# Patient Record
Sex: MALE | Race: WHITE | NOT HISPANIC OR LATINO | Employment: OTHER | URBAN - NONMETROPOLITAN AREA
[De-identification: names, ages, dates, MRNs, and addresses within clinical notes are randomized per-mention and may not be internally consistent; named-entity substitution may affect disease eponyms.]

---

## 2017-01-02 ENCOUNTER — TELEPHONE (OUTPATIENT)
Dept: URGENT CARE | Facility: CLINIC | Age: 52
End: 2017-01-02

## 2017-01-02 NOTE — TELEPHONE ENCOUNTER
Patient called stating he still isn't completely better.  Per Marya I advised the patient that he should be seen either here or by his PCP due to his heart history.  Marya stated the patient should still have abx left, the patient advised me that he had taken more than one tablet a day.    Gregoria Freeman RN 1/2/2017 2:02 PM

## 2017-01-09 RX ORDER — TRAZODONE HYDROCHLORIDE 100 MG/1
TABLET ORAL
Qty: 30 TABLET | Refills: 0 | Status: SHIPPED | OUTPATIENT
Start: 2017-01-09 | End: 2017-01-31 | Stop reason: SDUPTHER

## 2017-01-30 ENCOUNTER — TELEPHONE (OUTPATIENT)
Dept: INTERNAL MEDICINE | Facility: CLINIC | Age: 52
End: 2017-01-30

## 2017-01-30 DIAGNOSIS — E03.9 HYPOTHYROIDISM IN ADULT: Primary | ICD-10-CM

## 2017-01-30 DIAGNOSIS — K21.9 GASTROESOPHAGEAL REFLUX DISEASE WITHOUT ESOPHAGITIS: ICD-10-CM

## 2017-01-30 RX ORDER — LEVOTHYROXINE SODIUM 0.05 MG/1
50 TABLET ORAL DAILY
Qty: 30 TABLET | Refills: 3 | Status: CANCELLED | OUTPATIENT
Start: 2017-01-30

## 2017-01-30 RX ORDER — ESOMEPRAZOLE MAGNESIUM 40 MG/1
40 CAPSULE, DELAYED RELEASE ORAL
Qty: 30 CAPSULE | Refills: 5 | Status: SHIPPED | OUTPATIENT
Start: 2017-01-30 | End: 2017-08-08 | Stop reason: SDUPTHER

## 2017-01-30 RX ORDER — ESOMEPRAZOLE MAGNESIUM 40 MG/1
40 CAPSULE, DELAYED RELEASE ORAL
Qty: 30 CAPSULE | Refills: 3 | Status: CANCELLED | OUTPATIENT
Start: 2017-01-30

## 2017-01-30 RX ORDER — LEVOTHYROXINE SODIUM 0.05 MG/1
50 TABLET ORAL DAILY
Qty: 30 TABLET | Refills: 5 | Status: SHIPPED | OUTPATIENT
Start: 2017-01-30 | End: 2017-08-08 | Stop reason: SDUPTHER

## 2017-01-31 RX ORDER — TRAZODONE HYDROCHLORIDE 100 MG/1
TABLET ORAL
Qty: 30 TABLET | Refills: 0 | Status: SHIPPED | OUTPATIENT
Start: 2017-01-31 | End: 2017-02-27 | Stop reason: SDUPTHER

## 2017-01-31 RX ORDER — ESOMEPRAZOLE MAGNESIUM 40 MG/1
1 CAPSULE, DELAYED RELEASE ORAL DAILY
COMMUNITY
Start: 2016-01-13 | End: 2017-01-31 | Stop reason: SDUPTHER

## 2017-02-20 ENCOUNTER — OFFICE VISIT (OUTPATIENT)
Dept: INTERNAL MEDICINE | Facility: CLINIC | Age: 52
End: 2017-02-20

## 2017-02-20 ENCOUNTER — LAB (OUTPATIENT)
Dept: LAB | Facility: HOSPITAL | Age: 52
End: 2017-02-20

## 2017-02-20 VITALS
SYSTOLIC BLOOD PRESSURE: 150 MMHG | HEIGHT: 68 IN | WEIGHT: 247.5 LBS | OXYGEN SATURATION: 98 % | DIASTOLIC BLOOD PRESSURE: 108 MMHG | RESPIRATION RATE: 16 BRPM | HEART RATE: 102 BPM | BODY MASS INDEX: 37.51 KG/M2

## 2017-02-20 DIAGNOSIS — I10 ESSENTIAL HYPERTENSION: ICD-10-CM

## 2017-02-20 DIAGNOSIS — R51.9 CHRONIC NONINTRACTABLE HEADACHE, UNSPECIFIED HEADACHE TYPE: Primary | ICD-10-CM

## 2017-02-20 DIAGNOSIS — E03.9 ACQUIRED HYPOTHYROIDISM: ICD-10-CM

## 2017-02-20 DIAGNOSIS — M25.50 ARTHRALGIA, UNSPECIFIED JOINT: ICD-10-CM

## 2017-02-20 DIAGNOSIS — G89.29 CHRONIC NONINTRACTABLE HEADACHE, UNSPECIFIED HEADACHE TYPE: Primary | ICD-10-CM

## 2017-02-20 DIAGNOSIS — I50.22 CHRONIC SYSTOLIC HEART FAILURE (HCC): ICD-10-CM

## 2017-02-20 DIAGNOSIS — R41.3 MEMORY DEFICIT: ICD-10-CM

## 2017-02-20 DIAGNOSIS — R79.89 ELEVATED LFTS: ICD-10-CM

## 2017-02-20 LAB
ALBUMIN SERPL-MCNC: 4.1 G/DL (ref 3.5–5)
ALBUMIN/GLOB SERPL: 1.4 G/DL (ref 1.1–2.5)
ALP SERPL-CCNC: 92 U/L (ref 24–120)
ALT SERPL W P-5'-P-CCNC: 50 U/L (ref 0–54)
ANION GAP SERPL CALCULATED.3IONS-SCNC: 10 MMOL/L (ref 4–13)
APAP SERPL-MCNC: <10 MCG/ML (ref 10–30)
ARTICHOKE IGE QN: 60 MG/DL (ref 0–99)
AST SERPL-CCNC: 38 U/L (ref 7–45)
BILIRUB SERPL-MCNC: 0.4 MG/DL (ref 0.1–1)
BUN BLD-MCNC: 12 MG/DL (ref 5–21)
BUN/CREAT SERPL: 12.2 (ref 7–25)
CALCIUM SPEC-SCNC: 9.1 MG/DL (ref 8.4–10.4)
CHLORIDE SERPL-SCNC: 102 MMOL/L (ref 98–110)
CHOLEST SERPL-MCNC: 150 MG/DL (ref 130–200)
CO2 SERPL-SCNC: 29 MMOL/L (ref 24–31)
CREAT BLD-MCNC: 0.98 MG/DL (ref 0.5–1.4)
FERRITIN SERPL-MCNC: 81.7 NG/ML (ref 17.9–464)
GFR SERPL CREATININE-BSD FRML MDRD: 80 ML/MIN/1.73
GLOBULIN UR ELPH-MCNC: 2.9 GM/DL
GLUCOSE BLD-MCNC: 114 MG/DL (ref 70–100)
HDLC SERPL-MCNC: 32 MG/DL
LDLC/HDLC SERPL: ABNORMAL {RATIO}
POTASSIUM BLD-SCNC: 3.7 MMOL/L (ref 3.5–5.3)
PROT SERPL-MCNC: 7 G/DL (ref 6.3–8.7)
SODIUM BLD-SCNC: 141 MMOL/L (ref 135–145)
TRIGL SERPL-MCNC: 475 MG/DL (ref 0–149)
TSH SERPL DL<=0.05 MIU/L-ACNC: 3.48 MIU/ML (ref 0.47–4.68)

## 2017-02-20 PROCEDURE — 82728 ASSAY OF FERRITIN: CPT | Performed by: INTERNAL MEDICINE

## 2017-02-20 PROCEDURE — 99204 OFFICE O/P NEW MOD 45 MIN: CPT | Performed by: INTERNAL MEDICINE

## 2017-02-20 PROCEDURE — 86038 ANTINUCLEAR ANTIBODIES: CPT | Performed by: INTERNAL MEDICINE

## 2017-02-20 PROCEDURE — 83516 IMMUNOASSAY NONANTIBODY: CPT | Performed by: INTERNAL MEDICINE

## 2017-02-20 PROCEDURE — 36415 COLL VENOUS BLD VENIPUNCTURE: CPT

## 2017-02-20 PROCEDURE — 80061 LIPID PANEL: CPT | Performed by: INTERNAL MEDICINE

## 2017-02-20 PROCEDURE — 80053 COMPREHEN METABOLIC PANEL: CPT | Performed by: INTERNAL MEDICINE

## 2017-02-20 PROCEDURE — 80307 DRUG TEST PRSMV CHEM ANLYZR: CPT | Performed by: INTERNAL MEDICINE

## 2017-02-20 PROCEDURE — 84443 ASSAY THYROID STIM HORMONE: CPT | Performed by: INTERNAL MEDICINE

## 2017-02-20 RX ORDER — VALSARTAN 160 MG/1
160 TABLET ORAL DAILY
Qty: 30 TABLET | Refills: 5 | Status: SHIPPED | OUTPATIENT
Start: 2017-02-20 | End: 2017-08-03 | Stop reason: SDUPTHER

## 2017-02-20 RX ORDER — AMITRIPTYLINE HYDROCHLORIDE 100 MG/1
100 TABLET, FILM COATED ORAL 2 TIMES DAILY
COMMUNITY
End: 2019-02-19

## 2017-02-20 RX ORDER — AMITRIPTYLINE HYDROCHLORIDE 25 MG/1
50 TABLET, FILM COATED ORAL NIGHTLY
COMMUNITY
End: 2017-06-21

## 2017-02-20 NOTE — PROGRESS NOTES
"CC: Memory issues    History:  Ziyad Wilson is a 51 y.o. male who presents today for evaluation of the above problems.  He reports he has been doing reasonably well without any acute illness.  He recently tried to start a new job as a , but was unable to perform the necessary tasks.  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  He feels he had done very well during training and was confident starting this job, though he reports it as a \"complete disaster.\"  He has been performing his usual activities at home and has not noticed any memory issues there.  He states he is dyslexic and has always had trouble with learning, though he had been very confident prior to this job.  He denies any syncope, mental status change, or falls.  He has not noticed any weakness, vision changes or other focal symptoms.  He also has a history of chronic headaches, for which he has been receiving Fioricet from Dr. haq and also receiving injections a few times a year.  He reports the injections helped for about a month and then he requires medication again.  He has a history of MRI of the C-spine performed in 2014 at [facility], which was reviewed and showed only degenerative changes.  He has never had any imaging of his head to this point that is available to me.  He reports he takes Tylenol and other over-the-counter pain medications in addition to his Fioricet related to his headaches and other arthralgias.  He also wonders if his thyroid medication needs adjustment as he notes he has gained weight.  He reports this is ongoing worsening of his joint pains, especially in his knees.  However, he notes no other symptoms related to his thyroid that he cannot specify.    ROS:  Review of Systems   Constitutional: Negative for chills and fever.   HENT: Negative for congestion and sore throat.    Eyes: Negative for visual disturbance.   Respiratory: Negative for cough and shortness " of breath.    Cardiovascular: Negative for chest pain and palpitations.   Gastrointestinal: Negative for abdominal pain, constipation and nausea.   Endocrine: Negative for cold intolerance and heat intolerance.   Genitourinary: Negative for difficulty urinating and frequency.   Musculoskeletal: Positive for arthralgias, back pain and neck pain. Negative for gait problem.   Skin: Negative for rash.   Neurological: Positive for headaches. Negative for dizziness, syncope and weakness.   Psychiatric/Behavioral: Negative for dysphoric mood. The patient is not nervous/anxious.        No Known Allergies  Past Medical History   Diagnosis Date   • Asthma    • Cardiomyopathy      non-ischemic   • CHF (congestive heart failure)    • Coronary artery disease    • GERD (gastroesophageal reflux disease)    • Headache    • Hyperlipemia, mixed    • Hypertension      benign   • Hypothyroidism    • Sleep apnea    • SOB (shortness of breath)      Past Surgical History   Procedure Laterality Date   • Colostomy       with colostomy reversal   • Appendectomy     • Cardiac catheterization  09/2003   • Bladder repair       Family History   Problem Relation Age of Onset   • Hypertension Mother    • Diabetes Mother    • Diabetes Father    • Hypertension Father    • Hypertension Sister    • Hypertension Brother       reports that he has never smoked. He does not have any smokeless tobacco history on file. He reports that he drinks alcohol. He reports that he does not use illicit drugs.      Current Outpatient Prescriptions:   •  albuterol (PROAIR HFA) 108 (90 BASE) MCG/ACT inhaler, Inhale as needed., Disp: , Rfl:   •  albuterol (PROVENTIL HFA;VENTOLIN HFA) 108 (90 BASE) MCG/ACT inhaler, Inhale as needed., Disp: , Rfl:   •  amitriptyline (ELAVIL) 100 MG tablet, Take 100 mg by mouth 3 (Three) Times a Day., Disp: , Rfl:   •  amitriptyline (ELAVIL) 25 MG tablet, Take 25 mg by mouth Every Night., Disp: , Rfl:   •  aspirin 325 MG tablet, daily.,  "Disp: , Rfl:   •  carvedilol (COREG) 25 MG tablet, Take 2 tablets by mouth 2 (two) times a day., Disp: 120 tablet, Rfl: 11  •  esomeprazole (NEXIUM) 40 MG capsule, Take 1 capsule by mouth Every Morning Before Breakfast., Disp: 30 capsule, Rfl: 5  •  fluticasone (FLONASE) 50 MCG/ACT nasal spray, 2 sprays into each nostril Daily. Administer 2 sprays in each nostril for each dose., Disp: 1 each, Rfl: 0  •  fluticasone-salmeterol (ADVAIR) 100-50 MCG/DOSE DISKUS, Inhale 1 puff 2 (two) times a day., Disp: , Rfl:   •  furosemide (LASIX) 40 MG tablet, TAKE ONE TABLET BY MOUTH TWICE DAILY AS NEEDED, Disp: 60 tablet, Rfl: 11  •  guaiFENesin (MUCINEX) 600 MG 12 hr tablet, Take 1 tablet by mouth 2 (Two) Times a Day., Disp: 20 tablet, Rfl: 0  •  levothyroxine (SYNTHROID, LEVOTHROID) 50 MCG tablet, Take 1 tablet by mouth Daily., Disp: 30 tablet, Rfl: 5  •  nitroglycerin (NITROSTAT) 0.4 MG SL tablet, Place 0.4 mg under the tongue as needed. Take no more than 3 doses in 15 minutes., Disp: , Rfl:   •  sertraline (ZOLOFT) 50 MG tablet, Take 50 mg by mouth 3 (three) times a day., Disp: , Rfl:   •  simvastatin (ZOCOR) 40 MG tablet, TAKE ONE TABLET BY MOUTH ONCE DAILY, Disp: 30 tablet, Rfl: 11  •  spironolactone (ALDACTONE) 25 MG tablet, TAKE ONE TABLET BY MOUTH ONCE DAILY, Disp: 30 tablet, Rfl: 6  •  traZODone (DESYREL) 100 MG tablet, TAKE ONE-HALF TO ONE TABLET BY MOUTH AT BEDTIME AS NEEDED FOR SLEEP, Disp: 30 tablet, Rfl: 0  •  valsartan (DIOVAN) 80 MG tablet, TAKE ONE TABLET BY MOUTH ONCE DAILY, Disp: 30 tablet, Rfl: 11    OBJECTIVE:  Visit Vitals   • BP (!) 150/108 (BP Location: Left arm, Patient Position: Sitting, Cuff Size: Large Adult)   • Pulse 102   • Resp 16   • Ht 68\" (172.7 cm)   • Wt 247 lb 8 oz (112 kg)   • SpO2 98%   • BMI 37.63 kg/m2      Physical Exam   Constitutional: He is oriented to person, place, and time. He appears well-developed and well-nourished. No distress.   HENT:   Head: Normocephalic and atraumatic. "   Right Ear: External ear normal.   Left Ear: External ear normal.   Nose: Nose normal.   Mouth/Throat: Oropharynx is clear and moist. No oropharyngeal exudate.   Eyes: EOM are normal. No scleral icterus.   Neck: Normal range of motion. Neck supple.   Cardiovascular: Normal rate, regular rhythm and normal heart sounds.    No murmur heard.  Pulmonary/Chest: Effort normal and breath sounds normal. No accessory muscle usage. No respiratory distress. He has no wheezes.   Abdominal: Soft. Bowel sounds are normal. He exhibits no distension. There is no tenderness.   Musculoskeletal: Normal range of motion. He exhibits no edema.   Lymphadenopathy:     He has no cervical adenopathy.   Neurological: He is alert and oriented to person, place, and time. No cranial nerve deficit. Coordination and gait normal.   Skin: Skin is warm and dry. No cyanosis. Nails show no clubbing.   No jaundice   Psychiatric: He has a normal mood and affect. His mood appears not anxious. He does not exhibit a depressed mood.       Assessment/Plan    Diagnoses and all orders for this visit:    Chronic nonintractable headache, unspecified headache type  Memory deficit  -     CT Head Without Contrast; Future  We will perform CT of the head without contrast for further evaluation given his chronic headaches with no history of imaging to my knowledge.  It seems these memory problems were primarily centered around a new job, but he has not had any issues with other executive function or ADLs.  I reviewed his medications carefully, as many of his previous medications could affect his cognitive status.  However, it seems most of these, with the exception of Zoloft, trazodone and Elavil are past medications.  We will await results of imaging and plan to follow-up in one month.  If symptoms are persisting, we will consider performance of a MoCA test in the office.    Chronic systolic heart failure  -     Comprehensive Metabolic Panel; Future  Stable on beta  blocker, ARB, and spironolactone.  Follows with Dr. Lagos.    Essential hypertension  -     valsartan (DIOVAN) 160 MG tablet; Take 1 tablet by mouth Daily.  -     Comprehensive Metabolic Panel; Future  -     Lipid Panel; Future  Poorly controlled, BP goal for age is <140/90 per JNC 8 guidelines and We will double Diovan to 160 mg daily.    Elevated LFTs  -     Comprehensive Metabolic Panel; Future  -     Lipid Panel; Future  -     Acetaminophen Level; Future  -     KORI; Future  -     Anti-Smooth Muscle Antibody; Future  -     Mitochondrial Antibodies, M2; Future  -     Ferritin; Future  It seems this is been a chronic issue, though he has had only a hepatitis panel related to this.  He does take Tylenol on a regular basis, though he is unsure to tell me exactly how much.  As such, we will perform the above laboratories to further evaluate his elevated liver chemistries.  He drinks only on occasion and his hepatitis panel was negative.    Acquired hypothyroidism  -     TSH; Future  We will continue his current dose of Synthroid and check TSH for further evaluation of the need to adjust his pharmacologic replacement.    Arthralgia, unspecified joint   -     Acetaminophen Level; Future        An After Visit Summary was printed and given to the patient at discharge.  Return in about 1 month (around 3/20/2017) for Recheck with RHETT.         Ashish Thurman D.O. 2/20/2017

## 2017-02-21 ENCOUNTER — HOSPITAL ENCOUNTER (OUTPATIENT)
Dept: CT IMAGING | Facility: HOSPITAL | Age: 52
Discharge: HOME | End: 2017-02-21
Admitting: INTERNAL MEDICINE

## 2017-02-21 DIAGNOSIS — R51.9 CHRONIC NONINTRACTABLE HEADACHE, UNSPECIFIED HEADACHE TYPE: ICD-10-CM

## 2017-02-21 DIAGNOSIS — G89.29 CHRONIC NONINTRACTABLE HEADACHE, UNSPECIFIED HEADACHE TYPE: ICD-10-CM

## 2017-02-21 LAB
ANA SER QL: NEGATIVE
DEPRECATED MITOCHONDRIA M2 IGG SER-ACNC: <20 UNITS (ref 0–20)

## 2017-02-21 PROCEDURE — 70450 CT HEAD/BRAIN W/O DYE: CPT

## 2017-02-27 RX ORDER — TRAZODONE HYDROCHLORIDE 100 MG/1
TABLET ORAL
Qty: 30 TABLET | Refills: 3 | Status: SHIPPED | OUTPATIENT
Start: 2017-02-27 | End: 2017-06-21 | Stop reason: SDUPTHER

## 2017-03-20 ENCOUNTER — OFFICE VISIT (OUTPATIENT)
Dept: INTERNAL MEDICINE | Facility: CLINIC | Age: 52
End: 2017-03-20

## 2017-03-20 VITALS
WEIGHT: 230.3 LBS | OXYGEN SATURATION: 97 % | RESPIRATION RATE: 16 BRPM | BODY MASS INDEX: 34.9 KG/M2 | DIASTOLIC BLOOD PRESSURE: 80 MMHG | SYSTOLIC BLOOD PRESSURE: 118 MMHG | HEART RATE: 116 BPM | HEIGHT: 68 IN

## 2017-03-20 DIAGNOSIS — E66.09 NON MORBID OBESITY DUE TO EXCESS CALORIES: ICD-10-CM

## 2017-03-20 DIAGNOSIS — I50.22 CHRONIC SYSTOLIC HEART FAILURE (HCC): ICD-10-CM

## 2017-03-20 DIAGNOSIS — R79.89 ELEVATED LFTS: ICD-10-CM

## 2017-03-20 DIAGNOSIS — I25.10 CORONARY ARTERY DISEASE INVOLVING NATIVE CORONARY ARTERY OF NATIVE HEART WITHOUT ANGINA PECTORIS: ICD-10-CM

## 2017-03-20 DIAGNOSIS — G31.84 MILD COGNITIVE IMPAIRMENT: ICD-10-CM

## 2017-03-20 DIAGNOSIS — I10 ESSENTIAL HYPERTENSION: Primary | ICD-10-CM

## 2017-03-20 PROBLEM — R51.9 HEADACHE DISORDER: Status: ACTIVE | Noted: 2017-03-07

## 2017-03-20 PROCEDURE — 99214 OFFICE O/P EST MOD 30 MIN: CPT | Performed by: INTERNAL MEDICINE

## 2017-03-20 RX ORDER — MULTIPLE VITAMINS W/ MINERALS TAB 9MG-400MCG
1 TAB ORAL DAILY
COMMUNITY

## 2017-03-20 RX ORDER — GABAPENTIN 100 MG/1
400 CAPSULE ORAL 4 TIMES DAILY
COMMUNITY
End: 2019-04-29

## 2017-03-20 RX ORDER — ATORVASTATIN CALCIUM 40 MG/1
40 TABLET, FILM COATED ORAL DAILY
Qty: 30 TABLET | Refills: 5 | Status: SHIPPED | OUTPATIENT
Start: 2017-03-20 | End: 2017-08-30 | Stop reason: SDUPTHER

## 2017-03-20 RX ORDER — QUETIAPINE FUMARATE 50 MG/1
50 TABLET, FILM COATED ORAL NIGHTLY
COMMUNITY
End: 2017-04-20 | Stop reason: DRUGHIGH

## 2017-03-20 RX ORDER — BUTALBITAL, ACETAMINOPHEN AND CAFFEINE 50; 325; 40 MG/1; MG/1; MG/1
50 TABLET ORAL 2 TIMES DAILY PRN
COMMUNITY
Start: 2017-03-07 | End: 2020-09-16 | Stop reason: SDUPTHER

## 2017-03-20 NOTE — PROGRESS NOTES
CC: f/u cognitive issues and hypertension    History:  Ziyad Wilson is a 51 y.o. male who presents today for follow-up for evaluation of the above:  He reports he has been doing reasonably well and has had no acute illness.  He states his mind has been stable.  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  He states he has a long history of dyslexia, though this has not been a significant issue for him in the past.  He does continue with his writing and reports this has been stable with no increased difficulty.  He has not had difficulty with forgetting names or common things and states he continues to perform all of his own executive function and activities.  He reports he has done well with his increased dose of Diovan without any new side effects and with good tolerance of the medication.  He has a history of coronary disease and reports he continues on aspirin, beta blocker, and statin without any anginal symptoms at this time.    ROS:  Review of Systems   Constitutional: Negative for chills and fever.   HENT: Negative for congestion and sore throat.    Respiratory: Negative for cough and shortness of breath.    Cardiovascular: Negative for chest pain, palpitations and leg swelling.   Gastrointestinal: Negative for abdominal pain, constipation and nausea.       Mr. Wilson  reports that he has never smoked. He does not have any smokeless tobacco history on file. He reports that he drinks alcohol. He reports that he does not use illicit drugs.      Current Outpatient Prescriptions:   •  albuterol (PROAIR HFA) 108 (90 BASE) MCG/ACT inhaler, Inhale as needed., Disp: , Rfl:   •  albuterol (PROVENTIL HFA;VENTOLIN HFA) 108 (90 BASE) MCG/ACT inhaler, Inhale as needed., Disp: , Rfl:   •  amitriptyline (ELAVIL) 100 MG tablet, Take 100 mg by mouth 2 (Two) Times a Day., Disp: , Rfl:   •  amitriptyline (ELAVIL) 25 MG tablet,  Take 50 mg by mouth Every Night., Disp: , Rfl:   •  aspirin 325 MG tablet, daily., Disp: , Rfl:   •  butalbital-acetaminophen-caffeine (FIORICET, ESGIC) -40 MG per tablet, Take 50 tablets by mouth 2 (Two) Times a Day As Needed., Disp: , Rfl:   •  carvedilol (COREG) 25 MG tablet, Take 2 tablets by mouth 2 (two) times a day., Disp: 120 tablet, Rfl: 11  •  esomeprazole (NEXIUM) 40 MG capsule, Take 1 capsule by mouth Every Morning Before Breakfast., Disp: 30 capsule, Rfl: 5  •  fluticasone (FLONASE) 50 MCG/ACT nasal spray, 2 sprays into each nostril Daily. Administer 2 sprays in each nostril for each dose., Disp: 1 each, Rfl: 0  •  fluticasone-salmeterol (ADVAIR) 100-50 MCG/DOSE DISKUS, Inhale 1 puff 2 (two) times a day., Disp: , Rfl:   •  furosemide (LASIX) 40 MG tablet, TAKE ONE TABLET BY MOUTH TWICE DAILY AS NEEDED, Disp: 60 tablet, Rfl: 11  •  gabapentin (NEURONTIN) 100 MG capsule, Take 100 mg by mouth 4 (Four) Times a Day., Disp: , Rfl:   •  guaiFENesin (MUCINEX) 600 MG 12 hr tablet, Take 1 tablet by mouth 2 (Two) Times a Day., Disp: 20 tablet, Rfl: 0  •  levothyroxine (SYNTHROID, LEVOTHROID) 50 MCG tablet, Take 1 tablet by mouth Daily., Disp: 30 tablet, Rfl: 5  •  Multiple Vitamins-Minerals (MULTIVITAMIN WITH MINERALS) tablet tablet, Take 1 tablet by mouth Daily., Disp: , Rfl:   •  nitroglycerin (NITROSTAT) 0.4 MG SL tablet, Place 0.4 mg under the tongue as needed. Take no more than 3 doses in 15 minutes., Disp: , Rfl:   •  QUEtiapine (SEROquel) 50 MG tablet, Take 50 mg by mouth Every Night., Disp: , Rfl:   •  sertraline (ZOLOFT) 50 MG tablet, Take 50 mg by mouth 3 (three) times a day., Disp: , Rfl:   •  spironolactone (ALDACTONE) 25 MG tablet, TAKE ONE TABLET BY MOUTH ONCE DAILY, Disp: 30 tablet, Rfl: 6  •  traZODone (DESYREL) 100 MG tablet, TAKE ONE-HALF TO ONE TABLET BY MOUTH AT BEDTIME AS NEEDED FOR SLEEP, Disp: 30 tablet, Rfl: 3  •  valsartan (DIOVAN) 160 MG tablet, Take 1 tablet by mouth Daily., Disp:  "30 tablet, Rfl: 5  •  atorvastatin (LIPITOR) 40 MG tablet, Take 1 tablet by mouth Daily., Disp: 30 tablet, Rfl: 5      OBJECTIVE:  Visit Vitals   • /80 (BP Location: Left arm, Patient Position: Sitting, Cuff Size: Adult)   • Pulse 116   • Resp 16   • Ht 68\" (172.7 cm)   • Wt 230 lb 4.8 oz (104 kg)   • SpO2 97%   • BMI 35.02 kg/m2      Physical Exam   Constitutional: He is oriented to person, place, and time. He appears well-nourished. No distress.   Cardiovascular: Normal rate, regular rhythm and normal heart sounds.    No murmur heard.  Pulmonary/Chest: Effort normal and breath sounds normal. He has no wheezes.   Abdominal: Soft. There is no tenderness.   Neurological: He is alert and oriented to person, place, and time.   Psychiatric: He has a normal mood and affect.       Assessment/Plan    Diagnoses and all orders for this visit:    Essential hypertension  Well controlled, BP goal for age is <140/90 per JNC 8 guidelines and continue current medications    Coronary artery disease involving native coronary artery of native heart without angina pectoris  -     atorvastatin (LIPITOR) 40 MG tablet; Take 1 tablet by mouth Daily.  Table on aspirin, beta blocker, and statin therapy.    Non morbid obesity due to excess calories  We discussed the importance of portion control and being careful about the types and timing of his meals for the purpose of weight management.    Mild cognitive impairment  He scored a 26/30 on a [place] cognitive assessment test.  We discussed that there is no intervention needed and that if his symptoms were to worsen, we could repeat his test at a future date.    Chronic systolic heart failure  -     atorvastatin (LIPITOR) 40 MG tablet; Take 1 tablet by mouth Daily.  Stable on beta blocker, aldosterone receptor blocker, spironolactone.  He is euvolemic and without symptoms today.    Elevated LFTs  These were resolved on his most recent laboratories with negative workup noted as " well.      An After Visit Summary was printed and given to the patient at discharge.  Return in about 6 months (around 9/20/2017). Sooner if problems arise.         Ashish Thurman D.O. 3/21/2017

## 2017-04-11 RX ORDER — QUETIAPINE FUMARATE 50 MG/1
50 TABLET, FILM COATED ORAL NIGHTLY
Qty: 30 TABLET | Refills: 2 | Status: CANCELLED | OUTPATIENT
Start: 2017-04-11

## 2017-04-20 RX ORDER — QUETIAPINE FUMARATE 25 MG/1
25 TABLET, FILM COATED ORAL NIGHTLY
Qty: 30 TABLET | Refills: 5 | Status: SHIPPED | OUTPATIENT
Start: 2017-04-20 | End: 2017-09-20 | Stop reason: SDUPTHER

## 2017-04-24 ENCOUNTER — OFFICE VISIT (OUTPATIENT)
Dept: PODIATRY | Facility: CLINIC | Age: 52
End: 2017-04-24

## 2017-04-24 VITALS
DIASTOLIC BLOOD PRESSURE: 80 MMHG | HEART RATE: 92 BPM | SYSTOLIC BLOOD PRESSURE: 122 MMHG | BODY MASS INDEX: 37.35 KG/M2 | WEIGHT: 238 LBS | HEIGHT: 67 IN

## 2017-04-24 DIAGNOSIS — M79.672 FOOT PAIN, BILATERAL: Primary | ICD-10-CM

## 2017-04-24 DIAGNOSIS — M79.671 FOOT PAIN, BILATERAL: Primary | ICD-10-CM

## 2017-04-24 PROCEDURE — 99203 OFFICE O/P NEW LOW 30 MIN: CPT | Performed by: PODIATRIST

## 2017-04-24 NOTE — PATIENT INSTRUCTIONS
Foot Pain  Many things can cause foot pain. Some common causes are:  · An injury.  · A sprain.  · Arthritis.  · Blisters.  · Bunions.  HOME CARE INSTRUCTIONS  Pay attention to any changes in your symptoms. Take these actions to help with your discomfort:  · If directed, put ice on the affected area:    Put ice in a plastic bag.    Place a towel between your skin and the bag.    Leave the ice on for 15-20 minutes, 3-4 times a day for 2 days.  · Take over-the-counter and prescription medicines only as told by your health care provider.  · Wear comfortable, supportive shoes that fit you well. Do not wear high heels.  · Do not stand or walk for long periods of time.  · Do not lift a lot of weight. This can put added pressure on your feet.  · Do stretches to relieve foot pain and stiffness as told by your health care provider.  · Rub your foot gently.  · Keep your feet clean and dry.  SEEK MEDICAL CARE IF:  · Your pain does not get better after a few days of self-care.  · Your pain gets worse.  · You cannot stand on your foot.  SEEK IMMEDIATE MEDICAL CARE IF:  · Your foot is numb or tingling.  · Your foot or toes are swollen.  · Your foot or toes turn white or blue.  · You have warmth and redness along your foot.     This information is not intended to replace advice given to you by your health care provider. Make sure you discuss any questions you have with your health care provider.     Document Released: 01/13/2017 Document Reviewed: 01/13/2016  [employer] Patient Education ©2016 [employer]

## 2017-04-24 NOTE — PROGRESS NOTES
[facility] - PODIATRY    Today's Date: 04/24/2017    Patient Name: Ziyad Wilson  MRN: [id number]  CSN: [id number]  PCP: Ashish Thurman DO  Referring Provider: No ref. provider found    SUBJECTIVE     Chief Complaint   Patient presents with   • Lower Extremity Issue     Bilateral foot pain worse on left side.Worse when standing or walking.     HPI: Ziyad Wilson, a 51 y.o.male, comes to clinic as a(n) new patient complaining of foot pain. Pt has h/o Asthma, cardiomyopathy, CHF, CAD, HTN, GERD, and Sleep apnea. Relates that for the past few years his feet have become more painful when he walks, mainly on the outisde of each foot. Relates the left is worse than the right although today he has minimal pain. Admits pain at 4/10 level and described as shooting, aching, throbbing and sharp. Denies previous treatment. Denies any constitutional symptoms. No other pedal complaints at this time.    Past Medical History:   Diagnosis Date   • Asthma    • Cardiomyopathy     non-ischemic   • CHF (congestive heart failure)    • Coronary artery disease    • Foot pain, bilateral    • GERD (gastroesophageal reflux disease)    • Headache    • Hyperlipemia, mixed    • Hypertension     benign   • Hypothyroidism    • Obesity    • Sleep apnea    • SOB (shortness of breath)      Past Surgical History:   Procedure Laterality Date   • APPENDECTOMY     • BLADDER REPAIR     • CARDIAC CATHETERIZATION  09/2003   • COLOSTOMY      with colostomy reversal     Family History   Problem Relation Age of Onset   • Hypertension Mother    • Diabetes Mother    • Stroke Mother    • Diabetes Father    • Hypertension Father    • Heart disease Father    • Hypertension Sister    • Hypertension Brother      Social History     Social History   • Marital status:      Spouse name: N/A   • Number of children: N/A   • Years of education: N/A     Occupational History   • Not on file.     Social History Main Topics   • Smoking status:  Never Smoker   • Smokeless tobacco: Never Used   • Alcohol use Yes      Comment: occasional   • Drug use: No   • Sexual activity: Defer     Other Topics Concern   • Not on file     Social History Narrative     No Known Allergies  Current Outpatient Prescriptions   Medication Sig Dispense Refill   • albuterol (PROVENTIL HFA;VENTOLIN HFA) 108 (90 BASE) MCG/ACT inhaler Inhale as needed.     • amitriptyline (ELAVIL) 100 MG tablet Take 100 mg by mouth 2 (Two) Times a Day.     • amitriptyline (ELAVIL) 25 MG tablet Take 50 mg by mouth Every Night.     • aspirin 325 MG tablet daily.     • atorvastatin (LIPITOR) 40 MG tablet Take 1 tablet by mouth Daily. 30 tablet 5   • butalbital-acetaminophen-caffeine (FIORICET, ESGIC) -40 MG per tablet Take 50 tablets by mouth 2 (Two) Times a Day As Needed.     • carvedilol (COREG) 25 MG tablet Take 2 tablets by mouth 2 (two) times a day. 120 tablet 11   • esomeprazole (NEXIUM) 40 MG capsule Take 1 capsule by mouth Every Morning Before Breakfast. 30 capsule 5   • fluticasone (FLONASE) 50 MCG/ACT nasal spray 2 sprays into each nostril Daily. Administer 2 sprays in each nostril for each dose. 1 each 0   • fluticasone-salmeterol (ADVAIR) 100-50 MCG/DOSE DISKUS Inhale 1 puff 2 (two) times a day.     • furosemide (LASIX) 40 MG tablet TAKE ONE TABLET BY MOUTH TWICE DAILY AS NEEDED 60 tablet 11   • gabapentin (NEURONTIN) 100 MG capsule Take 100 mg by mouth 4 (Four) Times a Day.     • levothyroxine (SYNTHROID, LEVOTHROID) 50 MCG tablet Take 1 tablet by mouth Daily. 30 tablet 5   • Multiple Vitamins-Minerals (MULTIVITAMIN WITH MINERALS) tablet tablet Take 1 tablet by mouth Daily.     • nitroglycerin (NITROSTAT) 0.4 MG SL tablet Place 0.4 mg under the tongue as needed. Take no more than 3 doses in 15 minutes.     • QUEtiapine (SEROQUEL) 25 MG tablet Take 1 tablet by mouth Every Night. 30 tablet 5   • sertraline (ZOLOFT) 50 MG tablet Take 50 mg by mouth 3 (three) times a day.     •  spironolactone (ALDACTONE) 25 MG tablet TAKE ONE TABLET BY MOUTH ONCE DAILY 30 tablet 6   • traZODone (DESYREL) 100 MG tablet TAKE ONE-HALF TO ONE TABLET BY MOUTH AT BEDTIME AS NEEDED FOR SLEEP 30 tablet 3   • valsartan (DIOVAN) 160 MG tablet Take 1 tablet by mouth Daily. 30 tablet 5     No current facility-administered medications for this visit.      Review of Systems   Constitutional: Negative for chills and fever.   HENT: Negative for congestion.    Respiratory: Negative for shortness of breath.    Cardiovascular: Negative for chest pain and leg swelling.   Gastrointestinal: Negative for constipation, diarrhea, nausea and vomiting.   Musculoskeletal:        Foot pain   Skin: Negative for wound.   Neurological: Negative for numbness.       OBJECTIVE     Vitals:    04/24/17 1105   BP: 122/80   Pulse: 92       PHYSICAL EXAM  GEN:   A&Ox3, NAD. Pt presents to clinic ambulating without assistance and wearing Casual Shoes.      NEURO:   Protective sensation intact to 10/10 sites Right foot, 10/10 site Left foot using [place] monofilament  Light touch sensation present  No Tinel's or Ville[place] sign.    VASC:  Skin temperature Warm to Warm proximal to distal ruddy  DP pulses 2/4 Right, 2/4 Left  PT pulses 2/4 Right, 2/4 Left  CFT <3 sec ruddy  Pedal hair growth present  no edema noted ruddy  Varicosities absent ruddy    MUSC/SKEL:  Muscle Strength Right foot Dorsiflexors 5/5, Plantarflexors 5/5, Evertors 5/5, Invertors 5/5  Muscle Strength Left foot Dorsiflexors 5/5, Plantarflexors 5/5, Evertors 5/5, Invertors 5/5  ROM of the 1st MTP is full without pain or crepitus  ROM of the MTJ is full without pain or crepitus    ROM of the STJ is full without pain or crepitus    ROM of the ankle joint is full without pain or crepitus    POP of plantar and lateral 5th metatarsal base ruddy, No pain along peroneal tendons  Rectus foot type   Gait pattern: Normal  No gross pedal musculoskeletal deformities noted.     DERM:  Pedal nails  x10 are within normal limits of length and thickness  Webspaces are Clean, Dry, and Intact  Skin is normal in turgor and texture with no open wounds or sores appreciated.      RADIOLOGY/NUCLEAR:  No results found.    LABORATORY/CULTURE RESULTS:      PATHOLOGY RESULTS:       ASSESSMENT/PLAN     Ziyad was seen today for lower extremity issue.    Diagnoses and all orders for this visit:    Foot pain, bilateral      Comprehensive lower extremity examination and evaluation was performed.  Discussed findings and treatment plan including risks, benefits, and treatment options with patient in detail. Patient agreed with treatment plan.  Pt to start a stretching and icing regime to increase mobility of tissues and decrease inflammation.   Rx: custom inserts with 5th met depression  Pt to bring xrays with him to next appt   An After Visit Summary was printed and given to the patient at discharge, including (if requested) any available informative/educational handouts regarding diagnosis, treatment, or medications. All questions were answered to patient/family satisfaction. Should symptoms fail to improve or worsen they agree to call or return to clinic or to go to the Emergency Department. Discussed the importance of following up with any needed screening tests/labs/specialist appointments and any requested follow-up recommended by me today. Importance of maintaining follow-up discussed and patient accepts that missed appointments can delay diagnosis and potentially lead to worsening of conditions.  Return in about 2 months (around 6/24/2017)., or sooner if acute issues arise.        This document has been electronically signed by Anthony Phillips DPM on April 24, 2017 11:40 AM     Please note that portions of this note may have been completed with a voice recognition program. Efforts were made to edit the dictations, but occasionally words are mistranscribed.

## 2017-06-19 ENCOUNTER — TELEPHONE (OUTPATIENT)
Dept: INTERNAL MEDICINE | Facility: CLINIC | Age: 52
End: 2017-06-19

## 2017-06-21 RX ORDER — TRAZODONE HYDROCHLORIDE 100 MG/1
100 TABLET ORAL NIGHTLY
Qty: 30 TABLET | Refills: 3 | Status: SHIPPED | OUTPATIENT
Start: 2017-06-21 | End: 2017-09-20 | Stop reason: SDUPTHER

## 2017-08-03 DIAGNOSIS — I10 ESSENTIAL HYPERTENSION: ICD-10-CM

## 2017-08-03 RX ORDER — VALSARTAN 160 MG/1
160 TABLET ORAL DAILY
Qty: 30 TABLET | Refills: 5 | Status: SHIPPED | OUTPATIENT
Start: 2017-08-03 | End: 2018-01-22 | Stop reason: SDUPTHER

## 2017-08-08 DIAGNOSIS — K21.9 GASTROESOPHAGEAL REFLUX DISEASE WITHOUT ESOPHAGITIS: ICD-10-CM

## 2017-08-08 DIAGNOSIS — E03.9 HYPOTHYROIDISM IN ADULT: ICD-10-CM

## 2017-08-08 RX ORDER — ESOMEPRAZOLE MAGNESIUM 40 MG/1
40 CAPSULE, DELAYED RELEASE ORAL
Qty: 30 CAPSULE | Refills: 11 | Status: SHIPPED | OUTPATIENT
Start: 2017-08-08 | End: 2019-10-29 | Stop reason: SDUPTHER

## 2017-08-08 RX ORDER — LEVOTHYROXINE SODIUM 0.05 MG/1
50 TABLET ORAL DAILY
Qty: 30 TABLET | Refills: 11 | Status: SHIPPED | OUTPATIENT
Start: 2017-08-08 | End: 2018-07-20 | Stop reason: SDUPTHER

## 2017-08-18 PROBLEM — R06.02 SOB (SHORTNESS OF BREATH): Status: ACTIVE | Noted: 2017-08-18

## 2017-08-18 PROBLEM — G47.30 SLEEP APNEA: Status: ACTIVE | Noted: 2017-08-18

## 2017-08-18 PROBLEM — J45.909 ASTHMA: Status: ACTIVE | Noted: 2017-08-18

## 2017-08-18 PROBLEM — K21.9 GERD (GASTROESOPHAGEAL REFLUX DISEASE): Status: ACTIVE | Noted: 2017-08-18

## 2017-08-29 DIAGNOSIS — I25.10 CORONARY ARTERY DISEASE INVOLVING NATIVE CORONARY ARTERY OF NATIVE HEART WITHOUT ANGINA PECTORIS: ICD-10-CM

## 2017-08-29 DIAGNOSIS — I50.22 CHRONIC SYSTOLIC HEART FAILURE (HCC): ICD-10-CM

## 2017-08-29 RX ORDER — ATORVASTATIN CALCIUM 40 MG/1
TABLET, FILM COATED ORAL
Qty: 30 TABLET | Refills: 5 | Status: CANCELLED | OUTPATIENT
Start: 2017-08-29

## 2017-08-30 ENCOUNTER — OFFICE VISIT (OUTPATIENT)
Dept: CARDIOLOGY | Facility: CLINIC | Age: 52
End: 2017-08-30

## 2017-08-30 VITALS
HEIGHT: 67 IN | DIASTOLIC BLOOD PRESSURE: 78 MMHG | BODY MASS INDEX: 35.82 KG/M2 | WEIGHT: 228.2 LBS | SYSTOLIC BLOOD PRESSURE: 111 MMHG | HEART RATE: 90 BPM

## 2017-08-30 DIAGNOSIS — I25.10 CORONARY ARTERY DISEASE INVOLVING NATIVE CORONARY ARTERY OF NATIVE HEART WITHOUT ANGINA PECTORIS: ICD-10-CM

## 2017-08-30 DIAGNOSIS — I50.22 CHRONIC SYSTOLIC HEART FAILURE (HCC): ICD-10-CM

## 2017-08-30 DIAGNOSIS — I10 ESSENTIAL HYPERTENSION: ICD-10-CM

## 2017-08-30 DIAGNOSIS — E78.2 HYPERLIPEMIA, MIXED: ICD-10-CM

## 2017-08-30 DIAGNOSIS — I42.9 CARDIOMYOPATHY (HCC): Primary | ICD-10-CM

## 2017-08-30 PROCEDURE — 93000 ELECTROCARDIOGRAM COMPLETE: CPT | Performed by: INTERNAL MEDICINE

## 2017-08-30 PROCEDURE — 99213 OFFICE O/P EST LOW 20 MIN: CPT | Performed by: INTERNAL MEDICINE

## 2017-08-30 RX ORDER — ATORVASTATIN CALCIUM 40 MG/1
40 TABLET, FILM COATED ORAL DAILY
Qty: 30 TABLET | Refills: 11 | Status: SHIPPED | OUTPATIENT
Start: 2017-08-30 | End: 2018-09-10 | Stop reason: SDUPTHER

## 2017-08-30 RX ORDER — CARVEDILOL 25 MG/1
TABLET ORAL
Qty: 120 TABLET | Refills: 6 | Status: SHIPPED | OUTPATIENT
Start: 2017-08-30 | End: 2018-03-26 | Stop reason: SDUPTHER

## 2017-08-30 NOTE — PROGRESS NOTES
"Subjective    Ziyad Wilson is a 52 y.o. male. Fu of cmo and ihd and risks    History of Present Illness     NON-ISCHEMI CMO (MILD):  Last LVEF=40-45%. Got better with meds and cessation of anabolic steroids. Has cont good stamina and no unusual sob. Is compliant with meds and no edema. \"I've been feeling real good\"    IHD:  No angina or sob and good stamina. Tolerates meds ok    HTN:  Checks at home are all good    HLD:  Last LDL=60 and tolerates statin ok        The following portions of the patient's history were reviewed and updated as appropriate: allergies, current medications, past family history, past medical history, past social history, past surgical history and problem list.    Patient Active Problem List   Diagnosis   • Cardiomyopathy   • Essential hypertension   • Coronary artery disease   • Elevated LFTs   • Acquired hypothyroidism   • Headache disorder   • Non morbid obesity due to excess calories   • Mild cognitive impairment   • SOB (shortness of breath)   • Asthma   • GERD (gastroesophageal reflux disease)   • Sleep apnea   • Hyperlipemia, mixed       No Known Allergies    Family History   Problem Relation Age of Onset   • Hypertension Mother    • Stroke Mother    • Heart disease Mother    • Hypertension Father    • Heart disease Father    • Hypertension Sister    • Hypertension Brother        Social History     Social History   • Marital status:      Spouse name: N/A   • Number of children: N/A   • Years of education: N/A     Occupational History   • Not on file.     Social History Main Topics   • Smoking status: Never Smoker   • Smokeless tobacco: Never Used   • Alcohol use Yes      Comment: occasional   • Drug use: No   • Sexual activity: Defer     Other Topics Concern   • Not on file     Social History Narrative         Current Outpatient Prescriptions:   •  albuterol (PROVENTIL HFA;VENTOLIN HFA) 108 (90 BASE) MCG/ACT inhaler, Inhale as needed., Disp: , Rfl:   •  amitriptyline (ELAVIL) " 100 MG tablet, Take 100 mg by mouth 2 (Two) Times a Day., Disp: , Rfl:   •  aspirin 325 MG tablet, daily., Disp: , Rfl:   •  atorvastatin (LIPITOR) 40 MG tablet, Take 1 tablet by mouth Daily., Disp: 30 tablet, Rfl: 11  •  butalbital-acetaminophen-caffeine (FIORICET, ESGIC) -40 MG per tablet, Take 50 tablets by mouth 2 (Two) Times a Day As Needed., Disp: , Rfl:   •  carvedilol (COREG) 25 MG tablet, Take 2 tablets by mouth 2 (two) times a day., Disp: 120 tablet, Rfl: 11  •  esomeprazole (NEXIUM) 40 MG capsule, Take 1 capsule by mouth Every Morning Before Breakfast., Disp: 30 capsule, Rfl: 11  •  fluticasone (FLONASE) 50 MCG/ACT nasal spray, 2 sprays into each nostril Daily. Administer 2 sprays in each nostril for each dose., Disp: 1 each, Rfl: 0  •  fluticasone-salmeterol (ADVAIR) 100-50 MCG/DOSE DISKUS, Inhale 1 puff 2 (two) times a day., Disp: , Rfl:   •  furosemide (LASIX) 40 MG tablet, TAKE ONE TABLET BY MOUTH TWICE DAILY AS NEEDED, Disp: 60 tablet, Rfl: 11  •  gabapentin (NEURONTIN) 100 MG capsule, Take 100 mg by mouth 4 (Four) Times a Day., Disp: , Rfl:   •  levothyroxine (SYNTHROID, LEVOTHROID) 50 MCG tablet, Take 1 tablet by mouth Daily., Disp: 30 tablet, Rfl: 11  •  Multiple Vitamins-Minerals (MULTIVITAMIN WITH MINERALS) tablet tablet, Take 1 tablet by mouth Daily., Disp: , Rfl:   •  nitroglycerin (NITROSTAT) 0.4 MG SL tablet, Place 0.4 mg under the tongue as needed. Take no more than 3 doses in 15 minutes., Disp: , Rfl:   •  QUEtiapine (SEROQUEL) 25 MG tablet, Take 1 tablet by mouth Every Night., Disp: 30 tablet, Rfl: 5  •  sertraline (ZOLOFT) 50 MG tablet, Take 50 mg by mouth 3 (three) times a day., Disp: , Rfl:   •  spironolactone (ALDACTONE) 25 MG tablet, TAKE ONE TABLET BY MOUTH ONCE DAILY, Disp: 30 tablet, Rfl: 6  •  traZODone (DESYREL) 100 MG tablet, Take 1 tablet by mouth Every Night., Disp: 30 tablet, Rfl: 3  •  valsartan (DIOVAN) 160 MG tablet, Take 1 tablet by mouth Daily., Disp: 30 tablet,  "Rfl: 5    Past Surgical History:   Procedure Laterality Date   • APPENDECTOMY     • BLADDER REPAIR     • CARDIAC CATHETERIZATION  09/2003   • COLOSTOMY      with colostomy reversal       Review of Systems   Respiratory: Negative for apnea, chest tightness and shortness of breath.    Cardiovascular: Negative for chest pain, palpitations and leg swelling.   Gastrointestinal: Negative for abdominal pain.   Genitourinary: Negative for dysuria.   Musculoskeletal: Negative for myalgias.   Neurological: Negative for weakness and light-headedness.   Psychiatric/Behavioral: Negative for sleep disturbance.       /78 (BP Location: Left arm, Patient Position: Sitting, Cuff Size: Adult)  Pulse 90  Ht 67\" (170.2 cm)  Wt 228 lb 3.2 oz (104 kg)  BMI 35.74 kg/m2  Procedures    Objective   Physical Exam   Constitutional: He is oriented to person, place, and time. He appears well-developed.   HENT:   Head: Normocephalic.   Eyes: Pupils are equal, round, and reactive to light.   Neck: No thyromegaly present.   Cardiovascular: Normal rate, regular rhythm, normal heart sounds and intact distal pulses.  Exam reveals no gallop and no friction rub.    No murmur heard.  Pulmonary/Chest: Effort normal and breath sounds normal. No respiratory distress. He has no wheezes. He has no rales.   Abdominal: Soft. Bowel sounds are normal. He exhibits no distension. There is no tenderness.   Musculoskeletal: He exhibits tenderness. He exhibits no edema.   Neurological: He is alert and oriented to person, place, and time.   Skin: Skin is warm and dry.   Psychiatric: He has a normal mood and affect.       Assessment/Plan   Ziyad was seen today for congestive heart failure, coronary artery disease, hypertension and edema.    Diagnoses and all orders for this visit:    Cardiomyopathy  Comments:  mild with no overt chf    Essential hypertension  Comments:  controlled    Coronary artery disease involving native coronary artery of native heart " without angina pectoris  Comments:  no angina  Orders:  -     ECG 12 Lead    Hyperlipemia, mixed  Comments:  controlled                 Return in about 18 months (around 2/28/2019).  Orders Placed This Encounter   Procedures   • ECG 12 Lead     Order Specific Question:   Reason for Exam:     Answer:   ihd

## 2017-09-20 ENCOUNTER — OFFICE VISIT (OUTPATIENT)
Dept: INTERNAL MEDICINE | Facility: CLINIC | Age: 52
End: 2017-09-20

## 2017-09-20 VITALS
HEIGHT: 67 IN | SYSTOLIC BLOOD PRESSURE: 138 MMHG | WEIGHT: 233.1 LBS | BODY MASS INDEX: 36.58 KG/M2 | RESPIRATION RATE: 16 BRPM | OXYGEN SATURATION: 95 % | DIASTOLIC BLOOD PRESSURE: 80 MMHG | HEART RATE: 94 BPM

## 2017-09-20 DIAGNOSIS — R73.02 IMPAIRED GLUCOSE TOLERANCE: ICD-10-CM

## 2017-09-20 DIAGNOSIS — E66.09 NON MORBID OBESITY DUE TO EXCESS CALORIES: ICD-10-CM

## 2017-09-20 DIAGNOSIS — E78.2 HYPERLIPEMIA, MIXED: ICD-10-CM

## 2017-09-20 DIAGNOSIS — Z23 ENCOUNTER FOR IMMUNIZATION: ICD-10-CM

## 2017-09-20 DIAGNOSIS — F51.01 PRIMARY INSOMNIA: ICD-10-CM

## 2017-09-20 DIAGNOSIS — Z00.00 ENCOUNTER FOR PREVENTIVE HEALTH EXAMINATION: ICD-10-CM

## 2017-09-20 DIAGNOSIS — I10 ESSENTIAL HYPERTENSION: ICD-10-CM

## 2017-09-20 DIAGNOSIS — F33.1 MODERATE EPISODE OF RECURRENT MAJOR DEPRESSIVE DISORDER (HCC): Primary | ICD-10-CM

## 2017-09-20 PROBLEM — R79.89 ELEVATED LFTS: Status: RESOLVED | Noted: 2017-02-20 | Resolved: 2017-09-20

## 2017-09-20 PROCEDURE — 99214 OFFICE O/P EST MOD 30 MIN: CPT | Performed by: INTERNAL MEDICINE

## 2017-09-20 PROCEDURE — G0438 PPPS, INITIAL VISIT: HCPCS | Performed by: INTERNAL MEDICINE

## 2017-09-20 PROCEDURE — 90471 IMMUNIZATION ADMIN: CPT | Performed by: INTERNAL MEDICINE

## 2017-09-20 PROCEDURE — 90686 IIV4 VACC NO PRSV 0.5 ML IM: CPT | Performed by: INTERNAL MEDICINE

## 2017-09-20 RX ORDER — GABAPENTIN 100 MG/1
100 CAPSULE ORAL 4 TIMES DAILY
Refills: 3 | Status: CANCELLED | OUTPATIENT
Start: 2017-09-20

## 2017-09-20 RX ORDER — ESCITALOPRAM OXALATE 10 MG/1
10 TABLET ORAL DAILY
Qty: 30 TABLET | Refills: 5 | Status: SHIPPED | OUTPATIENT
Start: 2017-09-20 | End: 2017-12-14 | Stop reason: SDUPTHER

## 2017-09-20 RX ORDER — TRAZODONE HYDROCHLORIDE 100 MG/1
100 TABLET ORAL NIGHTLY
Qty: 30 TABLET | Refills: 5 | Status: SHIPPED | OUTPATIENT
Start: 2017-09-20 | End: 2018-02-08 | Stop reason: SDUPTHER

## 2017-09-20 RX ORDER — AMITRIPTYLINE HYDROCHLORIDE 100 MG/1
100 TABLET, FILM COATED ORAL 2 TIMES DAILY
Qty: 30 TABLET | Refills: 5 | Status: CANCELLED | OUTPATIENT
Start: 2017-09-20

## 2017-09-20 RX ORDER — QUETIAPINE FUMARATE 100 MG/1
100 TABLET, FILM COATED ORAL NIGHTLY
Qty: 30 TABLET | Refills: 5 | Status: SHIPPED | OUTPATIENT
Start: 2017-09-20 | End: 2018-02-11 | Stop reason: SDUPTHER

## 2017-09-20 NOTE — PATIENT INSTRUCTIONS
Medicare Wellness  Personal Prevention Plan of Service     Date of Office Visit:  2017  Encounter Provider:  Ashish Thurman DO  Place of Service:  [facility]  Patient Name: Ziyad Wilson  :  1965    As part of the Medicare Wellness portion of your visit today, we are providing you with this personalized preventive plan of services (PPPS). This plan is based upon recommendations of the [place] Preventive Services Task Force (USPSTF) and the Advisory Committee on Immunization Practices (ACIP).    This lists the preventive care services that should be considered, and provides dates of when you are due. Items listed as completed are up-to-date and do not require any further intervention.    Health Maintenance   Topic Date Due   • TDAP/TD VACCINES (1 - Tdap) 2014   • MEDICARE ANNUAL WELLNESS  2016   • COLONOSCOPY  2017   • INFLUENZA VACCINE  2017   • LIPID PANEL  2018   • HEPATITIS C SCREENING  Completed       Orders Placed This Encounter   Procedures   • Flu Vaccine Quad PF 3YR+ (FLURIX/FLUZONE [id number])   • Ambulatory Referral to Psychology     Referral Priority:   Routine     Referral Type:   Behavorial Health/Psych     Referral Reason:   Specialty Services Required     Requested Specialty:   Psychology     Number of Visits Requested:   1       Return in about 3 months (around 2017) for Recheck.

## 2017-09-20 NOTE — PROGRESS NOTES
QUICK REFERENCE INFORMATION:  The ABCs of the Annual Wellness Visit    Initial Medicare Wellness Visit    HEALTH RISK ASSESSMENT    1965    Recent Hospitalizations:  No hospitalization(s) within the last year..        Current Medical Providers:  Patient Care Team:  sAhish Thurman DO as PCP - General (Internal Medicine)  Douglas Ruano MD as Consulting Physician (Pain Medicine)  Uri Lagos MD as Cardiologist (Cardiology)        Smoking Status:  History   Smoking Status   • Never Smoker   Smokeless Tobacco   • Never Used       Alcohol Consumption:  History   Alcohol Use   • Yes     Comment: occasional       Depression Screen:   PHQ-2/PHQ-9 Depression Screening 9/20/2017   Little interest or pleasure in doing things 3   Feeling down, depressed, or hopeless 3   Trouble falling or staying asleep, or sleeping too much 3   Feeling tired or having little energy 0   Poor appetite or overeating 1   Feeling bad about yourself - or that you are a failure or have let yourself or your family down 3   Trouble concentrating on things, such as reading the newspaper or watching television 2   Moving or speaking so slowly that other people could have noticed. Or the opposite - being so fidgety or restless that you have been moving around a lot more than usual 0   Thoughts that you would be better off dead, or of hurting yourself in some way 0   Total Score 15   If you checked off any problems, how difficult have these problems made it for you to do your work, take care of things at home, or get along with other people? Very difficult       Health Habits and Functional and Cognitive Screening:  Functional & Cognitive Status 9/20/2017   Do you have difficulty preparing food and eating? No   Do you have difficulty bathing yourself? No   Do you have difficulty getting dressed? No   Do you have difficulty using the toilet? No   Do you have difficulty moving around from place to place? No   In the past year have you  fallen or experienced a near fall? Yes   Do you need help using the phone?  No   Are you deaf or do you have serious difficulty hearing?  No   Do you need help with transportation? No   Do you need help shopping? No   Do you need help preparing meals?  No   Do you need help with housework?  No   Do you need help with laundry? No   Do you need help taking your medications? No   Do you need help managing money? No   Do you have difficulty concentrating, remembering or making decisions? Yes       Health Habits  Current Diet: Unhealthy Diet  Dental Exam: Not up to date  Eye Exam: Up to date  Exercise (times per week): 0 times per week  Current Exercise Activities Include: None          Does the patient have evidence of cognitive impairment? Yes    Asiprin use counseling: Taking ASA appropriately as indicated      Recent Lab Results:    Visual Acuity:  No exam data present    Age-appropriate Screening Schedule:  Refer to the list below for future screening recommendations based on patient's age, sex and/or medical conditions. Orders for these recommended tests are listed in the plan section. The patient has been provided with a written plan.    Health Maintenance   Topic Date Due   • TDAP/TD VACCINES (1 - Tdap) 01/02/2014   • COLONOSCOPY  01/30/2017   • INFLUENZA VACCINE  08/01/2017   • LIPID PANEL  02/20/2018        Subjective   History of Present Illness    Ziyad Wilson is a 52 y.o. male who presents for an Annual Wellness Visit.    The following portions of the patient's history were reviewed and updated as appropriate: allergies, current medications, past family history, past medical history, past social history, past surgical history and problem list.    Outpatient Medications Prior to Visit   Medication Sig Dispense Refill   • albuterol (PROVENTIL HFA;VENTOLIN HFA) 108 (90 BASE) MCG/ACT inhaler Inhale as needed.     • amitriptyline (ELAVIL) 100 MG tablet Take 100 mg by mouth 2 (Two) Times a Day.     • aspirin 325  MG tablet daily.     • atorvastatin (LIPITOR) 40 MG tablet Take 1 tablet by mouth Daily. 30 tablet 11   • butalbital-acetaminophen-caffeine (FIORICET, ESGIC) -40 MG per tablet Take 50 tablets by mouth 2 (Two) Times a Day As Needed.     • carvedilol (COREG) 25 MG tablet TAKE TWO TABLETS BY MOUTH TWICE DAILY 120 tablet 6   • esomeprazole (NEXIUM) 40 MG capsule Take 1 capsule by mouth Every Morning Before Breakfast. 30 capsule 11   • fluticasone (FLONASE) 50 MCG/ACT nasal spray 2 sprays into each nostril Daily. Administer 2 sprays in each nostril for each dose. 1 each 0   • fluticasone-salmeterol (ADVAIR) 100-50 MCG/DOSE DISKUS Inhale 1 puff 2 (two) times a day.     • furosemide (LASIX) 40 MG tablet TAKE ONE TABLET BY MOUTH TWICE DAILY AS NEEDED 60 tablet 11   • gabapentin (NEURONTIN) 100 MG capsule Take 100 mg by mouth 4 (Four) Times a Day.     • levothyroxine (SYNTHROID, LEVOTHROID) 50 MCG tablet Take 1 tablet by mouth Daily. 30 tablet 11   • Multiple Vitamins-Minerals (MULTIVITAMIN WITH MINERALS) tablet tablet Take 1 tablet by mouth Daily.     • nitroglycerin (NITROSTAT) 0.4 MG SL tablet Place 0.4 mg under the tongue as needed. Take no more than 3 doses in 15 minutes.     • spironolactone (ALDACTONE) 25 MG tablet TAKE ONE TABLET BY MOUTH ONCE DAILY 30 tablet 6   • valsartan (DIOVAN) 160 MG tablet Take 1 tablet by mouth Daily. 30 tablet 5   • QUEtiapine (SEROQUEL) 25 MG tablet Take 1 tablet by mouth Every Night. 30 tablet 5   • sertraline (ZOLOFT) 50 MG tablet Take 50 mg by mouth 3 (three) times a day.     • traZODone (DESYREL) 100 MG tablet Take 1 tablet by mouth Every Night. 30 tablet 3     No facility-administered medications prior to visit.        Patient Active Problem List   Diagnosis   • Cardiomyopathy   • Essential hypertension   • Coronary artery disease   • Acquired hypothyroidism   • Headache disorder   • Non morbid obesity due to excess calories   • Mild cognitive impairment   • SOB (shortness of  "breath)   • Asthma   • GERD (gastroesophageal reflux disease)   • Sleep apnea   • Hyperlipemia, mixed   • Moderate episode of recurrent major depressive disorder   • Primary insomnia       Advance Care Planning:  has NO advance directive - information provided to the patient today    Identification of Risk Factors:  Risk factors include: weight , unhealthy diet, cardiovascular risk, inactivity, cognitive impairment and polypharmacy.    Review of Systems See  note    Compared to one year ago, the patient feels his physical health is the same.  Compared to one year ago, the patient feels his mental health is worse.    Objective     Physical Exam See  note    Vitals:    09/20/17 1057   BP: 138/80   BP Location: Left arm   Patient Position: Sitting   Cuff Size: Adult   Pulse: 94   Resp: 16   SpO2: 95%   Weight: 233 lb 1.6 oz (106 kg)   Height: 67\" (170.2 cm)       Body mass index is 36.51 kg/(m^2).  Discussed the patient's BMI with him. The BMI is above average; BMI management plan is completed.    Assessment/Plan   Patient Self-Management and Personalized Health Advice  The patient has been provided with information about: diet, exercise, weight management, fall prevention, designing advance directives and mental health concerns and preventive services including:   · Advance directive, Exercise counseling provided, Fall Risk assessment done, Influenza vaccine, Nutrition counseling provided, TdaP vaccine.    Visit Diagnoses:    ICD-10-CM ICD-9-CM   1. Non morbid obesity due to excess calories E66.09 278.00   2. Hyperlipemia, mixed E78.2 272.2   3. Essential hypertension I10 401.9   4. Moderate episode of recurrent major depressive disorder F33.1 296.32   5. Primary insomnia F51.01 307.42   6. Encounter for immunization Z23 V03.89   7. Encounter for preventive health examination Z00.00 V70.0   8. Impaired glucose tolerance R73.02 790.22       Orders Placed This Encounter   Procedures   • Flu Vaccine " Quad PF 3YR+ (FLURIX/FLUZONE [id number])   • Comprehensive Metabolic Panel     Standing Status:   Future     Standing Expiration Date:   9/20/2018   • Hemoglobin A1c     Standing Status:   Future     Standing Expiration Date:   9/20/2018   • Lipid Panel     Standing Status:   Future     Standing Expiration Date:   9/20/2018   • Ambulatory Referral to Psychology     Referral Priority:   Routine     Referral Type:   Behavorial Health/Psych     Referral Reason:   Specialty Services Required     Requested Specialty:   Psychology     Number of Visits Requested:   1   • Ambulatory Referral For Screening Colonoscopy     Referral Priority:   Routine     Referral Type:   Diagnostic Medical     Referral Reason:   Specialty Services Required     Number of Visits Requested:   1       Outpatient Encounter Prescriptions as of 9/20/2017   Medication Sig Dispense Refill   • albuterol (PROVENTIL HFA;VENTOLIN HFA) 108 (90 BASE) MCG/ACT inhaler Inhale as needed.     • amitriptyline (ELAVIL) 100 MG tablet Take 100 mg by mouth 2 (Two) Times a Day.     • aspirin 325 MG tablet daily.     • atorvastatin (LIPITOR) 40 MG tablet Take 1 tablet by mouth Daily. 30 tablet 11   • butalbital-acetaminophen-caffeine (FIORICET, ESGIC) -40 MG per tablet Take 50 tablets by mouth 2 (Two) Times a Day As Needed.     • carvedilol (COREG) 25 MG tablet TAKE TWO TABLETS BY MOUTH TWICE DAILY 120 tablet 6   • esomeprazole (NEXIUM) 40 MG capsule Take 1 capsule by mouth Every Morning Before Breakfast. 30 capsule 11   • fluticasone (FLONASE) 50 MCG/ACT nasal spray 2 sprays into each nostril Daily. Administer 2 sprays in each nostril for each dose. 1 each 0   • fluticasone-salmeterol (ADVAIR) 100-50 MCG/DOSE DISKUS Inhale 1 puff 2 (two) times a day.     • furosemide (LASIX) 40 MG tablet TAKE ONE TABLET BY MOUTH TWICE DAILY AS NEEDED 60 tablet 11   • gabapentin (NEURONTIN) 100 MG capsule Take 100 mg by mouth 4 (Four) Times a Day.     • levothyroxine (SYNTHROID,  LEVOTHROID) 50 MCG tablet Take 1 tablet by mouth Daily. 30 tablet 11   • Multiple Vitamins-Minerals (MULTIVITAMIN WITH MINERALS) tablet tablet Take 1 tablet by mouth Daily.     • nitroglycerin (NITROSTAT) 0.4 MG SL tablet Place 0.4 mg under the tongue as needed. Take no more than 3 doses in 15 minutes.     • QUEtiapine (SEROquel) 100 MG tablet Take 1 tablet by mouth Every Night. 30 tablet 5   • spironolactone (ALDACTONE) 25 MG tablet TAKE ONE TABLET BY MOUTH ONCE DAILY 30 tablet 6   • traZODone (DESYREL) 100 MG tablet Take 1 tablet by mouth Every Night. 30 tablet 5   • valsartan (DIOVAN) 160 MG tablet Take 1 tablet by mouth Daily. 30 tablet 5   • [DISCONTINUED] QUEtiapine (SEROQUEL) 25 MG tablet Take 1 tablet by mouth Every Night. 30 tablet 5   • [DISCONTINUED] sertraline (ZOLOFT) 50 MG tablet Take 50 mg by mouth 3 (three) times a day.     • [DISCONTINUED] traZODone (DESYREL) 100 MG tablet Take 1 tablet by mouth Every Night. 30 tablet 3   • escitalopram (LEXAPRO) 10 MG tablet Take 1 tablet by mouth Daily. Take 0.5 tab PO daily x 6 days, then 1 tab PO daily thereafter. 30 tablet 5     No facility-administered encounter medications on file as of 9/20/2017.        Reviewed use of high risk medication in the elderly: yes  Reviewed for potential of harmful drug interactions in the elderly: yes    Follow Up:  Return in about 3 months (around 12/20/2017) for Recheck.     An After Visit Summary and PPPS with all of these plans were given to the patient.

## 2017-09-21 NOTE — PROGRESS NOTES
CC: f/u depression    History:  Ziyad Wilson is a 52 y.o. male who presents today for follow-up for evaluation of the above:  He notes he has been doing well without acute illness. However, he has been off his sertraline and feels he would like to switch antidepressants to address his long-term depression. He has still been processing the loss of his mother last year, which was difficult, but ultimately, a vast spectrum of emotions. Additionally, he has had struggles with his children, which contributes. He notes his BP has done well on his medications. He continues on ASA, statin, BB, and ARB related to CAD. He admits he has not been adhering to any significant dietary modification for the purpose of weight loss.     ROS:  Review of Systems   Constitutional: Negative for chills and fever.   HENT: Negative for congestion and sore throat.    Respiratory: Negative for cough and shortness of breath.    Cardiovascular: Negative for chest pain and palpitations.   Gastrointestinal: Negative for abdominal pain, constipation and nausea.   Musculoskeletal: Negative for back pain and gait problem.   Psychiatric/Behavioral: Positive for dysphoric mood and sleep disturbance. Negative for suicidal ideas.       Mr. Wilson  reports that he has never smoked. He has never used smokeless tobacco. He reports that he drinks alcohol. He reports that he does not use illicit drugs.      Current Outpatient Prescriptions:   •  albuterol (PROVENTIL HFA;VENTOLIN HFA) 108 (90 BASE) MCG/ACT inhaler, Inhale as needed., Disp: , Rfl:   •  amitriptyline (ELAVIL) 100 MG tablet, Take 100 mg by mouth 2 (Two) Times a Day., Disp: , Rfl:   •  aspirin 325 MG tablet, daily., Disp: , Rfl:   •  atorvastatin (LIPITOR) 40 MG tablet, Take 1 tablet by mouth Daily., Disp: 30 tablet, Rfl: 11  •  butalbital-acetaminophen-caffeine (FIORICET, ESGIC) -40 MG per tablet, Take 50 tablets by mouth 2 (Two) Times a Day As Needed., Disp: , Rfl:   •  carvedilol (COREG)  "25 MG tablet, TAKE TWO TABLETS BY MOUTH TWICE DAILY, Disp: 120 tablet, Rfl: 6  •  esomeprazole (NEXIUM) 40 MG capsule, Take 1 capsule by mouth Every Morning Before Breakfast., Disp: 30 capsule, Rfl: 11  •  fluticasone (FLONASE) 50 MCG/ACT nasal spray, 2 sprays into each nostril Daily. Administer 2 sprays in each nostril for each dose., Disp: 1 each, Rfl: 0  •  fluticasone-salmeterol (ADVAIR) 100-50 MCG/DOSE DISKUS, Inhale 1 puff 2 (two) times a day., Disp: , Rfl:   •  furosemide (LASIX) 40 MG tablet, TAKE ONE TABLET BY MOUTH TWICE DAILY AS NEEDED, Disp: 60 tablet, Rfl: 11  •  gabapentin (NEURONTIN) 100 MG capsule, Take 100 mg by mouth 4 (Four) Times a Day., Disp: , Rfl:   •  levothyroxine (SYNTHROID, LEVOTHROID) 50 MCG tablet, Take 1 tablet by mouth Daily., Disp: 30 tablet, Rfl: 11  •  Multiple Vitamins-Minerals (MULTIVITAMIN WITH MINERALS) tablet tablet, Take 1 tablet by mouth Daily., Disp: , Rfl:   •  nitroglycerin (NITROSTAT) 0.4 MG SL tablet, Place 0.4 mg under the tongue as needed. Take no more than 3 doses in 15 minutes., Disp: , Rfl:   •  QUEtiapine (SEROquel) 100 MG tablet, Take 1 tablet by mouth Every Night., Disp: 30 tablet, Rfl: 5  •  spironolactone (ALDACTONE) 25 MG tablet, TAKE ONE TABLET BY MOUTH ONCE DAILY, Disp: 30 tablet, Rfl: 6  •  traZODone (DESYREL) 100 MG tablet, Take 1 tablet by mouth Every Night., Disp: 30 tablet, Rfl: 5  •  valsartan (DIOVAN) 160 MG tablet, Take 1 tablet by mouth Daily., Disp: 30 tablet, Rfl: 5      OBJECTIVE:  /80 (BP Location: Left arm, Patient Position: Sitting, Cuff Size: Adult)  Pulse 94  Resp 16  Ht 67\" (170.2 cm)  Wt 233 lb 1.6 oz (106 kg)  SpO2 95%  BMI 36.51 kg/m2   Physical ExamNone    Assessment/Plan    Diagnoses and all orders for this visit:    Moderate episode of recurrent major depressive disorder  -     traZODone (DESYREL) 100 MG tablet; Take 1 tablet by mouth Every Night.  -     escitalopram (LEXAPRO) 10 MG tablet; Take 1 tablet by mouth Daily. " Take 0.5 tab PO daily x 6 days, then 1 tab PO daily thereafter.  -     Ambulatory Referral to Psychology  We will discontinue sertraline and will switch to lexapro for further management. We will also refer to psychology for counseling. I advised that it will take 3-4 weeks to see some effect and 6-8 weeks to see full effect.  If the dose is ineffective or suboptimal, the patient should notify the clinic for a consideration of a dose increase. The patient denied SI/HI, but was advised that starting this medication could worsen these symptoms. We discussed this as an emergency and reason to seek care immediately. The patient expressed understanding.    Non morbid obesity due to excess calories  Recommended attention to portion control and being careful about the types and timing of meals for the purpose of weight management.    Hyperlipemia, mixed  -     Lipid Panel; Future  Stable on high intensity statin therapy per ACC/AHA guidelines.    Essential hypertension  -     Comprehensive Metabolic Panel; Future  Well controlled, BP goal for age is <140/90 per JNC 8 guidelines and continue current medications    Impaired glucose tolerance  -     Hemoglobin A1c; Future  Check A1c. Consider metformin therapy if this is still approaching DM.    Primary insomnia  -     QUEtiapine (SEROquel) 100 MG tablet; Take 1 tablet by mouth Every Night.  Increase Seroquel to 100mg. He is cautioned that this can cause increased sedation and potential risk for problematic symptoms if taken with trazodone. He should attempt to alternate therapies.    Encounter for immunization  -     Flu Vaccine Quad PF 3YR+ (FLURIX/FLUZONE [id number])    Encounter for preventive health examination  -     Ambulatory Referral For Screening Colonoscopy  -     Comprehensive Metabolic Panel; Future  -     Hemoglobin A1c; Future  -     Lipid Panel; Future    An After Visit Summary was printed and given to the patient at discharge.  Return in about 3 months (around  12/20/2017) for Recheck. Sooner if problems arise.         Ashish Thurman D.O. 9/20/2017

## 2017-10-13 RX ORDER — FUROSEMIDE 40 MG/1
TABLET ORAL
Qty: 60 TABLET | Refills: 11 | Status: SHIPPED | OUTPATIENT
Start: 2017-10-13 | End: 2018-11-13 | Stop reason: SDUPTHER

## 2017-10-16 RX ORDER — SIMVASTATIN 40 MG
TABLET ORAL
Qty: 30 TABLET | Refills: 11 | OUTPATIENT
Start: 2017-10-16

## 2017-10-30 RX ORDER — SIMVASTATIN 40 MG
TABLET ORAL
Qty: 30 TABLET | Refills: 11 | OUTPATIENT
Start: 2017-10-30

## 2017-11-06 RX ORDER — SIMVASTATIN 40 MG
TABLET ORAL
Qty: 30 TABLET | Refills: 11 | OUTPATIENT
Start: 2017-11-06

## 2017-11-20 ENCOUNTER — OFFICE VISIT (OUTPATIENT)
Dept: GASTROENTEROLOGY | Facility: CLINIC | Age: 52
End: 2017-11-20

## 2017-11-20 VITALS
DIASTOLIC BLOOD PRESSURE: 80 MMHG | HEART RATE: 90 BPM | BODY MASS INDEX: 35.63 KG/M2 | SYSTOLIC BLOOD PRESSURE: 136 MMHG | TEMPERATURE: 97.7 F | HEIGHT: 67 IN | OXYGEN SATURATION: 97 % | WEIGHT: 227 LBS

## 2017-11-20 DIAGNOSIS — Z12.11 ENCOUNTER FOR SCREENING FOR MALIGNANT NEOPLASM OF COLON: Primary | ICD-10-CM

## 2017-11-20 PROCEDURE — S0260 H&P FOR SURGERY: HCPCS | Performed by: NURSE PRACTITIONER
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
## 2017-11-21 PROBLEM — Z12.11 ENCOUNTER FOR SCREENING FOR MALIGNANT NEOPLASM OF COLON: Status: ACTIVE | Noted: 2017-11-21

## 2017-12-14 ENCOUNTER — OFFICE VISIT (OUTPATIENT)
Dept: INTERNAL MEDICINE | Facility: CLINIC | Age: 52
End: 2017-12-14

## 2017-12-14 VITALS
OXYGEN SATURATION: 98 % | SYSTOLIC BLOOD PRESSURE: 118 MMHG | HEIGHT: 67 IN | RESPIRATION RATE: 16 BRPM | WEIGHT: 236 LBS | DIASTOLIC BLOOD PRESSURE: 74 MMHG | BODY MASS INDEX: 37.04 KG/M2 | HEART RATE: 88 BPM

## 2017-12-14 DIAGNOSIS — E03.9 ACQUIRED HYPOTHYROIDISM: ICD-10-CM

## 2017-12-14 DIAGNOSIS — E66.09 NON MORBID OBESITY DUE TO EXCESS CALORIES: ICD-10-CM

## 2017-12-14 DIAGNOSIS — I10 ESSENTIAL HYPERTENSION: Primary | ICD-10-CM

## 2017-12-14 DIAGNOSIS — F33.1 MODERATE EPISODE OF RECURRENT MAJOR DEPRESSIVE DISORDER (HCC): ICD-10-CM

## 2017-12-14 DIAGNOSIS — F51.01 PRIMARY INSOMNIA: ICD-10-CM

## 2017-12-14 PROCEDURE — 99214 OFFICE O/P EST MOD 30 MIN: CPT | Performed by: INTERNAL MEDICINE

## 2017-12-14 RX ORDER — ESCITALOPRAM OXALATE 20 MG/1
20 TABLET ORAL DAILY
Qty: 30 TABLET | Refills: 5 | Status: SHIPPED | OUTPATIENT
Start: 2017-12-14 | End: 2018-07-20 | Stop reason: SDUPTHER

## 2017-12-14 RX ORDER — DICLOFENAC SODIUM 75 MG/1
1 TABLET, DELAYED RELEASE ORAL DAILY
COMMUNITY
Start: 2017-11-16 | End: 2019-04-29

## 2017-12-14 NOTE — PROGRESS NOTES
CC: f/u hypertension    History:  Ziyad Wilson is a 52 y.o. male who presents today for follow-up for evaluation of the above:  He reports his blood pressure has been doing well without any side effects on his medications. He tolerates levothyroxine without symptoms of thyroid dysfunction. He notes his mood is well controlled on lexapro without side effects. He has seen weight gain, though he admits he needs to do better with his diet and exercise.     ROS:  Review of Systems   Constitutional: Negative for chills and fever.   Respiratory: Negative for cough and shortness of breath.    Cardiovascular: Negative for chest pain and palpitations.       Mr. Wilson  reports that he has never smoked. He has never used smokeless tobacco. He reports that he drinks alcohol. He reports that he does not use illicit drugs.      Current Outpatient Prescriptions:   •  albuterol (PROVENTIL HFA;VENTOLIN HFA) 108 (90 BASE) MCG/ACT inhaler, Inhale as needed., Disp: , Rfl:   •  amitriptyline (ELAVIL) 100 MG tablet, Take 100 mg by mouth 2 (Two) Times a Day., Disp: , Rfl:   •  aspirin 325 MG tablet, daily., Disp: , Rfl:   •  atorvastatin (LIPITOR) 40 MG tablet, Take 1 tablet by mouth Daily., Disp: 30 tablet, Rfl: 11  •  butalbital-acetaminophen-caffeine (FIORICET, ESGIC) -40 MG per tablet, Take 50 tablets by mouth 2 (Two) Times a Day As Needed., Disp: , Rfl:   •  carvedilol (COREG) 25 MG tablet, TAKE TWO TABLETS BY MOUTH TWICE DAILY, Disp: 120 tablet, Rfl: 6  •  diclofenac (VOLTAREN) 75 MG EC tablet, Take 1 tablet by mouth Daily., Disp: , Rfl:   •  escitalopram (LEXAPRO) 20 MG tablet, Take 1 tablet by mouth Daily., Disp: 30 tablet, Rfl: 5  •  esomeprazole (NEXIUM) 40 MG capsule, Take 1 capsule by mouth Every Morning Before Breakfast., Disp: 30 capsule, Rfl: 11  •  fluticasone (FLONASE) 50 MCG/ACT nasal spray, 2 sprays into each nostril Daily. Administer 2 sprays in each nostril for each dose., Disp: 1 each, Rfl: 0  •   "fluticasone-salmeterol (ADVAIR) 100-50 MCG/DOSE DISKUS, Inhale 1 puff 2 (two) times a day., Disp: , Rfl:   •  furosemide (LASIX) 40 MG tablet, TAKE ONE TABLET BY MOUTH TWICE DAILY AS NEEDED, Disp: 60 tablet, Rfl: 11  •  gabapentin (NEURONTIN) 100 MG capsule, Take 100 mg by mouth 4 (Four) Times a Day., Disp: , Rfl:   •  levothyroxine (SYNTHROID, LEVOTHROID) 50 MCG tablet, Take 1 tablet by mouth Daily., Disp: 30 tablet, Rfl: 11  •  Multiple Vitamins-Minerals (MULTIVITAMIN WITH MINERALS) tablet tablet, Take 1 tablet by mouth Daily., Disp: , Rfl:   •  nitroglycerin (NITROSTAT) 0.4 MG SL tablet, Place 0.4 mg under the tongue as needed. Take no more than 3 doses in 15 minutes., Disp: , Rfl:   •  QUEtiapine (SEROquel) 100 MG tablet, Take 1 tablet by mouth Every Night., Disp: 30 tablet, Rfl: 5  •  spironolactone (ALDACTONE) 25 MG tablet, TAKE ONE TABLET BY MOUTH ONCE DAILY, Disp: 30 tablet, Rfl: 6  •  traZODone (DESYREL) 100 MG tablet, Take 1 tablet by mouth Every Night., Disp: 30 tablet, Rfl: 5  •  valsartan (DIOVAN) 160 MG tablet, Take 1 tablet by mouth Daily., Disp: 30 tablet, Rfl: 5      OBJECTIVE:  /74 (BP Location: Left arm, Patient Position: Sitting, Cuff Size: Adult)  Pulse 88  Resp 16  Ht 170.2 cm (67.01\")  Wt 107 kg (236 lb)  SpO2 98%  BMI 36.95 kg/m2   Physical Exam   Constitutional: He is oriented to person, place, and time. He appears well-nourished. No distress.   Cardiovascular: Normal rate, regular rhythm and normal heart sounds.    No murmur heard.  Pulmonary/Chest: Effort normal and breath sounds normal. He has no wheezes.   Neurological: He is alert and oriented to person, place, and time.   Psychiatric: He has a normal mood and affect.       Assessment/Plan    Diagnoses and all orders for this visit:    Essential hypertension  Well controlled, BP goal for age is <140/90 per JNC 8 guidelines and continue current medications    Acquired hypothyroidism  Check TSH prior to next visit, though well " controlled in 2/2017. Continue Synthroid.     Moderate episode of recurrent major depressive disorder  -     escitalopram (LEXAPRO) 20 MG tablet; Take 1 tablet by mouth Daily.  Well controlled on Lexapro. He does note occasional difficulties with focus.    Non morbid obesity due to excess calories  Recommended attention to portion control and being careful about the types and timing of meals for the purpose of weight management.    Primary insomnia  Well controlled on Trazodone and Seroquel.      An After Visit Summary was printed and given to the patient at discharge.  Return in about 3 months (around 3/14/2018). Sooner if problems arise.         Ashish Thurman D.O. 12/15/2017

## 2018-01-09 ENCOUNTER — HOSPITAL ENCOUNTER (OUTPATIENT)
Facility: HOSPITAL | Age: 53
Setting detail: HOSPITAL OUTPATIENT SURGERY
Discharge: HOME | End: 2018-01-09
Attending: INTERNAL MEDICINE | Admitting: INTERNAL MEDICINE

## 2018-01-09 ENCOUNTER — ANESTHESIA (OUTPATIENT)
Dept: GASTROENTEROLOGY | Facility: HOSPITAL | Age: 53
End: 2018-01-09

## 2018-01-09 ENCOUNTER — ANESTHESIA EVENT (OUTPATIENT)
Dept: GASTROENTEROLOGY | Facility: HOSPITAL | Age: 53
End: 2018-01-09

## 2018-01-09 VITALS
BODY MASS INDEX: 37.98 KG/M2 | HEIGHT: 67 IN | TEMPERATURE: 98.5 F | RESPIRATION RATE: 14 BRPM | HEART RATE: 99 BPM | SYSTOLIC BLOOD PRESSURE: 117 MMHG | OXYGEN SATURATION: 94 % | DIASTOLIC BLOOD PRESSURE: 73 MMHG | WEIGHT: 242 LBS

## 2018-01-09 DIAGNOSIS — Z12.11 ENCOUNTER FOR SCREENING FOR MALIGNANT NEOPLASM OF COLON: ICD-10-CM

## 2018-01-09 PROCEDURE — 25010000002 PROPOFOL 10 MG/ML EMULSION: Performed by: NURSE ANESTHETIST, CERTIFIED REGISTERED

## 2018-01-09 PROCEDURE — 45380 COLONOSCOPY AND BIOPSY: CPT | Performed by: INTERNAL MEDICINE

## 2018-01-09 PROCEDURE — 88305 TISSUE EXAM BY PATHOLOGIST: CPT | Performed by: INTERNAL MEDICINE

## 2018-01-09 RX ORDER — PROPOFOL 10 MG/ML
VIAL (ML) INTRAVENOUS AS NEEDED
Status: DISCONTINUED | OUTPATIENT
Start: 2018-01-09 | End: 2018-01-09 | Stop reason: SURG

## 2018-01-09 RX ORDER — SODIUM CHLORIDE 0.9 % (FLUSH) 0.9 %
3 SYRINGE (ML) INJECTION AS NEEDED
Status: DISCONTINUED | OUTPATIENT
Start: 2018-01-09 | End: 2018-01-09 | Stop reason: HOSPADM

## 2018-01-09 RX ORDER — SODIUM CHLORIDE 9 MG/ML
500 INJECTION, SOLUTION INTRAVENOUS CONTINUOUS PRN
Status: DISCONTINUED | OUTPATIENT
Start: 2018-01-09 | End: 2018-01-09 | Stop reason: HOSPADM

## 2018-01-09 RX ADMIN — SODIUM CHLORIDE 500 ML: 9 INJECTION, SOLUTION INTRAVENOUS at 07:12

## 2018-01-09 RX ADMIN — PROPOFOL 230 MG: 10 INJECTION, EMULSION INTRAVENOUS at 08:22

## 2018-01-09 NOTE — PLAN OF CARE
Problem: Patient Care Overview (Adult)  Goal: Plan of Care Review  Outcome: Ongoing (interventions implemented as appropriate)   01/09/18 0825   Coping/Psychosocial Response Interventions   Plan Of Care Reviewed With patient   Patient Care Overview   Progress no change   Outcome Evaluation   Outcome Summary/Follow up Plan tolerating well       Problem: GI Endoscopy (Adult)  Goal: Signs and Symptoms of Listed Potential Problems Will be Absent or Manageable (GI Endoscopy)  Outcome: Ongoing (interventions implemented as appropriate)

## 2018-01-09 NOTE — ANESTHESIA PREPROCEDURE EVALUATION
Anesthesia Evaluation     Patient summary reviewed and Nursing notes reviewed   no history of anesthetic complications:  NPO Solid Status: > 8 hours  NPO Liquid Status: > 8 hours     Airway   Mallampati: III  no difficulty expected  Dental    (+) upper dentures    Pulmonary    (+) asthma, shortness of breath, sleep apnea,   Cardiovascular   Exercise tolerance: good (4-7 METS)    Patient on routine beta blocker and Beta blocker given within 24 hours of surgery    (+) hypertension, CAD, CHF, hyperlipidemia    ROS comment: Dilated cardiomyopathy, 2014 echo EF 40-45%. Exercise tolerance mets>4    Neuro/Psych- negative ROS  GI/Hepatic/Renal/Endo    (+) obesity, morbid obesity, GERD, hypothyroidism,     Musculoskeletal (-) negative ROS    Abdominal    Substance History - negative use     OB/GYN negative ob/gyn ROS         Other                                                Anesthesia Plan    ASA 3     general     intravenous induction   Anesthetic plan and risks discussed with patient.

## 2018-01-09 NOTE — H&P
[facility]  Pre Procedure History & Physical    Chief Complaint:   Screening    Subjective     HPI:   screening    Past Medical History:   Past Medical History:   Diagnosis Date   • Asthma    • Cardiomyopathy     non-ischemic   • CHF (congestive heart failure)    • Coronary artery disease    • Foot pain, bilateral    • GERD (gastroesophageal reflux disease)    • Headache    • Hyperlipemia, mixed    • Hypertension     benign   • Hypothyroidism    • Obesity    • Sleep apnea    • SOB (shortness of breath)        Past Surgical History:  [unfilled]    Family History:  Family History   Problem Relation Age of Onset   • Hypertension Mother    • Stroke Mother    • Heart disease Mother    • Hypertension Father    • Heart disease Father    • Hypertension Sister    • Hypertension Brother        Social History:   reports that he has never smoked. He has never used smokeless tobacco. He reports that he drinks alcohol. He reports that he does not use illicit drugs.    Medications:   Prior to Admission medications    Medication Sig Start Date End Date Taking? Authorizing Provider   amitriptyline (ELAVIL) 100 MG tablet Take 100 mg by mouth 2 (Two) Times a Day.   Yes Historical Provider, MD   atorvastatin (LIPITOR) 40 MG tablet Take 1 tablet by mouth Daily. 8/30/17  Yes Ashish Thurman, DO   butalbital-acetaminophen-caffeine (FIORICET, ESGIC) -40 MG per tablet Take 50 tablets by mouth 2 (Two) Times a Day As Needed. 3/7/17  Yes Douglas Ruano MD   carvedilol (COREG) 25 MG tablet TAKE TWO TABLETS BY MOUTH TWICE DAILY 8/30/17  Yes Uri Lagos MD   diclofenac (VOLTAREN) 75 MG EC tablet Take 1 tablet by mouth Daily. 11/16/17  Yes Historical Provider, MD   escitalopram (LEXAPRO) 20 MG tablet Take 1 tablet by mouth Daily. 12/14/17  Yes Ashish Thurman DO   esomeprazole (NEXIUM) 40 MG capsule Take 1 capsule by mouth Every Morning Before Breakfast. 8/8/17  Yes Ashish Thurman DO   fluticasone  "(FLONASE) 50 MCG/ACT nasal spray 2 sprays into each nostril Daily. Administer 2 sprays in each nostril for each dose. 12/29/16  Yes VIRGILIO Barth   furosemide (LASIX) 40 MG tablet TAKE ONE TABLET BY MOUTH TWICE DAILY AS NEEDED 10/13/17  Yes Uri Lagos MD   gabapentin (NEURONTIN) 100 MG capsule Take 100 mg by mouth 4 (Four) Times a Day.   Yes Douglas Ruano MD   levothyroxine (SYNTHROID, LEVOTHROID) 50 MCG tablet Take 1 tablet by mouth Daily. 8/8/17  Yes Ashish Thurman, DO   Multiple Vitamins-Minerals (MULTIVITAMIN WITH MINERALS) tablet tablet Take 1 tablet by mouth Daily.   Yes Historical Provider, MD   QUEtiapine (SEROquel) 100 MG tablet Take 1 tablet by mouth Every Night. 9/20/17  Yes Ashish Thurman DO   spironolactone (ALDACTONE) 25 MG tablet TAKE ONE TABLET BY MOUTH ONCE DAILY 11/28/16  Yes Uri Lagos MD   traZODone (DESYREL) 100 MG tablet Take 1 tablet by mouth Every Night. 9/20/17  Yes Ashish Thurman, DO   valsartan (DIOVAN) 160 MG tablet Take 1 tablet by mouth Daily. 8/3/17  Yes Ashish Thurman, DO   albuterol (PROVENTIL HFA;VENTOLIN HFA) 108 (90 BASE) MCG/ACT inhaler Inhale as needed.    Historical Provider, MD   aspirin 325 MG tablet daily.    Historical Provider, MD   fluticasone-salmeterol (ADVAIR) 100-50 MCG/DOSE DISKUS Inhale 1 puff 2 (two) times a day.    Historical Provider, MD   nitroglycerin (NITROSTAT) 0.4 MG SL tablet Place 0.4 mg under the tongue as needed. Take no more than 3 doses in 15 minutes.    Historical Provider, MD       Allergies:  Review of patient's allergies indicates no known allergies.    Objective     Blood pressure (!) 145/105, pulse 98, temperature 98.5 °F (36.9 °C), temperature source Temporal Artery , resp. rate 20, height 170.2 cm (67\"), weight 110 kg (242 lb), SpO2 94 %.    Physical Exam   Constitutional: Pt is oriented to person, place, and in no distress.   HENT: Mouth/Throat: Oropharynx is clear.   Cardiovascular: " Normal rate, regular rhythm.    Pulmonary/Chest: Effort normal. No respiratory distress. No  wheezes.   Abdominal: Soft. Non-distended.  Skin: Skin is warm and dry.   Psychiatric: Mood, memory, affect and judgment appear normal.     Assessment/Plan     Diagnosis:  screening    Anticipated Surgical Procedure:  C-scope    The risks, benefits, and alternatives of this procedure have been discussed with the patient or the responsible party- the patient understands and agrees to proceed.

## 2018-01-09 NOTE — PLAN OF CARE
Problem: Patient Care Overview (Adult)  Goal: Plan of Care Review  Outcome: Outcome(s) achieved Date Met: 01/09/18 01/09/18 0847   Coping/Psychosocial Response Interventions   Plan Of Care Reviewed With patient;family   Patient Care Overview   Progress no change   Outcome Evaluation   Outcome Summary/Follow up Plan meets discharge criteria       Problem: GI Endoscopy (Adult)  Goal: Signs and Symptoms of Listed Potential Problems Will be Absent or Manageable (GI Endoscopy)  Outcome: Outcome(s) achieved Date Met: 01/09/18 01/09/18 0897   GI Endoscopy   Problems Assessed (GI Endoscopy) all   Problems Present (GI Endoscopy) none

## 2018-01-09 NOTE — ANESTHESIA POSTPROCEDURE EVALUATION
"Patient: Ziyad Wilson    Procedure Summary     Date Anesthesia Start Anesthesia Stop Room / Location    01/09/18 0819 0841  [facility] / [facility]       Procedure Diagnosis Surgeon Provider    COLONOSCOPY WITH ANESTHESIA (N/A ) Encounter for screening for malignant neoplasm of colon  (Encounter for screening for malignant neoplasm of colon [Z12.11]) DO Otilio Griffith CRNA          Anesthesia Type: general  Last vitals  BP   92/55 (01/09/18 0840)   Temp   98.5 °F (36.9 °C) (01/09/18 0700)   Pulse   99 (01/09/18 0840)   Resp   24 (01/09/18 0840)     SpO2   93 % (01/09/18 0840)     Post Anesthesia Care and Evaluation    Patient location during evaluation: PHASE II  Patient participation: complete - patient participated  Level of consciousness: awake and alert  Pain management: adequate  Airway patency: patent  Anesthetic complications: No anesthetic complications    Cardiovascular status: acceptable  Respiratory status: acceptable  Hydration status: acceptable    Comments: Blood pressure 92/55, pulse 99, temperature 98.5 °F (36.9 °C), temperature source Temporal Artery , resp. rate 24, height 170.2 cm (67\"), weight 110 kg (242 lb), SpO2 93 %.    Pt discharged from PACU based on santo score >8      "

## 2018-01-09 NOTE — PLAN OF CARE
Problem: Patient Care Overview (Adult)  Goal: Plan of Care Review  Outcome: Ongoing (interventions implemented as appropriate)   01/09/18 0827   Coping/Psychosocial Response Interventions   Plan Of Care Reviewed With patient   Patient Care Overview   Progress no change   Outcome Evaluation   Outcome Summary/Follow up Plan tolerating well       Problem: GI Endoscopy (Adult)  Goal: Signs and Symptoms of Listed Potential Problems Will be Absent or Manageable (GI Endoscopy)  Outcome: Ongoing (interventions implemented as appropriate)

## 2018-01-10 LAB
CYTO UR: NORMAL
LAB AP CASE REPORT: NORMAL
LAB AP CLINICAL INFORMATION: NORMAL
Lab: NORMAL
PATH REPORT.FINAL DX SPEC: NORMAL
PATH REPORT.GROSS SPEC: NORMAL

## 2018-01-18 ENCOUNTER — TELEPHONE (OUTPATIENT)
Dept: GASTROENTEROLOGY | Facility: CLINIC | Age: 53
End: 2018-01-18

## 2018-01-18 NOTE — TELEPHONE ENCOUNTER
----- Message from Dick Espinosa DO sent at 1/10/2018  6:51 PM CST -----   Please let him know his rectal bx are neg  c-scope 1 yr      ----- Message -----     From: Lab, Background User     Sent: 1/10/2018   4:31 PM       To: Dick Espinosa DO        Called patient gave results ..  Put in 1 year colon recall

## 2018-01-22 DIAGNOSIS — I10 ESSENTIAL HYPERTENSION: ICD-10-CM

## 2018-01-22 RX ORDER — VALSARTAN 160 MG/1
160 TABLET ORAL DAILY
Qty: 30 TABLET | Refills: 5 | Status: SHIPPED | OUTPATIENT
Start: 2018-01-22 | End: 2018-03-16

## 2018-02-07 DIAGNOSIS — F33.1 MODERATE EPISODE OF RECURRENT MAJOR DEPRESSIVE DISORDER (HCC): ICD-10-CM

## 2018-02-07 RX ORDER — TRAZODONE HYDROCHLORIDE 100 MG/1
TABLET ORAL
Qty: 30 TABLET | Refills: 5 | Status: CANCELLED | OUTPATIENT
Start: 2018-02-07

## 2018-02-08 DIAGNOSIS — F33.1 MODERATE EPISODE OF RECURRENT MAJOR DEPRESSIVE DISORDER (HCC): ICD-10-CM

## 2018-02-08 RX ORDER — TRAZODONE HYDROCHLORIDE 100 MG/1
100 TABLET ORAL NIGHTLY
Qty: 30 TABLET | Refills: 5 | Status: SHIPPED | OUTPATIENT
Start: 2018-02-08 | End: 2018-03-16

## 2018-02-11 DIAGNOSIS — F51.01 PRIMARY INSOMNIA: ICD-10-CM

## 2018-02-12 RX ORDER — QUETIAPINE FUMARATE 100 MG/1
TABLET, FILM COATED ORAL
Qty: 90 TABLET | Refills: 5 | Status: SHIPPED | OUTPATIENT
Start: 2018-02-12 | End: 2019-03-13 | Stop reason: SDUPTHER

## 2018-03-16 ENCOUNTER — OFFICE VISIT (OUTPATIENT)
Dept: INTERNAL MEDICINE | Facility: CLINIC | Age: 53
End: 2018-03-16

## 2018-03-16 VITALS
BODY MASS INDEX: 37.35 KG/M2 | RESPIRATION RATE: 16 BRPM | OXYGEN SATURATION: 94 % | HEART RATE: 90 BPM | DIASTOLIC BLOOD PRESSURE: 96 MMHG | SYSTOLIC BLOOD PRESSURE: 163 MMHG | HEIGHT: 67 IN | WEIGHT: 238 LBS

## 2018-03-16 DIAGNOSIS — Z00.00 ENCOUNTER FOR PREVENTIVE HEALTH EXAMINATION: ICD-10-CM

## 2018-03-16 DIAGNOSIS — E03.9 ACQUIRED HYPOTHYROIDISM: ICD-10-CM

## 2018-03-16 DIAGNOSIS — F51.01 PRIMARY INSOMNIA: Primary | ICD-10-CM

## 2018-03-16 DIAGNOSIS — I10 ESSENTIAL HYPERTENSION: ICD-10-CM

## 2018-03-16 DIAGNOSIS — I25.10 CORONARY ARTERY DISEASE INVOLVING NATIVE CORONARY ARTERY OF NATIVE HEART WITHOUT ANGINA PECTORIS: ICD-10-CM

## 2018-03-16 DIAGNOSIS — E66.09 NON MORBID OBESITY DUE TO EXCESS CALORIES: ICD-10-CM

## 2018-03-16 DIAGNOSIS — F33.1 MODERATE EPISODE OF RECURRENT MAJOR DEPRESSIVE DISORDER (HCC): ICD-10-CM

## 2018-03-16 PROBLEM — M47.812 CERVICAL FACET JOINT SYNDROME: Status: ACTIVE | Noted: 2018-02-13

## 2018-03-16 PROCEDURE — 99214 OFFICE O/P EST MOD 30 MIN: CPT | Performed by: INTERNAL MEDICINE

## 2018-03-16 RX ORDER — AMLODIPINE AND VALSARTAN 5; 160 MG/1; MG/1
1 TABLET ORAL DAILY
Qty: 30 TABLET | Refills: 5 | Status: SHIPPED | OUTPATIENT
Start: 2018-03-16 | End: 2018-09-10 | Stop reason: SDUPTHER

## 2018-03-16 RX ORDER — ZOLPIDEM TARTRATE 5 MG/1
5 TABLET ORAL NIGHTLY PRN
Qty: 30 TABLET | Refills: 2 | Status: SHIPPED | OUTPATIENT
Start: 2018-03-16 | End: 2018-07-11 | Stop reason: SDUPTHER

## 2018-03-16 NOTE — PROGRESS NOTES
CC: hypertension    History:  Ziyad Wilson is a 52 y.o. 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  He notes his blood pressure has been running high, typically in the 160s over 90s.  He has ongoing chronic headaches, but denies any vision disturbance.  He has no anginal symptoms related to CADand continues on full dose aspirin per Dr. Lagos, beta blocker, and statin.   He notes sleeping has continued to be poor despite Seroquel and trazodone.    ROS:  Review of Systems   Constitutional: Negative for chills and fever.   Respiratory: Negative for cough and shortness of breath.    Cardiovascular: Negative for chest pain and palpitations.   Gastrointestinal: Negative for abdominal pain, constipation and nausea.   Musculoskeletal: Negative for back pain and gait problem.   Psychiatric/Behavioral: Positive for dysphoric mood and sleep disturbance.       Mr. Wilson  reports that he has never smoked. He has never used smokeless tobacco. He reports that he drinks alcohol. He reports that he does not use drugs.      Current Outpatient Prescriptions:   •  albuterol (PROVENTIL HFA;VENTOLIN HFA) 108 (90 BASE) MCG/ACT inhaler, Inhale as needed., Disp: , Rfl:   •  amitriptyline (ELAVIL) 100 MG tablet, Take 100 mg by mouth 2 (Two) Times a Day., Disp: , Rfl:   •  aspirin 325 MG tablet, daily., Disp: , Rfl:   •  atorvastatin (LIPITOR) 40 MG tablet, Take 1 tablet by mouth Daily., Disp: 30 tablet, Rfl: 11  •  butalbital-acetaminophen-caffeine (FIORICET, ESGIC) -40 MG per tablet, Take 50 tablets by mouth 2 (Two) Times a Day As Needed., Disp: , Rfl:   •  carvedilol (COREG) 25 MG tablet, TAKE TWO TABLETS BY MOUTH TWICE DAILY, Disp: 120 tablet, Rfl: 6  •  diclofenac (VOLTAREN) 75 MG EC tablet, Take 1 tablet by mouth Daily., Disp: , Rfl:   •  escitalopram (LEXAPRO) 20 MG tablet, Take 1 tablet by mouth Daily., Disp: 30  "tablet, Rfl: 5  •  esomeprazole (NEXIUM) 40 MG capsule, Take 1 capsule by mouth Every Morning Before Breakfast., Disp: 30 capsule, Rfl: 11  •  fluticasone (FLONASE) 50 MCG/ACT nasal spray, 2 sprays into each nostril Daily. Administer 2 sprays in each nostril for each dose., Disp: 1 each, Rfl: 0  •  fluticasone-salmeterol (ADVAIR) 100-50 MCG/DOSE DISKUS, Inhale 1 puff 2 (two) times a day., Disp: , Rfl:   •  furosemide (LASIX) 40 MG tablet, TAKE ONE TABLET BY MOUTH TWICE DAILY AS NEEDED, Disp: 60 tablet, Rfl: 11  •  gabapentin (NEURONTIN) 100 MG capsule, Take 100 mg by mouth 4 (Four) Times a Day., Disp: , Rfl:   •  levothyroxine (SYNTHROID, LEVOTHROID) 50 MCG tablet, Take 1 tablet by mouth Daily., Disp: 30 tablet, Rfl: 11  •  Multiple Vitamins-Minerals (MULTIVITAMIN WITH MINERALS) tablet tablet, Take 1 tablet by mouth Daily., Disp: , Rfl:   •  nitroglycerin (NITROSTAT) 0.4 MG SL tablet, Place 0.4 mg under the tongue as needed. Take no more than 3 doses in 15 minutes., Disp: , Rfl:   •  QUEtiapine (SEROquel) 100 MG tablet, TAKE ONE TABLET BY MOUTH EVERY NIGHT, Disp: 90 tablet, Rfl: 5  •  spironolactone (ALDACTONE) 25 MG tablet, TAKE ONE TABLET BY MOUTH ONCE DAILY, Disp: 30 tablet, Rfl: 6  •  traZODone (DESYREL) 100 MG tablet, Take 1 tablet by mouth Every Night., Disp: 30 tablet, Rfl: 5  •  valsartan (DIOVAN) 160 MG tablet, Take 1 tablet by mouth Daily., Disp: 30 tablet, Rfl: 5      OBJECTIVE:  /96 (BP Location: Left arm, Patient Position: Sitting, Cuff Size: Adult)   Pulse 90   Resp 16   Ht 170.2 cm (67.01\")   Wt 108 kg (238 lb)   SpO2 94%   BMI 37.27 kg/m²    Physical Exam   Constitutional: He is oriented to person, place, and time. He appears well-nourished. No distress.   Cardiovascular: Normal rate, regular rhythm and normal heart sounds.    No murmur heard.  Pulmonary/Chest: Effort normal and breath sounds normal. He has no wheezes.   Abdominal: Soft. There is no tenderness.   Neurological: He is alert " and oriented to person, place, and time.   Psychiatric: He has a normal mood and affect.       Assessment/Plan    Diagnoses and all orders for this visit:    Primary insomnia  -     zolpidem (AMBIEN) 5 MG tablet; Take 1 tablet by mouth At Night As Needed for Sleep.  TRISTAN reviewed. Ambien will be substituted for trazodone. He understands this is controlled, should be filled only through our office and at a single pharmacy. He has used this in the past with good results.    Non morbid obesity due to excess calories  Recommended attention to portion control and being careful about the types and timing of meals for the purpose of weight management.    Moderate episode of recurrent major depressive disorder  -     Ambulatory Referral to Psychology  He reports his meds are doing well. He does request a referral to psychology, which we are happy to do.    Essential hypertension  -     CBC (No Diff); Future  -     Comprehensive Metabolic Panel; Future  -     amLODIPine-valsartan (EXFORGE) 5-160 MG per tablet; Take 1 tablet by mouth Daily.  Fair control, BP goal for age is <140/90 per JNC 8 guidelines and add amlodipine in Exforge.    Acquired hypothyroidism  -     TSH; Future  Labs due. Recommend to have done immediately.     Encounter for preventive health examination  -     Hemoglobin A1c; Future  -     Lipid Panel; Future  -     Urinalysis With / Microscopic If Indicated - Urine, Clean Catch; Future    Coronary artery disease involving native coronary artery of native heart without angina pectoris  Stable on ASA, BB and statin.      An After Visit Summary was printed and given to the patient at discharge.  Return in about 4 months (around 7/16/2018) for Recheck. Sooner if problems arise.         Ashish Thurman D.O. 3/16/2018

## 2018-03-27 RX ORDER — CARVEDILOL 25 MG/1
TABLET ORAL
Qty: 120 TABLET | Refills: 11 | Status: SHIPPED | OUTPATIENT
Start: 2018-03-27 | End: 2019-03-04 | Stop reason: SDUPTHER

## 2018-07-09 DIAGNOSIS — F33.1 MODERATE EPISODE OF RECURRENT MAJOR DEPRESSIVE DISORDER (HCC): ICD-10-CM

## 2018-07-09 RX ORDER — TRAZODONE HYDROCHLORIDE 100 MG/1
TABLET ORAL
Qty: 30 TABLET | Refills: 5 | OUTPATIENT
Start: 2018-07-09

## 2018-07-10 DIAGNOSIS — F51.01 PRIMARY INSOMNIA: ICD-10-CM

## 2018-07-10 RX ORDER — ZOLPIDEM TARTRATE 5 MG/1
TABLET ORAL
Qty: 30 TABLET | Refills: 3 | Status: CANCELLED | OUTPATIENT
Start: 2018-07-10

## 2018-07-11 DIAGNOSIS — F51.01 PRIMARY INSOMNIA: ICD-10-CM

## 2018-07-11 RX ORDER — ZOLPIDEM TARTRATE 5 MG/1
5 TABLET ORAL NIGHTLY PRN
Qty: 30 TABLET | Refills: 0 | Status: SHIPPED | OUTPATIENT
Start: 2018-07-11 | End: 2018-07-20 | Stop reason: SDUPTHER

## 2018-07-20 ENCOUNTER — OFFICE VISIT (OUTPATIENT)
Dept: INTERNAL MEDICINE | Facility: CLINIC | Age: 53
End: 2018-07-20

## 2018-07-20 ENCOUNTER — TELEPHONE (OUTPATIENT)
Dept: INTERNAL MEDICINE | Facility: CLINIC | Age: 53
End: 2018-07-20

## 2018-07-20 ENCOUNTER — LAB (OUTPATIENT)
Dept: LAB | Facility: HOSPITAL | Age: 53
End: 2018-07-20
Attending: INTERNAL MEDICINE

## 2018-07-20 VITALS
OXYGEN SATURATION: 98 % | WEIGHT: 229.5 LBS | SYSTOLIC BLOOD PRESSURE: 132 MMHG | DIASTOLIC BLOOD PRESSURE: 72 MMHG | BODY MASS INDEX: 36.02 KG/M2 | RESPIRATION RATE: 16 BRPM | HEART RATE: 95 BPM | HEIGHT: 67 IN

## 2018-07-20 DIAGNOSIS — N17.9 AKI (ACUTE KIDNEY INJURY) (HCC): Primary | ICD-10-CM

## 2018-07-20 DIAGNOSIS — M13.0 POLYARTHRITIS: ICD-10-CM

## 2018-07-20 DIAGNOSIS — K21.9 GASTROESOPHAGEAL REFLUX DISEASE, ESOPHAGITIS PRESENCE NOT SPECIFIED: ICD-10-CM

## 2018-07-20 DIAGNOSIS — I50.22 CHRONIC SYSTOLIC HEART FAILURE (HCC): ICD-10-CM

## 2018-07-20 DIAGNOSIS — I10 ESSENTIAL HYPERTENSION: ICD-10-CM

## 2018-07-20 DIAGNOSIS — E03.9 ACQUIRED HYPOTHYROIDISM: ICD-10-CM

## 2018-07-20 DIAGNOSIS — E66.09 NON MORBID OBESITY DUE TO EXCESS CALORIES: ICD-10-CM

## 2018-07-20 DIAGNOSIS — Z12.5 SCREENING FOR PROSTATE CANCER: ICD-10-CM

## 2018-07-20 DIAGNOSIS — F33.1 MODERATE EPISODE OF RECURRENT MAJOR DEPRESSIVE DISORDER (HCC): ICD-10-CM

## 2018-07-20 DIAGNOSIS — F51.01 PRIMARY INSOMNIA: ICD-10-CM

## 2018-07-20 DIAGNOSIS — Z00.00 ENCOUNTER FOR PREVENTIVE HEALTH EXAMINATION: ICD-10-CM

## 2018-07-20 DIAGNOSIS — I25.10 CORONARY ARTERY DISEASE INVOLVING NATIVE CORONARY ARTERY OF NATIVE HEART WITHOUT ANGINA PECTORIS: Primary | ICD-10-CM

## 2018-07-20 DIAGNOSIS — E03.9 HYPOTHYROIDISM IN ADULT: ICD-10-CM

## 2018-07-20 PROBLEM — Z12.11 ENCOUNTER FOR SCREENING FOR MALIGNANT NEOPLASM OF COLON: Status: RESOLVED | Noted: 2017-11-21 | Resolved: 2018-07-20

## 2018-07-20 PROBLEM — R73.02 IMPAIRED GLUCOSE TOLERANCE: Status: ACTIVE | Noted: 2018-07-20

## 2018-07-20 LAB
ALBUMIN SERPL-MCNC: 4.1 G/DL (ref 3.5–5)
ALBUMIN/GLOB SERPL: 1.5 G/DL (ref 1.1–2.5)
ALP SERPL-CCNC: 61 U/L (ref 24–120)
ALT SERPL W P-5'-P-CCNC: 37 U/L (ref 0–54)
ANION GAP SERPL CALCULATED.3IONS-SCNC: 12 MMOL/L (ref 4–13)
ARTICHOKE IGE QN: 44 MG/DL (ref 0–99)
AST SERPL-CCNC: 29 U/L (ref 7–45)
BACTERIA UR QL AUTO: ABNORMAL /HPF
BILIRUB SERPL-MCNC: 0.3 MG/DL (ref 0.1–1)
BILIRUB UR QL STRIP: NEGATIVE
BUN BLD-MCNC: 14 MG/DL (ref 5–21)
BUN/CREAT SERPL: 9.2 (ref 7–25)
CALCIUM SPEC-SCNC: 9 MG/DL (ref 8.4–10.4)
CHLORIDE SERPL-SCNC: 99 MMOL/L (ref 98–110)
CHOLEST SERPL-MCNC: 104 MG/DL (ref 130–200)
CHROMATIN AB SERPL-ACNC: <8.6 IU/ML (ref 0–11.9)
CLARITY UR: CLEAR
CO2 SERPL-SCNC: 29 MMOL/L (ref 24–31)
COLOR UR: ABNORMAL
CREAT BLD-MCNC: 1.53 MG/DL (ref 0.5–1.4)
DEPRECATED RDW RBC AUTO: 53.9 FL (ref 40–54)
ERYTHROCYTE [DISTWIDTH] IN BLOOD BY AUTOMATED COUNT: 18.3 % (ref 12–15)
GFR SERPL CREATININE-BSD FRML MDRD: 48 ML/MIN/1.73
GLOBULIN UR ELPH-MCNC: 2.8 GM/DL
GLUCOSE BLD-MCNC: 97 MG/DL (ref 70–100)
GLUCOSE UR STRIP-MCNC: NEGATIVE MG/DL
HBA1C MFR BLD: 5.6 %
HCT VFR BLD AUTO: 43.5 % (ref 40–52)
HDLC SERPL-MCNC: 27 MG/DL
HGB BLD-MCNC: 13.5 G/DL (ref 14–18)
HGB UR QL STRIP.AUTO: NEGATIVE
KETONES UR QL STRIP: ABNORMAL
LDLC/HDLC SERPL: 0.05 {RATIO}
LEUKOCYTE ESTERASE UR QL STRIP.AUTO: ABNORMAL
MCH RBC QN AUTO: 25.7 PG (ref 28–32)
MCHC RBC AUTO-ENTMCNC: 31 G/DL (ref 33–36)
MCV RBC AUTO: 82.7 FL (ref 82–95)
NITRITE UR QL STRIP: NEGATIVE
PH UR STRIP.AUTO: <=5 [PH] (ref 5–8)
PLATELET # BLD AUTO: 242 10*3/MM3 (ref 130–400)
PMV BLD AUTO: 9.8 FL (ref 6–12)
POTASSIUM BLD-SCNC: 3.9 MMOL/L (ref 3.5–5.3)
PROT SERPL-MCNC: 6.9 G/DL (ref 6.3–8.7)
PROT UR QL STRIP: ABNORMAL
PSA SERPL-MCNC: 0.48 NG/ML (ref 0–4)
RBC # BLD AUTO: 5.26 10*6/MM3 (ref 4.8–5.9)
RBC # UR: ABNORMAL /HPF
REF LAB TEST METHOD: ABNORMAL
SODIUM BLD-SCNC: 140 MMOL/L (ref 135–145)
SP GR UR STRIP: 1.03 (ref 1–1.03)
SQUAMOUS #/AREA URNS HPF: ABNORMAL /HPF
TRIGL SERPL-MCNC: 378 MG/DL (ref 0–149)
TSH SERPL DL<=0.05 MIU/L-ACNC: 6.64 MIU/ML (ref 0.47–4.68)
UROBILINOGEN UR QL STRIP: ABNORMAL
WBC NRBC COR # BLD: 7.7 10*3/MM3 (ref 4.8–10.8)
WBC UR QL AUTO: ABNORMAL /HPF

## 2018-07-20 PROCEDURE — 86431 RHEUMATOID FACTOR QUANT: CPT | Performed by: INTERNAL MEDICINE

## 2018-07-20 PROCEDURE — 80061 LIPID PANEL: CPT | Performed by: INTERNAL MEDICINE

## 2018-07-20 PROCEDURE — 85027 COMPLETE CBC AUTOMATED: CPT | Performed by: INTERNAL MEDICINE

## 2018-07-20 PROCEDURE — G0103 PSA SCREENING: HCPCS | Performed by: INTERNAL MEDICINE

## 2018-07-20 PROCEDURE — 83036 HEMOGLOBIN GLYCOSYLATED A1C: CPT | Performed by: INTERNAL MEDICINE

## 2018-07-20 PROCEDURE — 99214 OFFICE O/P EST MOD 30 MIN: CPT | Performed by: INTERNAL MEDICINE

## 2018-07-20 PROCEDURE — 84443 ASSAY THYROID STIM HORMONE: CPT | Performed by: INTERNAL MEDICINE

## 2018-07-20 PROCEDURE — 36415 COLL VENOUS BLD VENIPUNCTURE: CPT

## 2018-07-20 PROCEDURE — 81001 URINALYSIS AUTO W/SCOPE: CPT | Performed by: INTERNAL MEDICINE

## 2018-07-20 PROCEDURE — 80053 COMPREHEN METABOLIC PANEL: CPT | Performed by: INTERNAL MEDICINE

## 2018-07-20 RX ORDER — ZOLPIDEM TARTRATE 10 MG/1
10 TABLET ORAL NIGHTLY PRN
Qty: 30 TABLET | Refills: 3 | Status: CANCELLED | OUTPATIENT
Start: 2018-07-20

## 2018-07-20 RX ORDER — ESCITALOPRAM OXALATE 20 MG/1
20 TABLET ORAL DAILY
Qty: 30 TABLET | Refills: 5 | Status: SHIPPED | OUTPATIENT
Start: 2018-07-20 | End: 2019-04-29 | Stop reason: SDUPTHER

## 2018-07-20 RX ORDER — BUPROPION HYDROCHLORIDE 100 MG/1
100 TABLET, EXTENDED RELEASE ORAL EVERY 12 HOURS SCHEDULED
Qty: 60 TABLET | Refills: 1 | Status: SHIPPED | OUTPATIENT
Start: 2018-07-20 | End: 2019-04-29

## 2018-07-20 RX ORDER — ZOLPIDEM TARTRATE 10 MG/1
10 TABLET ORAL NIGHTLY PRN
Qty: 30 TABLET | Refills: 5 | Status: SHIPPED | OUTPATIENT
Start: 2018-07-20 | End: 2019-01-18 | Stop reason: ALTCHOICE

## 2018-07-20 RX ORDER — LEVOTHYROXINE SODIUM 0.07 MG/1
75 TABLET ORAL DAILY
Qty: 90 TABLET | Refills: 3 | Status: SHIPPED | OUTPATIENT
Start: 2018-07-20 | End: 2019-06-06 | Stop reason: SDUPTHER

## 2018-07-20 NOTE — PROGRESS NOTES
CC: Follow-up CAD    History:  Ziyad Wilson is a 53 y.o. male who presents today for follow-up for evaluation of the above:  He notes he has been doing well without any acute illness.  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  He continues on his blood pressure medication with no side effects and with good control per his report.  He continues to have difficulty sleeping and requests an increase in his Ambien is it has not been as helpful as he would like.  He denies anginal symptoms and does continue on aspirin, beta blocker, and statin.  He denies decompensation in the form of edema or shortness of breath as it relates to his chronic systolic heart failure.  He has had ongoing pain in his hands and knees.  He notes it takes him an hour to loosen up in the morning.  It is primarily in the MCP joints.    ROS:  Review of Systems   Constitutional: Negative for chills and fever.   Respiratory: Negative for cough and shortness of breath.    Cardiovascular: Negative for chest pain and palpitations.   Gastrointestinal: Negative for abdominal pain, constipation and nausea.   Neurological: Positive for headaches.   Psychiatric/Behavioral: Positive for sleep disturbance.       Mr. Wilson  reports that he has never smoked. He has never used smokeless tobacco. He reports that he drinks alcohol. He reports that he does not use drugs.      Current Outpatient Prescriptions:   •  albuterol (PROVENTIL HFA;VENTOLIN HFA) 108 (90 BASE) MCG/ACT inhaler, Inhale as needed., Disp: , Rfl:   •  amitriptyline (ELAVIL) 100 MG tablet, Take 100 mg by mouth 2 (Two) Times a Day., Disp: , Rfl:   •  amLODIPine-valsartan (EXFORGE) 5-160 MG per tablet, Take 1 tablet by mouth Daily., Disp: 30 tablet, Rfl: 5  •  aspirin 325 MG tablet, daily., Disp: , Rfl:   •  atorvastatin (LIPITOR) 40 MG tablet, Take 1 tablet by mouth Daily., Disp: 30  tablet, Rfl: 11  •  butalbital-acetaminophen-caffeine (FIORICET, ESGIC) -40 MG per tablet, Take 50 tablets by mouth 2 (Two) Times a Day As Needed., Disp: , Rfl:   •  carvedilol (COREG) 25 MG tablet, TAKE TWO TABLETS BY MOUTH TWICE DAILY, Disp: 120 tablet, Rfl: 11  •  diclofenac (VOLTAREN) 75 MG EC tablet, Take 1 tablet by mouth Daily., Disp: , Rfl:   •  escitalopram (LEXAPRO) 20 MG tablet, Take 1 tablet by mouth Daily., Disp: 30 tablet, Rfl: 5  •  esomeprazole (NEXIUM) 40 MG capsule, Take 1 capsule by mouth Every Morning Before Breakfast., Disp: 30 capsule, Rfl: 11  •  fluticasone (FLONASE) 50 MCG/ACT nasal spray, 2 sprays into each nostril Daily. Administer 2 sprays in each nostril for each dose., Disp: 1 each, Rfl: 0  •  fluticasone-salmeterol (ADVAIR) 100-50 MCG/DOSE DISKUS, Inhale 1 puff 2 (two) times a day., Disp: , Rfl:   •  furosemide (LASIX) 40 MG tablet, TAKE ONE TABLET BY MOUTH TWICE DAILY AS NEEDED, Disp: 60 tablet, Rfl: 11  •  gabapentin (NEURONTIN) 100 MG capsule, Take 100 mg by mouth 4 (Four) Times a Day., Disp: , Rfl:   •  levothyroxine (SYNTHROID, LEVOTHROID) 50 MCG tablet, Take 1 tablet by mouth Daily., Disp: 30 tablet, Rfl: 11  •  Multiple Vitamins-Minerals (MULTIVITAMIN WITH MINERALS) tablet tablet, Take 1 tablet by mouth Daily., Disp: , Rfl:   •  nitroglycerin (NITROSTAT) 0.4 MG SL tablet, Place 0.4 mg under the tongue as needed. Take no more than 3 doses in 15 minutes., Disp: , Rfl:   •  QUEtiapine (SEROquel) 100 MG tablet, TAKE ONE TABLET BY MOUTH EVERY NIGHT, Disp: 90 tablet, Rfl: 5  •  spironolactone (ALDACTONE) 25 MG tablet, TAKE ONE TABLET BY MOUTH ONCE DAILY, Disp: 30 tablet, Rfl: 6  •  zolpidem (AMBIEN) 10 MG tablet, Take 1 tablet by mouth At Night As Needed for Sleep., Disp: 30 tablet, Rfl: 5  •  buPROPion SR (WELLBUTRIN SR) 100 MG 12 hr tablet, Take 1 tablet by mouth Every 12 (Twelve) Hours., Disp: 60 tablet, Rfl: 1      OBJECTIVE:  /72 (BP Location: Left arm, Patient  "Position: Sitting, Cuff Size: Adult)   Pulse 95   Resp 16   Ht 170.2 cm (67.01\")   Wt 104 kg (229 lb 8 oz)   SpO2 98%   BMI 35.93 kg/m²    Physical Exam   Constitutional: He is oriented to person, place, and time. He appears well-nourished. No distress.   Cardiovascular: Normal rate, regular rhythm and normal heart sounds.    No murmur heard.  Pulmonary/Chest: Effort normal and breath sounds normal. He has no wheezes.   Abdominal: Soft. There is no tenderness.   Musculoskeletal:   There is mild fullness and lack of a well-defined joint space, but no suggestion of synovitis or swelling in the MCPs bilaterally.   Neurological: He is alert and oriented to person, place, and time.   Psychiatric: He has a normal mood and affect.       Assessment/Plan    Diagnoses and all orders for this visit:    Coronary artery disease involving native coronary artery of native heart without angina pectoris  Stable on aspirin, beta blocker, and statin.    Chronic systolic heart failure (CMS/HCC)  Stable without decompensation on beta blocker and diuretic therapy.  He is also on RAAS blockade.     Essential hypertension  Well controlled, BP goal for age is <140/90 per JNC 8 guidelines and continue current medications    Moderate episode of recurrent major depressive disorder (CMS/HCC)  -     escitalopram (LEXAPRO) 20 MG tablet; Take 1 tablet by mouth Daily.  -     buPROPion SR (WELLBUTRIN SR) 100 MG 12 hr tablet; Take 1 tablet by mouth Every 12 (Twelve) Hours.  We will continue Lexapro, but given suboptimally controlled depression, we will add Wellbutrin.    Primary insomnia  -     zolpidem (AMBIEN) 10 MG tablet; Take 1 tablet by mouth At Night As Needed for Sleep.  He has had some relief, but suboptimal control with Ambien at 5 mg.  We will increase this to 10 mg.  Of note, he did not have his 5 mg prescription refilled at the pharmacy as he was told they did not have it.  This was confirmed to have been received by the pharmacy, " but canceled in favor of the 10 mg dose.    Non morbid obesity due to excess calories  Recommended attention to portion control and being careful about the types and timing of meals for the purpose of weight management.    Gastroesophageal reflux disease, esophagitis presence not specified  Stable without exacerbation on PPI.    Acquired hypothyroidism  Check TSH with labs.  This was ordered previously.  We will adjust medications as needed.    Polyarthritis  -     RHEUMATOID FACTOR; Future  Check rheumatoid factor given joint pains as above.  I have low suspicion for rheumatoid arthritis.    Screening for prostate cancer  -     PSA Screen; Future  Check PSA per patient request despite informed discussion.     An After Visit Summary was printed and given to the patient at discharge.  Return in about 4 months (around 11/20/2018) for Medicare Wellness. Sooner if problems arise.         Ashish Thurman D.O. 7/20/2018

## 2018-07-23 ENCOUNTER — TELEPHONE (OUTPATIENT)
Dept: INTERNAL MEDICINE | Facility: CLINIC | Age: 53
End: 2018-07-23

## 2018-07-23 NOTE — TELEPHONE ENCOUNTER
----- Message from Ashish Thurman DO sent at 7/20/2018  4:27 PM CDT -----  1. Increase synthroid to 75mcg, which is sent to pharmacy for next refill.   2. Kidney numbers elevated. I would like him to focus on hydration and recheck on Monday or Tuesday.  3. PSA & arthritis lab negative.

## 2018-09-09 DIAGNOSIS — I25.10 CORONARY ARTERY DISEASE INVOLVING NATIVE CORONARY ARTERY OF NATIVE HEART WITHOUT ANGINA PECTORIS: ICD-10-CM

## 2018-09-09 DIAGNOSIS — I50.22 CHRONIC SYSTOLIC HEART FAILURE (HCC): ICD-10-CM

## 2018-09-09 DIAGNOSIS — I10 ESSENTIAL HYPERTENSION: ICD-10-CM

## 2018-09-09 RX ORDER — ATORVASTATIN CALCIUM 40 MG/1
TABLET, FILM COATED ORAL
Qty: 30 TABLET | Refills: 11 | Status: CANCELLED | OUTPATIENT
Start: 2018-09-09

## 2018-09-09 RX ORDER — AMLODIPINE AND VALSARTAN 5; 160 MG/1; MG/1
TABLET ORAL
Qty: 30 TABLET | Refills: 5 | Status: CANCELLED | OUTPATIENT
Start: 2018-09-09

## 2018-09-10 DIAGNOSIS — I50.22 CHRONIC SYSTOLIC HEART FAILURE (HCC): ICD-10-CM

## 2018-09-10 DIAGNOSIS — I10 ESSENTIAL HYPERTENSION: ICD-10-CM

## 2018-09-10 DIAGNOSIS — I25.10 CORONARY ARTERY DISEASE INVOLVING NATIVE CORONARY ARTERY OF NATIVE HEART WITHOUT ANGINA PECTORIS: ICD-10-CM

## 2018-09-10 RX ORDER — ATORVASTATIN CALCIUM 40 MG/1
40 TABLET, FILM COATED ORAL DAILY
Qty: 30 TABLET | Refills: 11 | Status: SHIPPED | OUTPATIENT
Start: 2018-09-10 | End: 2019-08-16 | Stop reason: SDUPTHER

## 2018-09-10 RX ORDER — AMLODIPINE AND VALSARTAN 5; 160 MG/1; MG/1
1 TABLET ORAL DAILY
Qty: 30 TABLET | Refills: 11 | Status: SHIPPED | OUTPATIENT
Start: 2018-09-10 | End: 2019-09-09 | Stop reason: SDUPTHER

## 2018-10-11 DIAGNOSIS — F33.1 MODERATE EPISODE OF RECURRENT MAJOR DEPRESSIVE DISORDER (HCC): ICD-10-CM

## 2018-10-12 RX ORDER — TRAZODONE HYDROCHLORIDE 100 MG/1
TABLET ORAL
Qty: 30 TABLET | Refills: 5 | OUTPATIENT
Start: 2018-10-12

## 2018-11-13 RX ORDER — FUROSEMIDE 40 MG/1
TABLET ORAL
Qty: 60 TABLET | Refills: 3 | Status: SHIPPED | OUTPATIENT
Start: 2018-11-13 | End: 2019-03-11 | Stop reason: SDUPTHER

## 2018-11-30 ENCOUNTER — OFFICE VISIT (OUTPATIENT)
Dept: INTERNAL MEDICINE | Facility: CLINIC | Age: 53
End: 2018-11-30

## 2018-11-30 ENCOUNTER — LAB (OUTPATIENT)
Dept: LAB | Facility: HOSPITAL | Age: 53
End: 2018-11-30
Attending: INTERNAL MEDICINE

## 2018-11-30 VITALS
HEIGHT: 67 IN | OXYGEN SATURATION: 96 % | HEART RATE: 96 BPM | WEIGHT: 219.7 LBS | DIASTOLIC BLOOD PRESSURE: 84 MMHG | BODY MASS INDEX: 34.48 KG/M2 | RESPIRATION RATE: 16 BRPM | SYSTOLIC BLOOD PRESSURE: 136 MMHG

## 2018-11-30 DIAGNOSIS — I25.10 CORONARY ARTERY DISEASE INVOLVING NATIVE CORONARY ARTERY OF NATIVE HEART WITHOUT ANGINA PECTORIS: ICD-10-CM

## 2018-11-30 DIAGNOSIS — E03.9 ACQUIRED HYPOTHYROIDISM: ICD-10-CM

## 2018-11-30 DIAGNOSIS — R79.89 ELEVATED SERUM CREATININE: ICD-10-CM

## 2018-11-30 DIAGNOSIS — I10 ESSENTIAL HYPERTENSION: ICD-10-CM

## 2018-11-30 DIAGNOSIS — E66.09 NON MORBID OBESITY DUE TO EXCESS CALORIES: ICD-10-CM

## 2018-11-30 DIAGNOSIS — E03.9 ACQUIRED HYPOTHYROIDISM: Primary | ICD-10-CM

## 2018-11-30 PROBLEM — N18.2 CKD (CHRONIC KIDNEY DISEASE) STAGE 2, GFR 60-89 ML/MIN: Status: ACTIVE | Noted: 2018-11-30

## 2018-11-30 LAB
ALBUMIN SERPL-MCNC: 4.3 G/DL (ref 3.5–5)
ANION GAP SERPL CALCULATED.3IONS-SCNC: 13 MMOL/L (ref 4–13)
BUN BLD-MCNC: 20 MG/DL (ref 5–21)
BUN/CREAT SERPL: 16 (ref 7–25)
CALCIUM SPEC-SCNC: 9.1 MG/DL (ref 8.4–10.4)
CHLORIDE SERPL-SCNC: 98 MMOL/L (ref 98–110)
CO2 SERPL-SCNC: 29 MMOL/L (ref 24–31)
CREAT BLD-MCNC: 1.25 MG/DL (ref 0.5–1.4)
GFR SERPL CREATININE-BSD FRML MDRD: 60 ML/MIN/1.73
GLUCOSE BLD-MCNC: 103 MG/DL (ref 70–100)
PHOSPHATE SERPL-MCNC: 3.5 MG/DL (ref 2.5–4.5)
POTASSIUM BLD-SCNC: 3.7 MMOL/L (ref 3.5–5.3)
SODIUM BLD-SCNC: 140 MMOL/L (ref 135–145)
TSH SERPL DL<=0.05 MIU/L-ACNC: 2.62 MIU/ML (ref 0.47–4.68)

## 2018-11-30 PROCEDURE — 80069 RENAL FUNCTION PANEL: CPT | Performed by: INTERNAL MEDICINE

## 2018-11-30 PROCEDURE — 99214 OFFICE O/P EST MOD 30 MIN: CPT | Performed by: INTERNAL MEDICINE

## 2018-11-30 PROCEDURE — G0439 PPPS, SUBSEQ VISIT: HCPCS | Performed by: INTERNAL MEDICINE

## 2018-11-30 PROCEDURE — 36415 COLL VENOUS BLD VENIPUNCTURE: CPT

## 2018-11-30 PROCEDURE — 96160 PT-FOCUSED HLTH RISK ASSMT: CPT | Performed by: INTERNAL MEDICINE

## 2018-11-30 PROCEDURE — 84443 ASSAY THYROID STIM HORMONE: CPT | Performed by: INTERNAL MEDICINE

## 2018-11-30 NOTE — PROGRESS NOTES
QUICK REFERENCE INFORMATION:  The ABCs of the Annual Wellness Visit    Subsequent Medicare Wellness Visit    HEALTH RISK ASSESSMENT    1965    Recent Hospitalizations:  No hospitalization(s) within the last year..        Current Medical Providers:  Patient Care Team:  Ashish Thurman DO as PCP - General (Internal Medicine)  oDuglas Ruano MD as Consulting Physician (Pain Medicine)  Uri Lagos MD as Cardiologist (Cardiology)        Smoking Status:  Social History     Tobacco Use   Smoking Status Never Smoker   Smokeless Tobacco Never Used       Alcohol Consumption:  Social History     Substance and Sexual Activity   Alcohol Use Yes    Comment: occasional       Depression Screen:   PHQ-2/PHQ-9 Depression Screening 11/30/2018   Little interest or pleasure in doing things 2   Feeling down, depressed, or hopeless 2   Trouble falling or staying asleep, or sleeping too much 2   Feeling tired or having little energy 1   Poor appetite or overeating 1   Feeling bad about yourself - or that you are a failure or have let yourself or your family down 2   Trouble concentrating on things, such as reading the newspaper or watching television 0   Moving or speaking so slowly that other people could have noticed. Or the opposite - being so fidgety or restless that you have been moving around a lot more than usual 0   Thoughts that you would be better off dead, or of hurting yourself in some way 0   Total Score 10   If you checked off any problems, how difficult have these problems made it for you to do your work, take care of things at home, or get along with other people? Very difficult       Health Habits and Functional and Cognitive Screening:  Functional & Cognitive Status 11/30/2018   Do you have difficulty preparing food and eating? No   Do you have difficulty bathing yourself, getting dressed or grooming yourself? No   Do you have difficulty using the toilet? No   Do you have difficulty moving around  from place to place? No   Do you have trouble with steps or getting out of a bed or a chair? No   In the past year have you fallen or experienced a near fall? Yes   Current Diet Unhealthy Diet   Dental Exam Not up to date   Eye Exam Not up to date   Exercise (times per week) 3 times per week   Current Exercise Activities Include Weightlifting   Do you need help using the phone?  No   Are you deaf or do you have serious difficulty hearing?  No   Do you need help with transportation? No   Do you need help shopping? No   Do you need help preparing meals?  No   Do you need help with housework?  No   Do you need help with laundry? No   Do you need help taking your medications? No   Do you need help managing money? No   Do you ever drive or ride in a car without wearing a seat belt? No   Have you felt unusual stress, anger or loneliness in the last month? Yes   Who do you live with? Sibling   If you need help, do you have trouble finding someone available to you? No   Have you been bothered in the last four weeks by sexual problems? No   Do you have difficulty concentrating, remembering or making decisions? No           Does the patient have evidence of cognitive impairment? No    Aspirin use counseling: Taking ASA appropriately as indicated      Recent Lab Results:  CMP:  Lab Results   Component Value Date    BUN 20 11/30/2018    CREATININE 1.25 11/30/2018    EGFRIFNONA 60 (L) 11/30/2018    BCR 16.0 11/30/2018     11/30/2018    K 3.7 11/30/2018    CO2 29.0 11/30/2018    CALCIUM 9.1 11/30/2018    ALBUMIN 4.30 11/30/2018    BILITOT 0.3 07/20/2018    ALKPHOS 61 07/20/2018    AST 29 07/20/2018    ALT 37 07/20/2018     Lipid Panel:  Lab Results   Component Value Date    CHOL 104 (L) 07/20/2018    TRIG 378 (H) 07/20/2018    HDL 27 (L) 07/20/2018    VLDL 50 (H) 01/13/2016    LDLHDL 0.05 07/20/2018     HbA1c:  Lab Results   Component Value Date    HGBA1C 5.6 07/20/2018       Visual Acuity:  No exam data  present    Age-appropriate Screening Schedule:  Refer to the list below for future screening recommendations based on patient's age, sex and/or medical conditions. Orders for these recommended tests are listed in the plan section. The patient has been provided with a written plan.    Health Maintenance   Topic Date Due   • TDAP/TD VACCINES (1 - Tdap) 01/02/2014   • ZOSTER VACCINE (1 of 2) 05/12/2014   • LIPID PANEL  07/20/2019   • COLONOSCOPY  01/09/2028   • INFLUENZA VACCINE  Completed        Subjective   History of Present Illness    Ziyad Wilson is a 53 y.o. male who presents for an Subsequent Wellness Visit.    The following portions of the patient's history were reviewed and updated as appropriate: allergies, current medications, past family history, past medical history, past social history, past surgical history and problem list.    Outpatient Medications Prior to Visit   Medication Sig Dispense Refill   • albuterol (PROVENTIL HFA;VENTOLIN HFA) 108 (90 BASE) MCG/ACT inhaler Inhale as needed.     • amitriptyline (ELAVIL) 100 MG tablet Take 100 mg by mouth 2 (Two) Times a Day.     • amLODIPine-valsartan (EXFORGE) 5-160 MG per tablet Take 1 tablet by mouth Daily. 30 tablet 11   • aspirin 325 MG tablet daily.     • atorvastatin (LIPITOR) 40 MG tablet Take 1 tablet by mouth Daily. 30 tablet 11   • buPROPion SR (WELLBUTRIN SR) 100 MG 12 hr tablet Take 1 tablet by mouth Every 12 (Twelve) Hours. 60 tablet 1   • butalbital-acetaminophen-caffeine (FIORICET, ESGIC) -40 MG per tablet Take 50 tablets by mouth 2 (Two) Times a Day As Needed.     • carvedilol (COREG) 25 MG tablet TAKE TWO TABLETS BY MOUTH TWICE DAILY 120 tablet 11   • diclofenac (VOLTAREN) 75 MG EC tablet Take 1 tablet by mouth Daily.     • escitalopram (LEXAPRO) 20 MG tablet Take 1 tablet by mouth Daily. 30 tablet 5   • esomeprazole (NEXIUM) 40 MG capsule Take 1 capsule by mouth Every Morning Before Breakfast. 30 capsule 11   • fluticasone (FLONASE)  50 MCG/ACT nasal spray 2 sprays into each nostril Daily. Administer 2 sprays in each nostril for each dose. 1 each 0   • fluticasone-salmeterol (ADVAIR) 100-50 MCG/DOSE DISKUS Inhale 1 puff 2 (two) times a day.     • furosemide (LASIX) 40 MG tablet TAKE ONE TABLET BY MOUTH TWICE DAILY AS NEEDED 60 tablet 3   • gabapentin (NEURONTIN) 100 MG capsule Take 100 mg by mouth 4 (Four) Times a Day.     • levothyroxine (SYNTHROID, LEVOTHROID) 75 MCG tablet Take 1 tablet by mouth Daily. 90 tablet 3   • Multiple Vitamins-Minerals (MULTIVITAMIN WITH MINERALS) tablet tablet Take 1 tablet by mouth Daily.     • nitroglycerin (NITROSTAT) 0.4 MG SL tablet Place 0.4 mg under the tongue as needed. Take no more than 3 doses in 15 minutes.     • QUEtiapine (SEROquel) 100 MG tablet TAKE ONE TABLET BY MOUTH EVERY NIGHT 90 tablet 5   • spironolactone (ALDACTONE) 25 MG tablet TAKE ONE TABLET BY MOUTH ONCE DAILY 30 tablet 6   • zolpidem (AMBIEN) 10 MG tablet Take 1 tablet by mouth At Night As Needed for Sleep. 30 tablet 5     No facility-administered medications prior to visit.        Patient Active Problem List   Diagnosis   • Chronic systolic heart failure (CMS/HCC)   • Essential hypertension   • Coronary artery disease   • Acquired hypothyroidism   • Chronic daily headache   • Non morbid obesity due to excess calories   • Mild cognitive impairment   • SOB (shortness of breath)   • Asthma   • GERD (gastroesophageal reflux disease)   • Sleep apnea   • Hyperlipemia, mixed   • Moderate episode of recurrent major depressive disorder (CMS/HCC)   • Primary insomnia   • Cervical facet joint syndrome   • Impaired glucose tolerance       Advance Care Planning:  has NO advance directive - information provided to the patient today    Identification of Risk Factors:  Risk factors include: weight , unhealthy diet, cardiovascular risk, inactivity and chronic pain.    Review of Systems See  note    Compared to one year ago, the patient feels his  "physical health is the same.  Compared to one year ago, the patient feels his mental health is worse.    Objective     Physical Exam See  note    Vitals:    11/30/18 1305 11/30/18 1345   BP: 137/90 136/84   BP Location: Left arm Left arm   Patient Position: Sitting    Pulse: 96    Resp: 16    SpO2: 96%    Weight: 99.7 kg (219 lb 11.2 oz)    Height: 170.2 cm (67.01\")        Patient's Body mass index is 34.4 kg/m². BMI is above normal parameters. Recommendations include: exercise counseling and nutrition counseling.      Assessment/Plan   Patient Self-Management and Personalized Health Advice  The patient has been provided with information about: diet, exercise, weight management, fall prevention, designing advance directives and mental health concerns and preventive services including:   · Advance directive, Exercise counseling provided, Fall Risk assessment done, TdaP vaccine, Shingrix..    Visit Diagnoses:    ICD-10-CM ICD-9-CM   1. Acquired hypothyroidism E03.9 244.9   2. Essential hypertension I10 401.9   3. Coronary artery disease involving native coronary artery of native heart without angina pectoris I25.10 414.01   4. Non morbid obesity due to excess calories E66.09 278.00   5. Elevated serum creatinine R79.89 790.99       Orders Placed This Encounter   Procedures   • TSH     Standing Status:   Future     Number of Occurrences:   1     Standing Expiration Date:   11/30/2019   • Renal Function Panel     Standing Status:   Future     Number of Occurrences:   1     Standing Expiration Date:   11/30/2019       Outpatient Encounter Medications as of 11/30/2018   Medication Sig Dispense Refill   • albuterol (PROVENTIL HFA;VENTOLIN HFA) 108 (90 BASE) MCG/ACT inhaler Inhale as needed.     • amitriptyline (ELAVIL) 100 MG tablet Take 100 mg by mouth 2 (Two) Times a Day.     • amLODIPine-valsartan (EXFORGE) 5-160 MG per tablet Take 1 tablet by mouth Daily. 30 tablet 11   • aspirin 325 MG tablet daily.     • " atorvastatin (LIPITOR) 40 MG tablet Take 1 tablet by mouth Daily. 30 tablet 11   • buPROPion SR (WELLBUTRIN SR) 100 MG 12 hr tablet Take 1 tablet by mouth Every 12 (Twelve) Hours. 60 tablet 1   • butalbital-acetaminophen-caffeine (FIORICET, ESGIC) -40 MG per tablet Take 50 tablets by mouth 2 (Two) Times a Day As Needed.     • carvedilol (COREG) 25 MG tablet TAKE TWO TABLETS BY MOUTH TWICE DAILY 120 tablet 11   • diclofenac (VOLTAREN) 75 MG EC tablet Take 1 tablet by mouth Daily.     • escitalopram (LEXAPRO) 20 MG tablet Take 1 tablet by mouth Daily. 30 tablet 5   • esomeprazole (NEXIUM) 40 MG capsule Take 1 capsule by mouth Every Morning Before Breakfast. 30 capsule 11   • fluticasone (FLONASE) 50 MCG/ACT nasal spray 2 sprays into each nostril Daily. Administer 2 sprays in each nostril for each dose. 1 each 0   • fluticasone-salmeterol (ADVAIR) 100-50 MCG/DOSE DISKUS Inhale 1 puff 2 (two) times a day.     • furosemide (LASIX) 40 MG tablet TAKE ONE TABLET BY MOUTH TWICE DAILY AS NEEDED 60 tablet 3   • gabapentin (NEURONTIN) 100 MG capsule Take 100 mg by mouth 4 (Four) Times a Day.     • levothyroxine (SYNTHROID, LEVOTHROID) 75 MCG tablet Take 1 tablet by mouth Daily. 90 tablet 3   • Multiple Vitamins-Minerals (MULTIVITAMIN WITH MINERALS) tablet tablet Take 1 tablet by mouth Daily.     • nitroglycerin (NITROSTAT) 0.4 MG SL tablet Place 0.4 mg under the tongue as needed. Take no more than 3 doses in 15 minutes.     • QUEtiapine (SEROquel) 100 MG tablet TAKE ONE TABLET BY MOUTH EVERY NIGHT 90 tablet 5   • spironolactone (ALDACTONE) 25 MG tablet TAKE ONE TABLET BY MOUTH ONCE DAILY 30 tablet 6   • zolpidem (AMBIEN) 10 MG tablet Take 1 tablet by mouth At Night As Needed for Sleep. 30 tablet 5     No facility-administered encounter medications on file as of 11/30/2018.        Reviewed use of high risk medication in the elderly: yes  Reviewed for potential of harmful drug interactions in the elderly: yes    Follow  Up:  Return in about 6 months (around 5/30/2019) for Recheck.     An After Visit Summary and PPPS with all of these plans were given to the patient.

## 2018-11-30 NOTE — PROGRESS NOTES
CC: Follow-up hypertension    History:  Ziyad Wilson is a 53 y.o. male who presents today for follow-up for evaluation of the above:  He notes his hypertension has done well on his current medications without any side effects.  He did have an episode of weakness with fall about 2 weeks ago.  He remained conscious, but notes he had muscle weakness that resolved over the course of 60 seconds.  He has had no further episodes since nor had he had any episodes before.  He denies changes to his urine, but his creatinine had been elevated on last lab check.  Additionally, his levothyroxine was changed, but he has not had follow-up with TSH since.     ROS:  Review of Systems   Constitutional: Negative for chills and fever.   Respiratory: Negative for cough and shortness of breath.    Cardiovascular: Negative for chest pain and palpitations.   Gastrointestinal: Negative for abdominal pain, constipation and nausea.   Musculoskeletal: Positive for arthralgias. Negative for back pain.   Neurological: Positive for weakness.       Mr. Wilson  reports that  has never smoked. he has never used smokeless tobacco. He reports that he drinks alcohol. He reports that he does not use drugs.      Current Outpatient Medications:   •  albuterol (PROVENTIL HFA;VENTOLIN HFA) 108 (90 BASE) MCG/ACT inhaler, Inhale as needed., Disp: , Rfl:   •  amitriptyline (ELAVIL) 100 MG tablet, Take 100 mg by mouth 2 (Two) Times a Day., Disp: , Rfl:   •  amLODIPine-valsartan (EXFORGE) 5-160 MG per tablet, Take 1 tablet by mouth Daily., Disp: 30 tablet, Rfl: 11  •  aspirin 325 MG tablet, daily., Disp: , Rfl:   •  atorvastatin (LIPITOR) 40 MG tablet, Take 1 tablet by mouth Daily., Disp: 30 tablet, Rfl: 11  •  buPROPion SR (WELLBUTRIN SR) 100 MG 12 hr tablet, Take 1 tablet by mouth Every 12 (Twelve) Hours., Disp: 60 tablet, Rfl: 1  •  butalbital-acetaminophen-caffeine (FIORICET, ESGIC) -40 MG per tablet, Take 50 tablets by mouth 2 (Two) Times a Day As  "Needed., Disp: , Rfl:   •  carvedilol (COREG) 25 MG tablet, TAKE TWO TABLETS BY MOUTH TWICE DAILY, Disp: 120 tablet, Rfl: 11  •  diclofenac (VOLTAREN) 75 MG EC tablet, Take 1 tablet by mouth Daily., Disp: , Rfl:   •  escitalopram (LEXAPRO) 20 MG tablet, Take 1 tablet by mouth Daily., Disp: 30 tablet, Rfl: 5  •  esomeprazole (NEXIUM) 40 MG capsule, Take 1 capsule by mouth Every Morning Before Breakfast., Disp: 30 capsule, Rfl: 11  •  fluticasone (FLONASE) 50 MCG/ACT nasal spray, 2 sprays into each nostril Daily. Administer 2 sprays in each nostril for each dose., Disp: 1 each, Rfl: 0  •  fluticasone-salmeterol (ADVAIR) 100-50 MCG/DOSE DISKUS, Inhale 1 puff 2 (two) times a day., Disp: , Rfl:   •  furosemide (LASIX) 40 MG tablet, TAKE ONE TABLET BY MOUTH TWICE DAILY AS NEEDED, Disp: 60 tablet, Rfl: 3  •  gabapentin (NEURONTIN) 100 MG capsule, Take 100 mg by mouth 4 (Four) Times a Day., Disp: , Rfl:   •  levothyroxine (SYNTHROID, LEVOTHROID) 75 MCG tablet, Take 1 tablet by mouth Daily., Disp: 90 tablet, Rfl: 3  •  Multiple Vitamins-Minerals (MULTIVITAMIN WITH MINERALS) tablet tablet, Take 1 tablet by mouth Daily., Disp: , Rfl:   •  nitroglycerin (NITROSTAT) 0.4 MG SL tablet, Place 0.4 mg under the tongue as needed. Take no more than 3 doses in 15 minutes., Disp: , Rfl:   •  QUEtiapine (SEROquel) 100 MG tablet, TAKE ONE TABLET BY MOUTH EVERY NIGHT, Disp: 90 tablet, Rfl: 5  •  spironolactone (ALDACTONE) 25 MG tablet, TAKE ONE TABLET BY MOUTH ONCE DAILY, Disp: 30 tablet, Rfl: 6  •  zolpidem (AMBIEN) 10 MG tablet, Take 1 tablet by mouth At Night As Needed for Sleep., Disp: 30 tablet, Rfl: 5      OBJECTIVE:  /84 (BP Location: Left arm)   Pulse 96   Resp 16   Ht 170.2 cm (67.01\")   Wt 99.7 kg (219 lb 11.2 oz)   SpO2 96%   BMI 34.40 kg/m²    Physical Exam   Constitutional: He is oriented to person, place, and time. He appears well-nourished. No distress.   Cardiovascular: Normal rate, regular rhythm and normal " heart sounds.   No murmur heard.  Pulmonary/Chest: Effort normal and breath sounds normal. He has no wheezes.   Neurological: He is alert and oriented to person, place, and time.   Psychiatric: He has a normal mood and affect.       Assessment/Plan    Diagnoses and all orders for this visit:    Acquired hypothyroidism  -     TSH; Future  Recheck TSH after dose change at last labs.     Essential hypertension  Well controlled, BP goal for age is <140/90 per JNC 8 guidelines and continue current medications     Coronary artery disease involving native coronary artery of native heart without angina pectoris  No anginal symptoms on ASA, BB, and statin.     Non morbid obesity due to excess calories  Recommended attention to portion control and being careful about the types and timing of meals for the purpose of weight management.    Elevated serum creatinine  -     Renal Function Panel; Future  Recheck. He has had borderline renal function in the past, so we will monitor given recent increase.       An After Visit Summary was printed and given to the patient at discharge.  Return in about 6 months (around 5/30/2019) for Recheck. Sooner if problems arise.         Ashish Thurman D.O. 11/30/2018

## 2018-11-30 NOTE — PATIENT INSTRUCTIONS
Medicare Wellness  Personal Prevention Plan of Service     Date of Office Visit:  2018  Encounter Provider:  Ashish Thurman DO  Place of Service:  [facility]  Patient Name: Ziyad Wilson  :  1965    As part of the Medicare Wellness portion of your visit today, we are providing you with this personalized preventive plan of services (PPPS). This plan is based upon recommendations of the [place] Preventive Services Task Force (USPSTF) and the Advisory Committee on Immunization Practices (ACIP).    This lists the preventive care services that should be considered, and provides dates of when you are due. Items listed as completed are up-to-date and do not require any further intervention.    Health Maintenance   Topic Date Due   • TDAP/TD VACCINES (1 - Tdap) 2014   • ZOSTER VACCINE (1 of 2) 2014   • MEDICARE ANNUAL WELLNESS  2018   • LIPID PANEL  2019   • COLONOSCOPY  2028   • HEPATITIS C SCREENING  Completed   • INFLUENZA VACCINE  Completed       Orders Placed This Encounter   Procedures   • TSH     Standing Status:   Future     Number of Occurrences:   1     Standing Expiration Date:   2019   • Renal Function Panel     Standing Status:   Future     Number of Occurrences:   1     Standing Expiration Date:   2019       Return in about 6 months (around 2019) for Recheck.

## 2018-12-07 ENCOUNTER — TELEPHONE (OUTPATIENT)
Dept: INTERNAL MEDICINE | Facility: CLINIC | Age: 53
End: 2018-12-07

## 2018-12-10 NOTE — TELEPHONE ENCOUNTER
Liver function was great in July. We were concerned about his kidneys, so we did not look at liver numbers again this time.

## 2019-01-12 DIAGNOSIS — F51.01 PRIMARY INSOMNIA: ICD-10-CM

## 2019-01-14 RX ORDER — ZOLPIDEM TARTRATE 10 MG/1
10 TABLET ORAL NIGHTLY PRN
Qty: 30 TABLET | Refills: 5 | OUTPATIENT
Start: 2019-01-14

## 2019-01-18 ENCOUNTER — OFFICE VISIT (OUTPATIENT)
Dept: INTERNAL MEDICINE | Facility: CLINIC | Age: 54
End: 2019-01-18

## 2019-01-18 VITALS
HEIGHT: 67 IN | DIASTOLIC BLOOD PRESSURE: 80 MMHG | HEART RATE: 87 BPM | TEMPERATURE: 98.2 F | RESPIRATION RATE: 16 BRPM | BODY MASS INDEX: 34.84 KG/M2 | SYSTOLIC BLOOD PRESSURE: 145 MMHG | WEIGHT: 222 LBS

## 2019-01-18 DIAGNOSIS — F51.01 PRIMARY INSOMNIA: ICD-10-CM

## 2019-01-18 DIAGNOSIS — G89.29 CHRONIC PAIN OF RIGHT KNEE: ICD-10-CM

## 2019-01-18 DIAGNOSIS — M25.562 CHRONIC PAIN OF LEFT KNEE: Primary | ICD-10-CM

## 2019-01-18 DIAGNOSIS — G89.29 CHRONIC PAIN OF LEFT KNEE: Primary | ICD-10-CM

## 2019-01-18 DIAGNOSIS — M25.561 CHRONIC PAIN OF RIGHT KNEE: ICD-10-CM

## 2019-01-18 DIAGNOSIS — M25.511 CHRONIC RIGHT SHOULDER PAIN: ICD-10-CM

## 2019-01-18 DIAGNOSIS — G89.29 CHRONIC RIGHT SHOULDER PAIN: ICD-10-CM

## 2019-01-18 PROCEDURE — 99214 OFFICE O/P EST MOD 30 MIN: CPT | Performed by: NURSE PRACTITIONER

## 2019-01-18 RX ORDER — ZOLPIDEM TARTRATE 12.5 MG/1
12.5 TABLET, FILM COATED, EXTENDED RELEASE ORAL NIGHTLY PRN
Qty: 30 TABLET | Refills: 5 | Status: SHIPPED | OUTPATIENT
Start: 2019-01-18 | End: 2019-07-17 | Stop reason: SDUPTHER

## 2019-01-18 NOTE — PROGRESS NOTES
"CC: f/u insomnia, shoulder pain (right), bilateral knee pain    History:  Ziyad Wilson is a 53 y.o. male who presents today for follow-up for evaluation of the above:  Patient presents today for medication refill. He reports this has been stable on Ambien without adverse side effects.   He also c/o Right shoulder pain. Sharp pain with ROM. Reports that pain is also worsened if he sleeps on it.  Will take 2-3 days for pain to decrease. He reports this shoulder pain has been present for a year.   He also reports Bilateral knee pain. He states he has been wearing bilateral \"knee pads\" with some improvement. He states he feels like his knees are  giving out with walking. Chronic pain that has been present for years but worsening.     ROS:  Review of Systems   Musculoskeletal: Positive for arthralgias.   Psychiatric/Behavioral: Positive for sleep disturbance.       Mr. Wilson  reports that  has never smoked. he has never used smokeless tobacco. He reports that he drinks alcohol. He reports that he does not use drugs.      Current Outpatient Medications:   •  albuterol (PROVENTIL HFA;VENTOLIN HFA) 108 (90 BASE) MCG/ACT inhaler, Inhale as needed., Disp: , Rfl:   •  amitriptyline (ELAVIL) 100 MG tablet, Take 100 mg by mouth 2 (Two) Times a Day., Disp: , Rfl:   •  amLODIPine-valsartan (EXFORGE) 5-160 MG per tablet, Take 1 tablet by mouth Daily., Disp: 30 tablet, Rfl: 11  •  aspirin 325 MG tablet, daily., Disp: , Rfl:   •  atorvastatin (LIPITOR) 40 MG tablet, Take 1 tablet by mouth Daily., Disp: 30 tablet, Rfl: 11  •  buPROPion SR (WELLBUTRIN SR) 100 MG 12 hr tablet, Take 1 tablet by mouth Every 12 (Twelve) Hours., Disp: 60 tablet, Rfl: 1  •  butalbital-acetaminophen-caffeine (FIORICET, ESGIC) -40 MG per tablet, Take 50 tablets by mouth 2 (Two) Times a Day As Needed., Disp: , Rfl:   •  carvedilol (COREG) 25 MG tablet, TAKE TWO TABLETS BY MOUTH TWICE DAILY, Disp: 120 tablet, Rfl: 11  •  diclofenac (VOLTAREN) 75 MG EC " "tablet, Take 1 tablet by mouth Daily., Disp: , Rfl:   •  escitalopram (LEXAPRO) 20 MG tablet, Take 1 tablet by mouth Daily., Disp: 30 tablet, Rfl: 5  •  esomeprazole (NEXIUM) 40 MG capsule, Take 1 capsule by mouth Every Morning Before Breakfast., Disp: 30 capsule, Rfl: 11  •  fluticasone (FLONASE) 50 MCG/ACT nasal spray, 2 sprays into each nostril Daily. Administer 2 sprays in each nostril for each dose., Disp: 1 each, Rfl: 0  •  fluticasone-salmeterol (ADVAIR) 100-50 MCG/DOSE DISKUS, Inhale 1 puff 2 (two) times a day., Disp: , Rfl:   •  furosemide (LASIX) 40 MG tablet, TAKE ONE TABLET BY MOUTH TWICE DAILY AS NEEDED, Disp: 60 tablet, Rfl: 3  •  gabapentin (NEURONTIN) 100 MG capsule, Take 100 mg by mouth 4 (Four) Times a Day., Disp: , Rfl:   •  levothyroxine (SYNTHROID, LEVOTHROID) 75 MCG tablet, Take 1 tablet by mouth Daily., Disp: 90 tablet, Rfl: 3  •  Multiple Vitamins-Minerals (MULTIVITAMIN WITH MINERALS) tablet tablet, Take 1 tablet by mouth Daily., Disp: , Rfl:   •  nitroglycerin (NITROSTAT) 0.4 MG SL tablet, Place 0.4 mg under the tongue as needed. Take no more than 3 doses in 15 minutes., Disp: , Rfl:   •  QUEtiapine (SEROquel) 100 MG tablet, TAKE ONE TABLET BY MOUTH EVERY NIGHT, Disp: 90 tablet, Rfl: 5  •  spironolactone (ALDACTONE) 25 MG tablet, TAKE ONE TABLET BY MOUTH ONCE DAILY, Disp: 30 tablet, Rfl: 6  •  zolpidem (AMBIEN) 10 MG tablet, Take 1 tablet by mouth At Night As Needed for Sleep., Disp: 30 tablet, Rfl: 5      OBJECTIVE:  /80 (BP Location: Left arm, Patient Position: Sitting)   Pulse 87   Temp 98.2 °F (36.8 °C) (Oral)   Resp 16   Ht 170.2 cm (67.01\")   Wt 101 kg (222 lb)   PF 93 L/min   BMI 34.76 kg/m²    Physical Exam   Constitutional: He is oriented to person, place, and time. He appears well-developed and well-nourished.   HENT:   Head: Normocephalic and atraumatic.   Eyes: Conjunctivae and EOM are normal. Pupils are equal, round, and reactive to light.   Neck: Normal range of " motion. Neck supple.   Cardiovascular: Normal rate, regular rhythm and normal heart sounds.   Pulmonary/Chest: Effort normal and breath sounds normal.   Abdominal: Soft. Bowel sounds are normal.   Musculoskeletal:        Right shoulder: He exhibits decreased range of motion, tenderness and pain. He exhibits no swelling.        Right knee: He exhibits normal range of motion, no swelling, no effusion, no deformity and no LCL laxity. Tenderness found. Patellar tendon tenderness noted.        Left knee: He exhibits swelling. He exhibits normal range of motion, no erythema and no LCL laxity. Tenderness found. Patellar tendon tenderness noted.        Arms:  Neurological: He is alert and oriented to person, place, and time. He has normal reflexes.   Skin: Skin is warm and dry.   Psychiatric: He has a normal mood and affect.   Vitals reviewed.      Assessment/Plan    Ziyad was seen today for med refill, shoulder pain, knee pain and medication problem.    Diagnoses and all orders for this visit:    Chronic pain of left knee  -     XR Knee 1 or 2 View Left; Future    Chronic pain of right knee  -     XR Knee 1 or 2 View Right; Future    Chronic right shoulder pain  -     XR Shoulder 2+ View Right; Future    Primary insomnia  -     zolpidem CR (AMBIEN CR) 12.5 MG CR tablet; Take 1 tablet by mouth At Night As Needed for Sleep.    Will further evaluate right shoulder and knees with imaging.   Garo reviewed and is appropriate.     An After Visit Summary was printed and given to the patient at discharge.  Return in about 3 months (around 4/18/2019). Sooner if problems arise.         Erika Dill APRN. 1/19/2019

## 2019-01-24 ENCOUNTER — HOSPITAL ENCOUNTER (OUTPATIENT)
Dept: GENERAL RADIOLOGY | Facility: HOSPITAL | Age: 54
Discharge: HOME | End: 2019-01-24

## 2019-01-24 ENCOUNTER — HOSPITAL ENCOUNTER (OUTPATIENT)
Dept: GENERAL RADIOLOGY | Facility: HOSPITAL | Age: 54
Discharge: HOME | End: 2019-01-24
Admitting: NURSE PRACTITIONER

## 2019-01-24 DIAGNOSIS — M75.31 CALCIFIC TENDINITIS OF RIGHT SHOULDER: ICD-10-CM

## 2019-01-24 DIAGNOSIS — M25.462 BILATERAL KNEE EFFUSIONS: Primary | ICD-10-CM

## 2019-01-24 DIAGNOSIS — G89.29 CHRONIC PAIN OF LEFT KNEE: ICD-10-CM

## 2019-01-24 DIAGNOSIS — M25.562 CHRONIC PAIN OF LEFT KNEE: ICD-10-CM

## 2019-01-24 DIAGNOSIS — M25.461 BILATERAL KNEE EFFUSIONS: Primary | ICD-10-CM

## 2019-01-24 DIAGNOSIS — M25.561 CHRONIC PAIN OF RIGHT KNEE: ICD-10-CM

## 2019-01-24 DIAGNOSIS — G89.29 CHRONIC PAIN OF RIGHT KNEE: ICD-10-CM

## 2019-01-24 DIAGNOSIS — G89.29 CHRONIC RIGHT SHOULDER PAIN: ICD-10-CM

## 2019-01-24 DIAGNOSIS — M25.511 CHRONIC RIGHT SHOULDER PAIN: ICD-10-CM

## 2019-01-24 PROCEDURE — 73030 X-RAY EXAM OF SHOULDER: CPT

## 2019-01-24 PROCEDURE — 73560 X-RAY EXAM OF KNEE 1 OR 2: CPT

## 2019-01-25 ENCOUNTER — TELEPHONE (OUTPATIENT)
Dept: INTERNAL MEDICINE | Facility: CLINIC | Age: 54
End: 2019-01-25

## 2019-01-25 NOTE — TELEPHONE ENCOUNTER
----- Message from VIRGILIO Srinivasan sent at 1/24/2019  4:24 PM CST -----  Referral placed to [facility] for bilateral effusions of the knee. Attempted to call patient but no answer. LM to call back

## 2019-01-25 NOTE — TELEPHONE ENCOUNTER
Patient was informed he also stated that [facility] turned him away due to a bill he owes and was wondering if you could send him somewhere else.       ----- Message from VIRGILIO Srinivasan sent at 1/24/2019  4:24 PM CST -----  Referral placed to [facility] for bilateral effusions of the knee. Attempted to call patient but no answer. LM to call back

## 2019-01-30 ENCOUNTER — TELEPHONE (OUTPATIENT)
Dept: INTERNAL MEDICINE | Facility: CLINIC | Age: 54
End: 2019-01-30

## 2019-01-30 NOTE — TELEPHONE ENCOUNTER
Patient states that his copay for ambien 12.5mg was $20 and he is okay paying for this higher dose as the 10mg was not working for the patient.

## 2019-02-14 ENCOUNTER — TELEPHONE (OUTPATIENT)
Dept: INTERNAL MEDICINE | Facility: CLINIC | Age: 54
End: 2019-02-14

## 2019-02-14 NOTE — TELEPHONE ENCOUNTER
Can get it for as cheap as $24/month ($54 for 90 days) at [facility] or $36/month ($64 for 90 days) at [facility] through [facility]. Would this be affordable?

## 2019-02-14 NOTE — TELEPHONE ENCOUNTER
Please see previous message. Patient called back and now his Ambien is going to be $126.00, and he says that it is not due to his deductible.  He would like for you to send another medication in for him that may be cheaper. Refill due on 2-18-19.   MM

## 2019-02-14 NOTE — TELEPHONE ENCOUNTER
Patient is going to get it at [email or web address].  He will come by and  the [facility] card I'm leaving at the  for him.   TABATHA

## 2019-02-19 ENCOUNTER — OFFICE VISIT (OUTPATIENT)
Dept: CARDIOLOGY | Facility: CLINIC | Age: 54
End: 2019-02-19

## 2019-02-19 ENCOUNTER — TELEPHONE (OUTPATIENT)
Dept: CARDIOLOGY | Facility: CLINIC | Age: 54
End: 2019-02-19

## 2019-02-19 VITALS
WEIGHT: 227 LBS | BODY MASS INDEX: 35.63 KG/M2 | HEART RATE: 93 BPM | SYSTOLIC BLOOD PRESSURE: 100 MMHG | DIASTOLIC BLOOD PRESSURE: 78 MMHG | HEIGHT: 67 IN

## 2019-02-19 DIAGNOSIS — I10 ESSENTIAL HYPERTENSION: ICD-10-CM

## 2019-02-19 DIAGNOSIS — I25.10 CORONARY ARTERY DISEASE INVOLVING NATIVE CORONARY ARTERY OF NATIVE HEART WITHOUT ANGINA PECTORIS: ICD-10-CM

## 2019-02-19 DIAGNOSIS — I50.22 CHRONIC SYSTOLIC HEART FAILURE (HCC): Primary | ICD-10-CM

## 2019-02-19 DIAGNOSIS — E78.2 HYPERLIPEMIA, MIXED: ICD-10-CM

## 2019-02-19 DIAGNOSIS — Z01.818 PRE-OP EVALUATION: ICD-10-CM

## 2019-02-19 PROCEDURE — 99213 OFFICE O/P EST LOW 20 MIN: CPT | Performed by: INTERNAL MEDICINE

## 2019-02-19 PROCEDURE — 93000 ELECTROCARDIOGRAM COMPLETE: CPT | Performed by: INTERNAL MEDICINE

## 2019-02-19 RX ORDER — ICOSAPENT ETHYL 1000 MG/1
2 CAPSULE ORAL 2 TIMES DAILY WITH MEALS
Qty: 120 CAPSULE | Refills: 11 | Status: SHIPPED | OUTPATIENT
Start: 2019-02-19 | End: 2020-02-19 | Stop reason: SDUPTHER

## 2019-02-19 RX ORDER — OMEPRAZOLE 20 MG/1
20 CAPSULE, DELAYED RELEASE ORAL DAILY
COMMUNITY
End: 2019-10-29

## 2019-02-19 NOTE — PROGRESS NOTES
Subjective    Ziyad Wilson is a 53 y.o. male. Referred by [facility] for pre-op eval    History of Present Illness     PRE-OP EVAL - STAGED BILAT TKR's:  [place] with [place] [facility] [place] K[place] pain is limiting factor to walking. Has no unusual cp or sob. EKG is nsc today. Is compliant with meds. Has been rec to stop ASA before surgery but this will increase his risk for MACCE.    DIL CMO:  Has no unusual sob or cp. Has mild chronic dependent edema. Has no recent wt increase or change in stamina. Tolerates meds well and has good uop.    IHD:  Has no exertional cp on current meds and stamina is stable. Takes  daily and could cut that back to 81mg daily    HLD:  Is on a potent statin without myalgias and gets good reports when his pcp checks his labs - last in chart is 8/18 with LDL=44 but MNJ=206 with low HDL - may have some MACCE risk reduction with high-dose fish-oil.    HTN:  Does not check at home but gets good clinic checks and has no light-headedness.        The following portions of the patient's history were reviewed and updated as appropriate: allergies, current medications, past family history, past medical history, past social history, past surgical history and problem list.    Patient Active Problem List   Diagnosis   • Chronic systolic heart failure (CMS/HCC)   • Essential hypertension   • Coronary artery disease   • Acquired hypothyroidism   • Chronic daily headache   • Non morbid obesity due to excess calories   • Mild cognitive impairment   • SOB (shortness of breath)   • Asthma   • GERD (gastroesophageal reflux disease)   • Sleep apnea   • Hyperlipemia, mixed   • Moderate episode of recurrent major depressive disorder (CMS/HCC)   • Primary insomnia   • Cervical facet joint syndrome   • Impaired glucose tolerance   • CKD (chronic kidney disease) stage 2, GFR 60-89 ml/min   • Pre-op evaluation       No Known Allergies    Family History   Problem Relation Age of Onset   •  Hypertension Mother    • Stroke Mother    • Heart disease Mother    • Hypertension Father    • Heart disease Father    • Hypertension Sister    • Hypertension Brother        Social History     Socioeconomic History   • Marital status:      Spouse name: Not on file   • Number of children: Not on file   • Years of education: Not on file   • Highest education level: Not on file   Social Needs   • Financial resource strain: Not on file   • Food insecurity - worry: Not on file   • Food insecurity - inability: Not on file   • Transportation needs - medical: Not on file   • Transportation needs - non-medical: Not on file   Occupational History   • Not on file   Tobacco Use   • Smoking status: Never Smoker   • Smokeless tobacco: Never Used   Substance and Sexual Activity   • Alcohol use: Yes     Comment: occasional   • Drug use: No   • Sexual activity: Defer   Other Topics Concern   • Not on file   Social History Narrative   • Not on file         Current Outpatient Medications:   •  albuterol (PROVENTIL HFA;VENTOLIN HFA) 108 (90 BASE) MCG/ACT inhaler, Inhale as needed., Disp: , Rfl:   •  amLODIPine-valsartan (EXFORGE) 5-160 MG per tablet, Take 1 tablet by mouth Daily., Disp: 30 tablet, Rfl: 11  •  aspirin 325 MG tablet, daily., Disp: , Rfl:   •  atorvastatin (LIPITOR) 40 MG tablet, Take 1 tablet by mouth Daily., Disp: 30 tablet, Rfl: 11  •  buPROPion SR (WELLBUTRIN SR) 100 MG 12 hr tablet, Take 1 tablet by mouth Every 12 (Twelve) Hours., Disp: 60 tablet, Rfl: 1  •  butalbital-acetaminophen-caffeine (FIORICET, ESGIC) -40 MG per tablet, Take 50 tablets by mouth 2 (Two) Times a Day As Needed., Disp: , Rfl:   •  carvedilol (COREG) 25 MG tablet, TAKE TWO TABLETS BY MOUTH TWICE DAILY, Disp: 120 tablet, Rfl: 11  •  diclofenac (VOLTAREN) 75 MG EC tablet, Take 1 tablet by mouth Daily., Disp: , Rfl:   •  escitalopram (LEXAPRO) 20 MG tablet, Take 1 tablet by mouth Daily., Disp: 30 tablet, Rfl: 5  •  esomeprazole (NEXIUM)  40 MG capsule, Take 1 capsule by mouth Every Morning Before Breakfast., Disp: 30 capsule, Rfl: 11  •  fluticasone (FLONASE) 50 MCG/ACT nasal spray, 2 sprays into each nostril Daily. Administer 2 sprays in each nostril for each dose., Disp: 1 each, Rfl: 0  •  fluticasone-salmeterol (ADVAIR) 100-50 MCG/DOSE DISKUS, Inhale 1 puff 2 (two) times a day., Disp: , Rfl:   •  furosemide (LASIX) 40 MG tablet, TAKE ONE TABLET BY MOUTH TWICE DAILY AS NEEDED, Disp: 60 tablet, Rfl: 3  •  gabapentin (NEURONTIN) 100 MG capsule, Take 400 mg by mouth 4 (Four) Times a Day., Disp: , Rfl:   •  levothyroxine (SYNTHROID, LEVOTHROID) 75 MCG tablet, Take 1 tablet by mouth Daily., Disp: 90 tablet, Rfl: 3  •  Multiple Vitamins-Minerals (MULTIVITAMIN WITH MINERALS) tablet tablet, Take 1 tablet by mouth Daily., Disp: , Rfl:   •  nitroglycerin (NITROSTAT) 0.4 MG SL tablet, Place 0.4 mg under the tongue as needed. Take no more than 3 doses in 15 minutes., Disp: , Rfl:   •  omeprazole (priLOSEC) 20 MG capsule, Take 20 mg by mouth Daily., Disp: , Rfl:   •  QUEtiapine (SEROquel) 100 MG tablet, TAKE ONE TABLET BY MOUTH EVERY NIGHT, Disp: 90 tablet, Rfl: 5  •  spironolactone (ALDACTONE) 25 MG tablet, TAKE ONE TABLET BY MOUTH ONCE DAILY, Disp: 30 tablet, Rfl: 6  •  zolpidem CR (AMBIEN CR) 12.5 MG CR tablet, Take 1 tablet by mouth At Night As Needed for Sleep., Disp: 30 tablet, Rfl: 5  •  icosapent ethyl (VASCEPA) 1 g capsule capsule, Take 2 g by mouth 2 (Two) Times a Day With Meals., Disp: 120 capsule, Rfl: 11    Past Surgical History:   Procedure Laterality Date   • APPENDECTOMY     • BLADDER REPAIR     • CARDIAC CATHETERIZATION  09/2003   • COLONOSCOPY N/A 1/9/2018    Procedure: COLONOSCOPY WITH ANESTHESIA;  Surgeon: Dick Espinosa DO;  Location: [facility] ENDOSCOPY;  Service:    • COLOSTOMY      with colostomy reversal       Review of Systems   Constitutional: Negative for fatigue, fever and unexpected weight change.   Respiratory: Negative for  "apnea, chest tightness and shortness of breath.    Cardiovascular: Negative for chest pain, palpitations and leg swelling.   Gastrointestinal: Negative for abdominal pain.   Genitourinary: Negative for dysuria.   Musculoskeletal: Positive for arthralgias. Negative for myalgias.   Neurological: Negative for weakness and light-headedness.   Psychiatric/Behavioral: Negative for sleep disturbance.       /78   Pulse 93   Ht 170.2 cm (67\")   Wt 103 kg (227 lb)   BMI 35.55 kg/m²   Procedures    Objective   Physical Exam   Constitutional: He is oriented to person, place, and time. He appears well-developed and well-nourished. No distress.   HENT:   Head: Normocephalic.   Eyes: Pupils are equal, round, and reactive to light.   Neck: No thyromegaly present.   Cardiovascular: Normal rate, regular rhythm, normal heart sounds and intact distal pulses. Exam reveals no gallop and no friction rub.   No murmur heard.  Pulmonary/Chest: Effort normal and breath sounds normal. No stridor. No respiratory distress. He has no wheezes. He has no rales. He exhibits no tenderness.   Abdominal: Soft. Bowel sounds are normal. He exhibits no distension and no mass. There is no tenderness. There is no guarding.   Musculoskeletal: He exhibits no edema, tenderness or deformity.   Neurological: He is alert and oriented to person, place, and time.   Skin: Skin is warm and dry. He is not diaphoretic.   Psychiatric: He has a normal mood and affect.       Assessment/Plan   Ziyad was seen today for coronary artery disease, cardiomyopathy, hypertension, surgery risk assessment and edema.    Diagnoses and all orders for this visit:    Chronic systolic heart failure (CMS/HCC)  Comments:  COMPENSATED - FUNCTIONAL CLASS 2C    Essential hypertension  Comments:  GOOD CONTROL    Hyperlipemia, mixed  Comments:  GOOD LDL CONTROL BUT TGL's HIGH - add Vascepa  Orders:  -     icosapent ethyl (VASCEPA) 1 g capsule capsule; Take 2 g by mouth 2 (Two) Times a " Day With Meals.    Coronary artery disease involving native coronary artery of native heart without angina pectoris  Comments:  NO ANGINA  Orders:  -     ECG 12 Lead    Pre-op evaluation  Comments:  LOW RISK FOR MACCE - IS HIGHER THO IF ASA IS INTERRUPTED - THEN LOW-MOD                 Return in about 1 year (around 2/19/2020).  Orders Placed This Encounter   Procedures   • ECG 12 Lead     Order Specific Question:   Reason for Exam:     Answer:   [email or web address]

## 2019-02-19 NOTE — TELEPHONE ENCOUNTER
Liana from Dr. Merritt received Dr. Schwarz office but does not understand the pre-op eval.  Please call her back.

## 2019-03-05 RX ORDER — CARVEDILOL 25 MG/1
TABLET ORAL
Qty: 120 TABLET | Refills: 11 | Status: SHIPPED | OUTPATIENT
Start: 2019-03-05 | End: 2020-03-13 | Stop reason: SDUPTHER

## 2019-03-07 ENCOUNTER — OFFICE VISIT (OUTPATIENT)
Dept: INTERNAL MEDICINE | Facility: CLINIC | Age: 54
End: 2019-03-07

## 2019-03-07 VITALS
SYSTOLIC BLOOD PRESSURE: 110 MMHG | HEART RATE: 94 BPM | HEIGHT: 67 IN | RESPIRATION RATE: 16 BRPM | DIASTOLIC BLOOD PRESSURE: 80 MMHG | BODY MASS INDEX: 35.41 KG/M2 | OXYGEN SATURATION: 94 % | WEIGHT: 225.6 LBS

## 2019-03-07 DIAGNOSIS — M17.11 PRIMARY OSTEOARTHRITIS OF RIGHT KNEE: ICD-10-CM

## 2019-03-07 DIAGNOSIS — I25.10 CORONARY ARTERY DISEASE INVOLVING NATIVE CORONARY ARTERY OF NATIVE HEART WITHOUT ANGINA PECTORIS: ICD-10-CM

## 2019-03-07 DIAGNOSIS — Z01.818 PRE-OP EVALUATION: Primary | ICD-10-CM

## 2019-03-07 DIAGNOSIS — I10 ESSENTIAL HYPERTENSION: ICD-10-CM

## 2019-03-07 DIAGNOSIS — I50.22 CHRONIC SYSTOLIC HEART FAILURE (HCC): ICD-10-CM

## 2019-03-07 DIAGNOSIS — E66.09 NON MORBID OBESITY DUE TO EXCESS CALORIES: ICD-10-CM

## 2019-03-07 DIAGNOSIS — N18.2 CKD (CHRONIC KIDNEY DISEASE) STAGE 2, GFR 60-89 ML/MIN: ICD-10-CM

## 2019-03-07 DIAGNOSIS — M19.011 ARTHRITIS OF RIGHT SHOULDER REGION: ICD-10-CM

## 2019-03-07 PROCEDURE — 99214 OFFICE O/P EST MOD 30 MIN: CPT | Performed by: INTERNAL MEDICINE

## 2019-03-07 NOTE — PROGRESS NOTES
CC: preop for right total knee    History:  Ziyad Wilson is a 53 y.o. male   He notes he has been doing well and is planning to have a right total knee replacement by Dr. Merritt next Wednesday.  He admits his blood pressure has been well controlled on current medication without side effects.  He does continue on carvedilol, valsartan, spironolactone, and diuretic therapy related to his history of chronic systolic heart failure.  He has had no symptoms of decompensation such as dyspnea, edema.  He also has a history of coronary artery disease, but has no recent anginal or ischemic symptoms and continues on aspirin, beta-blocker, and statin.  He admits he does become short of breath on climbing 2 flights of stairs, but if not for his knee, he would be able to do so without stopping.  He does have right shoulder pain and has been shown to have severe arthritic changes along with pain.  He wonders if there is anything that could be done for this.    ROS:  Review of Systems   Constitutional: Negative for chills and fever.   HENT: Negative for congestion and sore throat.    Respiratory: Negative for cough and shortness of breath.    Cardiovascular: Negative for chest pain and palpitations.   Gastrointestinal: Negative for abdominal pain, constipation and nausea.   Musculoskeletal: Negative for back pain and gait problem.        reports that  has never smoked. he has never used smokeless tobacco. He reports that he drinks alcohol. He reports that he does not use drugs.      Current Outpatient Medications:   •  albuterol (PROVENTIL HFA;VENTOLIN HFA) 108 (90 BASE) MCG/ACT inhaler, Inhale as needed., Disp: , Rfl:   •  amLODIPine-valsartan (EXFORGE) 5-160 MG per tablet, Take 1 tablet by mouth Daily., Disp: 30 tablet, Rfl: 11  •  aspirin 325 MG tablet, daily., Disp: , Rfl:   •  atorvastatin (LIPITOR) 40 MG tablet, Take 1 tablet by mouth Daily., Disp: 30 tablet, Rfl: 11  •  buPROPion SR (WELLBUTRIN SR) 100 MG 12 hr tablet, Take 1  tablet by mouth Every 12 (Twelve) Hours., Disp: 60 tablet, Rfl: 1  •  butalbital-acetaminophen-caffeine (FIORICET, ESGIC) -40 MG per tablet, Take 50 tablets by mouth 2 (Two) Times a Day As Needed., Disp: , Rfl:   •  carvedilol (COREG) 25 MG tablet, TAKE 2 TABLETS BY MOUTH TWICE DAILY, Disp: 120 tablet, Rfl: 11  •  diclofenac (VOLTAREN) 75 MG EC tablet, Take 1 tablet by mouth Daily., Disp: , Rfl:   •  escitalopram (LEXAPRO) 20 MG tablet, Take 1 tablet by mouth Daily., Disp: 30 tablet, Rfl: 5  •  esomeprazole (NEXIUM) 40 MG capsule, Take 1 capsule by mouth Every Morning Before Breakfast., Disp: 30 capsule, Rfl: 11  •  fluticasone (FLONASE) 50 MCG/ACT nasal spray, 2 sprays into each nostril Daily. Administer 2 sprays in each nostril for each dose., Disp: 1 each, Rfl: 0  •  fluticasone-salmeterol (ADVAIR) 100-50 MCG/DOSE DISKUS, Inhale 1 puff 2 (two) times a day., Disp: , Rfl:   •  furosemide (LASIX) 40 MG tablet, TAKE ONE TABLET BY MOUTH TWICE DAILY AS NEEDED, Disp: 60 tablet, Rfl: 3  •  gabapentin (NEURONTIN) 100 MG capsule, Take 400 mg by mouth 4 (Four) Times a Day., Disp: , Rfl:   •  icosapent ethyl (VASCEPA) 1 g capsule capsule, Take 2 g by mouth 2 (Two) Times a Day With Meals., Disp: 120 capsule, Rfl: 11  •  levothyroxine (SYNTHROID, LEVOTHROID) 75 MCG tablet, Take 1 tablet by mouth Daily., Disp: 90 tablet, Rfl: 3  •  Multiple Vitamins-Minerals (MULTIVITAMIN WITH MINERALS) tablet tablet, Take 1 tablet by mouth Daily., Disp: , Rfl:   •  nitroglycerin (NITROSTAT) 0.4 MG SL tablet, Place 0.4 mg under the tongue as needed. Take no more than 3 doses in 15 minutes., Disp: , Rfl:   •  omeprazole (priLOSEC) 20 MG capsule, Take 20 mg by mouth Daily., Disp: , Rfl:   •  QUEtiapine (SEROquel) 100 MG tablet, TAKE ONE TABLET BY MOUTH EVERY NIGHT, Disp: 90 tablet, Rfl: 5  •  spironolactone (ALDACTONE) 25 MG tablet, TAKE ONE TABLET BY MOUTH ONCE DAILY, Disp: 30 tablet, Rfl: 6  •  zolpidem CR (AMBIEN CR) 12.5 MG CR tablet,  "Take 1 tablet by mouth At Night As Needed for Sleep., Disp: 30 tablet, Rfl: 5    OBJECTIVE:  /80 (BP Location: Left arm, Patient Position: Sitting, Cuff Size: Adult)   Pulse 94   Resp 16   Ht 170.2 cm (67\")   Wt 102 kg (225 lb 9.6 oz)   SpO2 94%   BMI 35.33 kg/m²    Physical Exam   Constitutional: He is oriented to person, place, and time. He appears well-nourished. No distress.   Cardiovascular: Normal rate, regular rhythm and normal heart sounds.   No murmur heard.  Pulmonary/Chest: Effort normal and breath sounds normal. He has no wheezes.   Neurological: He is alert and oriented to person, place, and time.   Psychiatric: He has a normal mood and affect.       Assessment/Plan    Diagnoses and all orders for this visit:    Pre-op evaluation  Primary osteoarthritis of right knee  RCRI risk is 2, owing to CAD and CHF.  He has a 10.1% risk of major cardiac event perioperatively.  Fortunately, he has no symptoms of acute coronary syndrome, decompensated heart failure or arrhythmia.  EKG was reviewed and stable without suggestion of acute coronary syndrome.  Creatinine is stable at 1.3.  Platelets are within normal limits.  Agree with Dr. Lagos that aspirin should be continued perioperatively at 81 mg to best optimize his risk.  He should continue his beta-blocker perioperatively as well.  At this time, I believe he is optimized for surgery and should proceed with a low to moderate risk for major cardiac events without any further testing.    Chronic systolic heart failure (CMS/HCC)  Stable, euvolemic, and without decompensation on beta-blocker, valsartan, spironolactone, and diuretic therapy.    Coronary artery disease involving native coronary artery of native heart without angina pectoris  Stable without anginal symptoms suggestive of ischemic heart disease on aspirin, beta-blocker, and statin.    CKD (chronic kidney disease) stage 2, GFR 60-89 ml/min  Recent labs reviewed with a stable creatinine.  No " new urinary symptoms.    Essential hypertension  Well controlled, BP goal for age is <140/90 per JNC 8 guidelines and continue current medications    Non morbid obesity due to excess calories  Recommended attention to portion control and being careful about the types and timing of meals for the purpose of weight management.    Arthritis of right shoulder region  We discussed that Dr. Merritt may consider injection or replacement, though we could also refer him to a shoulder specialist if Dr. Merritt does not perform these procedures.  Recommend he move forward with knee replacement before pursuing definitive treatment for his shoulder.        An After Visit Summary was printed and given to the patient at discharge.  Return in about 4 months (around 7/7/2019) for Cancel April.         Ashish Thurman D.O. 3/7/2019

## 2019-03-12 DIAGNOSIS — F51.01 PRIMARY INSOMNIA: ICD-10-CM

## 2019-03-12 RX ORDER — QUETIAPINE FUMARATE 100 MG/1
TABLET, FILM COATED ORAL
Qty: 90 TABLET | Refills: 5 | Status: CANCELLED | OUTPATIENT
Start: 2019-03-12

## 2019-03-12 RX ORDER — FUROSEMIDE 40 MG/1
TABLET ORAL
Qty: 60 TABLET | Refills: 11 | Status: SHIPPED | OUTPATIENT
Start: 2019-03-12 | End: 2020-04-27

## 2019-03-13 DIAGNOSIS — F51.01 PRIMARY INSOMNIA: ICD-10-CM

## 2019-03-13 RX ORDER — QUETIAPINE FUMARATE 100 MG/1
100 TABLET, FILM COATED ORAL
Qty: 90 TABLET | Refills: 5 | Status: SHIPPED | OUTPATIENT
Start: 2019-03-13 | End: 2020-05-04

## 2019-03-21 ENCOUNTER — TELEPHONE (OUTPATIENT)
Dept: INTERNAL MEDICINE | Facility: CLINIC | Age: 54
End: 2019-03-21

## 2019-03-21 NOTE — TELEPHONE ENCOUNTER
----- Message from Ashish Thurman DO sent at 3/21/2019  3:51 PM CDT -----  Received results that Dr. Merritt ordered. He needs to be sure to f/u with his office. Kidney function is down a bit, so he needs to be sure to stay hydrated. Blood counts are also lower than normal, so he should f/u with Dr. Merritt regarding this.

## 2019-04-29 ENCOUNTER — OFFICE VISIT (OUTPATIENT)
Dept: INTERNAL MEDICINE | Facility: CLINIC | Age: 54
End: 2019-04-29

## 2019-04-29 VITALS
WEIGHT: 204 LBS | RESPIRATION RATE: 16 BRPM | HEART RATE: 80 BPM | DIASTOLIC BLOOD PRESSURE: 88 MMHG | SYSTOLIC BLOOD PRESSURE: 130 MMHG | HEIGHT: 67 IN | OXYGEN SATURATION: 97 % | BODY MASS INDEX: 32.02 KG/M2

## 2019-04-29 DIAGNOSIS — N18.2 CKD (CHRONIC KIDNEY DISEASE) STAGE 2, GFR 60-89 ML/MIN: ICD-10-CM

## 2019-04-29 DIAGNOSIS — M19.011 ARTHRITIS OF RIGHT SHOULDER REGION: ICD-10-CM

## 2019-04-29 DIAGNOSIS — F33.1 MODERATE EPISODE OF RECURRENT MAJOR DEPRESSIVE DISORDER (HCC): ICD-10-CM

## 2019-04-29 DIAGNOSIS — I25.10 CORONARY ARTERY DISEASE INVOLVING NATIVE CORONARY ARTERY OF NATIVE HEART WITHOUT ANGINA PECTORIS: ICD-10-CM

## 2019-04-29 DIAGNOSIS — E78.2 MIXED HYPERLIPIDEMIA: ICD-10-CM

## 2019-04-29 DIAGNOSIS — E66.09 CLASS 1 OBESITY DUE TO EXCESS CALORIES WITHOUT SERIOUS COMORBIDITY WITH BODY MASS INDEX (BMI) OF 32.0 TO 32.9 IN ADULT: ICD-10-CM

## 2019-04-29 DIAGNOSIS — F51.01 PRIMARY INSOMNIA: ICD-10-CM

## 2019-04-29 DIAGNOSIS — I10 ESSENTIAL HYPERTENSION: Primary | ICD-10-CM

## 2019-04-29 DIAGNOSIS — E03.9 ACQUIRED HYPOTHYROIDISM: ICD-10-CM

## 2019-04-29 PROBLEM — E66.811 CLASS 1 OBESITY DUE TO EXCESS CALORIES WITHOUT SERIOUS COMORBIDITY WITH BODY MASS INDEX (BMI) OF 32.0 TO 32.9 IN ADULT: Status: ACTIVE | Noted: 2017-03-20

## 2019-04-29 PROBLEM — R06.02 SOB (SHORTNESS OF BREATH): Status: RESOLVED | Noted: 2017-08-18 | Resolved: 2019-04-29

## 2019-04-29 PROCEDURE — 99214 OFFICE O/P EST MOD 30 MIN: CPT | Performed by: INTERNAL MEDICINE

## 2019-04-29 RX ORDER — AMITRIPTYLINE HYDROCHLORIDE 50 MG/1
50 TABLET, FILM COATED ORAL NIGHTLY
Qty: 30 TABLET | Refills: 5 | Status: SHIPPED | OUTPATIENT
Start: 2019-04-29 | End: 2019-10-28 | Stop reason: SDUPTHER

## 2019-04-29 RX ORDER — ESCITALOPRAM OXALATE 20 MG/1
20 TABLET ORAL DAILY
Qty: 30 TABLET | Refills: 11 | Status: SHIPPED | OUTPATIENT
Start: 2019-04-29 | End: 2020-04-29

## 2019-04-29 NOTE — PROGRESS NOTES
CC: Follow-up hypertension    History:  Ziyad Wilson is a 53 y.o. male   He notes he has been doing reasonably well without any acute illness.  He recently had his right knee replaced and has markedly decreased pain with this.  Unfortunately, he has had ongoing pain with the left knee and right shoulder.  He is hopeful to have these addressed per Dr. Merritt soon.  Blood pressure has done well on current medication without side effects.  He notes his mood has done reasonably well, though he previously took amitriptyline and wonders if this could be helpful for his headaches, sleep, and mood.    ROS:  Review of Systems   Constitutional: Negative for chills and fever.   Respiratory: Negative for cough and shortness of breath.    Cardiovascular: Negative for chest pain and palpitations.   Gastrointestinal: Negative for abdominal pain and constipation.   Genitourinary: Negative for difficulty urinating and dysuria.   Musculoskeletal: Positive for arthralgias.   Neurological: Positive for headaches.        reports that he has never smoked. He has never used smokeless tobacco. He reports that he drinks alcohol. He reports that he does not use drugs.      Current Outpatient Medications:   •  albuterol (PROVENTIL HFA;VENTOLIN HFA) 108 (90 BASE) MCG/ACT inhaler, Inhale as needed., Disp: , Rfl:   •  amLODIPine-valsartan (EXFORGE) 5-160 MG per tablet, Take 1 tablet by mouth Daily., Disp: 30 tablet, Rfl: 11  •  aspirin 325 MG tablet, daily., Disp: , Rfl:   •  atorvastatin (LIPITOR) 40 MG tablet, Take 1 tablet by mouth Daily., Disp: 30 tablet, Rfl: 11  •  butalbital-acetaminophen-caffeine (FIORICET, ESGIC) -40 MG per tablet, Take 50 tablets by mouth 2 (Two) Times a Day As Needed., Disp: , Rfl:   •  carvedilol (COREG) 25 MG tablet, TAKE 2 TABLETS BY MOUTH TWICE DAILY, Disp: 120 tablet, Rfl: 11  •  escitalopram (LEXAPRO) 20 MG tablet, Take 1 tablet by mouth Daily., Disp: 30 tablet, Rfl: 11  •  esomeprazole (NEXIUM) 40 MG  "capsule, Take 1 capsule by mouth Every Morning Before Breakfast., Disp: 30 capsule, Rfl: 11  •  fluticasone (FLONASE) 50 MCG/ACT nasal spray, 2 sprays into each nostril Daily. Administer 2 sprays in each nostril for each dose., Disp: 1 each, Rfl: 0  •  fluticasone-salmeterol (ADVAIR) 100-50 MCG/DOSE DISKUS, Inhale 1 puff 2 (two) times a day., Disp: , Rfl:   •  furosemide (LASIX) 40 MG tablet, TAKE 1 TABLET BY MOUTH TWICE DAILY AS NEEDED, Disp: 60 tablet, Rfl: 11  •  icosapent ethyl (VASCEPA) 1 g capsule capsule, Take 2 g by mouth 2 (Two) Times a Day With Meals., Disp: 120 capsule, Rfl: 11  •  levothyroxine (SYNTHROID, LEVOTHROID) 75 MCG tablet, Take 1 tablet by mouth Daily., Disp: 90 tablet, Rfl: 3  •  Multiple Vitamins-Minerals (MULTIVITAMIN WITH MINERALS) tablet tablet, Take 1 tablet by mouth Daily., Disp: , Rfl:   •  nitroglycerin (NITROSTAT) 0.4 MG SL tablet, Place 0.4 mg under the tongue as needed. Take no more than 3 doses in 15 minutes., Disp: , Rfl:   •  omeprazole (priLOSEC) 20 MG capsule, Take 20 mg by mouth Daily., Disp: , Rfl:   •  QUEtiapine (SEROquel) 100 MG tablet, Take 1 tablet by mouth every night at bedtime., Disp: 90 tablet, Rfl: 5  •  spironolactone (ALDACTONE) 25 MG tablet, TAKE ONE TABLET BY MOUTH ONCE DAILY, Disp: 30 tablet, Rfl: 6  •  zolpidem CR (AMBIEN CR) 12.5 MG CR tablet, Take 1 tablet by mouth At Night As Needed for Sleep., Disp: 30 tablet, Rfl: 5  •  amitriptyline (ELAVIL) 50 MG tablet, Take 1 tablet by mouth Every Night., Disp: 30 tablet, Rfl: 5  •  diclofenac (VOLTAREN) 1 % gel gel, Apply 4 g topically to the appropriate area as directed 3 (Three) Times a Day., Disp: 100 g, Rfl: 3    OBJECTIVE:  /88 (BP Location: Left arm, Patient Position: Sitting, Cuff Size: Adult)   Pulse 80   Resp 16   Ht 170.2 cm (67\")   Wt 92.5 kg (204 lb)   SpO2 97%   BMI 31.95 kg/m²    Physical Exam   Constitutional: He is oriented to person, place, and time. He appears well-nourished. No " distress.   Cardiovascular: Normal rate, regular rhythm and normal heart sounds.   No murmur heard.  Pulmonary/Chest: Effort normal and breath sounds normal. He has no wheezes.   Neurological: He is alert and oriented to person, place, and time.   Skin:   Well healed incision from right TKA.   Psychiatric: He has a normal mood and affect.       Assessment/Plan    Diagnoses and all orders for this visit:    Essential hypertension  -     Comprehensive Metabolic Panel; Future  Well controlled, BP goal for age is <140/90 per JNC 8 guidelines and continue current medications    Coronary artery disease involving native coronary artery of native heart without angina pectoris  Stable on aspirin, beta-blocker, and statin.  He has no anginal symptoms at this time.    Moderate episode of recurrent major depressive disorder (CMS/HCC)  -     escitalopram (LEXAPRO) 20 MG tablet; Take 1 tablet by mouth Daily.  -     amitriptyline (ELAVIL) 50 MG tablet; Take 1 tablet by mouth Every Night.  Reasonably well controlled on Lexapro.  Given worsening headaches and difficulty with sleep, we will restart amitriptyline.    Class 1 obesity due to excess calories without serious comorbidity with body mass index (BMI) of 32.0 to 32.9 in adult  Recommended attention to portion control and being careful about the types and timing of meals for the purpose of weight management.    Acquired hypothyroidism  -     TSH; Future  Check TSH to monitor levothyroxine dosing.  He has no symptoms of thyroid dysfunction at this time.    Mixed hyperlipidemia  -     Lipid Panel; Future  Stable on high intensity statin therapy per ACC/AHA guidelines.    Arthritis of right shoulder region  -     diclofenac (VOLTAREN) 1 % gel gel; Apply 4 g topically to the appropriate area as directed 3 (Three) Times a Day.  We will try Voltaren gel.  He is given a good Rx coupon, which should allow him to get this for $25 for a large tube.  We discussed that systemic NSAIDs put  him at higher risk for cardiac complications, so we will try to avoid this at this time.    CKD (chronic kidney disease) stage 2, GFR 60-89 ml/min  -     Urinalysis With Microscopic If Indicated (No Culture) - Urine, Clean Catch; Future  -     Protein / Creatinine Ratio, Urine - Urine, Clean Catch; Future  Labs as ordered above.  He will continue on RAAS blockade.    Primary insomnia  -     amitriptyline (ELAVIL) 50 MG tablet; Take 1 tablet by mouth Every Night.  Restart amitriptyline, which had previously done well for him.      An After Visit Summary was printed and given to the patient at discharge.  Return in about 6 months (around 10/29/2019) for Recheck.         Ashish Thurman D.O. 4/29/2019

## 2019-05-06 ENCOUNTER — TELEPHONE (OUTPATIENT)
Dept: INTERNAL MEDICINE | Facility: CLINIC | Age: 54
End: 2019-05-06

## 2019-05-06 NOTE — TELEPHONE ENCOUNTER
Yes, sorry, we had discussed this. He needs to speak to Dr. Merritt about his shoulder at this point.

## 2019-05-06 NOTE — TELEPHONE ENCOUNTER
He may try some ibuprofen or aleve, though as we discussed in the office, this can put him at higher risk for heart attacks. Alternatively, tylenol arthritis may be a better choice. However, I think we should also consider referring to [facility] since his arthritis does appear to be advanced. If he is willing, I am happy to send the referral.

## 2019-05-06 NOTE — TELEPHONE ENCOUNTER
PT SAYS HE THINKS HE NEEDS ARTHRITIS MEDICINE TO TAKE.Patient SAYS THE TOPICAL IS NOT HELPING..PLEASE  USE [facility]..

## 2019-05-06 NOTE — TELEPHONE ENCOUNTER
I tried calling to inform patient of results, no answer.  I left a message for him to call the office.

## 2019-05-06 NOTE — TELEPHONE ENCOUNTER
Patient informed.  Patient stated he has been taking ibuprofen for years, but he will try Tylenol Arthritis.  Patient reported he cannot go to  because he has an outstanding bill there related to the surgery he had a few months ago.

## 2019-06-05 ENCOUNTER — LAB (OUTPATIENT)
Dept: LAB | Facility: HOSPITAL | Age: 54
End: 2019-06-05

## 2019-06-05 DIAGNOSIS — N18.2 CKD (CHRONIC KIDNEY DISEASE) STAGE 2, GFR 60-89 ML/MIN: ICD-10-CM

## 2019-06-05 DIAGNOSIS — N17.9 AKI (ACUTE KIDNEY INJURY) (HCC): ICD-10-CM

## 2019-06-05 DIAGNOSIS — E78.2 MIXED HYPERLIPIDEMIA: ICD-10-CM

## 2019-06-05 DIAGNOSIS — E03.9 ACQUIRED HYPOTHYROIDISM: ICD-10-CM

## 2019-06-05 DIAGNOSIS — I10 ESSENTIAL HYPERTENSION: ICD-10-CM

## 2019-06-05 LAB
ALBUMIN SERPL-MCNC: 4.1 G/DL (ref 3.5–5)
ALBUMIN/GLOB SERPL: 1.3 G/DL (ref 1.1–2.5)
ALP SERPL-CCNC: 99 U/L (ref 24–120)
ALT SERPL W P-5'-P-CCNC: 25 U/L (ref 0–54)
ANION GAP SERPL CALCULATED.3IONS-SCNC: 14 MMOL/L (ref 4–13)
ARTICHOKE IGE QN: 53 MG/DL (ref 0–99)
AST SERPL-CCNC: 28 U/L (ref 7–45)
BACTERIA UR QL AUTO: ABNORMAL /HPF
BILIRUB SERPL-MCNC: 0.3 MG/DL (ref 0.1–1)
BILIRUB UR QL STRIP: NEGATIVE
BUN BLD-MCNC: 20 MG/DL (ref 5–21)
BUN/CREAT SERPL: 12.6 (ref 7–25)
CALCIUM SPEC-SCNC: 9 MG/DL (ref 8.4–10.4)
CHLORIDE SERPL-SCNC: 103 MMOL/L (ref 98–110)
CHOLEST SERPL-MCNC: 112 MG/DL (ref 130–200)
CLARITY UR: CLEAR
CO2 SERPL-SCNC: 27 MMOL/L (ref 24–31)
COD CRY URNS QL: ABNORMAL /HPF
COLOR UR: YELLOW
CREAT BLD-MCNC: 1.59 MG/DL (ref 0.5–1.4)
CREAT UR-MCNC: 314.3 MG/DL
GFR SERPL CREATININE-BSD FRML MDRD: 45 ML/MIN/1.73
GLOBULIN UR ELPH-MCNC: 3.1 GM/DL
GLUCOSE BLD-MCNC: 103 MG/DL (ref 70–100)
GLUCOSE UR STRIP-MCNC: NEGATIVE MG/DL
HDLC SERPL-MCNC: 19 MG/DL
HGB UR QL STRIP.AUTO: NEGATIVE
KETONES UR QL STRIP: NEGATIVE
LDLC/HDLC SERPL: 2.36 {RATIO}
LEUKOCYTE ESTERASE UR QL STRIP.AUTO: ABNORMAL
NITRITE UR QL STRIP: NEGATIVE
PH UR STRIP.AUTO: 5.5 [PH] (ref 5–8)
PHOSPHATE SERPL-MCNC: 4.4 MG/DL (ref 2.5–4.5)
POTASSIUM BLD-SCNC: 3.6 MMOL/L (ref 3.5–5.3)
PROT SERPL-MCNC: 7.2 G/DL (ref 6.3–8.7)
PROT UR QL STRIP: ABNORMAL
PROT UR-MCNC: 30 MG/DL (ref 0–13.5)
PROT/CREAT UR: 95.5 MG/G CREA
RBC # UR: ABNORMAL /HPF
REF LAB TEST METHOD: ABNORMAL
SODIUM BLD-SCNC: 144 MMOL/L (ref 135–145)
SP GR UR STRIP: 1.03 (ref 1–1.03)
SQUAMOUS #/AREA URNS HPF: ABNORMAL /HPF
TRIGL SERPL-MCNC: 241 MG/DL (ref 0–149)
TSH SERPL DL<=0.05 MIU/L-ACNC: 0.06 MIU/ML (ref 0.47–4.68)
UROBILINOGEN UR QL STRIP: ABNORMAL
WBC UR QL AUTO: ABNORMAL /HPF

## 2019-06-05 PROCEDURE — 81001 URINALYSIS AUTO W/SCOPE: CPT | Performed by: INTERNAL MEDICINE

## 2019-06-05 PROCEDURE — 82570 ASSAY OF URINE CREATININE: CPT | Performed by: INTERNAL MEDICINE

## 2019-06-05 PROCEDURE — 84100 ASSAY OF PHOSPHORUS: CPT | Performed by: INTERNAL MEDICINE

## 2019-06-05 PROCEDURE — 80053 COMPREHEN METABOLIC PANEL: CPT | Performed by: INTERNAL MEDICINE

## 2019-06-05 PROCEDURE — 84443 ASSAY THYROID STIM HORMONE: CPT | Performed by: INTERNAL MEDICINE

## 2019-06-05 PROCEDURE — 84156 ASSAY OF PROTEIN URINE: CPT | Performed by: INTERNAL MEDICINE

## 2019-06-05 PROCEDURE — 36415 COLL VENOUS BLD VENIPUNCTURE: CPT

## 2019-06-05 PROCEDURE — 80061 LIPID PANEL: CPT | Performed by: INTERNAL MEDICINE

## 2019-06-06 DIAGNOSIS — E03.9 HYPOTHYROIDISM IN ADULT: ICD-10-CM

## 2019-06-06 RX ORDER — LEVOTHYROXINE SODIUM 0.05 MG/1
50 TABLET ORAL DAILY
Qty: 90 TABLET | Refills: 1 | Status: SHIPPED | OUTPATIENT
Start: 2019-06-06 | End: 2019-10-29

## 2019-07-16 DIAGNOSIS — F51.01 PRIMARY INSOMNIA: ICD-10-CM

## 2019-07-16 RX ORDER — ZOLPIDEM TARTRATE 12.5 MG/1
TABLET, FILM COATED, EXTENDED RELEASE ORAL
Qty: 30 TABLET | Refills: 5 | Status: CANCELLED | OUTPATIENT
Start: 2019-07-16

## 2019-07-17 DIAGNOSIS — F51.01 PRIMARY INSOMNIA: ICD-10-CM

## 2019-07-17 RX ORDER — ZOLPIDEM TARTRATE 12.5 MG/1
12.5 TABLET, FILM COATED, EXTENDED RELEASE ORAL NIGHTLY PRN
Qty: 30 TABLET | Refills: 5 | Status: SHIPPED | OUTPATIENT
Start: 2019-07-17 | End: 2020-01-13

## 2019-07-30 DIAGNOSIS — E03.9 HYPOTHYROIDISM IN ADULT: ICD-10-CM

## 2019-07-30 RX ORDER — LEVOTHYROXINE SODIUM 0.07 MG/1
TABLET ORAL
Qty: 90 TABLET | Refills: 3 | OUTPATIENT
Start: 2019-07-30

## 2019-08-06 DIAGNOSIS — E03.9 HYPOTHYROIDISM IN ADULT: ICD-10-CM

## 2019-08-07 RX ORDER — LEVOTHYROXINE SODIUM 0.07 MG/1
TABLET ORAL
Qty: 90 TABLET | Refills: 3 | OUTPATIENT
Start: 2019-08-07

## 2019-08-15 DIAGNOSIS — E03.9 HYPOTHYROIDISM IN ADULT: ICD-10-CM

## 2019-08-16 ENCOUNTER — TELEPHONE (OUTPATIENT)
Dept: INTERNAL MEDICINE | Facility: CLINIC | Age: 54
End: 2019-08-16

## 2019-08-16 DIAGNOSIS — I25.10 CORONARY ARTERY DISEASE INVOLVING NATIVE CORONARY ARTERY OF NATIVE HEART WITHOUT ANGINA PECTORIS: ICD-10-CM

## 2019-08-16 DIAGNOSIS — I50.22 CHRONIC SYSTOLIC HEART FAILURE (HCC): ICD-10-CM

## 2019-08-16 RX ORDER — ATORVASTATIN CALCIUM 40 MG/1
40 TABLET, FILM COATED ORAL DAILY
Qty: 90 TABLET | Refills: 3 | Status: SHIPPED | OUTPATIENT
Start: 2019-08-16 | End: 2020-05-15 | Stop reason: SDUPTHER

## 2019-08-16 RX ORDER — LEVOTHYROXINE SODIUM 0.07 MG/1
TABLET ORAL
Qty: 90 TABLET | Refills: 3 | OUTPATIENT
Start: 2019-08-16

## 2019-09-08 DIAGNOSIS — I10 ESSENTIAL HYPERTENSION: ICD-10-CM

## 2019-09-08 RX ORDER — AMLODIPINE AND VALSARTAN 5; 160 MG/1; MG/1
TABLET ORAL
Qty: 90 TABLET | Refills: 3 | Status: CANCELLED | OUTPATIENT
Start: 2019-09-08

## 2019-09-09 DIAGNOSIS — I10 ESSENTIAL HYPERTENSION: ICD-10-CM

## 2019-09-09 RX ORDER — AMLODIPINE AND VALSARTAN 5; 160 MG/1; MG/1
1 TABLET ORAL DAILY
Qty: 30 TABLET | Refills: 11 | Status: SHIPPED | OUTPATIENT
Start: 2019-09-09 | End: 2020-07-29

## 2019-10-14 DIAGNOSIS — E03.9 HYPOTHYROIDISM IN ADULT: ICD-10-CM

## 2019-10-16 RX ORDER — LEVOTHYROXINE SODIUM 0.05 MG/1
50 TABLET ORAL DAILY
Qty: 90 TABLET | Refills: 1 | Status: SHIPPED | OUTPATIENT
Start: 2019-10-16 | End: 2020-04-10 | Stop reason: SDUPTHER

## 2019-10-28 DIAGNOSIS — F33.1 MODERATE EPISODE OF RECURRENT MAJOR DEPRESSIVE DISORDER (HCC): ICD-10-CM

## 2019-10-28 DIAGNOSIS — F51.01 PRIMARY INSOMNIA: ICD-10-CM

## 2019-10-28 RX ORDER — AMITRIPTYLINE HYDROCHLORIDE 50 MG/1
50 TABLET, FILM COATED ORAL EVERY EVENING
Qty: 30 TABLET | Refills: 5 | Status: SHIPPED | OUTPATIENT
Start: 2019-10-28 | End: 2020-05-11

## 2019-10-29 ENCOUNTER — LAB (OUTPATIENT)
Dept: LAB | Facility: HOSPITAL | Age: 54
End: 2019-10-29

## 2019-10-29 ENCOUNTER — OFFICE VISIT (OUTPATIENT)
Dept: INTERNAL MEDICINE | Facility: CLINIC | Age: 54
End: 2019-10-29

## 2019-10-29 VITALS
DIASTOLIC BLOOD PRESSURE: 80 MMHG | BODY MASS INDEX: 37.28 KG/M2 | HEIGHT: 67 IN | RESPIRATION RATE: 16 BRPM | OXYGEN SATURATION: 98 % | HEART RATE: 87 BPM | SYSTOLIC BLOOD PRESSURE: 120 MMHG | WEIGHT: 237.5 LBS

## 2019-10-29 DIAGNOSIS — E03.9 ACQUIRED HYPOTHYROIDISM: ICD-10-CM

## 2019-10-29 DIAGNOSIS — R73.02 IMPAIRED GLUCOSE TOLERANCE: ICD-10-CM

## 2019-10-29 DIAGNOSIS — Z23 FLU VACCINE NEED: ICD-10-CM

## 2019-10-29 DIAGNOSIS — I10 ESSENTIAL HYPERTENSION: ICD-10-CM

## 2019-10-29 DIAGNOSIS — J20.9 ACUTE BRONCHITIS, UNSPECIFIED ORGANISM: ICD-10-CM

## 2019-10-29 DIAGNOSIS — K21.9 GASTROESOPHAGEAL REFLUX DISEASE WITHOUT ESOPHAGITIS: ICD-10-CM

## 2019-10-29 DIAGNOSIS — M19.011 ARTHRITIS OF RIGHT SHOULDER REGION: ICD-10-CM

## 2019-10-29 DIAGNOSIS — E78.2 MIXED HYPERLIPIDEMIA: ICD-10-CM

## 2019-10-29 DIAGNOSIS — E66.01 CLASS 2 SEVERE OBESITY DUE TO EXCESS CALORIES WITH SERIOUS COMORBIDITY AND BODY MASS INDEX (BMI) OF 37.0 TO 37.9 IN ADULT (HCC): ICD-10-CM

## 2019-10-29 DIAGNOSIS — I10 ESSENTIAL HYPERTENSION: Primary | ICD-10-CM

## 2019-10-29 DIAGNOSIS — I25.10 CORONARY ARTERY DISEASE INVOLVING NATIVE CORONARY ARTERY OF NATIVE HEART WITHOUT ANGINA PECTORIS: ICD-10-CM

## 2019-10-29 PROBLEM — E66.812 CLASS 2 SEVERE OBESITY DUE TO EXCESS CALORIES WITH SERIOUS COMORBIDITY AND BODY MASS INDEX (BMI) OF 37.0 TO 37.9 IN ADULT: Status: ACTIVE | Noted: 2017-03-20

## 2019-10-29 PROCEDURE — 84443 ASSAY THYROID STIM HORMONE: CPT | Performed by: INTERNAL MEDICINE

## 2019-10-29 PROCEDURE — 80053 COMPREHEN METABOLIC PANEL: CPT | Performed by: INTERNAL MEDICINE

## 2019-10-29 PROCEDURE — 36415 COLL VENOUS BLD VENIPUNCTURE: CPT

## 2019-10-29 PROCEDURE — 85027 COMPLETE CBC AUTOMATED: CPT | Performed by: INTERNAL MEDICINE

## 2019-10-29 PROCEDURE — 99214 OFFICE O/P EST MOD 30 MIN: CPT | Performed by: INTERNAL MEDICINE

## 2019-10-29 PROCEDURE — G0008 ADMIN INFLUENZA VIRUS VAC: HCPCS | Performed by: INTERNAL MEDICINE

## 2019-10-29 PROCEDURE — 83036 HEMOGLOBIN GLYCOSYLATED A1C: CPT | Performed by: INTERNAL MEDICINE

## 2019-10-29 PROCEDURE — 90674 CCIIV4 VAC NO PRSV 0.5 ML IM: CPT | Performed by: INTERNAL MEDICINE

## 2019-10-29 RX ORDER — FLUTICASONE PROPIONATE 50 MCG
2 SPRAY, SUSPENSION (ML) NASAL DAILY
Qty: 1 BOTTLE | Refills: 11 | Status: SHIPPED | OUTPATIENT
Start: 2019-10-29 | End: 2020-11-25 | Stop reason: SDUPTHER

## 2019-10-29 RX ORDER — ESOMEPRAZOLE MAGNESIUM 40 MG/1
40 CAPSULE, DELAYED RELEASE ORAL
Qty: 90 CAPSULE | Refills: 3 | Status: SHIPPED | OUTPATIENT
Start: 2019-10-29 | End: 2021-03-16

## 2019-10-29 NOTE — PROGRESS NOTES
"CC: \"arthritis is killing me\"    History:  Ziyad Wilson is a 54 y.o. male   He notes he has ongoing issues with right shoulder, left knee, and bilateral hand pain.  He has used Voltaren gel with some success.  His levothyroxine was changed on last check after his TSH was found to be low in the spring and he denies thyroid dysfunction at this time.  He has gained 34 pounds since his last visit and notes he has been less adherent to diet and less able to exercise status post surgery in the spring.  Right shoulder, left knee, hands.    ROS:  Review of Systems   Constitutional: Negative for chills and fever.   Respiratory: Negative for cough and shortness of breath.    Cardiovascular: Negative for chest pain and palpitations.   Gastrointestinal: Negative for abdominal pain and constipation.   Musculoskeletal: Positive for arthralgias and joint swelling.        reports that he has never smoked. He has never used smokeless tobacco. He reports that he drinks alcohol. He reports that he does not use drugs.      Current Outpatient Medications:   •  albuterol (PROVENTIL HFA;VENTOLIN HFA) 108 (90 BASE) MCG/ACT inhaler, Inhale as needed., Disp: , Rfl:   •  amitriptyline (ELAVIL) 50 MG tablet, Take 1 tablet by mouth Every Evening., Disp: 30 tablet, Rfl: 5  •  amLODIPine-valsartan (EXFORGE) 5-160 MG per tablet, Take 1 tablet by mouth Daily., Disp: 30 tablet, Rfl: 11  •  aspirin 325 MG tablet, daily., Disp: , Rfl:   •  atorvastatin (LIPITOR) 40 MG tablet, Take 1 tablet by mouth Daily., Disp: 90 tablet, Rfl: 3  •  butalbital-acetaminophen-caffeine (FIORICET, ESGIC) -40 MG per tablet, Take 50 tablets by mouth 2 (Two) Times a Day As Needed., Disp: , Rfl:   •  carvedilol (COREG) 25 MG tablet, TAKE 2 TABLETS BY MOUTH TWICE DAILY, Disp: 120 tablet, Rfl: 11  •  diclofenac (VOLTAREN) 1 % gel gel, Apply 4 g topically to the appropriate area as directed 3 (Three) Times a Day., Disp: 100 g, Rfl: 3  •  escitalopram (LEXAPRO) 20 MG " "tablet, Take 1 tablet by mouth Daily., Disp: 30 tablet, Rfl: 11  •  esomeprazole (NEXIUM) 40 MG capsule, Take 1 capsule by mouth Every Morning Before Breakfast., Disp: 30 capsule, Rfl: 11  •  fluticasone (FLONASE) 50 MCG/ACT nasal spray, 2 sprays into each nostril Daily. Administer 2 sprays in each nostril for each dose., Disp: 1 each, Rfl: 0  •  fluticasone-salmeterol (ADVAIR) 100-50 MCG/DOSE DISKUS, Inhale 1 puff 2 (two) times a day., Disp: , Rfl:   •  furosemide (LASIX) 40 MG tablet, TAKE 1 TABLET BY MOUTH TWICE DAILY AS NEEDED, Disp: 60 tablet, Rfl: 11  •  icosapent ethyl (VASCEPA) 1 g capsule capsule, Take 2 g by mouth 2 (Two) Times a Day With Meals., Disp: 120 capsule, Rfl: 11  •  levothyroxine (SYNTHROID, LEVOTHROID) 50 MCG tablet, Take 1 tablet by mouth Daily., Disp: 90 tablet, Rfl: 1  •  Multiple Vitamins-Minerals (MULTIVITAMIN WITH MINERALS) tablet tablet, Take 1 tablet by mouth Daily., Disp: , Rfl:   •  nitroglycerin (NITROSTAT) 0.4 MG SL tablet, Place 0.4 mg under the tongue as needed. Take no more than 3 doses in 15 minutes., Disp: , Rfl:   •  omeprazole (priLOSEC) 20 MG capsule, Take 20 mg by mouth Daily., Disp: , Rfl:   •  QUEtiapine (SEROquel) 100 MG tablet, Take 1 tablet by mouth every night at bedtime., Disp: 90 tablet, Rfl: 5  •  spironolactone (ALDACTONE) 25 MG tablet, TAKE ONE TABLET BY MOUTH ONCE DAILY, Disp: 30 tablet, Rfl: 6  •  zolpidem CR (AMBIEN CR) 12.5 MG CR tablet, Take 1 tablet by mouth At Night As Needed for Sleep., Disp: 30 tablet, Rfl: 5    OBJECTIVE:  /80 (BP Location: Left arm, Patient Position: Sitting, Cuff Size: Adult)   Pulse 87   Resp 16   Ht 170.2 cm (67\")   Wt 108 kg (237 lb 8 oz)   SpO2 98%   BMI 37.20 kg/m²    Physical Exam   Constitutional: He is oriented to person, place, and time. He appears well-nourished. No distress.   Cardiovascular: Normal rate, regular rhythm and normal heart sounds.   No murmur heard.  Pulmonary/Chest: Effort normal and breath sounds " normal. He has no wheezes.   Musculoskeletal: He exhibits no edema.   Neurological: He is alert and oriented to person, place, and time.   Psychiatric: He has a normal mood and affect.       Assessment/Plan    Diagnoses and all orders for this visit:    Essential hypertension  -     Comprehensive Metabolic Panel; Future  -     CBC (No Diff); Future  -     Ambulatory Referral to Nutrition Services  Well controlled, BP goal for age is <140/90 per JNC 8 guidelines and continue current medications    Coronary artery disease involving native coronary artery of native heart without angina pectoris  Stable on aspirin and statin without anginal symptoms as managed per cardiology.    Acquired hypothyroidism  -     TSH; Future  Check TSH after adjustment of thyroid dose in the spring.  He has no thyroid symptoms at this time.    Arthritis of right shoulder region  He may continue Voltaren gel and may consider capsaicin cream.  We discussed that NSAID therapy is not recommended secondary to his cardiac disease.  Exercise, walking, and weight loss are absolutely recommended.    Acute bronchitis, unspecified organism  -     fluticasone (FLONASE) 50 MCG/ACT nasal spray; 2 sprays into the nostril(s) as directed by provider Daily. Administer 2 sprays in each nostril for each dose.    Gastroesophageal reflux disease without esophagitis  -     esomeprazole (nexIUM) 40 MG capsule; Take 1 capsule by mouth Every Morning Before Breakfast.  Stable without exacerbation on PPI.    Mixed hyperlipidemia  Stable on high intensity statin therapy per ACC/AHA guidelines.    Impaired glucose tolerance  -     Hemoglobin A1c; Future  -     Ambulatory Referral to Nutrition Services  Check A1c to monitor.    Class 2 severe obesity due to excess calories with serious comorbidity and body mass index (BMI) of 37.0 to 37.9 in adult (CMS/[facility])  -     Ambulatory Referral to Nutrition Services  Recommended attention to portion control and being careful about  the types and timing of meals for the purpose of weight management.  Long discussion was had regarding tips for portion control, choosing good foods, avoiding carbohydrates, and tips toward lifestyle modification for the purpose of weight loss.    Flu vaccine need  -     Flucelvax Quad=>4Years ([id number])        An After Visit Summary was printed and given to the patient at discharge.  Return in about 6 months (around 4/29/2020) for Medicare Wellness.         Ashish Thurman D.O. 10/29/2019   Electronically signed.

## 2019-10-29 NOTE — PATIENT INSTRUCTIONS
"What You Need to Know About Osteoarthritis  Osteoarthritis is a type of arthritis that affects tissue that covers the ends of bones in joints (cartilage). Cartilage acts as a cushion between the bones and helps them move smoothly. Osteoarthritis results when cartilage in the joints gets worn down. Osteoarthritis is sometimes called \"wear and tear\" arthritis.  Osteoarthritis can affect any joint and can make movement painful. Hips, knees, fingers, and toes are some of the joints that are most often affected by osteoarthritis.  You may be more likely to develop osteoarthritis if:  · You are middle-aged or older.  · You are obese.  · You have injured a joint or had surgery on a joint.  · You have a family history of osteoarthritis.  How can osteoarthritis affect me?  Osteoarthritis can cause:  · Pain and swelling in your joint.  · Difficulty moving your joint.  · A grating or scraping feeling inside the joint when you move it.  · Popping or creaking sounds when you move.  This condition can make it harder to do things that you need or want to do each day. Osteoarthritis in a major joint, such as your knee or hip, can make it painful to walk or exercise. If you have osteoarthritis in your hands, you might not be able to  items, twist your hand, or control small movements of your hands and fingers (fine motor skills).  Over time, osteoarthritis could cause you to be less physically active. Being less active increases your risk for other long-term (chronic) health problems, such as diabetes and heart disease.  What lifestyle changes can be made?  You can lessen the impact that osteoarthritis has on your daily life by:  · Switching to low-impact activities that do not put repeated pressure on your joints. For example, if you usually run or jog for exercise, try swimming or riding a bike instead.  · Staying active. Build up to at least 150 minutes of physical activity each week to keep your joints healthy and keep your " body strong.  · Losing weight. If you are overweight or obese, losing weight can take pressure off of your joints. If you need help with weight loss, talk with your health care provider or a diet and nutrition specialist (dietitian).  What other changes can be made?  You can also lessen the effect of osteoarthritis by:  · Using assistive devices. Sometimes a brace, wrap, splint, specialized glove, or cane can help. Talk with your health care provider or physical therapist about when and how to use these.  · Working with a physical therapist who can help you find ways to do your daily activities without harming your joints. A physical therapist can also teach you exercises and stretches to strengthen the muscles that support your joints.  · Treating pain and inflammation. Take over-the-counter and prescription medicines for pain and inflammation only as told by your health care provider. If directed, you may put ice on the affected joint:  ? If you have a removable assistive device, remove it as told by your health care provider.  ? Put ice in a plastic bag.  ? Place a towel between your skin and the bag.  ? Leave the ice on for 20 minutes, 2-3 times a day.  If other measures do not work, you may need joint surgery, such as joint replacement.  What can happen if changes are not made?  Osteoarthritis is a condition that gets worse over time (a progressive condition). If you do not take steps to strengthen your body and to slow down the progress of the disease, your condition may get worse more quickly. Your joints may stiffen and become swollen, which will make them painful and hard to move.  Where to find more information  You can learn more about osteoarthritis from:  · The Arthritis Foundation: www.arthritis.org/about-arthritis/types/osteoarthritis  · National [place] of Arthritis and Musculoskeletal and Skin Diseases: www.niams.nih.gov/health_info/osteoarthritis/osteoarthritis_ff.asp  Contact a health care  provider if:  · You cannot do your normal activities comfortably.  · Your joint does not function at all.  · Your pain is interfering with your sleep.  · You are gaining weight.  · Your joint appears to be changing in shape, instead of just being swollen and sore.  Summary  · Osteoarthritis is a painful joint disease that gets worse over time.  · This condition can lead to other long-term (chronic) health problems.  · There are changes that you can make to slow down the progression of the disease.  This information is not intended to replace advice given to you by your health care provider. Make sure you discuss any questions you have with your health care provider.  Document Released: 08/08/2017 Document Revised: 08/10/2017 Document Reviewed: 08/08/2017  [employer] Interactive Patient Education © 2019 [employer]

## 2019-10-30 LAB
ALBUMIN SERPL-MCNC: 4 G/DL (ref 3.5–5.2)
ALBUMIN/GLOB SERPL: 1.3 G/DL
ALP SERPL-CCNC: 71 U/L (ref 39–117)
ALT SERPL W P-5'-P-CCNC: 32 U/L (ref 1–41)
ANION GAP SERPL CALCULATED.3IONS-SCNC: 11.9 MMOL/L (ref 5–15)
AST SERPL-CCNC: 31 U/L (ref 1–40)
BILIRUB SERPL-MCNC: 0.2 MG/DL (ref 0.2–1.2)
BUN BLD-MCNC: 17 MG/DL (ref 6–20)
BUN/CREAT SERPL: 13.8 (ref 7–25)
CALCIUM SPEC-SCNC: 8.7 MG/DL (ref 8.6–10.5)
CHLORIDE SERPL-SCNC: 101 MMOL/L (ref 98–107)
CO2 SERPL-SCNC: 26.1 MMOL/L (ref 22–29)
CREAT BLD-MCNC: 1.23 MG/DL (ref 0.76–1.27)
DEPRECATED RDW RBC AUTO: 42.6 FL (ref 37–54)
ERYTHROCYTE [DISTWIDTH] IN BLOOD BY AUTOMATED COUNT: 13.1 % (ref 12.3–15.4)
GFR SERPL CREATININE-BSD FRML MDRD: 61 ML/MIN/1.73
GLOBULIN UR ELPH-MCNC: 3.2 GM/DL
GLUCOSE BLD-MCNC: 95 MG/DL (ref 65–99)
HBA1C MFR BLD: 5.8 % (ref 4.8–5.6)
HCT VFR BLD AUTO: 39.3 % (ref 37.5–51)
HGB BLD-MCNC: 13.3 G/DL (ref 13–17.7)
MCH RBC QN AUTO: 30 PG (ref 26.6–33)
MCHC RBC AUTO-ENTMCNC: 33.8 G/DL (ref 31.5–35.7)
MCV RBC AUTO: 88.5 FL (ref 79–97)
PLATELET # BLD AUTO: 194 10*3/MM3 (ref 140–450)
PMV BLD AUTO: 11.1 FL (ref 6–12)
POTASSIUM BLD-SCNC: 3.3 MMOL/L (ref 3.5–5.2)
PROT SERPL-MCNC: 7.2 G/DL (ref 6–8.5)
RBC # BLD AUTO: 4.44 10*6/MM3 (ref 4.14–5.8)
SODIUM BLD-SCNC: 139 MMOL/L (ref 136–145)
TSH SERPL DL<=0.05 MIU/L-ACNC: 13.7 UIU/ML (ref 0.27–4.2)
WBC NRBC COR # BLD: 7.98 10*3/MM3 (ref 3.4–10.8)

## 2019-10-30 RX ORDER — LEVOTHYROXINE SODIUM 0.07 MG/1
75 TABLET ORAL EVERY OTHER DAY
Qty: 45 TABLET | Refills: 1 | Status: SHIPPED | OUTPATIENT
Start: 2019-10-30 | End: 2020-04-10

## 2019-10-31 ENCOUNTER — TELEPHONE (OUTPATIENT)
Dept: INTERNAL MEDICINE | Facility: CLINIC | Age: 54
End: 2019-10-31

## 2019-10-31 NOTE — TELEPHONE ENCOUNTER
Patient informed Dr. Thurman needs to adjust his thyroid medication again and has sent a script for levothyroxine 75mcg to his pharmacy.  Patient informed Dr. Thurman would like him to alternate days between the 50mcg and 75mcg doses because there is no in-between dose.  Patient verbalized understanding.

## 2019-10-31 NOTE — TELEPHONE ENCOUNTER
"----- Message from Ashish Thurman, DO sent at 10/30/2019  1:30 PM CDT -----  We need to adjust thyroid dose again. I have sent a refill for 75mcg. I would like him to alternate days between the 50mcg and 75 mcg since there is no \"in between\" dose.  "

## 2020-01-13 DIAGNOSIS — F51.01 PRIMARY INSOMNIA: ICD-10-CM

## 2020-01-13 RX ORDER — ZOLPIDEM TARTRATE 12.5 MG/1
12.5 TABLET, FILM COATED, EXTENDED RELEASE ORAL NIGHTLY PRN
Qty: 30 TABLET | Refills: 5 | Status: SHIPPED | OUTPATIENT
Start: 2020-01-13 | End: 2020-07-09

## 2020-02-19 ENCOUNTER — OFFICE VISIT (OUTPATIENT)
Dept: CARDIOLOGY | Facility: CLINIC | Age: 55
End: 2020-02-19

## 2020-02-19 VITALS
HEART RATE: 75 BPM | SYSTOLIC BLOOD PRESSURE: 118 MMHG | DIASTOLIC BLOOD PRESSURE: 80 MMHG | HEIGHT: 67 IN | BODY MASS INDEX: 36.26 KG/M2 | WEIGHT: 231 LBS

## 2020-02-19 DIAGNOSIS — I50.22 CHRONIC SYSTOLIC HEART FAILURE (HCC): Primary | ICD-10-CM

## 2020-02-19 DIAGNOSIS — I10 ESSENTIAL HYPERTENSION: ICD-10-CM

## 2020-02-19 DIAGNOSIS — R07.89 CHEST PAIN, ATYPICAL: ICD-10-CM

## 2020-02-19 DIAGNOSIS — E78.2 HYPERLIPEMIA, MIXED: ICD-10-CM

## 2020-02-19 DIAGNOSIS — F33.1 MODERATE EPISODE OF RECURRENT MAJOR DEPRESSIVE DISORDER (HCC): ICD-10-CM

## 2020-02-19 DIAGNOSIS — I25.119 CORONARY ARTERY DISEASE INVOLVING NATIVE CORONARY ARTERY OF NATIVE HEART WITH ANGINA PECTORIS (HCC): ICD-10-CM

## 2020-02-19 DIAGNOSIS — E78.2 MIXED HYPERLIPIDEMIA: ICD-10-CM

## 2020-02-19 PROCEDURE — 99214 OFFICE O/P EST MOD 30 MIN: CPT | Performed by: INTERNAL MEDICINE

## 2020-02-19 RX ORDER — ICOSAPENT ETHYL 1000 MG/1
2 CAPSULE ORAL 2 TIMES DAILY WITH MEALS
Qty: 120 CAPSULE | Refills: 30 | Status: SHIPPED | OUTPATIENT
Start: 2020-02-19 | End: 2020-09-16

## 2020-02-19 RX ORDER — OMEPRAZOLE 20 MG/1
20 CAPSULE, DELAYED RELEASE ORAL DAILY
COMMUNITY
End: 2021-03-16 | Stop reason: SDUPTHER

## 2020-02-19 RX ORDER — ICOSAPENT ETHYL 1000 MG/1
2 CAPSULE ORAL 2 TIMES DAILY WITH MEALS
Qty: 120 CAPSULE | Refills: 11 | Status: SHIPPED | OUTPATIENT
Start: 2020-02-19

## 2020-02-19 NOTE — PROGRESS NOTES
"Subjective    Ziyad Wilson is a 54 y.o. male. Fu of ihd and risks    History of Present Illness     IHD:  Is active but does not do regular exercise. He has had a couple episodes of exertional cp since LOV. These resolve quickly with rest. EKG is nsc today. He is compliant with meds \"but somehow that Vascepa got dropped\". His stamina is declining due to decreased energy and more bee. He relates that he has been more fatigued and more depressed than usual.    HTN:  He states that clinic checks and home checks are \"<130/80\". Same bp med as usual.    HLD:  This is mixed. He has been compliant with his potent statin. On 6/19 his LDL was 53 but his TGL's were 241.        The following portions of the patient's history were reviewed and updated as appropriate: allergies, current medications, past family history, past medical history, past social history, past surgical history and problem list.    Patient Active Problem List   Diagnosis   • Chronic systolic heart failure (CMS/HCC)   • Essential hypertension   • Coronary artery disease   • Acquired hypothyroidism   • Chronic daily headache   • Class 2 severe obesity due to excess calories with serious comorbidity and body mass index (BMI) of 37.0 to 37.9 in adult (CMS/HCC)   • Mild cognitive impairment   • Asthma   • GERD (gastroesophageal reflux disease)   • Sleep apnea   • Mixed hyperlipidemia   • Moderate episode of recurrent major depressive disorder (CMS/HCC)   • Primary insomnia   • Cervical facet joint syndrome   • Impaired glucose tolerance   • CKD (chronic kidney disease) stage 2, GFR 60-89 ml/min   • Arthritis of right shoulder region   • Chest pain, atypical       No Known Allergies    Family History   Problem Relation Age of Onset   • Hypertension Mother    • Stroke Mother    • Heart disease Mother    • Hypertension Father    • Heart disease Father    • Hypertension Sister    • Hypertension Brother        Social History     Socioeconomic History   • Marital " status:      Spouse name: Not on file   • Number of children: Not on file   • Years of education: Not on file   • Highest education level: Not on file   Tobacco Use   • Smoking status: Never Smoker   • Smokeless tobacco: Never Used   Substance and Sexual Activity   • Alcohol use: Yes     Comment: occasional   • Drug use: No   • Sexual activity: Defer         Current Outpatient Medications:   •  albuterol (PROVENTIL HFA;VENTOLIN HFA) 108 (90 BASE) MCG/ACT inhaler, Inhale as needed., Disp: , Rfl:   •  amitriptyline (ELAVIL) 50 MG tablet, Take 1 tablet by mouth Every Evening., Disp: 30 tablet, Rfl: 5  •  amLODIPine-valsartan (EXFORGE) 5-160 MG per tablet, Take 1 tablet by mouth Daily., Disp: 30 tablet, Rfl: 11  •  aspirin 325 MG tablet, daily., Disp: , Rfl:   •  atorvastatin (LIPITOR) 40 MG tablet, Take 1 tablet by mouth Daily., Disp: 90 tablet, Rfl: 3  •  carvedilol (COREG) 25 MG tablet, TAKE 2 TABLETS BY MOUTH TWICE DAILY, Disp: 120 tablet, Rfl: 11  •  diclofenac (VOLTAREN) 1 % gel gel, Apply 4 g topically to the appropriate area as directed 3 (Three) Times a Day., Disp: 100 g, Rfl: 3  •  escitalopram (LEXAPRO) 20 MG tablet, Take 1 tablet by mouth Daily., Disp: 30 tablet, Rfl: 11  •  fluticasone (FLONASE) 50 MCG/ACT nasal spray, 2 sprays into the nostril(s) as directed by provider Daily. Administer 2 sprays in each nostril for each dose., Disp: 1 bottle, Rfl: 11  •  fluticasone-salmeterol (ADVAIR) 100-50 MCG/DOSE DISKUS, Inhale 1 puff 2 (two) times a day., Disp: , Rfl:   •  furosemide (LASIX) 40 MG tablet, TAKE 1 TABLET BY MOUTH TWICE DAILY AS NEEDED, Disp: 60 tablet, Rfl: 11  •  levothyroxine (SYNTHROID) 75 MCG tablet, Take 1 tablet by mouth Every Other Day., Disp: 45 tablet, Rfl: 1  •  levothyroxine (SYNTHROID, LEVOTHROID) 50 MCG tablet, Take 1 tablet by mouth Daily. (Patient taking differently: Take 50 mcg by mouth Every Other Day.), Disp: 90 tablet, Rfl: 1  •  Multiple Vitamins-Minerals (MULTIVITAMIN WITH  MINERALS) tablet tablet, Take 1 tablet by mouth Daily., Disp: , Rfl:   •  nitroglycerin (NITROSTAT) 0.4 MG SL tablet, Place 0.4 mg under the tongue as needed. Take no more than 3 doses in 15 minutes., Disp: , Rfl:   •  omeprazole (priLOSEC) 20 MG capsule, Take 20 mg by mouth Daily., Disp: , Rfl:   •  QUEtiapine (SEROquel) 100 MG tablet, Take 1 tablet by mouth every night at bedtime., Disp: 90 tablet, Rfl: 5  •  zolpidem CR (AMBIEN CR) 12.5 MG CR tablet, Take 1 tablet by mouth At Night As Needed for Sleep., Disp: 30 tablet, Rfl: 5  •  butalbital-acetaminophen-caffeine (FIORICET, ESGIC) -40 MG per tablet, Take 50 tablets by mouth 2 (Two) Times a Day As Needed., Disp: , Rfl:   •  esomeprazole (nexIUM) 40 MG capsule, Take 1 capsule by mouth Every Morning Before Breakfast., Disp: 90 capsule, Rfl: 3  •  icosapent ethyl (VASCEPA) 1 g capsule capsule, Take 2 g by mouth 2 (Two) Times a Day With Meals., Disp: 120 capsule, Rfl: 30  •  icosapent ethyl (VASCEPA) 1 g capsule capsule, Take 2 g by mouth 2 (Two) Times a Day With Meals., Disp: 120 capsule, Rfl: 11  •  spironolactone (ALDACTONE) 25 MG tablet, TAKE ONE TABLET BY MOUTH ONCE DAILY, Disp: 30 tablet, Rfl: 6    Past Surgical History:   Procedure Laterality Date   • APPENDECTOMY     • BLADDER REPAIR     • CARDIAC CATHETERIZATION  09/2003   • COLONOSCOPY N/A 1/9/2018    Procedure: COLONOSCOPY WITH ANESTHESIA;  Surgeon: Dick Espinosa DO;  Location: [facility] ENDOSCOPY;  Service:    • COLOSTOMY      with colostomy reversal   • KNEE SURGERY Right        Review of Systems   Constitutional: Positive for fatigue. Negative for fever and unexpected weight change.   Respiratory: Positive for shortness of breath. Negative for apnea and chest tightness.    Cardiovascular: Positive for chest pain. Negative for palpitations and leg swelling.   Gastrointestinal: Negative for abdominal pain.   Genitourinary: Negative for dysuria.   Musculoskeletal: Positive for arthralgias and back  "pain. Negative for myalgias.   Neurological: Negative for weakness and light-headedness.   Psychiatric/Behavioral: Positive for dysphoric mood. Negative for sleep disturbance.       /80   Pulse 75   Ht 170.2 cm (67\")   Wt 105 kg (231 lb)   BMI 36.18 kg/m²   Procedures    Objective   Physical Exam   Constitutional: He is oriented to person, place, and time. He appears well-developed and well-nourished. No distress.   HENT:   Head: Normocephalic.   Eyes: Pupils are equal, round, and reactive to light.   Neck: No thyromegaly present.   Cardiovascular: Normal rate, regular rhythm, normal heart sounds and intact distal pulses. Exam reveals no gallop and no friction rub.   No murmur heard.  Pulmonary/Chest: Effort normal and breath sounds normal. No stridor. No respiratory distress. He has no wheezes. He has no rales.   Abdominal: Soft. Bowel sounds are normal. He exhibits no distension and no mass. There is no tenderness. There is no guarding.   Musculoskeletal: He exhibits no edema, tenderness or deformity.   Neurological: He is alert and oriented to person, place, and time.   Skin: Skin is warm and dry. He is not diaphoretic.   Psychiatric: He has a normal mood and affect.       Assessment/Plan   Ziyad was seen today for coronary artery disease, congestive heart failure, hypertension, chest pain and shortness of breath.    Diagnoses and all orders for this visit:    Chronic systolic heart failure (CMS/HCC)  Comments:  poss worsening    Essential hypertension  Comments:  controlled    Coronary artery disease involving native coronary artery of native heart with angina pectoris (CMS/HCC)  Comments:  possibly worsening - check stress - the pt relates that he can't walk well enough to do a TM.    Mixed hyperlipidemia  Comments:  needs Vascepa for better risk reduction    Chest pain, atypical  -     Adult Stress Echo W/ Cont or Stress Agent if Necessary Per Protocol    Hyperlipemia, mixed  Comments:  GOOD LDL " CONTROL BUT TGL's HIGH - add Vascepa  Orders:  -     icosapent ethyl (VASCEPA) 1 g capsule capsule; Take 2 g by mouth 2 (Two) Times a Day With Meals.  -     icosapent ethyl (VASCEPA) 1 g capsule capsule; Take 2 g by mouth 2 (Two) Times a Day With Meals.    Moderate episode of recurrent major depressive disorder (CMS/HCC)                 Return in about 6 months (around 8/19/2020) for Next scheduled follow up with apc.  Orders Placed This Encounter   Procedures   • Adult Stress Echo W/ Cont or Stress Agent if Necessary Per Protocol     Order Specific Question:   What stress agent will be used?     Answer:   Dobutamine     Order Specific Question:   Reason for exam?     Answer:   Chest Pain

## 2020-02-20 DIAGNOSIS — I25.9 IHD (ISCHEMIC HEART DISEASE): Primary | ICD-10-CM

## 2020-02-20 PROCEDURE — 93000 ELECTROCARDIOGRAM COMPLETE: CPT | Performed by: INTERNAL MEDICINE

## 2020-02-25 ENCOUNTER — HOSPITAL ENCOUNTER (OUTPATIENT)
Dept: CARDIOLOGY | Facility: HOSPITAL | Age: 55
Discharge: HOME | End: 2020-02-25
Admitting: INTERNAL MEDICINE

## 2020-02-25 VITALS
SYSTOLIC BLOOD PRESSURE: 125 MMHG | HEIGHT: 67 IN | BODY MASS INDEX: 36.33 KG/M2 | HEART RATE: 81 BPM | DIASTOLIC BLOOD PRESSURE: 83 MMHG | WEIGHT: 231.48 LBS

## 2020-02-25 LAB
BH CV STRESS BP STAGE 1: NORMAL
BH CV STRESS BP STAGE 2: NORMAL
BH CV STRESS BP STAGE 3: NORMAL
BH CV STRESS BP STAGE 4: NORMAL
BH CV STRESS BP STAGE 5: NORMAL
BH CV STRESS DOB - ATROPINE STAGE 3: 1
BH CV STRESS DOB - ATROPINE STAGE 4: 1
BH CV STRESS DOSE DOBUTAMINE STAGE 1: 10
BH CV STRESS DOSE DOBUTAMINE STAGE 2: 20
BH CV STRESS DOSE DOBUTAMINE STAGE 3: 30
BH CV STRESS DOSE DOBUTAMINE STAGE 4: 40
BH CV STRESS DOSE DOBUTAMINE STAGE 5: 50
BH CV STRESS DURATION MIN STAGE 1: 3
BH CV STRESS DURATION MIN STAGE 2: 3
BH CV STRESS DURATION MIN STAGE 3: 3
BH CV STRESS DURATION MIN STAGE 4: 3
BH CV STRESS DURATION MIN STAGE 5: 3
BH CV STRESS DURATION SEC STAGE 1: 0
BH CV STRESS DURATION SEC STAGE 2: 0
BH CV STRESS DURATION SEC STAGE 3: 0
BH CV STRESS DURATION SEC STAGE 4: 0
BH CV STRESS DURATION SEC STAGE 5: 0
BH CV STRESS HR STAGE 1: 75
BH CV STRESS HR STAGE 2: 75
BH CV STRESS HR STAGE 3: 93
BH CV STRESS HR STAGE 4: 105
BH CV STRESS HR STAGE 5: 110
BH CV STRESS PROTOCOL 1: NORMAL
BH CV STRESS RECOVERY BP: NORMAL MMHG
BH CV STRESS RECOVERY HR: 90 BPM
BH CV STRESS STAGE 1: 1
BH CV STRESS STAGE 2: 2
BH CV STRESS STAGE 3: 3
BH CV STRESS STAGE 4: 4
BH CV STRESS STAGE 5: 5
PERCENT MAX PREDICTED HR: 66.67 %
STRESS BASELINE BP: NORMAL MMHG
STRESS BASELINE HR: 80 BPM
STRESS PERCENT HR: 78 %
STRESS POST EXERCISE DUR MIN: 15 MIN
STRESS POST EXERCISE DUR SEC: 0 SEC
STRESS POST PEAK BP: NORMAL MMHG
STRESS POST PEAK HR: 110 BPM

## 2020-02-25 PROCEDURE — 93350 STRESS TTE ONLY: CPT | Performed by: INTERNAL MEDICINE

## 2020-02-25 PROCEDURE — 25010000003 ATROPINE SULFATE: Performed by: INTERNAL MEDICINE

## 2020-02-25 PROCEDURE — 93018 CV STRESS TEST I&R ONLY: CPT | Performed by: INTERNAL MEDICINE

## 2020-02-25 PROCEDURE — 93350 STRESS TTE ONLY: CPT

## 2020-02-25 PROCEDURE — 25010000002 PERFLUTREN 6.52 MG/ML SUSPENSION: Performed by: INTERNAL MEDICINE

## 2020-02-25 PROCEDURE — 93017 CV STRESS TEST TRACING ONLY: CPT

## 2020-02-25 PROCEDURE — 93352 ADMIN ECG CONTRAST AGENT: CPT | Performed by: INTERNAL MEDICINE

## 2020-02-25 PROCEDURE — 25010000003 DOBUTAMINE PER 250 MG: Performed by: INTERNAL MEDICINE

## 2020-02-25 RX ORDER — DOBUTAMINE HYDROCHLORIDE 100 MG/100ML
10-50 INJECTION INTRAVENOUS CONTINUOUS
Status: DISCONTINUED | OUTPATIENT
Start: 2020-02-25 | End: 2020-02-26 | Stop reason: HOSPADM

## 2020-02-25 RX ADMIN — PERFLUTREN 8.48 MG: 6.52 INJECTION, SUSPENSION INTRAVENOUS at 08:56

## 2020-02-25 RX ADMIN — Medication 10 MCG/KG/MIN: at 08:56

## 2020-02-25 RX ADMIN — ATROPINE SULFATE 2 MG: 0.1 INJECTION PARENTERAL at 09:15

## 2020-03-13 RX ORDER — CARVEDILOL 25 MG/1
50 TABLET ORAL 2 TIMES DAILY
Qty: 360 TABLET | Refills: 4 | Status: SHIPPED | OUTPATIENT
Start: 2020-03-13 | End: 2020-12-01 | Stop reason: SDUPTHER

## 2020-04-10 DIAGNOSIS — E03.9 HYPOTHYROIDISM IN ADULT: ICD-10-CM

## 2020-04-10 DIAGNOSIS — E03.9 ACQUIRED HYPOTHYROIDISM: ICD-10-CM

## 2020-04-10 RX ORDER — LEVOTHYROXINE SODIUM 0.07 MG/1
75 TABLET ORAL EVERY OTHER DAY
Qty: 45 TABLET | Refills: 0 | Status: SHIPPED | OUTPATIENT
Start: 2020-04-10 | End: 2020-05-15 | Stop reason: SDUPTHER

## 2020-04-10 RX ORDER — LEVOTHYROXINE SODIUM 0.05 MG/1
50 TABLET ORAL EVERY OTHER DAY
Qty: 45 TABLET | Refills: 0 | Status: SHIPPED | OUTPATIENT
Start: 2020-04-10 | End: 2020-05-15 | Stop reason: SDUPTHER

## 2020-04-27 RX ORDER — FUROSEMIDE 40 MG/1
TABLET ORAL
Qty: 180 TABLET | Refills: 4 | Status: SHIPPED | OUTPATIENT
Start: 2020-04-27 | End: 2020-11-25 | Stop reason: SDUPTHER

## 2020-04-29 DIAGNOSIS — F33.1 MODERATE EPISODE OF RECURRENT MAJOR DEPRESSIVE DISORDER (HCC): ICD-10-CM

## 2020-04-29 RX ORDER — ESCITALOPRAM OXALATE 20 MG/1
20 TABLET ORAL DAILY
Qty: 30 TABLET | Refills: 11 | Status: SHIPPED | OUTPATIENT
Start: 2020-04-29 | End: 2020-10-22 | Stop reason: SDUPTHER

## 2020-05-03 DIAGNOSIS — F51.01 PRIMARY INSOMNIA: ICD-10-CM

## 2020-05-04 RX ORDER — QUETIAPINE FUMARATE 100 MG/1
100 TABLET, FILM COATED ORAL
Qty: 90 TABLET | Refills: 0 | Status: SHIPPED | OUTPATIENT
Start: 2020-05-04 | End: 2020-07-21

## 2020-05-11 DIAGNOSIS — F51.01 PRIMARY INSOMNIA: ICD-10-CM

## 2020-05-11 DIAGNOSIS — F33.1 MODERATE EPISODE OF RECURRENT MAJOR DEPRESSIVE DISORDER (HCC): ICD-10-CM

## 2020-05-11 RX ORDER — AMITRIPTYLINE HYDROCHLORIDE 50 MG/1
50 TABLET, FILM COATED ORAL EVERY EVENING
Qty: 30 TABLET | Refills: 0 | Status: SHIPPED | OUTPATIENT
Start: 2020-05-11 | End: 2020-05-15 | Stop reason: SDUPTHER

## 2020-05-15 ENCOUNTER — OFFICE VISIT (OUTPATIENT)
Dept: INTERNAL MEDICINE | Facility: CLINIC | Age: 55
End: 2020-05-15

## 2020-05-15 ENCOUNTER — LAB (OUTPATIENT)
Dept: LAB | Facility: HOSPITAL | Age: 55
End: 2020-05-15

## 2020-05-15 VITALS
OXYGEN SATURATION: 98 % | TEMPERATURE: 98.4 F | SYSTOLIC BLOOD PRESSURE: 112 MMHG | RESPIRATION RATE: 16 BRPM | WEIGHT: 233.5 LBS | BODY MASS INDEX: 36.65 KG/M2 | HEIGHT: 67 IN | HEART RATE: 97 BPM | DIASTOLIC BLOOD PRESSURE: 78 MMHG

## 2020-05-15 DIAGNOSIS — E66.01 CLASS 2 SEVERE OBESITY DUE TO EXCESS CALORIES WITH SERIOUS COMORBIDITY AND BODY MASS INDEX (BMI) OF 36.0 TO 36.9 IN ADULT (HCC): ICD-10-CM

## 2020-05-15 DIAGNOSIS — M19.011 ARTHRITIS OF RIGHT SHOULDER REGION: Primary | ICD-10-CM

## 2020-05-15 DIAGNOSIS — N18.2 CKD (CHRONIC KIDNEY DISEASE) STAGE 2, GFR 60-89 ML/MIN: ICD-10-CM

## 2020-05-15 DIAGNOSIS — I25.119 CORONARY ARTERY DISEASE INVOLVING NATIVE CORONARY ARTERY OF NATIVE HEART WITH ANGINA PECTORIS (HCC): ICD-10-CM

## 2020-05-15 DIAGNOSIS — I50.22 CHRONIC SYSTOLIC HEART FAILURE (HCC): ICD-10-CM

## 2020-05-15 DIAGNOSIS — E78.2 MIXED HYPERLIPIDEMIA: ICD-10-CM

## 2020-05-15 DIAGNOSIS — E03.9 ACQUIRED HYPOTHYROIDISM: ICD-10-CM

## 2020-05-15 DIAGNOSIS — I10 ESSENTIAL HYPERTENSION: ICD-10-CM

## 2020-05-15 DIAGNOSIS — F33.1 MODERATE EPISODE OF RECURRENT MAJOR DEPRESSIVE DISORDER (HCC): ICD-10-CM

## 2020-05-15 DIAGNOSIS — F51.01 PRIMARY INSOMNIA: ICD-10-CM

## 2020-05-15 DIAGNOSIS — Z23 NEED FOR VACCINATION: ICD-10-CM

## 2020-05-15 LAB
CHOLEST SERPL-MCNC: 89 MG/DL (ref 0–200)
HDLC SERPL-MCNC: 34 MG/DL (ref 40–60)
LDLC SERPL CALC-MCNC: 14 MG/DL (ref 0–100)
LDLC/HDLC SERPL: 0.4 {RATIO}
TRIGL SERPL-MCNC: 207 MG/DL (ref 0–150)
TSH SERPL DL<=0.05 MIU/L-ACNC: 5.69 UIU/ML (ref 0.27–4.2)
VLDLC SERPL-MCNC: 41.4 MG/DL (ref 5–40)

## 2020-05-15 PROCEDURE — 90670 PCV13 VACCINE IM: CPT | Performed by: INTERNAL MEDICINE

## 2020-05-15 PROCEDURE — 80061 LIPID PANEL: CPT | Performed by: INTERNAL MEDICINE

## 2020-05-15 PROCEDURE — 36415 COLL VENOUS BLD VENIPUNCTURE: CPT

## 2020-05-15 PROCEDURE — 84443 ASSAY THYROID STIM HORMONE: CPT | Performed by: INTERNAL MEDICINE

## 2020-05-15 PROCEDURE — G0009 ADMIN PNEUMOCOCCAL VACCINE: HCPCS | Performed by: INTERNAL MEDICINE

## 2020-05-15 PROCEDURE — 99214 OFFICE O/P EST MOD 30 MIN: CPT | Performed by: INTERNAL MEDICINE

## 2020-05-15 PROCEDURE — G0439 PPPS, SUBSEQ VISIT: HCPCS | Performed by: INTERNAL MEDICINE

## 2020-05-15 PROCEDURE — 96160 PT-FOCUSED HLTH RISK ASSMT: CPT | Performed by: INTERNAL MEDICINE

## 2020-05-15 RX ORDER — LEVOTHYROXINE SODIUM 0.05 MG/1
50 TABLET ORAL EVERY OTHER DAY
Qty: 45 TABLET | Refills: 1 | Status: SHIPPED | OUTPATIENT
Start: 2020-05-15 | End: 2021-02-17 | Stop reason: SDUPTHER

## 2020-05-15 RX ORDER — BUPROPION HYDROCHLORIDE 150 MG/1
150 TABLET ORAL DAILY
Qty: 30 TABLET | Refills: 1 | Status: SHIPPED | OUTPATIENT
Start: 2020-05-15 | End: 2020-07-14

## 2020-05-15 RX ORDER — ZOLPIDEM TARTRATE 12.5 MG/1
12.5 TABLET, FILM COATED, EXTENDED RELEASE ORAL NIGHTLY PRN
Qty: 30 TABLET | Refills: 5 | Status: CANCELLED | OUTPATIENT
Start: 2020-05-15

## 2020-05-15 RX ORDER — LEVOTHYROXINE SODIUM 0.07 MG/1
75 TABLET ORAL EVERY OTHER DAY
Qty: 45 TABLET | Refills: 1 | Status: SHIPPED | OUTPATIENT
Start: 2020-05-15 | End: 2021-01-04

## 2020-05-15 RX ORDER — AMITRIPTYLINE HYDROCHLORIDE 50 MG/1
50 TABLET, FILM COATED ORAL EVERY EVENING
Qty: 90 TABLET | Refills: 1 | Status: SHIPPED | OUTPATIENT
Start: 2020-05-15 | End: 2020-11-13

## 2020-05-15 RX ORDER — ATORVASTATIN CALCIUM 40 MG/1
40 TABLET, FILM COATED ORAL DAILY
Qty: 90 TABLET | Refills: 3 | Status: SHIPPED | OUTPATIENT
Start: 2020-05-15 | End: 2020-12-01 | Stop reason: SDUPTHER

## 2020-05-15 NOTE — PATIENT INSTRUCTIONS
Medicare Wellness  Personal Prevention Plan of Service     Date of Office Visit:  05/15/2020  Encounter Provider:  Ashish Thurman DO  Place of Service:  [facility]  Patient Name: Ziyad Wilson  :  1965    As part of the Medicare Wellness portion of your visit today, we are providing you with this personalized preventive plan of services (PPPS). This plan is based upon recommendations of the [place] Preventive Services Task Force (USPSTF) and the Advisory Committee on Immunization Practices (ACIP).    This lists the preventive care services that should be considered, and provides dates of when you are due. Items listed as completed are up-to-date and do not require any further intervention.    Health Maintenance   Topic Date Due   • TDAP/TD VACCINES (1 - Tdap) 1975   • ZOSTER VACCINE (1 of 2) 2014   • MEDICARE ANNUAL WELLNESS  2019   • LIPID PANEL  2020   • PNEUMOCOCCAL VACCINE (19-64 HIGHEST RISK) (2 of 3 - PPSV23) 07/10/2020   • INFLUENZA VACCINE  2020   • COLONOSCOPY  2028   • HEPATITIS C SCREENING  Completed       Orders Placed This Encounter   Procedures   • Pneumococcal Conjugate Vaccine 13-Valent All   • Lipid Panel     Standing Status:   Future     Number of Occurrences:   1     Standing Expiration Date:   5/15/2021   • TSH     Standing Status:   Future     Number of Occurrences:   1     Standing Expiration Date:   5/15/2021   • Ambulatory Referral to Orthopedic Surgery     Referral Priority:   Routine     Referral Type:   Consultation     Referral Reason:   Specialty Services Required     Referred to Provider:   Curtis Merritt MD     Requested Specialty:   Orthopedic Surgery     Number of Visits Requested:   1   • Ambulatory Referral to Psychology     Referral Priority:   Routine     Referral Type:   Behavorial Health/Psych     Referral Reason:   Specialty Services Required     Requested Specialty:    Psychology     Number of Visits Requested:   1       Return in about 4 months (around 9/15/2020) for Recheck.

## 2020-05-15 NOTE — PROGRESS NOTES
The ABCs of the Annual Wellness Visit  Subsequent Medicare Wellness Visit    No chief complaint on file.  See  note    Subjective   History of Present Illness:  Ziyad Wilson is a 55 y.o. male who presents for a Subsequent Medicare Wellness Visit.    HEALTH RISK ASSESSMENT    Recent Hospitalizations:  No hospitalization(s) within the last year.    Current Medical Providers:  Patient Care Team:  Ashish Thurman DO as PCP - General (Internal Medicine)  Douglas Ruano MD as Consulting Physician (Pain Medicine)  Uri Lagos MD as Cardiologist (Cardiology)    Smoking Status:  Social History     Tobacco Use   Smoking Status Never Smoker   Smokeless Tobacco Never Used       Alcohol Consumption:  Social History     Substance and Sexual Activity   Alcohol Use Yes    Comment: occasional       Depression Screen:   PHQ-2/PHQ-9 Depression Screening 5/15/2020   Little interest or pleasure in doing things 3   Feeling down, depressed, or hopeless 3   Trouble falling or staying asleep, or sleeping too much 3   Feeling tired or having little energy 2   Poor appetite or overeating 2   Feeling bad about yourself - or that you are a failure or have let yourself or your family down 1   Trouble concentrating on things, such as reading the newspaper or watching television 2   Moving or speaking so slowly that other people could have noticed. Or the opposite - being so fidgety or restless that you have been moving around a lot more than usual 0   Thoughts that you would be better off dead, or of hurting yourself in some way 0   Total Score 16   If you checked off any problems, how difficult have these problems made it for you to do your work, take care of things at home, or get along with other people? Extremely dIfficult       Fall Risk Screen:  STEADI Fall Risk Assessment has not been completed.    Health Habits and Functional and Cognitive Screening:  Functional & Cognitive Status 5/15/2020   Do you have  difficulty preparing food and eating? Yes   Do you have difficulty bathing yourself, getting dressed or grooming yourself? Yes   Do you have difficulty using the toilet? Yes   Do you have difficulty moving around from place to place? Yes   Do you have trouble with steps or getting out of a bed or a chair? Yes   Current Diet Unhealthy Diet   Dental Exam Not up to date   Eye Exam Not up to date   Exercise (times per week) 0 times per week   Current Exercise Activities Include None   Do you need help using the phone?  No   Are you deaf or do you have serious difficulty hearing?  No   Do you need help with transportation? No   Do you need help shopping? No   Do you need help preparing meals?  No   Do you need help with housework?  No   Do you need help with laundry? No   Do you need help taking your medications? No   Do you need help managing money? No   Do you ever drive or ride in a car without wearing a seat belt? No   Have you felt unusual stress, anger or loneliness in the last month? No   Who do you live with? Sibling   If you need help, do you have trouble finding someone available to you? No   Have you been bothered in the last four weeks by sexual problems? No   Do you have difficulty concentrating, remembering or making decisions? Yes         Does the patient have evidence of cognitive impairment? Yes    Asprin use counseling:Taking ASA appropriately as indicated    Age-appropriate Screening Schedule:  Refer to the list below for future screening recommendations based on patient's age, sex and/or medical conditions. Orders for these recommended tests are listed in the plan section. The patient has been provided with a written plan.    Health Maintenance   Topic Date Due   • TDAP/TD VACCINES (1 - Tdap) 05/12/1975   • ZOSTER VACCINE (1 of 2) 05/12/2014   • LIPID PANEL  06/05/2020   • PNEUMOCOCCAL VACCINE (19-64 HIGHEST RISK) (2 of 3 - PPSV23) 07/10/2020   • INFLUENZA VACCINE  08/01/2020   • COLONOSCOPY   01/09/2028          The following portions of the patient's history were reviewed and updated as appropriate: allergies, current medications, past family history, past medical history, past social history, past surgical history and problem list.    Outpatient Medications Prior to Visit   Medication Sig Dispense Refill   • albuterol (PROVENTIL HFA;VENTOLIN HFA) 108 (90 BASE) MCG/ACT inhaler Inhale as needed.     • amLODIPine-valsartan (EXFORGE) 5-160 MG per tablet Take 1 tablet by mouth Daily. 30 tablet 11   • aspirin 325 MG tablet daily.     • butalbital-acetaminophen-caffeine (FIORICET, ESGIC) -40 MG per tablet Take 50 tablets by mouth 2 (Two) Times a Day As Needed.     • carvedilol (COREG) 25 MG tablet Take 2 tablets by mouth 2 (Two) Times a Day. 360 tablet 4   • diclofenac (VOLTAREN) 1 % gel gel Apply 4 g topically to the appropriate area as directed 3 (Three) Times a Day. 100 g 3   • escitalopram (LEXAPRO) 20 MG tablet Take 1 tablet by mouth Daily. 30 tablet 11   • esomeprazole (nexIUM) 40 MG capsule Take 1 capsule by mouth Every Morning Before Breakfast. 90 capsule 3   • fluticasone (FLONASE) 50 MCG/ACT nasal spray 2 sprays into the nostril(s) as directed by provider Daily. Administer 2 sprays in each nostril for each dose. 1 bottle 11   • fluticasone-salmeterol (ADVAIR) 100-50 MCG/DOSE DISKUS Inhale 1 puff 2 (two) times a day.     • furosemide (LASIX) 40 MG tablet Take 1 tablet by mouth twice daily as needed 180 tablet 4   • icosapent ethyl (VASCEPA) 1 g capsule capsule Take 2 g by mouth 2 (Two) Times a Day With Meals. 120 capsule 30   • icosapent ethyl (VASCEPA) 1 g capsule capsule Take 2 g by mouth 2 (Two) Times a Day With Meals. 120 capsule 11   • Multiple Vitamins-Minerals (MULTIVITAMIN WITH MINERALS) tablet tablet Take 1 tablet by mouth Daily.     • omeprazole (priLOSEC) 20 MG capsule Take 20 mg by mouth Daily.     • QUEtiapine (SEROquel) 100 MG tablet Take 1 tablet by mouth every night at bedtime.  90 tablet 0   • spironolactone (ALDACTONE) 25 MG tablet TAKE ONE TABLET BY MOUTH ONCE DAILY 30 tablet 6   • zolpidem CR (AMBIEN CR) 12.5 MG CR tablet Take 1 tablet by mouth At Night As Needed for Sleep. 30 tablet 5   • amitriptyline (ELAVIL) 50 MG tablet Take 1 tablet by mouth Every Evening. Must keep appointment to receive additional refills 30 tablet 0   • levothyroxine (SYNTHROID, LEVOTHROID) 50 MCG tablet Take 1 tablet by mouth Every Other Day. Alternate days with 75mcg. 45 tablet 0   • levothyroxine (SYNTHROID, LEVOTHROID) 75 MCG tablet Take 1 tablet by mouth Every Other Day. Alternate days with 50mcg. 45 tablet 0   • nitroglycerin (NITROSTAT) 0.4 MG SL tablet Place 0.4 mg under the tongue as needed. Take no more than 3 doses in 15 minutes.     • atorvastatin (LIPITOR) 40 MG tablet Take 1 tablet by mouth Daily. 90 tablet 3     No facility-administered medications prior to visit.        Patient Active Problem List   Diagnosis   • Chronic systolic heart failure (CMS/HCC)   • Essential hypertension   • Coronary artery disease   • Acquired hypothyroidism   • Chronic daily headache   • Class 2 severe obesity due to excess calories with serious comorbidity and body mass index (BMI) of 36.0 to 36.9 in adult (CMS/HCC)   • Mild cognitive impairment   • Asthma   • GERD (gastroesophageal reflux disease)   • Sleep apnea   • Mixed hyperlipidemia   • Moderate episode of recurrent major depressive disorder (CMS/HCC)   • Primary insomnia   • Cervical facet joint syndrome   • Impaired glucose tolerance   • CKD (chronic kidney disease) stage 2, GFR 60-89 ml/min   • Arthritis of right shoulder region   • Chest pain, atypical       Advanced Care Planning:  ACP discussion was held with the patient during this visit. Patient does not have an advance directive, information provided.    Review of Systems See  note    Compared to one year ago, the patient feels his physical health is the same.  Compared to one year ago, the  "patient feels his mental health is the same.    Reviewed chart for potential of high risk medication in the elderly: yes  Reviewed chart for potential of harmful drug interactions in the elderly:yes    Objective         Vitals:    05/15/20 0807   BP: 112/78   BP Location: Left arm   Patient Position: Sitting   Cuff Size: Adult   Pulse: 97   Resp: 16   Temp: 98.4 °F (36.9 °C)   SpO2: 98%   Weight: 106 kg (233 lb 8 oz)   Height: 170.2 cm (67.01\")       Body mass index is 36.56 kg/m².  Discussed the patient's BMI with him. The BMI is above average; BMI management plan is completed.    Physical Exam See  note          Assessment/Plan   Medicare Risks and Personalized Health Plan  CMS Preventative Services Quick Reference  Advance Directive Discussion  Cardiovascular risk  Chronic Pain   Fall Risk  Immunizations Discussed/Encouraged (specific immunizations; Prevnar and Shingrix )  Inactivity/Sedentary  Obesity/Overweight     The above risks/problems have been discussed with the patient.  Pertinent information has been shared with the patient in the After Visit Summary.  Follow up plans and orders are seen below in the Assessment/Plan Section.    Diagnoses and all orders for this visit:    1. Arthritis of right shoulder region (Primary)  -     Ambulatory Referral to Orthopedic Surgery    2. Acquired hypothyroidism  -     levothyroxine (SYNTHROID, LEVOTHROID) 75 MCG tablet; Take 1 tablet by mouth Every Other Day. Alternate days with 50mcg.  Dispense: 45 tablet; Refill: 1  -     levothyroxine (SYNTHROID, LEVOTHROID) 50 MCG tablet; Take 1 tablet by mouth Every Other Day. Alternate days with 75mcg.  Dispense: 45 tablet; Refill: 1  -     TSH; Future    3. Moderate episode of recurrent major depressive disorder (CMS/[facility])  -     amitriptyline (ELAVIL) 50 MG tablet; Take 1 tablet by mouth Every Evening. Must keep appointment to receive additional refills  Dispense: 90 tablet; Refill: 1  -     Ambulatory Referral to " Psychology  -     buPROPion XL (WELLBUTRIN XL) 150 MG 24 hr tablet; Take 1 tablet by mouth Daily.  Dispense: 30 tablet; Refill: 1    4. Primary insomnia  -     amitriptyline (ELAVIL) 50 MG tablet; Take 1 tablet by mouth Every Evening. Must keep appointment to receive additional refills  Dispense: 90 tablet; Refill: 1    5. Essential hypertension    6. Coronary artery disease involving native coronary artery of native heart with angina pectoris (CMS/[facility])  -     atorvastatin (Lipitor) 40 MG tablet; Take 1 tablet by mouth Daily.  Dispense: 90 tablet; Refill: 3    7. Chronic systolic heart failure (CMS/[facility])    8. CKD (chronic kidney disease) stage 2, GFR 60-89 ml/min    9. Class 2 severe obesity due to excess calories with serious comorbidity and body mass index (BMI) of 36.0 to 36.9 in adult (CMS/[facility])    10. Mixed hyperlipidemia  -     Lipid Panel; Future    11. Need for vaccination  -     Pneumococcal Conjugate Vaccine 13-Valent All    Other orders  -     Cancel: zolpidem CR (AMBIEN CR) 12.5 MG CR tablet; Take 1 tablet by mouth At Night As Needed for Sleep.  Dispense: 30 tablet; Refill: 5  -     Cancel: TSH; Future      Follow Up:  Return in about 4 months (around 9/15/2020) for Recheck.     An After Visit Summary and PPPS were given to the patient.

## 2020-05-15 NOTE — PROGRESS NOTES
CC: right shoulder pain    History:  Ziyad Wilson is a 55 y.o. male   He notes his right shoulder has ongoing pain that is quite severe. He has previously seen Dr. Merritt who wanted to do surgery, but the hospital wouldn't because he owed money per his report.     He notes worsening depression related to life events and pandemic. He continues on Lexapro, but has yet to establish with a counselor.     His BP has done well on current meds without side effects.     He denies symptoms of thyroid dysfunction at this time, but is due for TSH.        ROS:  Review of Systems   Constitutional: Negative for chills and fever.   Respiratory: Negative for cough and shortness of breath.    Cardiovascular: Positive for chest pain. Negative for palpitations.   Gastrointestinal: Negative for abdominal pain and constipation.   Musculoskeletal: Positive for arthralgias.   Psychiatric/Behavioral: Positive for dysphoric mood.        reports that he has never smoked. He has never used smokeless tobacco. He reports that he drinks alcohol. He reports that he does not use drugs.      Current Outpatient Medications:   •  albuterol (PROVENTIL HFA;VENTOLIN HFA) 108 (90 BASE) MCG/ACT inhaler, Inhale as needed., Disp: , Rfl:   •  amitriptyline (ELAVIL) 50 MG tablet, Take 1 tablet by mouth Every Evening. Must keep appointment to receive additional refills, Disp: 30 tablet, Rfl: 0  •  amLODIPine-valsartan (EXFORGE) 5-160 MG per tablet, Take 1 tablet by mouth Daily., Disp: 30 tablet, Rfl: 11  •  aspirin 325 MG tablet, daily., Disp: , Rfl:   •  butalbital-acetaminophen-caffeine (FIORICET, ESGIC) -40 MG per tablet, Take 50 tablets by mouth 2 (Two) Times a Day As Needed., Disp: , Rfl:   •  carvedilol (COREG) 25 MG tablet, Take 2 tablets by mouth 2 (Two) Times a Day., Disp: 360 tablet, Rfl: 4  •  diclofenac (VOLTAREN) 1 % gel gel, Apply 4 g topically to the appropriate area as directed 3 (Three) Times a Day., Disp: 100 g, Rfl: 3  •  escitalopram  (LEXAPRO) 20 MG tablet, Take 1 tablet by mouth Daily., Disp: 30 tablet, Rfl: 11  •  esomeprazole (nexIUM) 40 MG capsule, Take 1 capsule by mouth Every Morning Before Breakfast., Disp: 90 capsule, Rfl: 3  •  fluticasone (FLONASE) 50 MCG/ACT nasal spray, 2 sprays into the nostril(s) as directed by provider Daily. Administer 2 sprays in each nostril for each dose., Disp: 1 bottle, Rfl: 11  •  fluticasone-salmeterol (ADVAIR) 100-50 MCG/DOSE DISKUS, Inhale 1 puff 2 (two) times a day., Disp: , Rfl:   •  furosemide (LASIX) 40 MG tablet, Take 1 tablet by mouth twice daily as needed, Disp: 180 tablet, Rfl: 4  •  icosapent ethyl (VASCEPA) 1 g capsule capsule, Take 2 g by mouth 2 (Two) Times a Day With Meals., Disp: 120 capsule, Rfl: 30  •  icosapent ethyl (VASCEPA) 1 g capsule capsule, Take 2 g by mouth 2 (Two) Times a Day With Meals., Disp: 120 capsule, Rfl: 11  •  levothyroxine (SYNTHROID, LEVOTHROID) 50 MCG tablet, Take 1 tablet by mouth Every Other Day. Alternate days with 75mcg., Disp: 45 tablet, Rfl: 0  •  levothyroxine (SYNTHROID, LEVOTHROID) 75 MCG tablet, Take 1 tablet by mouth Every Other Day. Alternate days with 50mcg., Disp: 45 tablet, Rfl: 0  •  Multiple Vitamins-Minerals (MULTIVITAMIN WITH MINERALS) tablet tablet, Take 1 tablet by mouth Daily., Disp: , Rfl:   •  omeprazole (priLOSEC) 20 MG capsule, Take 20 mg by mouth Daily., Disp: , Rfl:   •  QUEtiapine (SEROquel) 100 MG tablet, Take 1 tablet by mouth every night at bedtime., Disp: 90 tablet, Rfl: 0  •  spironolactone (ALDACTONE) 25 MG tablet, TAKE ONE TABLET BY MOUTH ONCE DAILY, Disp: 30 tablet, Rfl: 6  •  zolpidem CR (AMBIEN CR) 12.5 MG CR tablet, Take 1 tablet by mouth At Night As Needed for Sleep., Disp: 30 tablet, Rfl: 5  •  atorvastatin (LIPITOR) 40 MG tablet, Take 1 tablet by mouth Daily., Disp: 90 tablet, Rfl: 3  •  nitroglycerin (NITROSTAT) 0.4 MG SL tablet, Place 0.4 mg under the tongue as needed. Take no more than 3 doses in 15 minutes., Disp: , Rfl:  "    OBJECTIVE:  /78 (BP Location: Left arm, Patient Position: Sitting, Cuff Size: Adult)   Pulse 97   Temp 98.4 °F (36.9 °C)   Resp 16   Ht 170.2 cm (67.01\")   Wt 106 kg (233 lb 8 oz)   SpO2 98%   BMI 36.56 kg/m²    Physical Exam   Constitutional: He is oriented to person, place, and time. He appears well-nourished. No distress.   Pulmonary/Chest: Effort normal. No respiratory distress.   Neurological: He is alert and oriented to person, place, and time.       Assessment/Plan     Diagnoses and all orders for this visit:    Arthritis of right shoulder region  -     Ambulatory Referral to Orthopedic Surgery  Refer back to Ortho given risk of NSAIDS with CAD and without recommendation for opioids for chronic MSK pain.     Acquired hypothyroidism  -     levothyroxine (SYNTHROID, LEVOTHROID) 75 MCG tablet; Take 1 tablet by mouth Every Other Day. Alternate days with 50mcg.  -     levothyroxine (SYNTHROID, LEVOTHROID) 50 MCG tablet; Take 1 tablet by mouth Every Other Day. Alternate days with 75mcg.  -     TSH; Future  Check TSH and continue Synthroid as ordered unless otherwise indicated.     Moderate episode of recurrent major depressive disorder (CMS/HCC)  -     amitriptyline (ELAVIL) 50 MG tablet; Take 1 tablet by mouth Every Evening. Must keep appointment to receive additional refills  -     Ambulatory Referral to Psychology  -     buPROPion XL (WELLBUTRIN XL) 150 MG 24 hr tablet; Take 1 tablet by mouth Daily.  Worse. Add wellbutrin and refer to psychology.     Primary insomnia  -     amitriptyline (ELAVIL) 50 MG tablet; Take 1 tablet by mouth Every Evening. Must keep appointment to receive additional refills  Continue elavil, seroquel and ambien.     Essential hypertension  Well controlled, BP goal for age is <140/90 per JNC 8 guidelines and continue current medications     Coronary artery disease involving native coronary artery of native heart with angina pectoris (CMS/HCC)  -     atorvastatin (Lipitor) " 40 MG tablet; Take 1 tablet by mouth Daily.  Continue ASA, statin and prn NTG. He still has intermittent angina with recent negative stress echo.     Chronic systolic heart failure (CMS/HCC)  Continue Coreg, RAAS blockade, Aldactone, and diuretic. Euvolemic today.     CKD (chronic kidney disease) stage 2, GFR 60-89 ml/min  Check BMP and continue RAAS blockade.     Class 2 severe obesity due to excess calories with serious comorbidity and body mass index (BMI) of 36.0 to 36.9 in adult (CMS/[facility])  Recommended attention to portion control and being careful about the types and timing of meals for the purpose of weight management.    Mixed hyperlipidemia  -     Lipid Panel; Future  Stable on high intensity statin therapy per ACC/AHA guidelines.    Need for vaccination  -     Pneumococcal Conjugate Vaccine 13-Valent All        An After Visit Summary was printed and given to the patient at discharge.  Return in about 4 months (around 9/15/2020) for Recheck.          Ashsih Thurman D.O. 5/15/2020   Electronically signed.

## 2020-07-09 DIAGNOSIS — F51.01 PRIMARY INSOMNIA: ICD-10-CM

## 2020-07-09 RX ORDER — ZOLPIDEM TARTRATE 12.5 MG/1
12.5 TABLET, FILM COATED, EXTENDED RELEASE ORAL NIGHTLY PRN
Qty: 30 TABLET | Refills: 3 | Status: SHIPPED | OUTPATIENT
Start: 2020-07-09 | End: 2020-07-24 | Stop reason: SDUPTHER

## 2020-07-14 DIAGNOSIS — F33.1 MODERATE EPISODE OF RECURRENT MAJOR DEPRESSIVE DISORDER (HCC): ICD-10-CM

## 2020-07-14 RX ORDER — BUPROPION HYDROCHLORIDE 150 MG/1
150 TABLET ORAL DAILY
Qty: 30 TABLET | Refills: 5 | Status: SHIPPED | OUTPATIENT
Start: 2020-07-14 | End: 2020-09-16

## 2020-07-21 DIAGNOSIS — F51.01 PRIMARY INSOMNIA: ICD-10-CM

## 2020-07-21 RX ORDER — QUETIAPINE FUMARATE 100 MG/1
100 TABLET, FILM COATED ORAL
Qty: 90 TABLET | Refills: 1 | Status: SHIPPED | OUTPATIENT
Start: 2020-07-21 | End: 2021-01-11

## 2020-07-23 DIAGNOSIS — F51.01 PRIMARY INSOMNIA: ICD-10-CM

## 2020-07-23 NOTE — TELEPHONE ENCOUNTER
Patient called he states that [facility] is out of Ambien 12.5 cr and its on back order. I called the pharmacy to confirm with the pharmacist & she confirmed that it is on back order.     Patient is wanting this sent to [facility] they have it in stock, are you okay with sending it to [facility].

## 2020-07-24 RX ORDER — ZOLPIDEM TARTRATE 12.5 MG/1
12.5 TABLET, FILM COATED, EXTENDED RELEASE ORAL NIGHTLY PRN
Qty: 30 TABLET | Refills: 3 | Status: SHIPPED | OUTPATIENT
Start: 2020-07-24 | End: 2020-07-27 | Stop reason: SDUPTHER

## 2020-07-24 NOTE — TELEPHONE ENCOUNTER
Spoke with patient he states that he is transferring all medication to [facility].  He stated that he would tell [facility] to send for a transfer on all other medications today. Please send Ambien.   Thank you

## 2020-07-27 ENCOUNTER — TELEPHONE (OUTPATIENT)
Dept: INTERNAL MEDICINE | Facility: CLINIC | Age: 55
End: 2020-07-27

## 2020-07-27 DIAGNOSIS — F51.01 PRIMARY INSOMNIA: ICD-10-CM

## 2020-07-27 RX ORDER — ZOLPIDEM TARTRATE 12.5 MG/1
12.5 TABLET, FILM COATED, EXTENDED RELEASE ORAL NIGHTLY PRN
Qty: 30 TABLET | Refills: 3 | Status: SHIPPED | OUTPATIENT
Start: 2020-07-27 | End: 2020-07-28

## 2020-07-27 NOTE — TELEPHONE ENCOUNTER
Please see notes, he is transferring all medications to [facility], please send the AMBIEN TO [facility], this has not been done yet.

## 2020-07-28 ENCOUNTER — TELEPHONE (OUTPATIENT)
Dept: INTERNAL MEDICINE | Facility: CLINIC | Age: 55
End: 2020-07-28

## 2020-07-28 DIAGNOSIS — F51.01 PRIMARY INSOMNIA: Primary | ICD-10-CM

## 2020-07-28 DIAGNOSIS — I10 ESSENTIAL HYPERTENSION: ICD-10-CM

## 2020-07-28 NOTE — TELEPHONE ENCOUNTER
PATIENT WAS CALLED, GIVEN MESSAGE AND STATED HE HAS NOT TRIED BELSOMRA BEFORE AND HE HAS NOT FILLED THE AMBIEN..

## 2020-07-28 NOTE — TELEPHONE ENCOUNTER
Please call patient and let him know that we received a request from [facility] to change Ambien to Belsomra due to insurance coverage. Has patient tried this medication in the past? Did he already fill the Ambien?

## 2020-07-29 RX ORDER — AMLODIPINE AND VALSARTAN 5; 160 MG/1; MG/1
1 TABLET ORAL DAILY
Qty: 90 TABLET | Refills: 11 | Status: SHIPPED | OUTPATIENT
Start: 2020-07-29 | End: 2021-03-16 | Stop reason: SDUPTHER

## 2020-09-16 ENCOUNTER — OFFICE VISIT (OUTPATIENT)
Dept: INTERNAL MEDICINE | Facility: CLINIC | Age: 55
End: 2020-09-16

## 2020-09-16 VITALS
BODY MASS INDEX: 32.35 KG/M2 | HEIGHT: 67 IN | TEMPERATURE: 97.2 F | DIASTOLIC BLOOD PRESSURE: 82 MMHG | OXYGEN SATURATION: 96 % | HEART RATE: 79 BPM | WEIGHT: 206.1 LBS | RESPIRATION RATE: 16 BRPM | SYSTOLIC BLOOD PRESSURE: 110 MMHG

## 2020-09-16 DIAGNOSIS — R51.9 CHRONIC DAILY HEADACHE: ICD-10-CM

## 2020-09-16 DIAGNOSIS — J45.22 MILD INTERMITTENT ASTHMA WITH STATUS ASTHMATICUS: ICD-10-CM

## 2020-09-16 DIAGNOSIS — F33.1 MODERATE EPISODE OF RECURRENT MAJOR DEPRESSIVE DISORDER (HCC): Primary | ICD-10-CM

## 2020-09-16 DIAGNOSIS — I50.22 CHRONIC SYSTOLIC HEART FAILURE (HCC): ICD-10-CM

## 2020-09-16 DIAGNOSIS — M19.011 ARTHRITIS OF RIGHT SHOULDER REGION: ICD-10-CM

## 2020-09-16 DIAGNOSIS — Z23 NEED FOR VACCINATION: ICD-10-CM

## 2020-09-16 DIAGNOSIS — E03.9 HYPOTHYROIDISM IN ADULT: ICD-10-CM

## 2020-09-16 PROCEDURE — G0008 ADMIN INFLUENZA VIRUS VAC: HCPCS | Performed by: NURSE PRACTITIONER

## 2020-09-16 PROCEDURE — 90686 IIV4 VACC NO PRSV 0.5 ML IM: CPT | Performed by: NURSE PRACTITIONER

## 2020-09-16 PROCEDURE — 99214 OFFICE O/P EST MOD 30 MIN: CPT | Performed by: NURSE PRACTITIONER

## 2020-09-16 RX ORDER — ALBUTEROL SULFATE 90 UG/1
2 AEROSOL, METERED RESPIRATORY (INHALATION) EVERY 4 HOURS PRN
Qty: 18 G | Refills: 11 | Status: SHIPPED | OUTPATIENT
Start: 2020-09-16

## 2020-09-16 RX ORDER — BUTALBITAL, ACETAMINOPHEN AND CAFFEINE 50; 325; 40 MG/1; MG/1; MG/1
1 TABLET ORAL 2 TIMES DAILY PRN
Qty: 15 TABLET | Refills: 3 | Status: SHIPPED | OUTPATIENT
Start: 2020-09-16 | End: 2021-03-26 | Stop reason: SDUPTHER

## 2020-09-16 RX ORDER — BUPROPION HYDROCHLORIDE 150 MG/1
150 TABLET ORAL DAILY
Qty: 30 TABLET | Refills: 5 | Status: SHIPPED | OUTPATIENT
Start: 2020-09-16 | End: 2020-09-16 | Stop reason: SDUPTHER

## 2020-09-16 RX ORDER — BUPROPION HYDROCHLORIDE 300 MG/1
300 TABLET ORAL DAILY
Qty: 30 TABLET | Refills: 3 | Status: SHIPPED | OUTPATIENT
Start: 2020-09-16 | End: 2020-12-01 | Stop reason: SDUPTHER

## 2020-09-16 NOTE — PROGRESS NOTES
CC: f/u hypertension    History:  Ziyad Wilson is a 55 y.o. male who presents today for follow-up for evaluation of the above:  Patient presents today for f/u hypertension  Patient reports compliance with blood pressure medications without adverse side effects.   Denies having any recent chest pain.   Mood could be better controlled. Would like to increase Wellbutrin.  Reports he has twice daily headaches. Reports they are migraines that are usually managed with excedrine migraine OTC but occasionally will need a fioricet for relief.   BMI is 32        ROS:  Review of Systems   Constitutional: Negative for activity change, appetite change, fatigue, fever and unexpected weight change.   Respiratory: Negative for cough, chest tightness, shortness of breath and wheezing.    Cardiovascular: Negative for chest pain, palpitations and leg swelling.   Gastrointestinal: Negative.    Endocrine: Negative.    Genitourinary: Negative.    Musculoskeletal: Negative.    Skin: Negative.    Neurological: Positive for headaches. Negative for dizziness.   Psychiatric/Behavioral: Positive for dysphoric mood. The patient is nervous/anxious.        Mr. Wilson  reports that he has never smoked. He has never used smokeless tobacco. He reports current alcohol use. He reports that he does not use drugs.      Current Outpatient Medications:   •  albuterol sulfate  (90 Base) MCG/ACT inhaler, Inhale 2 puffs Every 4 (Four) Hours As Needed for Wheezing or Shortness of Air., Disp: 18 g, Rfl: 11  •  amitriptyline (ELAVIL) 50 MG tablet, Take 1 tablet by mouth Every Evening. Must keep appointment to receive additional refills, Disp: 90 tablet, Rfl: 1  •  amLODIPine-valsartan (EXFORGE) 5-160 MG per tablet, Take 1 tablet by mouth Daily., Disp: 90 tablet, Rfl: 11  •  aspirin 325 MG tablet, daily., Disp: , Rfl:   •  atorvastatin (Lipitor) 40 MG tablet, Take 1 tablet by mouth Daily., Disp: 90 tablet, Rfl: 3  •  buPROPion XL (WELLBUTRIN XL) 300 MG 24  hr tablet, Take 1 tablet by mouth Daily., Disp: 30 tablet, Rfl: 3  •  butalbital-acetaminophen-caffeine (FIORICET, ESGIC) -40 MG per tablet, Take 1 tablet by mouth 2 (Two) Times a Day As Needed for Headache., Disp: 15 tablet, Rfl: 3  •  carvedilol (COREG) 25 MG tablet, Take 2 tablets by mouth 2 (Two) Times a Day., Disp: 360 tablet, Rfl: 4  •  escitalopram (LEXAPRO) 20 MG tablet, Take 1 tablet by mouth Daily., Disp: 30 tablet, Rfl: 11  •  esomeprazole (nexIUM) 40 MG capsule, Take 1 capsule by mouth Every Morning Before Breakfast., Disp: 90 capsule, Rfl: 3  •  fluticasone (FLONASE) 50 MCG/ACT nasal spray, 2 sprays into the nostril(s) as directed by provider Daily. Administer 2 sprays in each nostril for each dose., Disp: 1 bottle, Rfl: 11  •  fluticasone-salmeterol (ADVAIR) 100-50 MCG/DOSE DISKUS, Inhale 1 puff 2 (two) times a day., Disp: , Rfl:   •  furosemide (LASIX) 40 MG tablet, Take 1 tablet by mouth twice daily as needed, Disp: 180 tablet, Rfl: 4  •  levothyroxine (SYNTHROID, LEVOTHROID) 50 MCG tablet, Take 1 tablet by mouth Every Other Day. Alternate days with 75mcg., Disp: 45 tablet, Rfl: 1  •  levothyroxine (SYNTHROID, LEVOTHROID) 75 MCG tablet, Take 1 tablet by mouth Every Other Day. Alternate days with 50mcg., Disp: 45 tablet, Rfl: 1  •  Multiple Vitamins-Minerals (MULTIVITAMIN WITH MINERALS) tablet tablet, Take 1 tablet by mouth Daily., Disp: , Rfl:   •  nitroglycerin (NITROSTAT) 0.4 MG SL tablet, Place 0.4 mg under the tongue as needed. Take no more than 3 doses in 15 minutes., Disp: , Rfl:   •  omeprazole (priLOSEC) 20 MG capsule, Take 20 mg by mouth Daily., Disp: , Rfl:   •  QUEtiapine (SEROquel) 100 MG tablet, Take 1 tablet by mouth every night at bedtime., Disp: 90 tablet, Rfl: 1  •  Suvorexant 10 MG tablet, Take 10 mg by mouth Every Night., Disp: 30 tablet, Rfl: 2  •  diclofenac (VOLTAREN) 1 % gel gel, Apply 4 g topically to the appropriate area as directed 3 (Three) Times a Day., Disp: 100 g,  "Rfl: 3  •  icosapent ethyl (VASCEPA) 1 g capsule capsule, Take 2 g by mouth 2 (Two) Times a Day With Meals., Disp: 120 capsule, Rfl: 11  •  spironolactone (ALDACTONE) 25 MG tablet, TAKE ONE TABLET BY MOUTH ONCE DAILY, Disp: 30 tablet, Rfl: 6      OBJECTIVE:  /82 (BP Location: Left arm, Patient Position: Sitting, Cuff Size: Adult)   Pulse 79   Temp 97.2 °F (36.2 °C) (Temporal)   Resp 16   Ht 170.2 cm (67.01\")   Wt 93.5 kg (206 lb 1.6 oz)   SpO2 96%   BMI 32.27 kg/m²    Physical Exam  Constitutional:       General: He is not in acute distress.  Pulmonary:      Effort: No respiratory distress.   Abdominal:      General: There is no distension.   Skin:     General: Skin is warm and dry.   Neurological:      Mental Status: He is oriented to person, place, and time.   Psychiatric:         Mood and Affect: Mood normal.         Assessment/Plan    Ziyad was seen today for medication problem and med refill.    Diagnoses and all orders for this visit:    Moderate episode of recurrent major depressive disorder (CMS/HCC)  -     Discontinue: buPROPion XL (WELLBUTRIN XL) 150 MG 24 hr tablet; Take 1 tablet by mouth Daily.  -     buPROPion XL (WELLBUTRIN XL) 300 MG 24 hr tablet; Take 1 tablet by mouth Daily.  Will increase wellbutrin to 300 mg dose.   I advised that it will take 3-4 weeks to see some effect and 6-8 weeks to see full effect.  If the dose is ineffective or suboptimal, the patient should notify the clinic for a consideration of a dose increase.  The patient was advised that starting this medication could worsen symptoms of SI/HI. We discussed this as an emergency and reason to seek care immediately. The patient expressed understanding.     Need for vaccination  -     FluLaval Quad >6 Months ([id number])    Arthritis of right shoulder region  -     diclofenac (VOLTAREN) 1 % gel gel; Apply 4 g topically to the appropriate area as directed 3 (Three) Times a Day.    Mild intermittent asthma with status " asthmaticus  -     albuterol sulfate  (90 Base) MCG/ACT inhaler; Inhale 2 puffs Every 4 (Four) Hours As Needed for Wheezing or Shortness of Air.  Stable on current therapy    Chronic daily headache  -     butalbital-acetaminophen-caffeine (FIORICET, ESGIC) -40 MG per tablet; Take 1 tablet by mouth 2 (Two) Times a Day As Needed for Headache.  neeru is obtained. This medication has not been prescribed by out office in the past. Advise he will be given a 15 tablet script to last a month. If needing more frequent may need to return to neurology.     Hypothyroidism in adult  -     TSH; Future  Labs to monitor.     Chronic systolic heart failure (CMS/HCC)  Continue current medications. Keep recommended f/u with cardiology.        An After Visit Summary was printed and given to the patient at discharge.  Return in about 6 months (around 3/16/2021). Sooner if problems arise.          Erika POOLE. 9/17/2020   Electronically Signed

## 2020-10-21 DIAGNOSIS — F51.01 PRIMARY INSOMNIA: ICD-10-CM

## 2020-10-21 RX ORDER — SUVOREXANT 10 MG/1
TABLET, FILM COATED ORAL
Qty: 30 TABLET | Refills: 2 | Status: SHIPPED | OUTPATIENT
Start: 2020-10-21 | End: 2021-01-18

## 2020-10-22 DIAGNOSIS — F33.1 MODERATE EPISODE OF RECURRENT MAJOR DEPRESSIVE DISORDER (HCC): ICD-10-CM

## 2020-10-22 RX ORDER — ESCITALOPRAM OXALATE 20 MG/1
20 TABLET ORAL DAILY
Qty: 90 TABLET | Refills: 3 | Status: SHIPPED | OUTPATIENT
Start: 2020-10-22 | End: 2020-11-25 | Stop reason: SDUPTHER

## 2020-10-22 RX ORDER — ESCITALOPRAM OXALATE 20 MG/1
20 TABLET ORAL DAILY
Qty: 30 TABLET | Refills: 11 | OUTPATIENT
Start: 2020-10-22

## 2020-11-13 DIAGNOSIS — F33.1 MODERATE EPISODE OF RECURRENT MAJOR DEPRESSIVE DISORDER (HCC): ICD-10-CM

## 2020-11-13 DIAGNOSIS — F51.01 PRIMARY INSOMNIA: ICD-10-CM

## 2020-11-13 RX ORDER — AMITRIPTYLINE HYDROCHLORIDE 50 MG/1
50 TABLET, FILM COATED ORAL EVERY EVENING
Qty: 90 TABLET | Refills: 1 | Status: SHIPPED | OUTPATIENT
Start: 2020-11-13 | End: 2021-03-16 | Stop reason: SDUPTHER

## 2020-11-25 DIAGNOSIS — F33.1 MODERATE EPISODE OF RECURRENT MAJOR DEPRESSIVE DISORDER (HCC): ICD-10-CM

## 2020-11-25 DIAGNOSIS — J20.9 ACUTE BRONCHITIS, UNSPECIFIED ORGANISM: ICD-10-CM

## 2020-11-25 RX ORDER — FUROSEMIDE 40 MG/1
TABLET ORAL
Qty: 180 TABLET | Refills: 4 | Status: SHIPPED | OUTPATIENT
Start: 2020-11-25 | End: 2020-12-01 | Stop reason: SDUPTHER

## 2020-11-25 RX ORDER — FLUTICASONE PROPIONATE 50 MCG
2 SPRAY, SUSPENSION (ML) NASAL DAILY
Qty: 1 BOTTLE | Refills: 11 | Status: SHIPPED | OUTPATIENT
Start: 2020-11-25

## 2020-11-25 RX ORDER — ESCITALOPRAM OXALATE 20 MG/1
20 TABLET ORAL DAILY
Qty: 90 TABLET | Refills: 3 | Status: SHIPPED | OUTPATIENT
Start: 2020-11-25 | End: 2020-12-01 | Stop reason: SDUPTHER

## 2020-12-01 DIAGNOSIS — F33.1 MODERATE EPISODE OF RECURRENT MAJOR DEPRESSIVE DISORDER (HCC): ICD-10-CM

## 2020-12-01 DIAGNOSIS — E03.9 ACQUIRED HYPOTHYROIDISM: Primary | ICD-10-CM

## 2020-12-01 DIAGNOSIS — I10 ESSENTIAL HYPERTENSION: ICD-10-CM

## 2020-12-01 DIAGNOSIS — R73.02 IMPAIRED GLUCOSE TOLERANCE: ICD-10-CM

## 2020-12-01 DIAGNOSIS — I25.119 CORONARY ARTERY DISEASE INVOLVING NATIVE CORONARY ARTERY OF NATIVE HEART WITH ANGINA PECTORIS (HCC): ICD-10-CM

## 2020-12-01 RX ORDER — BUPROPION HYDROCHLORIDE 300 MG/1
300 TABLET ORAL DAILY
Qty: 90 TABLET | Refills: 3 | Status: SHIPPED | OUTPATIENT
Start: 2020-12-01 | End: 2022-02-01

## 2020-12-01 RX ORDER — CARVEDILOL 25 MG/1
50 TABLET ORAL 2 TIMES DAILY
Qty: 360 TABLET | Refills: 3 | Status: SHIPPED | OUTPATIENT
Start: 2020-12-01 | End: 2022-02-01

## 2020-12-01 RX ORDER — ATORVASTATIN CALCIUM 40 MG/1
40 TABLET, FILM COATED ORAL DAILY
Qty: 90 TABLET | Refills: 3 | Status: SHIPPED | OUTPATIENT
Start: 2020-12-01 | End: 2021-07-19

## 2020-12-01 RX ORDER — FUROSEMIDE 40 MG/1
TABLET ORAL
Qty: 180 TABLET | Refills: 3 | Status: SHIPPED | OUTPATIENT
Start: 2020-12-01 | End: 2021-07-19

## 2020-12-01 RX ORDER — ESCITALOPRAM OXALATE 20 MG/1
20 TABLET ORAL DAILY
Qty: 90 TABLET | Refills: 3 | Status: SHIPPED | OUTPATIENT
Start: 2020-12-01 | End: 2022-01-19

## 2021-01-04 DIAGNOSIS — E03.9 ACQUIRED HYPOTHYROIDISM: ICD-10-CM

## 2021-01-04 RX ORDER — LEVOTHYROXINE SODIUM 0.07 MG/1
TABLET ORAL
Qty: 45 TABLET | Refills: 1 | Status: SHIPPED | OUTPATIENT
Start: 2021-01-04 | End: 2021-02-17 | Stop reason: SDUPTHER

## 2021-01-09 DIAGNOSIS — F51.01 PRIMARY INSOMNIA: ICD-10-CM

## 2021-01-10 DIAGNOSIS — F33.1 MODERATE EPISODE OF RECURRENT MAJOR DEPRESSIVE DISORDER (HCC): ICD-10-CM

## 2021-01-11 RX ORDER — BUPROPION HYDROCHLORIDE 300 MG/1
300 TABLET ORAL DAILY
Qty: 30 TABLET | OUTPATIENT
Start: 2021-01-11

## 2021-01-11 RX ORDER — QUETIAPINE FUMARATE 100 MG/1
100 TABLET, FILM COATED ORAL
Qty: 90 TABLET | Refills: 1 | Status: SHIPPED | OUTPATIENT
Start: 2021-01-11 | End: 2021-04-21

## 2021-01-17 DIAGNOSIS — F51.01 PRIMARY INSOMNIA: ICD-10-CM

## 2021-01-18 RX ORDER — SUVOREXANT 10 MG/1
TABLET, FILM COATED ORAL
Qty: 30 TABLET | Refills: 2 | Status: SHIPPED | OUTPATIENT
Start: 2021-01-19 | End: 2021-03-16 | Stop reason: SDUPTHER

## 2021-01-20 DIAGNOSIS — J20.9 ACUTE BRONCHITIS, UNSPECIFIED ORGANISM: ICD-10-CM

## 2021-01-20 RX ORDER — FLUTICASONE PROPIONATE 50 MCG
SPRAY, SUSPENSION (ML) NASAL
Qty: 16 G | OUTPATIENT
Start: 2021-01-20

## 2021-02-17 DIAGNOSIS — E03.9 ACQUIRED HYPOTHYROIDISM: ICD-10-CM

## 2021-02-17 RX ORDER — LEVOTHYROXINE SODIUM 0.07 MG/1
75 TABLET ORAL DAILY
Qty: 45 TABLET | Refills: 1 | Status: SHIPPED | OUTPATIENT
Start: 2021-02-17 | End: 2021-03-16 | Stop reason: SDUPTHER

## 2021-02-17 RX ORDER — LEVOTHYROXINE SODIUM 0.05 MG/1
50 TABLET ORAL EVERY OTHER DAY
Qty: 45 TABLET | Refills: 1 | Status: SHIPPED | OUTPATIENT
Start: 2021-02-17 | End: 2021-03-16 | Stop reason: SDUPTHER

## 2021-03-16 ENCOUNTER — OFFICE VISIT (OUTPATIENT)
Dept: INTERNAL MEDICINE | Facility: CLINIC | Age: 56
End: 2021-03-16

## 2021-03-16 ENCOUNTER — LAB (OUTPATIENT)
Dept: LAB | Facility: HOSPITAL | Age: 56
End: 2021-03-16

## 2021-03-16 VITALS
HEIGHT: 67 IN | WEIGHT: 197.4 LBS | HEART RATE: 81 BPM | SYSTOLIC BLOOD PRESSURE: 118 MMHG | TEMPERATURE: 96.4 F | DIASTOLIC BLOOD PRESSURE: 78 MMHG | BODY MASS INDEX: 30.98 KG/M2 | RESPIRATION RATE: 16 BRPM | OXYGEN SATURATION: 98 %

## 2021-03-16 DIAGNOSIS — F33.1 MODERATE EPISODE OF RECURRENT MAJOR DEPRESSIVE DISORDER (HCC): ICD-10-CM

## 2021-03-16 DIAGNOSIS — F51.01 PRIMARY INSOMNIA: ICD-10-CM

## 2021-03-16 DIAGNOSIS — I10 ESSENTIAL HYPERTENSION: ICD-10-CM

## 2021-03-16 DIAGNOSIS — E03.9 ACQUIRED HYPOTHYROIDISM: ICD-10-CM

## 2021-03-16 DIAGNOSIS — R73.02 IMPAIRED GLUCOSE TOLERANCE: ICD-10-CM

## 2021-03-16 DIAGNOSIS — I25.119 CORONARY ARTERY DISEASE INVOLVING NATIVE CORONARY ARTERY OF NATIVE HEART WITH ANGINA PECTORIS (HCC): ICD-10-CM

## 2021-03-16 DIAGNOSIS — F43.10 PTSD (POST-TRAUMATIC STRESS DISORDER): ICD-10-CM

## 2021-03-16 DIAGNOSIS — K21.9 GASTROESOPHAGEAL REFLUX DISEASE, UNSPECIFIED WHETHER ESOPHAGITIS PRESENT: Primary | ICD-10-CM

## 2021-03-16 DIAGNOSIS — E66.09 CLASS 1 OBESITY DUE TO EXCESS CALORIES WITH SERIOUS COMORBIDITY AND BODY MASS INDEX (BMI) OF 30.0 TO 30.9 IN ADULT: ICD-10-CM

## 2021-03-16 LAB
ALBUMIN SERPL-MCNC: 4.3 G/DL (ref 3.5–5.2)
ALBUMIN/GLOB SERPL: 1.5 G/DL
ALP SERPL-CCNC: 76 U/L (ref 39–117)
ALT SERPL W P-5'-P-CCNC: 27 U/L (ref 1–41)
ANION GAP SERPL CALCULATED.3IONS-SCNC: 9 MMOL/L (ref 5–15)
AST SERPL-CCNC: 29 U/L (ref 1–40)
BILIRUB SERPL-MCNC: 0.4 MG/DL (ref 0–1.2)
BUN SERPL-MCNC: 17 MG/DL (ref 6–20)
BUN/CREAT SERPL: 14.8 (ref 7–25)
CALCIUM SPEC-SCNC: 9.1 MG/DL (ref 8.6–10.5)
CHLORIDE SERPL-SCNC: 105 MMOL/L (ref 98–107)
CHOLEST SERPL-MCNC: 125 MG/DL (ref 0–200)
CO2 SERPL-SCNC: 26 MMOL/L (ref 22–29)
CREAT SERPL-MCNC: 1.15 MG/DL (ref 0.76–1.27)
GFR SERPL CREATININE-BSD FRML MDRD: 66 ML/MIN/1.73
GLOBULIN UR ELPH-MCNC: 2.8 GM/DL
GLUCOSE SERPL-MCNC: 87 MG/DL (ref 65–99)
HBA1C MFR BLD: 5.6 % (ref 4.8–5.6)
HDLC SERPL-MCNC: 46 MG/DL (ref 40–60)
LDLC SERPL CALC-MCNC: 48 MG/DL (ref 0–100)
LDLC/HDLC SERPL: 0.89 {RATIO}
POTASSIUM SERPL-SCNC: 3.9 MMOL/L (ref 3.5–5.2)
PROT SERPL-MCNC: 7.1 G/DL (ref 6–8.5)
SODIUM SERPL-SCNC: 140 MMOL/L (ref 136–145)
TRIGL SERPL-MCNC: 191 MG/DL (ref 0–150)
TSH SERPL DL<=0.05 MIU/L-ACNC: 5.27 UIU/ML (ref 0.27–4.2)
VLDLC SERPL-MCNC: 31 MG/DL (ref 5–40)

## 2021-03-16 PROCEDURE — 80061 LIPID PANEL: CPT

## 2021-03-16 PROCEDURE — 83036 HEMOGLOBIN GLYCOSYLATED A1C: CPT

## 2021-03-16 PROCEDURE — 80053 COMPREHEN METABOLIC PANEL: CPT

## 2021-03-16 PROCEDURE — 84443 ASSAY THYROID STIM HORMONE: CPT

## 2021-03-16 PROCEDURE — 99214 OFFICE O/P EST MOD 30 MIN: CPT | Performed by: INTERNAL MEDICINE

## 2021-03-16 PROCEDURE — 36415 COLL VENOUS BLD VENIPUNCTURE: CPT

## 2021-03-16 RX ORDER — AMITRIPTYLINE HYDROCHLORIDE 50 MG/1
50 TABLET, FILM COATED ORAL EVERY EVENING
Qty: 90 TABLET | Refills: 1 | Status: SHIPPED | OUTPATIENT
Start: 2021-03-16 | End: 2021-08-05

## 2021-03-16 RX ORDER — BUPROPION HYDROCHLORIDE 150 MG/1
1 TABLET ORAL DAILY
COMMUNITY
Start: 2021-01-20 | End: 2021-03-16

## 2021-03-16 RX ORDER — LEVOTHYROXINE SODIUM 0.07 MG/1
75 TABLET ORAL DAILY
Qty: 45 TABLET | Refills: 1 | Status: SHIPPED | OUTPATIENT
Start: 2021-03-16 | End: 2021-03-16

## 2021-03-16 RX ORDER — LEVOTHYROXINE SODIUM 0.07 MG/1
75 TABLET ORAL DAILY
Qty: 90 TABLET | Refills: 1 | Status: SHIPPED | OUTPATIENT
Start: 2021-03-16 | End: 2021-09-02

## 2021-03-16 RX ORDER — AMLODIPINE AND VALSARTAN 5; 160 MG/1; MG/1
1 TABLET ORAL DAILY
Qty: 90 TABLET | Refills: 3 | Status: SHIPPED | OUTPATIENT
Start: 2021-03-16

## 2021-03-16 RX ORDER — OMEPRAZOLE 20 MG/1
20 CAPSULE, DELAYED RELEASE ORAL DAILY
Qty: 90 CAPSULE | Refills: 3 | Status: SHIPPED | OUTPATIENT
Start: 2021-03-16 | End: 2022-02-11

## 2021-03-16 RX ORDER — LEVOTHYROXINE SODIUM 0.05 MG/1
50 TABLET ORAL EVERY OTHER DAY
Qty: 45 TABLET | Refills: 1 | Status: SHIPPED | OUTPATIENT
Start: 2021-03-16 | End: 2021-03-16

## 2021-03-16 RX ORDER — SUVOREXANT 10 MG/1
1 TABLET, FILM COATED ORAL NIGHTLY
Qty: 90 TABLET | Refills: 1 | Status: SHIPPED | OUTPATIENT
Start: 2021-03-16 | End: 2021-09-02 | Stop reason: SDUPTHER

## 2021-03-16 NOTE — PROGRESS NOTES
CC: f/u hypertension    History:  Ziyad Wilson is a 55 y.o. male   He notes he has been doing well without any acute illness and he notes no symptoms of thyroid dysfunction on current alternating doses of 50 and 75 mcg daily.  He has not had good control of his mood despite use of SSRI and Wellbutrin as well as Seroquel.  He was not able to connect with a counselor last spring. He has had ongoing issues with shoulder and knee pain that has not improved with weight loss.        ROS:  Review of Systems   Constitutional: Negative for chills and fever.   Respiratory: Negative for cough and shortness of breath.    Cardiovascular: Negative for chest pain and palpitations.   Musculoskeletal: Positive for arthralgias and neck pain. Negative for joint swelling.   Psychiatric/Behavioral: Positive for dysphoric mood and sleep disturbance. The patient is nervous/anxious.         reports that he has never smoked. He has never used smokeless tobacco. He reports current alcohol use. He reports that he does not use drugs.      Current Outpatient Medications:   •  albuterol sulfate  (90 Base) MCG/ACT inhaler, Inhale 2 puffs Every 4 (Four) Hours As Needed for Wheezing or Shortness of Air., Disp: 18 g, Rfl: 11  •  amitriptyline (ELAVIL) 50 MG tablet, Take 1 tablet by mouth Every Evening., Disp: 90 tablet, Rfl: 1  •  amLODIPine-valsartan (EXFORGE) 5-160 MG per tablet, Take 1 tablet by mouth Daily., Disp: 90 tablet, Rfl: 3  •  aspirin 325 MG tablet, daily., Disp: , Rfl:   •  atorvastatin (Lipitor) 40 MG tablet, Take 1 tablet by mouth Daily., Disp: 90 tablet, Rfl: 3  •  buPROPion XL (WELLBUTRIN XL) 300 MG 24 hr tablet, Take 1 tablet by mouth Daily., Disp: 90 tablet, Rfl: 3  •  butalbital-acetaminophen-caffeine (FIORICET, ESGIC) -40 MG per tablet, Take 1 tablet by mouth 2 (Two) Times a Day As Needed for Headache., Disp: 15 tablet, Rfl: 3  •  carvedilol (COREG) 25 MG tablet, Take 2 tablets by mouth 2 (Two) Times a Day., Disp:  "360 tablet, Rfl: 3  •  diclofenac (VOLTAREN) 1 % gel gel, Apply 4 g topically to the appropriate area as directed 3 (Three) Times a Day., Disp: 100 g, Rfl: 3  •  escitalopram (LEXAPRO) 20 MG tablet, Take 1 tablet by mouth Daily., Disp: 90 tablet, Rfl: 3  •  fluticasone (Flonase) 50 MCG/ACT nasal spray, 2 sprays into the nostril(s) as directed by provider Daily. Administer 2 sprays in each nostril for each dose., Disp: 1 bottle, Rfl: 11  •  fluticasone-salmeterol (ADVAIR) 100-50 MCG/DOSE DISKUS, Inhale 1 puff 2 (Two) Times a Day., Disp: 60 each, Rfl: 3  •  furosemide (LASIX) 40 MG tablet, 1 tablet BID, Disp: 180 tablet, Rfl: 3  •  icosapent ethyl (VASCEPA) 1 g capsule capsule, Take 2 g by mouth 2 (Two) Times a Day With Meals., Disp: 120 capsule, Rfl: 11  •  levothyroxine (SYNTHROID, LEVOTHROID) 50 MCG tablet, Take 1 tablet by mouth Every Other Day. Alternate days with 75mcg., Disp: 45 tablet, Rfl: 1  •  levothyroxine (SYNTHROID, LEVOTHROID) 75 MCG tablet, Take 1 tablet by mouth Daily., Disp: 45 tablet, Rfl: 1  •  Multiple Vitamins-Minerals (MULTIVITAMIN WITH MINERALS) tablet tablet, Take 1 tablet by mouth Daily., Disp: , Rfl:   •  nitroglycerin (NITROSTAT) 0.4 MG SL tablet, Place 0.4 mg under the tongue as needed. Take no more than 3 doses in 15 minutes., Disp: , Rfl:   •  omeprazole (priLOSEC) 20 MG capsule, Take 1 capsule by mouth Daily., Disp: 90 capsule, Rfl: 3  •  QUEtiapine (SEROquel) 100 MG tablet, TAKE 1 TABLET BY MOUTH EVERY NIGHT AT BEDTIME, Disp: 90 tablet, Rfl: 1  •  spironolactone (ALDACTONE) 25 MG tablet, TAKE ONE TABLET BY MOUTH ONCE DAILY, Disp: 30 tablet, Rfl: 6  •  Suvorexant (Belsomra) 10 MG tablet, Take 1 tablet by mouth Every Night., Disp: 90 tablet, Rfl: 1    OBJECTIVE:  /78 (BP Location: Left arm, Patient Position: Sitting, Cuff Size: Adult)   Pulse 81   Temp 96.4 °F (35.8 °C)   Resp 16   Ht 170.2 cm (67.01\")   Wt 89.5 kg (197 lb 6.4 oz)   SpO2 98%   BMI 30.91 kg/m²    Physical " Exam  Constitutional:       General: He is not in acute distress.  Cardiovascular:      Rate and Rhythm: Normal rate and regular rhythm.      Heart sounds: Normal heart sounds. No murmur.   Pulmonary:      Effort: Pulmonary effort is normal. No respiratory distress.      Breath sounds: Normal breath sounds. No wheezing.   Neurological:      Mental Status: He is alert and oriented to person, place, and time.      Gait: Gait normal.   Psychiatric:         Mood and Affect: Mood normal.         Behavior: Behavior normal.         Assessment/Plan     Diagnoses and all orders for this visit:    1. Gastroesophageal reflux disease, unspecified whether esophagitis present (Primary)  -     omeprazole (priLOSEC) 20 MG capsule; Take 1 capsule by mouth Daily.  Dispense: 90 capsule; Refill: 3  Stable without exacerbation on PPI.    2. Acquired hypothyroidism  -     levothyroxine (SYNTHROID, LEVOTHROID) 75 MCG tablet; Take 1 tablet by mouth Daily.  Dispense: 45 tablet; Refill: 1  -     levothyroxine (SYNTHROID, LEVOTHROID) 50 MCG tablet; Take 1 tablet by mouth Every Other Day. Alternate days with 75mcg.  Dispense: 45 tablet; Refill: 1  Check TSH per previous labs and alter as needed based on results.     3. Primary insomnia  -     Suvorexant (Belsomra) 10 MG tablet; Take 1 tablet by mouth Every Night.  Dispense: 90 tablet; Refill: 1  -     amitriptyline (ELAVIL) 50 MG tablet; Take 1 tablet by mouth Every Evening.  Dispense: 90 tablet; Refill: 1  PDMP data reviewed and meds refilled. Will send to [facility] for cost purposes for him.     4. Essential hypertension  -     amLODIPine-valsartan (EXFORGE) 5-160 MG per tablet; Take 1 tablet by mouth Daily.  Dispense: 90 tablet; Refill: 3  Well controlled, BP goal for age is <140/90 per JNC 8 guidelines and continue current medications    5. Moderate episode of recurrent major depressive disorder (CMS/HCC)  9. PTSD (post-traumatic stress disorder)  -     amitriptyline (ELAVIL) 50 MG tablet;  Take 1 tablet by mouth Every Evening.  Dispense: 90 tablet; Refill: 1  -     Ambulatory Referral to Psychology  Refer to psych as he was never contacted regarding this referral last May. No other changes to meds at this time pending their intervention.     6. Class 1 obesity due to excess calories with serious comorbidity and body mass index (BMI) of 30.0 to 30.9 in adult  Recommended attention to portion control and being careful about the types and timing of meals for the purpose of weight management.    7. Coronary artery disease involving native coronary artery of native heart with angina pectoris (CMS/[facility])  No anginal symptoms at this time and on ASA & statin as well as Vascepa with management per Dr. Lagos.     An After Visit Summary was printed and given to the patient at discharge.  Return in about 6 months (around 9/16/2021) for Annual physical.          Ashish Thurman D.O. 3/16/2021   Electronically signed.

## 2021-03-26 ENCOUNTER — TELEPHONE (OUTPATIENT)
Dept: INTERNAL MEDICINE | Facility: CLINIC | Age: 56
End: 2021-03-26

## 2021-03-26 DIAGNOSIS — R51.9 CHRONIC DAILY HEADACHE: ICD-10-CM

## 2021-03-26 RX ORDER — BUTALBITAL, ACETAMINOPHEN AND CAFFEINE 50; 325; 40 MG/1; MG/1; MG/1
1 TABLET ORAL 2 TIMES DAILY PRN
Qty: 15 TABLET | Refills: 3 | Status: SHIPPED | OUTPATIENT
Start: 2021-03-26 | End: 2021-05-27

## 2021-03-26 NOTE — TELEPHONE ENCOUNTER
PATIENT WAS CALLED, HE STATED THAT HE IS NEEDING HIE BUTALBITAL FILLED.  MEDICATION HAS BEEN SENT TO PROVIDERS FOR REFILL APPROVAL.

## 2021-03-26 NOTE — TELEPHONE ENCOUNTER
Caller: Ziyad Wilson    Relationship: Self    Best call back number: [phone]    What is the best time to reach you: ANYTIME    Who are you requesting to speak with (clinical staff, provider,  specific staff member): CLINICAL    Do you know the name of the person who called: PATIENT    What was the call regarding: NEEDS TO KNOW WHERE HIS HEADACHE MEDICATION WAS SENT TO    Do you require a callback: YES

## 2021-04-21 DIAGNOSIS — F51.01 PRIMARY INSOMNIA: ICD-10-CM

## 2021-04-21 RX ORDER — QUETIAPINE FUMARATE 100 MG/1
100 TABLET, FILM COATED ORAL
Qty: 90 TABLET | Refills: 1 | Status: SHIPPED | OUTPATIENT
Start: 2021-04-21 | End: 2021-09-02 | Stop reason: SDUPTHER

## 2021-05-27 DIAGNOSIS — R51.9 CHRONIC DAILY HEADACHE: ICD-10-CM

## 2021-05-27 RX ORDER — BUTALBITAL, ACETAMINOPHEN AND CAFFEINE 50; 325; 40 MG/1; MG/1; MG/1
1 TABLET ORAL DAILY PRN
Qty: 15 TABLET | Refills: 2 | Status: SHIPPED | OUTPATIENT
Start: 2021-05-27

## 2021-06-01 RX ORDER — CARVEDILOL 25 MG/1
TABLET ORAL
Qty: 915 TABLET | OUTPATIENT
Start: 2021-06-01

## 2021-07-19 DIAGNOSIS — I25.119 CORONARY ARTERY DISEASE INVOLVING NATIVE CORONARY ARTERY OF NATIVE HEART WITH ANGINA PECTORIS (HCC): ICD-10-CM

## 2021-07-19 RX ORDER — FUROSEMIDE 40 MG/1
TABLET ORAL
Qty: 180 TABLET | Refills: 3 | Status: SHIPPED | OUTPATIENT
Start: 2021-07-19 | End: 2022-02-17

## 2021-07-19 RX ORDER — ATORVASTATIN CALCIUM 40 MG/1
40 TABLET, FILM COATED ORAL DAILY
Qty: 90 TABLET | Refills: 3 | Status: SHIPPED | OUTPATIENT
Start: 2021-07-19 | End: 2022-02-17

## 2021-08-04 DIAGNOSIS — F51.01 PRIMARY INSOMNIA: ICD-10-CM

## 2021-08-04 DIAGNOSIS — F33.1 MODERATE EPISODE OF RECURRENT MAJOR DEPRESSIVE DISORDER (HCC): ICD-10-CM

## 2021-08-05 RX ORDER — AMITRIPTYLINE HYDROCHLORIDE 50 MG/1
50 TABLET, FILM COATED ORAL EVERY EVENING
Qty: 90 TABLET | Refills: 1 | Status: SHIPPED | OUTPATIENT
Start: 2021-08-05 | End: 2022-01-13

## 2021-08-23 DIAGNOSIS — F51.01 PRIMARY INSOMNIA: ICD-10-CM

## 2021-08-24 RX ORDER — QUETIAPINE FUMARATE 100 MG/1
TABLET, FILM COATED ORAL
Qty: 90 TABLET | Refills: 1 | OUTPATIENT
Start: 2021-08-24

## 2021-09-01 DIAGNOSIS — E03.9 ACQUIRED HYPOTHYROIDISM: ICD-10-CM

## 2021-09-02 DIAGNOSIS — F51.01 PRIMARY INSOMNIA: ICD-10-CM

## 2021-09-02 RX ORDER — LEVOTHYROXINE SODIUM 0.07 MG/1
75 TABLET ORAL DAILY
Qty: 90 TABLET | Refills: 1 | Status: SHIPPED | OUTPATIENT
Start: 2021-09-02

## 2021-09-02 RX ORDER — SUVOREXANT 10 MG/1
1 TABLET, FILM COATED ORAL NIGHTLY
Qty: 90 TABLET | Refills: 1 | Status: SHIPPED | OUTPATIENT
Start: 2021-09-02

## 2021-09-02 RX ORDER — QUETIAPINE FUMARATE 100 MG/1
100 TABLET, FILM COATED ORAL
Qty: 90 TABLET | Refills: 1 | Status: SHIPPED | OUTPATIENT
Start: 2021-09-02 | End: 2022-02-17

## 2021-09-02 NOTE — TELEPHONE ENCOUNTER
.    Caller: Ziyad Wilson    Relationship: Self    Best call back number: [phone]    Medication needed:   Requested Prescriptions     Pending Prescriptions Disp Refills   • QUEtiapine (SEROquel) 100 MG tablet 90 tablet 1     Sig: Take 1 tablet by mouth every night at bedtime.   • Suvorexant (Belsomra) 10 MG tablet 90 tablet 1     Sig: Take 1 tablet by mouth Every Night.       When do you need the refill by: ASAP    What additional details did the patient provide when requesting the medication: PATIENT MAY HAVE 4 OR 5 DAYS LEFT BUT WILL TAKE MAIL IN SERVICE THAT LONG     Does the patient have less than a 3 day supply:  [] Yes  [x] No    What is the patient's preferred pharmacy: [facility] - [facility] [address] at the [facility] - [phone] [facility] [phone]

## 2021-09-16 ENCOUNTER — OFFICE VISIT (OUTPATIENT)
Dept: INTERNAL MEDICINE | Facility: CLINIC | Age: 56
End: 2021-09-16

## 2021-09-16 VITALS
HEIGHT: 67 IN | BODY MASS INDEX: 29.19 KG/M2 | DIASTOLIC BLOOD PRESSURE: 70 MMHG | RESPIRATION RATE: 16 BRPM | WEIGHT: 186 LBS | OXYGEN SATURATION: 98 % | HEART RATE: 82 BPM | SYSTOLIC BLOOD PRESSURE: 104 MMHG | TEMPERATURE: 98.2 F

## 2021-09-16 DIAGNOSIS — E66.3 OVERWEIGHT (BMI 25.0-29.9): ICD-10-CM

## 2021-09-16 DIAGNOSIS — I50.22 CHRONIC SYSTOLIC HEART FAILURE (HCC): ICD-10-CM

## 2021-09-16 DIAGNOSIS — R73.02 IMPAIRED GLUCOSE TOLERANCE: ICD-10-CM

## 2021-09-16 DIAGNOSIS — E03.9 ACQUIRED HYPOTHYROIDISM: ICD-10-CM

## 2021-09-16 DIAGNOSIS — N18.2 CKD (CHRONIC KIDNEY DISEASE) STAGE 2, GFR 60-89 ML/MIN: ICD-10-CM

## 2021-09-16 DIAGNOSIS — F33.1 MODERATE EPISODE OF RECURRENT MAJOR DEPRESSIVE DISORDER (HCC): ICD-10-CM

## 2021-09-16 DIAGNOSIS — Z23 NEED FOR INFLUENZA VACCINATION: ICD-10-CM

## 2021-09-16 DIAGNOSIS — Z00.00 MEDICARE ANNUAL WELLNESS VISIT, SUBSEQUENT: Primary | ICD-10-CM

## 2021-09-16 DIAGNOSIS — I10 ESSENTIAL HYPERTENSION: ICD-10-CM

## 2021-09-16 PROCEDURE — G0008 ADMIN INFLUENZA VIRUS VAC: HCPCS | Performed by: NURSE PRACTITIONER

## 2021-09-16 PROCEDURE — 1159F MED LIST DOCD IN RCRD: CPT | Performed by: NURSE PRACTITIONER

## 2021-09-16 PROCEDURE — 96160 PT-FOCUSED HLTH RISK ASSMT: CPT | Performed by: NURSE PRACTITIONER

## 2021-09-16 PROCEDURE — G0439 PPPS, SUBSEQ VISIT: HCPCS | Performed by: NURSE PRACTITIONER

## 2021-09-16 PROCEDURE — 1170F FXNL STATUS ASSESSED: CPT | Performed by: NURSE PRACTITIONER

## 2021-09-16 PROCEDURE — 99214 OFFICE O/P EST MOD 30 MIN: CPT | Performed by: NURSE PRACTITIONER

## 2021-09-16 PROCEDURE — 90686 IIV4 VACC NO PRSV 0.5 ML IM: CPT | Performed by: NURSE PRACTITIONER

## 2021-09-16 NOTE — PROGRESS NOTES
The ABCs of the Annual Wellness Visit  Subsequent Medicare Wellness Visit    Chief Complaint   Patient presents with   • Medicare Wellness-subsequent      Subjective    History of Present Illness:  Ziyad Wilson is a 56 y.o. male who presents for a Subsequent Medicare Wellness Visit.  See associated note  The following portions of the patient's history were reviewed and   updated as appropriate: allergies, current medications, past family history, past medical history, past social history, past surgical history and problem list.    Compared to one year ago, the patient feels his physical   health is better.    Compared to one year ago, the patient feels his mental   health is the same.    Recent Hospitalizations:  He was not admitted to the hospital during the last year.       Current Medical Providers:  Patient Care Team:  Ashish Thurman DO as PCP - General (Internal Medicine)  Douglas Ruano MD as Consulting Physician (Pain Medicine)  Uri Lagos MD as Cardiologist (Cardiology)    Outpatient Medications Prior to Visit   Medication Sig Dispense Refill   • albuterol sulfate  (90 Base) MCG/ACT inhaler Inhale 2 puffs Every 4 (Four) Hours As Needed for Wheezing or Shortness of Air. 18 g 11   • amitriptyline (ELAVIL) 50 MG tablet Take 1 tablet by mouth Every Evening. 90 tablet 1   • amLODIPine-valsartan (EXFORGE) 5-160 MG per tablet Take 1 tablet by mouth Daily. 90 tablet 3   • aspirin 325 MG tablet daily.     • atorvastatin (LIPITOR) 40 MG tablet Take 1 tablet by mouth Daily. 90 tablet 3   • buPROPion XL (WELLBUTRIN XL) 300 MG 24 hr tablet Take 1 tablet by mouth Daily. 90 tablet 3   • butalbital-acetaminophen-caffeine (FIORICET, ESGIC) -40 MG per tablet Take 1 tablet by mouth Daily As Needed for Migraine. 15 tablet 2   • carvedilol (COREG) 25 MG tablet Take 2 tablets by mouth 2 (Two) Times a Day. 360 tablet 3   • escitalopram (LEXAPRO) 20 MG tablet Take 1 tablet by mouth Daily. 90  tablet 3   • fluticasone (Flonase) 50 MCG/ACT nasal spray 2 sprays into the nostril(s) as directed by provider Daily. Administer 2 sprays in each nostril for each dose. 1 bottle 11   • fluticasone-salmeterol (ADVAIR) 100-50 MCG/DOSE DISKUS Inhale 1 puff 2 (Two) Times a Day. 60 each 3   • furosemide (LASIX) 40 MG tablet TAKE 1 TABLET BY MOUTH TWICE DAILY AS NEEDED 180 tablet 3   • icosapent ethyl (VASCEPA) 1 g capsule capsule Take 2 g by mouth 2 (Two) Times a Day With Meals. 120 capsule 11   • levothyroxine (SYNTHROID, LEVOTHROID) 75 MCG tablet Take 1 tablet by mouth Daily. 90 tablet 1   • Multiple Vitamins-Minerals (MULTIVITAMIN WITH MINERALS) tablet tablet Take 1 tablet by mouth Daily.     • nitroglycerin (NITROSTAT) 0.4 MG SL tablet Place 0.4 mg under the tongue as needed. Take no more than 3 doses in 15 minutes.     • omeprazole (priLOSEC) 20 MG capsule Take 1 capsule by mouth Daily. 90 capsule 3   • QUEtiapine (SEROquel) 100 MG tablet Take 1 tablet by mouth every night at bedtime. 90 tablet 1   • spironolactone (ALDACTONE) 25 MG tablet TAKE ONE TABLET BY MOUTH ONCE DAILY 30 tablet 6   • Suvorexant (Belsomra) 10 MG tablet Take 1 tablet by mouth Every Night. 90 tablet 1   • diclofenac (VOLTAREN) 1 % gel gel Apply 4 g topically to the appropriate area as directed 3 (Three) Times a Day. 100 g 3     No facility-administered medications prior to visit.       No opioid medication identified on active medication list. I have reviewed chart for other potential  high risk medication/s and harmful drug interactions in the elderly.          Aspirin is on active medication list. Aspirin use is indicated based on review of current medical condition/s. Pros and cons of this therapy have been discussed today. Benefits of this medication outweigh potential harm.  Patient has been encouraged to continue taking this medication.  .      Patient Active Problem List   Diagnosis   • Chronic systolic heart failure (CMS/HCC)   •  "Essential hypertension   • Coronary artery disease   • Acquired hypothyroidism   • Chronic daily headache   • Class 1 obesity due to excess calories with serious comorbidity and body mass index (BMI) of 30.0 to 30.9 in adult   • Mild cognitive impairment   • Asthma   • GERD (gastroesophageal reflux disease)   • Sleep apnea   • Mixed hyperlipidemia   • Moderate episode of recurrent major depressive disorder (CMS/HCC)   • Primary insomnia   • Cervical facet joint syndrome   • Impaired glucose tolerance   • CKD (chronic kidney disease) stage 2, GFR 60-89 ml/min   • Arthritis of right shoulder region   • Chest pain, atypical     Advance Care Planning  Advance Directive is not on file.  ACP discussion was held with the patient during this visit. Patient does not have an advance directive, information provided.     Review of systems  See associated note      Objective    Vitals:    09/16/21 1326   BP: 104/70   BP Location: Right arm   Patient Position: Sitting   Cuff Size: Adult   Pulse: 82   Resp: 16   Temp: 98.2 °F (36.8 °C)   TempSrc: Temporal   SpO2: 98%   Weight: 84.4 kg (186 lb)   Height: 170.2 cm (67.01\")     BMI Readings from Last 1 Encounters:   09/16/21 29.12 kg/m²   BMI is above normal parameters. Recommendations include: educational material    Does the patient have evidence of cognitive impairment? No    Physical Exam   see associated note          HEALTH RISK ASSESSMENT    Smoking Status:  Social History     Tobacco Use   Smoking Status Never Smoker   Smokeless Tobacco Never Used     Alcohol Consumption:  Social History     Substance and Sexual Activity   Alcohol Use Yes    Comment: occasional     Fall Risk Screen:    SANDRAADI Fall Risk Assessment has not been completed.    Depression Screening:  PHQ-2/PHQ-9 Depression Screening 3/16/2021   Little interest or pleasure in doing things 3   Feeling down, depressed, or hopeless 3   Trouble falling or staying asleep, or sleeping too much 0   Feeling tired or having " little energy 1   Poor appetite or overeating 0   Feeling bad about yourself - or that you are a failure or have let yourself or your family down 1   Trouble concentrating on things, such as reading the newspaper or watching television 3   Moving or speaking so slowly that other people could have noticed. Or the opposite - being so fidgety or restless that you have been moving around a lot more than usual 0   Thoughts that you would be better off dead, or of hurting yourself in some way 0   Total Score 11   If you checked off any problems, how difficult have these problems made it for you to do your work, take care of things at home, or get along with other people? Very difficult       Health Habits and Functional and Cognitive Screening:  Functional & Cognitive Status 9/16/2021   Do you have difficulty preparing food and eating? No   Do you have difficulty bathing yourself, getting dressed or grooming yourself? No   Do you have difficulty using the toilet? No   Do you have difficulty moving around from place to place? Yes   Do you have trouble with steps or getting out of a bed or a chair? Yes   Current Diet Well Balanced Diet   Dental Exam Up to date   Eye Exam Not up to date   Exercise (times per week) 0 times per week   Current Exercises Include No Regular Exercise   Current Exercise Activities Include -   Do you need help using the phone?  No   Are you deaf or do you have serious difficulty hearing?  No   Do you need help with transportation? Yes   Do you need help shopping? No   Do you need help preparing meals?  No   Do you need help with housework?  No   Do you need help with laundry? No   Do you need help taking your medications? No   Do you need help managing money? No   Do you ever drive or ride in a car without wearing a seat belt? No   Have you felt unusual stress, anger or loneliness in the last month? Yes   Who do you live with? Sibling   If you need help, do you have trouble finding someone available  to you? No   Have you been bothered in the last four weeks by sexual problems? No   Do you have difficulty concentrating, remembering or making decisions? Yes       Age-appropriate Screening Schedule:  Refer to the list below for future screening recommendations based on patient's age, sex and/or medical conditions. Orders for these recommended tests are listed in the plan section. The patient has been provided with a written plan.    Health Maintenance   Topic Date Due   • ZOSTER VACCINE (1 of 2) Never done   • INFLUENZA VACCINE  10/01/2021   • LIPID PANEL  03/16/2022   • TDAP/TD VACCINES (2 - Tdap) 01/01/2024              Assessment/Plan   CMS Preventative Services Quick Reference  Risk Factors Identified During Encounter  Depression/Dysphoria  Immunizations Discussed/Encouraged (specific Immunizations; Influenza  Obesity/Overweight   The above risks/problems have been discussed with the patient.  Follow up actions/plans if indicated are seen below in the Assessment/Plan Section.  Pertinent information has been shared with the patient in the After Visit Summary.    Diagnoses and all orders for this visit:    1. Medicare annual wellness visit, subsequent (Primary)    2. Need for influenza vaccination  -     FluLaval >6 Months ([id number])    3. Essential hypertension    4. Chronic systolic heart failure (CMS/[facility])    5. CKD (chronic kidney disease) stage 2, GFR 60-89 ml/min    6. Acquired hypothyroidism    7. Impaired glucose tolerance  -     Comprehensive metabolic panel; Future        Follow Up:   Return in about 6 months (around 3/16/2022).     An After Visit Summary and PPPS were made available to the patient.

## 2021-09-16 NOTE — PROGRESS NOTES
Chief Complaint   Patient presents with   • Medicare Wellness-subsequent   • Hypertension       History:  Ziyad Wilson is a 56 y.o. male who presents today for follow-up for evaluation of the above:    HPI     Patient presents today for medicare wellness and f/u HTN.  Patient reports compliance with blood pressure medications without adverse side effects.   BMI is 29  Patient does report his anxiety is slightly elevated.  He reports that his son has recently moved in with him and he is adjusting to his life stress.  He is happy that his son is in town with him at this time.  He just recently had all of his teeth removed from his upper jaw.          ROS:  Review of Systems   Constitutional: Negative for activity change, appetite change, fatigue, fever and unexpected weight change.   HENT: Negative.    Respiratory: Negative for cough, chest tightness, shortness of breath and wheezing.    Cardiovascular: Negative for chest pain, palpitations and leg swelling.   Gastrointestinal: Negative.    Endocrine: Negative.    Genitourinary: Negative.    Musculoskeletal: Negative.    Skin: Negative.    Neurological: Negative for dizziness and headaches.   Psychiatric/Behavioral: Negative.        Mr. Wilson  reports that he has never smoked. He has never used smokeless tobacco. He reports current alcohol use. He reports that he does not use drugs.      Current Outpatient Medications:   •  albuterol sulfate  (90 Base) MCG/ACT inhaler, Inhale 2 puffs Every 4 (Four) Hours As Needed for Wheezing or Shortness of Air., Disp: 18 g, Rfl: 11  •  amitriptyline (ELAVIL) 50 MG tablet, Take 1 tablet by mouth Every Evening., Disp: 90 tablet, Rfl: 1  •  amLODIPine-valsartan (EXFORGE) 5-160 MG per tablet, Take 1 tablet by mouth Daily., Disp: 90 tablet, Rfl: 3  •  aspirin 325 MG tablet, daily., Disp: , Rfl:   •  atorvastatin (LIPITOR) 40 MG tablet, Take 1 tablet by mouth Daily., Disp: 90 tablet, Rfl: 3  •  buPROPion XL (WELLBUTRIN XL) 300 MG  24 hr tablet, Take 1 tablet by mouth Daily., Disp: 90 tablet, Rfl: 3  •  butalbital-acetaminophen-caffeine (FIORICET, ESGIC) -40 MG per tablet, Take 1 tablet by mouth Daily As Needed for Migraine., Disp: 15 tablet, Rfl: 2  •  carvedilol (COREG) 25 MG tablet, Take 2 tablets by mouth 2 (Two) Times a Day., Disp: 360 tablet, Rfl: 3  •  escitalopram (LEXAPRO) 20 MG tablet, Take 1 tablet by mouth Daily., Disp: 90 tablet, Rfl: 3  •  fluticasone (Flonase) 50 MCG/ACT nasal spray, 2 sprays into the nostril(s) as directed by provider Daily. Administer 2 sprays in each nostril for each dose., Disp: 1 bottle, Rfl: 11  •  fluticasone-salmeterol (ADVAIR) 100-50 MCG/DOSE DISKUS, Inhale 1 puff 2 (Two) Times a Day., Disp: 60 each, Rfl: 3  •  furosemide (LASIX) 40 MG tablet, TAKE 1 TABLET BY MOUTH TWICE DAILY AS NEEDED, Disp: 180 tablet, Rfl: 3  •  icosapent ethyl (VASCEPA) 1 g capsule capsule, Take 2 g by mouth 2 (Two) Times a Day With Meals., Disp: 120 capsule, Rfl: 11  •  levothyroxine (SYNTHROID, LEVOTHROID) 75 MCG tablet, Take 1 tablet by mouth Daily., Disp: 90 tablet, Rfl: 1  •  Multiple Vitamins-Minerals (MULTIVITAMIN WITH MINERALS) tablet tablet, Take 1 tablet by mouth Daily., Disp: , Rfl:   •  nitroglycerin (NITROSTAT) 0.4 MG SL tablet, Place 0.4 mg under the tongue as needed. Take no more than 3 doses in 15 minutes., Disp: , Rfl:   •  omeprazole (priLOSEC) 20 MG capsule, Take 1 capsule by mouth Daily., Disp: 90 capsule, Rfl: 3  •  QUEtiapine (SEROquel) 100 MG tablet, Take 1 tablet by mouth every night at bedtime., Disp: 90 tablet, Rfl: 1  •  spironolactone (ALDACTONE) 25 MG tablet, TAKE ONE TABLET BY MOUTH ONCE DAILY, Disp: 30 tablet, Rfl: 6  •  Suvorexant (Belsomra) 10 MG tablet, Take 1 tablet by mouth Every Night., Disp: 90 tablet, Rfl: 1  •  diclofenac (VOLTAREN) 1 % gel gel, Apply 4 g topically to the appropriate area as directed 3 (Three) Times a Day., Disp: 100 g, Rfl: 3      OBJECTIVE:  /70 (BP Location:  "Right arm, Patient Position: Sitting, Cuff Size: Adult)   Pulse 82   Temp 98.2 °F (36.8 °C) (Temporal)   Resp 16   Ht 170.2 cm (67.01\")   Wt 84.4 kg (186 lb)   SpO2 98%   BMI 29.12 kg/m²    Physical Exam  Vitals reviewed.   Constitutional:       Appearance: He is well-developed.   HENT:      Head: Normocephalic and atraumatic.   Eyes:      Conjunctiva/sclera: Conjunctivae normal.      Pupils: Pupils are equal, round, and reactive to light.   Cardiovascular:      Rate and Rhythm: Normal rate and regular rhythm.      Heart sounds: Normal heart sounds.   Pulmonary:      Effort: Pulmonary effort is normal.      Breath sounds: Normal breath sounds.   Abdominal:      General: Bowel sounds are normal.      Palpations: Abdomen is soft.   Musculoskeletal:      Cervical back: Normal range of motion and neck supple.   Skin:     General: Skin is warm and dry.   Neurological:      Mental Status: He is alert and oriented to person, place, and time.      Deep Tendon Reflexes: Reflexes are normal and symmetric.         Assessment/Plan    Diagnoses and all orders for this visit:    1. Medicare annual wellness visit, subsequent (Primary)  See associated note   2. Need for influenza vaccination  -     FluLaval >6 Months ([id number])    3. Essential hypertension  Well controlled, BP goal for age is <140/90 per JNC 8 guidelines and continue current medications    4. Chronic systolic heart failure (CMS/HCC)  He continues on BB without acute symptoms at this time.     5. CKD (chronic kidney disease) stage 2, GFR 60-89 ml/min  Labs to monitor     6. Acquired hypothyroidism  Labs to monitor    7. Impaired glucose tolerance  -     Comprehensive metabolic panel; Future    8. Moderate episode of recurrent major depressive disorder (CMS/HCC)  Stable on current therapy    9.Overweight (BMI 25.0-29.9)  He is congratulated on successful weight loss.  He is encouraged to continue diet and exercise changes.     An After Visit Summary was " printed and given to the patient at discharge.  Return in about 6 months (around 3/16/2022). Sooner if problems arise.          Erika POOLE. 9/16/2021   Electronically Signed

## 2021-09-16 NOTE — PATIENT INSTRUCTIONS
Medicare Wellness  Personal Prevention Plan of Service     Date of Office Visit:  2021  Encounter Provider:  VIRGILIO Srinivasan  Place of Service:  [facility]  Patient Name: Ziyad Wilson  :  1965    As part of the Medicare Wellness portion of your visit today, we are providing you with this personalized preventive plan of services (PPPS). This plan is based upon recommendations of the [place] Preventive Services Task Force (USPSTF) and the Advisory Committee on Immunization Practices (ACIP).    This lists the preventive care services that should be considered, and provides dates of when you are due. Items listed as completed are up-to-date and do not require any further intervention.    Health Maintenance   Topic Date Due   • ZOSTER VACCINE (1 of 2) Never done   • Pneumococcal Vaccine 0-64 (1 of 2 - PPSV23) 07/10/2020   • ANNUAL WELLNESS VISIT  05/15/2021   • INFLUENZA VACCINE  10/01/2021   • LIPID PANEL  2022   • TDAP/TD VACCINES (2 - Tdap) 2024   • COLORECTAL CANCER SCREENING  2028   • HEPATITIS C SCREENING  Completed   • COVID-19 Vaccine  Completed       Orders Placed This Encounter   Procedures   • FluLaval >6 Months ([id number])   • Comprehensive metabolic panel     Standing Status:   Future     Standing Expiration Date:   2022     Order Specific Question:   Release to patient     Answer:   Immediate       Return in about 6 months (around 3/16/2022).

## 2022-01-12 DIAGNOSIS — F33.1 MODERATE EPISODE OF RECURRENT MAJOR DEPRESSIVE DISORDER: ICD-10-CM

## 2022-01-12 DIAGNOSIS — F51.01 PRIMARY INSOMNIA: ICD-10-CM

## 2022-01-13 RX ORDER — AMITRIPTYLINE HYDROCHLORIDE 50 MG/1
TABLET, FILM COATED ORAL
Qty: 90 TABLET | Refills: 1 | Status: SHIPPED | OUTPATIENT
Start: 2022-01-13

## 2022-01-18 DIAGNOSIS — F33.1 MODERATE EPISODE OF RECURRENT MAJOR DEPRESSIVE DISORDER: ICD-10-CM

## 2022-01-18 DIAGNOSIS — I25.119 CORONARY ARTERY DISEASE INVOLVING NATIVE CORONARY ARTERY OF NATIVE HEART WITH ANGINA PECTORIS: ICD-10-CM

## 2022-01-18 DIAGNOSIS — F51.01 PRIMARY INSOMNIA: ICD-10-CM

## 2022-01-19 RX ORDER — FUROSEMIDE 40 MG/1
TABLET ORAL
Qty: 180 TABLET | Refills: 3 | OUTPATIENT
Start: 2022-01-19

## 2022-01-19 RX ORDER — ESCITALOPRAM OXALATE 20 MG/1
20 TABLET ORAL DAILY
Qty: 90 TABLET | Refills: 3 | Status: SHIPPED | OUTPATIENT
Start: 2022-01-19

## 2022-01-19 RX ORDER — QUETIAPINE FUMARATE 100 MG/1
TABLET, FILM COATED ORAL
Qty: 90 TABLET | Refills: 1 | OUTPATIENT
Start: 2022-01-19

## 2022-01-19 RX ORDER — ATORVASTATIN CALCIUM 40 MG/1
TABLET, FILM COATED ORAL
Qty: 90 TABLET | Refills: 3 | OUTPATIENT
Start: 2022-01-19

## 2022-01-27 DIAGNOSIS — E03.9 ACQUIRED HYPOTHYROIDISM: ICD-10-CM

## 2022-01-28 RX ORDER — LEVOTHYROXINE SODIUM 0.07 MG/1
TABLET ORAL
Qty: 90 TABLET | Refills: 1 | OUTPATIENT
Start: 2022-01-28

## 2022-01-31 DIAGNOSIS — F33.1 MODERATE EPISODE OF RECURRENT MAJOR DEPRESSIVE DISORDER: ICD-10-CM

## 2022-02-01 RX ORDER — BUPROPION HYDROCHLORIDE 300 MG/1
300 TABLET ORAL DAILY
Qty: 90 TABLET | Refills: 3 | Status: SHIPPED | OUTPATIENT
Start: 2022-02-01

## 2022-02-01 RX ORDER — CARVEDILOL 25 MG/1
50 TABLET ORAL 2 TIMES DAILY
Qty: 360 TABLET | Refills: 3 | Status: SHIPPED | OUTPATIENT
Start: 2022-02-01 | End: 2022-11-28

## 2022-02-10 DIAGNOSIS — K21.9 GASTROESOPHAGEAL REFLUX DISEASE, UNSPECIFIED WHETHER ESOPHAGITIS PRESENT: ICD-10-CM

## 2022-02-11 RX ORDER — OMEPRAZOLE 20 MG/1
20 CAPSULE, DELAYED RELEASE ORAL DAILY
Qty: 90 CAPSULE | Refills: 3 | Status: SHIPPED | OUTPATIENT
Start: 2022-02-11

## 2022-02-16 DIAGNOSIS — F51.01 PRIMARY INSOMNIA: ICD-10-CM

## 2022-02-16 DIAGNOSIS — I25.119 CORONARY ARTERY DISEASE INVOLVING NATIVE CORONARY ARTERY OF NATIVE HEART WITH ANGINA PECTORIS: ICD-10-CM

## 2022-02-17 RX ORDER — ATORVASTATIN CALCIUM 40 MG/1
40 TABLET, FILM COATED ORAL DAILY
Qty: 90 TABLET | Refills: 3 | Status: SHIPPED | OUTPATIENT
Start: 2022-02-17

## 2022-02-17 RX ORDER — QUETIAPINE FUMARATE 100 MG/1
100 TABLET, FILM COATED ORAL
Qty: 90 TABLET | Refills: 1 | Status: SHIPPED | OUTPATIENT
Start: 2022-02-17

## 2022-02-17 RX ORDER — FUROSEMIDE 40 MG/1
TABLET ORAL
Qty: 180 TABLET | Refills: 3 | Status: SHIPPED | OUTPATIENT
Start: 2022-02-17

## 2022-02-21 DIAGNOSIS — I10 ESSENTIAL HYPERTENSION: ICD-10-CM

## 2022-02-22 RX ORDER — AMLODIPINE AND VALSARTAN 5; 160 MG/1; MG/1
TABLET ORAL
Qty: 90 TABLET | Refills: 3 | OUTPATIENT
Start: 2022-02-22

## 2022-07-04 DIAGNOSIS — F51.01 PRIMARY INSOMNIA: ICD-10-CM

## 2022-07-05 RX ORDER — QUETIAPINE FUMARATE 100 MG/1
TABLET, FILM COATED ORAL
Qty: 90 TABLET | Refills: 1 | OUTPATIENT
Start: 2022-07-05

## 2022-11-14 DIAGNOSIS — F33.1 MODERATE EPISODE OF RECURRENT MAJOR DEPRESSIVE DISORDER: ICD-10-CM

## 2022-11-14 RX ORDER — ESCITALOPRAM OXALATE 20 MG/1
20 TABLET ORAL DAILY
Qty: 90 TABLET | Refills: 3 | OUTPATIENT
Start: 2022-11-14

## 2022-11-28 RX ORDER — CARVEDILOL 25 MG/1
50 TABLET ORAL 2 TIMES DAILY
Qty: 60 TABLET | Refills: 0 | Status: SHIPPED | OUTPATIENT
Start: 2022-11-28

## 2022-11-29 DIAGNOSIS — F33.1 MODERATE EPISODE OF RECURRENT MAJOR DEPRESSIVE DISORDER: ICD-10-CM

## 2022-11-29 RX ORDER — BUPROPION HYDROCHLORIDE 300 MG/1
TABLET ORAL
Qty: 90 TABLET | Refills: 3 | OUTPATIENT
Start: 2022-11-29

## 2022-12-01 NOTE — TELEPHONE ENCOUNTER
PATIENT HAS BEEN CALLED, HE STATED THAT HE NO LONGER COMES HERE.  HE HAS MOVED AND STATED WE SHOULD NOT HAVE GOTTEN THAT REFILL REQUEST.

## 2022-12-06 DIAGNOSIS — K21.9 GASTROESOPHAGEAL REFLUX DISEASE, UNSPECIFIED WHETHER ESOPHAGITIS PRESENT: ICD-10-CM

## 2022-12-06 RX ORDER — OMEPRAZOLE 20 MG/1
CAPSULE, DELAYED RELEASE ORAL
Qty: 90 CAPSULE | Refills: 3 | OUTPATIENT
Start: 2022-12-06

## 2022-12-06 RX ORDER — FUROSEMIDE 40 MG/1
TABLET ORAL
Qty: 180 TABLET | Refills: 3 | OUTPATIENT
Start: 2022-12-06

## 2022-12-07 DIAGNOSIS — F33.1 MODERATE EPISODE OF RECURRENT MAJOR DEPRESSIVE DISORDER: ICD-10-CM

## 2022-12-07 RX ORDER — ESCITALOPRAM OXALATE 20 MG/1
TABLET ORAL
Qty: 90 TABLET | Refills: 3 | OUTPATIENT
Start: 2022-12-07

## 2022-12-09 DIAGNOSIS — K21.9 GASTROESOPHAGEAL REFLUX DISEASE, UNSPECIFIED WHETHER ESOPHAGITIS PRESENT: ICD-10-CM

## 2022-12-09 RX ORDER — OMEPRAZOLE 20 MG/1
CAPSULE, DELAYED RELEASE ORAL
Qty: 90 CAPSULE | Refills: 3 | OUTPATIENT
Start: 2022-12-09

## 2022-12-09 RX ORDER — FUROSEMIDE 40 MG/1
TABLET ORAL
Qty: 180 TABLET | Refills: 3 | OUTPATIENT
Start: 2022-12-09

## 2022-12-28 DIAGNOSIS — F33.1 MODERATE EPISODE OF RECURRENT MAJOR DEPRESSIVE DISORDER: ICD-10-CM

## 2022-12-28 RX ORDER — BUPROPION HYDROCHLORIDE 300 MG/1
TABLET ORAL
Qty: 90 TABLET | Refills: 3 | OUTPATIENT
Start: 2022-12-28

## 2023-01-03 RX ORDER — CARVEDILOL 25 MG/1
TABLET ORAL
Qty: 120 TABLET | OUTPATIENT
Start: 2023-01-03

## 2023-01-04 DIAGNOSIS — K21.9 GASTROESOPHAGEAL REFLUX DISEASE, UNSPECIFIED WHETHER ESOPHAGITIS PRESENT: ICD-10-CM

## 2023-01-05 RX ORDER — OMEPRAZOLE 20 MG/1
CAPSULE, DELAYED RELEASE ORAL
Qty: 90 CAPSULE | Refills: 3 | OUTPATIENT
Start: 2023-01-05

## 2023-01-05 RX ORDER — FUROSEMIDE 40 MG/1
TABLET ORAL
Qty: 180 TABLET | Refills: 3 | OUTPATIENT
Start: 2023-01-05

## 2023-02-20 DIAGNOSIS — I25.119 CORONARY ARTERY DISEASE INVOLVING NATIVE CORONARY ARTERY OF NATIVE HEART WITH ANGINA PECTORIS: ICD-10-CM

## 2023-02-20 RX ORDER — ATORVASTATIN CALCIUM 40 MG/1
40 TABLET, FILM COATED ORAL DAILY
Qty: 90 TABLET | Refills: 3 | OUTPATIENT
Start: 2023-02-20

## 2023-03-08 DIAGNOSIS — I25.119 CORONARY ARTERY DISEASE INVOLVING NATIVE CORONARY ARTERY OF NATIVE HEART WITH ANGINA PECTORIS: ICD-10-CM

## 2023-03-08 RX ORDER — ATORVASTATIN CALCIUM 40 MG/1
TABLET, FILM COATED ORAL
Qty: 90 TABLET | Refills: 3 | OUTPATIENT
Start: 2023-03-08

## 2024-07-22 ENCOUNTER — HOSPITAL ENCOUNTER (INPATIENT)
Facility: HOSPITAL | Age: 59
LOS: 11 days | Discharge: HOME | End: 2024-08-02
Attending: EMERGENCY MEDICINE | Admitting: FAMILY MEDICINE
Payer: MEDICARE

## 2024-07-22 ENCOUNTER — APPOINTMENT (OUTPATIENT)
Dept: CT IMAGING | Facility: HOSPITAL | Age: 59
End: 2024-07-22
Payer: MEDICARE

## 2024-07-22 ENCOUNTER — APPOINTMENT (OUTPATIENT)
Dept: GENERAL RADIOLOGY | Facility: HOSPITAL | Age: 59
End: 2024-07-22
Payer: MEDICARE

## 2024-07-22 DIAGNOSIS — K21.9 GASTROESOPHAGEAL REFLUX DISEASE, UNSPECIFIED WHETHER ESOPHAGITIS PRESENT: ICD-10-CM

## 2024-07-22 DIAGNOSIS — I25.119 CORONARY ARTERY DISEASE INVOLVING NATIVE CORONARY ARTERY OF NATIVE HEART WITH ANGINA PECTORIS: ICD-10-CM

## 2024-07-22 DIAGNOSIS — M19.011 ARTHRITIS OF RIGHT SHOULDER REGION: ICD-10-CM

## 2024-07-22 DIAGNOSIS — R73.02 IMPAIRED GLUCOSE TOLERANCE: ICD-10-CM

## 2024-07-22 DIAGNOSIS — E03.9 ACQUIRED HYPOTHYROIDISM: ICD-10-CM

## 2024-07-22 DIAGNOSIS — D50.9 IRON DEFICIENCY ANEMIA, UNSPECIFIED IRON DEFICIENCY ANEMIA TYPE: ICD-10-CM

## 2024-07-22 DIAGNOSIS — R07.89 CHEST PAIN, ATYPICAL: ICD-10-CM

## 2024-07-22 DIAGNOSIS — I10 ESSENTIAL HYPERTENSION: ICD-10-CM

## 2024-07-22 DIAGNOSIS — E78.2 MIXED HYPERLIPIDEMIA: ICD-10-CM

## 2024-07-22 DIAGNOSIS — G47.30 SLEEP APNEA, UNSPECIFIED TYPE: ICD-10-CM

## 2024-07-22 DIAGNOSIS — I50.21 ACUTE SYSTOLIC CHF (CONGESTIVE HEART FAILURE): ICD-10-CM

## 2024-07-22 DIAGNOSIS — N18.2 CKD (CHRONIC KIDNEY DISEASE) STAGE 2, GFR 60-89 ML/MIN: ICD-10-CM

## 2024-07-22 DIAGNOSIS — J45.22 MILD INTERMITTENT ASTHMA WITH STATUS ASTHMATICUS: ICD-10-CM

## 2024-07-22 DIAGNOSIS — F12.20 TETRAHYDROCANNABINOL (THC) DEPENDENCE: ICD-10-CM

## 2024-07-22 DIAGNOSIS — I42.8 NICM (NONISCHEMIC CARDIOMYOPATHY): Chronic | ICD-10-CM

## 2024-07-22 DIAGNOSIS — Z74.09 IMPAIRED MOBILITY: ICD-10-CM

## 2024-07-22 DIAGNOSIS — G31.84 MILD COGNITIVE IMPAIRMENT: ICD-10-CM

## 2024-07-22 DIAGNOSIS — F33.1 MODERATE EPISODE OF RECURRENT MAJOR DEPRESSIVE DISORDER: ICD-10-CM

## 2024-07-22 DIAGNOSIS — I50.22 CHRONIC SYSTOLIC HEART FAILURE: ICD-10-CM

## 2024-07-22 DIAGNOSIS — F51.01 PRIMARY INSOMNIA: ICD-10-CM

## 2024-07-22 DIAGNOSIS — J81.0 ACUTE PULMONARY EDEMA: ICD-10-CM

## 2024-07-22 DIAGNOSIS — R51.9 CHRONIC DAILY HEADACHE: ICD-10-CM

## 2024-07-22 DIAGNOSIS — I48.91 ATRIAL FIBRILLATION WITH RVR: Primary | ICD-10-CM

## 2024-07-22 DIAGNOSIS — M47.812 CERVICAL FACET JOINT SYNDROME: ICD-10-CM

## 2024-07-22 DIAGNOSIS — I34.0 SEVERE MITRAL REGURGITATION: ICD-10-CM

## 2024-07-22 DIAGNOSIS — E66.09 CLASS 1 OBESITY DUE TO EXCESS CALORIES WITH SERIOUS COMORBIDITY AND BODY MASS INDEX (BMI) OF 30.0 TO 30.9 IN ADULT: ICD-10-CM

## 2024-07-22 LAB
ALBUMIN SERPL-MCNC: 3.8 G/DL (ref 3.5–5.2)
ALBUMIN/GLOB SERPL: 1.2 G/DL
ALP SERPL-CCNC: 65 U/L (ref 39–117)
ALT SERPL W P-5'-P-CCNC: 28 U/L (ref 1–41)
ANION GAP SERPL CALCULATED.3IONS-SCNC: 11 MMOL/L (ref 5–15)
APTT PPP: 31.3 SECONDS (ref 24.5–36)
ARTERIAL PATENCY WRIST A: POSITIVE
AST SERPL-CCNC: 25 U/L (ref 1–40)
ATMOSPHERIC PRESS: 752 MMHG
BASE EXCESS BLDA CALC-SCNC: -0.4 MMOL/L (ref 0–2)
BASOPHILS # BLD AUTO: 0.04 10*3/MM3 (ref 0–0.2)
BASOPHILS NFR BLD AUTO: 0.4 % (ref 0–1.5)
BDY SITE: ABNORMAL
BILIRUB SERPL-MCNC: 0.3 MG/DL (ref 0–1.2)
BODY TEMPERATURE: 37
BUN SERPL-MCNC: 21 MG/DL (ref 8–23)
BUN/CREAT SERPL: 14 (ref 7–25)
CA-I BLD-MCNC: 4.57 MG/DL (ref 4.6–5.4)
CALCIUM SPEC-SCNC: 8.6 MG/DL (ref 8.6–10.5)
CHLORIDE SERPL-SCNC: 104 MMOL/L (ref 98–107)
CO2 SERPL-SCNC: 26 MMOL/L (ref 22–29)
COHGB MFR BLD: 0.1 % (ref 0–5)
CREAT SERPL-MCNC: 1.5 MG/DL (ref 0.76–1.27)
D-LACTATE SERPL-SCNC: 1.1 MMOL/L (ref 0.5–2)
DEPRECATED RDW RBC AUTO: 49 FL (ref 37–54)
EGFRCR SERPLBLD CKD-EPI 2021: 53 ML/MIN/1.73
EOSINOPHIL # BLD AUTO: 0.25 10*3/MM3 (ref 0–0.4)
EOSINOPHIL NFR BLD AUTO: 2.2 % (ref 0.3–6.2)
ERYTHROCYTE [DISTWIDTH] IN BLOOD BY AUTOMATED COUNT: 17.8 % (ref 12.3–15.4)
GLOBULIN UR ELPH-MCNC: 3.2 GM/DL
GLUCOSE SERPL-MCNC: 112 MG/DL (ref 65–99)
HCO3 BLDA-SCNC: 23.6 MMOL/L (ref 20–26)
HCT VFR BLD AUTO: 40.1 % (ref 37.5–51)
HCT VFR BLD CALC: 36.8 % (ref 38–51)
HGB BLD-MCNC: 11.7 G/DL (ref 13–17.7)
HGB BLDA-MCNC: 12 G/DL (ref 14–18)
HOLD SPECIMEN: NORMAL
IMM GRANULOCYTES # BLD AUTO: 0.05 10*3/MM3 (ref 0–0.05)
IMM GRANULOCYTES NFR BLD AUTO: 0.4 % (ref 0–0.5)
INR PPP: 1.01 (ref 0.91–1.09)
LYMPHOCYTES # BLD AUTO: 1.1 10*3/MM3 (ref 0.7–3.1)
LYMPHOCYTES NFR BLD AUTO: 9.8 % (ref 19.6–45.3)
Lab: ABNORMAL
MAGNESIUM SERPL-MCNC: 1.6 MG/DL (ref 1.6–2.4)
MCH RBC QN AUTO: 22.5 PG (ref 26.6–33)
MCHC RBC AUTO-ENTMCNC: 29.2 G/DL (ref 31.5–35.7)
MCV RBC AUTO: 77.3 FL (ref 79–97)
METHGB BLD QL: 0.4 % (ref 0–3)
MODALITY: ABNORMAL
MONOCYTES # BLD AUTO: 1.27 10*3/MM3 (ref 0.1–0.9)
MONOCYTES NFR BLD AUTO: 11.3 % (ref 5–12)
NEUTROPHILS NFR BLD AUTO: 75.9 % (ref 42.7–76)
NEUTROPHILS NFR BLD AUTO: 8.48 10*3/MM3 (ref 1.7–7)
NRBC BLD AUTO-RTO: 0 /100 WBC (ref 0–0.2)
NT-PROBNP SERPL-MCNC: 602.9 PG/ML (ref 0–900)
OXYHGB MFR BLDV: 93 % (ref 94–99)
PCO2 BLDA: 35.4 MM HG (ref 35–45)
PCO2 TEMP ADJ BLD: 35.4 MM HG (ref 35–45)
PH BLDA: 7.43 PH UNITS (ref 7.35–7.45)
PH, TEMP CORRECTED: 7.43 PH UNITS (ref 7.35–7.45)
PLATELET # BLD AUTO: 234 10*3/MM3 (ref 140–450)
PMV BLD AUTO: 10.3 FL (ref 6–12)
PO2 BLDA: 72.1 MM HG (ref 83–108)
PO2 TEMP ADJ BLD: 72.1 MM HG (ref 83–108)
POTASSIUM BLDA-SCNC: 3.4 MMOL/L (ref 3.5–5.2)
POTASSIUM SERPL-SCNC: 3.6 MMOL/L (ref 3.5–5.2)
PROCALCITONIN SERPL-MCNC: 0.06 NG/ML (ref 0–0.25)
PROT SERPL-MCNC: 7 G/DL (ref 6–8.5)
PROTHROMBIN TIME: 13.7 SECONDS (ref 11.8–14.8)
RBC # BLD AUTO: 5.19 10*6/MM3 (ref 4.14–5.8)
SAO2 % BLDCOA: 93.5 % (ref 94–99)
SODIUM BLDA-SCNC: 143 MMOL/L (ref 136–145)
SODIUM SERPL-SCNC: 141 MMOL/L (ref 136–145)
T4 FREE SERPL-MCNC: 1.01 NG/DL (ref 0.93–1.7)
TROPONIN T SERPL HS-MCNC: 23 NG/L
TSH SERPL DL<=0.05 MIU/L-ACNC: 7.34 UIU/ML (ref 0.27–4.2)
VENTILATOR MODE: ABNORMAL
WBC NRBC COR # BLD AUTO: 11.19 10*3/MM3 (ref 3.4–10.8)
WHOLE BLOOD HOLD COAG: NORMAL
WHOLE BLOOD HOLD SPECIMEN: NORMAL

## 2024-07-22 PROCEDURE — 84443 ASSAY THYROID STIM HORMONE: CPT

## 2024-07-22 PROCEDURE — 84484 ASSAY OF TROPONIN QUANT: CPT

## 2024-07-22 PROCEDURE — 80053 COMPREHEN METABOLIC PANEL: CPT

## 2024-07-22 PROCEDURE — 85025 COMPLETE CBC W/AUTO DIFF WBC: CPT

## 2024-07-22 PROCEDURE — 25010000002 FUROSEMIDE PER 20 MG: Performed by: INTERNAL MEDICINE

## 2024-07-22 PROCEDURE — 82805 BLOOD GASES W/O2 SATURATION: CPT

## 2024-07-22 PROCEDURE — 84145 PROCALCITONIN (PCT): CPT

## 2024-07-22 PROCEDURE — 94761 N-INVAS EAR/PLS OXIMETRY MLT: CPT

## 2024-07-22 PROCEDURE — 25510000001 IOPAMIDOL PER 1 ML: Performed by: EMERGENCY MEDICINE

## 2024-07-22 PROCEDURE — 85730 THROMBOPLASTIN TIME PARTIAL: CPT

## 2024-07-22 PROCEDURE — 93005 ELECTROCARDIOGRAM TRACING: CPT

## 2024-07-22 PROCEDURE — 83605 ASSAY OF LACTIC ACID: CPT

## 2024-07-22 PROCEDURE — 82375 ASSAY CARBOXYHB QUANT: CPT

## 2024-07-22 PROCEDURE — 36600 WITHDRAWAL OF ARTERIAL BLOOD: CPT

## 2024-07-22 PROCEDURE — 25010000002 ENOXAPARIN PER 10 MG: Performed by: INTERNAL MEDICINE

## 2024-07-22 PROCEDURE — 99285 EMERGENCY DEPT VISIT HI MDM: CPT

## 2024-07-22 PROCEDURE — 25010000002 CEFTRIAXONE PER 250 MG

## 2024-07-22 PROCEDURE — 71275 CT ANGIOGRAPHY CHEST: CPT

## 2024-07-22 PROCEDURE — 83880 ASSAY OF NATRIURETIC PEPTIDE: CPT

## 2024-07-22 PROCEDURE — 83735 ASSAY OF MAGNESIUM: CPT

## 2024-07-22 PROCEDURE — 36415 COLL VENOUS BLD VENIPUNCTURE: CPT

## 2024-07-22 PROCEDURE — 85610 PROTHROMBIN TIME: CPT

## 2024-07-22 PROCEDURE — 25010000002 AMIODARONE IN DEXTROSE 5% 150-4.21 MG/100ML-% SOLUTION: Performed by: INTERNAL MEDICINE

## 2024-07-22 PROCEDURE — 83050 HGB METHEMOGLOBIN QUAN: CPT

## 2024-07-22 PROCEDURE — 87040 BLOOD CULTURE FOR BACTERIA: CPT

## 2024-07-22 PROCEDURE — 71045 X-RAY EXAM CHEST 1 VIEW: CPT

## 2024-07-22 PROCEDURE — 84439 ASSAY OF FREE THYROXINE: CPT

## 2024-07-22 PROCEDURE — 93010 ELECTROCARDIOGRAM REPORT: CPT | Performed by: INTERNAL MEDICINE

## 2024-07-22 RX ORDER — BENZONATATE 100 MG/1
100 CAPSULE ORAL 3 TIMES DAILY PRN
Status: DISCONTINUED | OUTPATIENT
Start: 2024-07-22 | End: 2024-08-02 | Stop reason: HOSPADM

## 2024-07-22 RX ORDER — ALBUTEROL SULFATE 2.5 MG/3ML
2.5 SOLUTION RESPIRATORY (INHALATION) EVERY 4 HOURS PRN
Status: DISCONTINUED | OUTPATIENT
Start: 2024-07-22 | End: 2024-07-23

## 2024-07-22 RX ORDER — ESCITALOPRAM OXALATE 10 MG/1
20 TABLET ORAL DAILY
Status: DISCONTINUED | OUTPATIENT
Start: 2024-07-23 | End: 2024-07-27

## 2024-07-22 RX ORDER — ATORVASTATIN CALCIUM 40 MG/1
40 TABLET, FILM COATED ORAL NIGHTLY
Status: DISCONTINUED | OUTPATIENT
Start: 2024-07-23 | End: 2024-08-02 | Stop reason: HOSPADM

## 2024-07-22 RX ORDER — LEVOTHYROXINE SODIUM 0.07 MG/1
75 TABLET ORAL DAILY
Status: DISCONTINUED | OUTPATIENT
Start: 2024-07-23 | End: 2024-07-29

## 2024-07-22 RX ORDER — ENOXAPARIN SODIUM 100 MG/ML
1 INJECTION SUBCUTANEOUS EVERY 12 HOURS
Status: DISCONTINUED | OUTPATIENT
Start: 2024-07-22 | End: 2024-07-23

## 2024-07-22 RX ORDER — NITROGLYCERIN 0.4 MG/1
0.4 TABLET SUBLINGUAL
Status: DISCONTINUED | OUTPATIENT
Start: 2024-07-22 | End: 2024-07-22 | Stop reason: SDUPTHER

## 2024-07-22 RX ORDER — VALSARTAN 80 MG/1
160 TABLET ORAL
Status: DISCONTINUED | OUTPATIENT
Start: 2024-07-23 | End: 2024-07-23

## 2024-07-22 RX ORDER — DILTIAZEM HCL/D5W 125 MG/125
5-15 PLASTIC BAG, INJECTION (ML) INTRAVENOUS
Status: DISCONTINUED | OUTPATIENT
Start: 2024-07-22 | End: 2024-07-23

## 2024-07-22 RX ORDER — FLUTICASONE PROPIONATE 50 MCG
2 SPRAY, SUSPENSION (ML) NASAL DAILY
Status: DISCONTINUED | OUTPATIENT
Start: 2024-07-23 | End: 2024-08-02 | Stop reason: HOSPADM

## 2024-07-22 RX ORDER — SODIUM CHLORIDE 0.9 % (FLUSH) 0.9 %
10 SYRINGE (ML) INJECTION AS NEEDED
Status: DISCONTINUED | OUTPATIENT
Start: 2024-07-22 | End: 2024-07-25 | Stop reason: SDUPTHER

## 2024-07-22 RX ORDER — QUETIAPINE FUMARATE 100 MG/1
100 TABLET, FILM COATED ORAL NIGHTLY
Status: DISCONTINUED | OUTPATIENT
Start: 2024-07-22 | End: 2024-08-02 | Stop reason: HOSPADM

## 2024-07-22 RX ORDER — SODIUM CHLORIDE 9 MG/ML
40 INJECTION, SOLUTION INTRAVENOUS AS NEEDED
Status: DISCONTINUED | OUTPATIENT
Start: 2024-07-22 | End: 2024-08-02 | Stop reason: HOSPADM

## 2024-07-22 RX ORDER — BISACODYL 5 MG/1
5 TABLET, DELAYED RELEASE ORAL DAILY PRN
Status: DISCONTINUED | OUTPATIENT
Start: 2024-07-22 | End: 2024-08-02 | Stop reason: HOSPADM

## 2024-07-22 RX ORDER — BISACODYL 10 MG
10 SUPPOSITORY, RECTAL RECTAL DAILY PRN
Status: DISCONTINUED | OUTPATIENT
Start: 2024-07-22 | End: 2024-08-02 | Stop reason: HOSPADM

## 2024-07-22 RX ORDER — AMITRIPTYLINE HYDROCHLORIDE 25 MG/1
50 TABLET, FILM COATED ORAL EVERY EVENING
Status: DISCONTINUED | OUTPATIENT
Start: 2024-07-22 | End: 2024-07-24

## 2024-07-22 RX ORDER — SPIRONOLACTONE 25 MG/1
25 TABLET ORAL DAILY
Status: DISCONTINUED | OUTPATIENT
Start: 2024-07-23 | End: 2024-07-30

## 2024-07-22 RX ORDER — ENOXAPARIN SODIUM 100 MG/ML
40 INJECTION SUBCUTANEOUS DAILY
Status: DISCONTINUED | OUTPATIENT
Start: 2024-07-23 | End: 2024-07-23

## 2024-07-22 RX ORDER — ALBUTEROL SULFATE 90 UG/1
2 AEROSOL, METERED RESPIRATORY (INHALATION) EVERY 4 HOURS PRN
Status: DISCONTINUED | OUTPATIENT
Start: 2024-07-22 | End: 2024-07-22 | Stop reason: CLARIF

## 2024-07-22 RX ORDER — BUPROPION HYDROCHLORIDE 150 MG/1
300 TABLET ORAL DAILY
Status: DISCONTINUED | OUTPATIENT
Start: 2024-07-23 | End: 2024-08-02 | Stop reason: HOSPADM

## 2024-07-22 RX ORDER — SODIUM CHLORIDE 0.9 % (FLUSH) 0.9 %
10 SYRINGE (ML) INJECTION AS NEEDED
Status: DISCONTINUED | OUTPATIENT
Start: 2024-07-22 | End: 2024-08-02 | Stop reason: HOSPADM

## 2024-07-22 RX ORDER — PANTOPRAZOLE SODIUM 40 MG/1
40 TABLET, DELAYED RELEASE ORAL
Status: DISCONTINUED | OUTPATIENT
Start: 2024-07-23 | End: 2024-08-02 | Stop reason: HOSPADM

## 2024-07-22 RX ORDER — AMLODIPINE BESYLATE 5 MG/1
5 TABLET ORAL
Status: DISCONTINUED | OUTPATIENT
Start: 2024-07-23 | End: 2024-07-23

## 2024-07-22 RX ORDER — BUDESONIDE AND FORMOTEROL FUMARATE DIHYDRATE 160; 4.5 UG/1; UG/1
1 AEROSOL RESPIRATORY (INHALATION)
Status: DISCONTINUED | OUTPATIENT
Start: 2024-07-22 | End: 2024-08-02 | Stop reason: HOSPADM

## 2024-07-22 RX ORDER — MULTIPLE VITAMINS W/ MINERALS TAB 9MG-400MCG
1 TAB ORAL DAILY
Status: DISCONTINUED | OUTPATIENT
Start: 2024-07-23 | End: 2024-08-02 | Stop reason: HOSPADM

## 2024-07-22 RX ORDER — CARVEDILOL 25 MG/1
50 TABLET ORAL 2 TIMES DAILY
Status: DISCONTINUED | OUTPATIENT
Start: 2024-07-22 | End: 2024-07-25

## 2024-07-22 RX ORDER — AMOXICILLIN 250 MG
2 CAPSULE ORAL 2 TIMES DAILY PRN
Status: DISCONTINUED | OUTPATIENT
Start: 2024-07-22 | End: 2024-08-02 | Stop reason: HOSPADM

## 2024-07-22 RX ORDER — BUTALBITAL, ACETAMINOPHEN AND CAFFEINE 50; 325; 40 MG/1; MG/1; MG/1
1 TABLET ORAL DAILY PRN
Status: DISCONTINUED | OUTPATIENT
Start: 2024-07-22 | End: 2024-08-02 | Stop reason: HOSPADM

## 2024-07-22 RX ORDER — POLYETHYLENE GLYCOL 3350 17 G/17G
17 POWDER, FOR SOLUTION ORAL DAILY PRN
Status: DISCONTINUED | OUTPATIENT
Start: 2024-07-22 | End: 2024-08-02 | Stop reason: HOSPADM

## 2024-07-22 RX ORDER — DILTIAZEM HYDROCHLORIDE 5 MG/ML
10 INJECTION INTRAVENOUS ONCE
Status: COMPLETED | OUTPATIENT
Start: 2024-07-22 | End: 2024-07-22

## 2024-07-22 RX ORDER — FUROSEMIDE 40 MG/1
40 TABLET ORAL
Status: DISCONTINUED | OUTPATIENT
Start: 2024-07-22 | End: 2024-07-23

## 2024-07-22 RX ORDER — FUROSEMIDE 10 MG/ML
40 INJECTION INTRAMUSCULAR; INTRAVENOUS EVERY 12 HOURS SCHEDULED
Status: DISCONTINUED | OUTPATIENT
Start: 2024-07-22 | End: 2024-07-23

## 2024-07-22 RX ORDER — SODIUM CHLORIDE 0.9 % (FLUSH) 0.9 %
10 SYRINGE (ML) INJECTION EVERY 12 HOURS SCHEDULED
Status: DISCONTINUED | OUTPATIENT
Start: 2024-07-22 | End: 2024-08-02 | Stop reason: HOSPADM

## 2024-07-22 RX ORDER — ASPIRIN 325 MG
325 TABLET ORAL DAILY
Status: DISCONTINUED | OUTPATIENT
Start: 2024-07-23 | End: 2024-07-23

## 2024-07-22 RX ORDER — NITROGLYCERIN 0.4 MG/1
0.4 TABLET SUBLINGUAL
Status: DISCONTINUED | OUTPATIENT
Start: 2024-07-22 | End: 2024-08-02 | Stop reason: HOSPADM

## 2024-07-22 RX ADMIN — FUROSEMIDE 40 MG: 10 INJECTION, SOLUTION INTRAVENOUS at 21:01

## 2024-07-22 RX ADMIN — AMITRIPTYLINE HYDROCHLORIDE 50 MG: 25 TABLET, FILM COATED ORAL at 22:30

## 2024-07-22 RX ADMIN — ENOXAPARIN SODIUM 90 MG: 100 INJECTION SUBCUTANEOUS at 21:53

## 2024-07-22 RX ADMIN — FUROSEMIDE 40 MG: 40 TABLET ORAL at 21:56

## 2024-07-22 RX ADMIN — IOPAMIDOL 100 ML: 755 INJECTION, SOLUTION INTRAVENOUS at 18:14

## 2024-07-22 RX ADMIN — QUETIAPINE FUMARATE 100 MG: 100 TABLET ORAL at 22:31

## 2024-07-22 RX ADMIN — CARVEDILOL 50 MG: 25 TABLET, FILM COATED ORAL at 22:31

## 2024-07-22 RX ADMIN — AMIODARONE HYDROCHLORIDE 150 MG: 1.5 INJECTION, SOLUTION INTRAVENOUS at 21:05

## 2024-07-22 RX ADMIN — Medication 5 MG/HR: at 19:30

## 2024-07-22 RX ADMIN — DILTIAZEM HYDROCHLORIDE 10 MG: 5 INJECTION INTRAVENOUS at 17:51

## 2024-07-22 RX ADMIN — SODIUM CHLORIDE 1000 MG: 900 INJECTION INTRAVENOUS at 17:50

## 2024-07-22 NOTE — Clinical Note
Level of Care: Telemetry [5]   Diagnosis: Atrial fibrillation with RVR [[id number]]   Admitting Physician: MANINDER DIAZ [[id number]]   Attending Physician: MANINDER DIAZ [[id number]]   Certification: I Certify That Inpatient Hospital Services Are Medically Necessary For Greater Than 2 Midnights

## 2024-07-22 NOTE — ED PROVIDER NOTES
"Subjective   History of Present Illness  Patient is a 59-year-old male that presents to the emergency department by EMS from his primary care provider's office for shortness of breath and new onset A-fib with RVR.  PMH significant for asthma, viral cardiomyopathy, CHF, CAD, GERD, hyperlipidemia, hypertension, hypothyroidism and sleep apnea.  Patient states over the last 2 days he has been experiencing gradually worsening shortness of breath with both exertion and at rest.  He states shortness of breath is accompanied by a dry cough as well as lightheadedness.  Patient states he has been taking his Lasix over the last 3 days to help with fluid as he felt this was the cause of the shortness of breath.  Patient states he has had significant urine output with Lasix administration but reports symptoms did not improve.  He states today he noticed symptoms were worse than the last 2 days and reports that he took 3, 40 mg Lasix prior to arrival that again did not relieve any symptoms.  He states he followed up with his primary care provider and after obtaining EKG he was sent to the ER for further evaluation and treatment.  Patient reports only complaints are shortness of breath and pain with breathing, dry cough and lightheadedness.  He denies any chest pain or palpitations.  Denies any fevers, body aches, chills, or congestion.  Denies any dizziness, blurred vision, headache or near syncope.  Denies any urinary or other neurologic changes.  Denies any history of atrial fibrillation and reports he followed up with cardiology several years ago for his viral cardiomyopathy but was \"cleared by cardiology \". Patient denies any missed doses of any other medications.  States he does not smoke but is a daily marijuana user but reports no use of marijuana in the last 2 days.        Review of Systems   Constitutional:  Positive for activity change.   Respiratory:  Positive for cough and shortness of breath.    Neurological:  " Positive for light-headedness.   All other systems reviewed and are negative.      Past Medical History:   Diagnosis Date    Asthma     Cardiomyopathy     non-ischemic    CHF (congestive heart failure)     Coronary artery disease     Foot pain, bilateral     GERD (gastroesophageal reflux disease)     Headache     Hyperlipemia, mixed     Hypertension     benign    Hypothyroidism     Obesity     Sleep apnea     SOB (shortness of breath)        No Known Allergies    Past Surgical History:   Procedure Laterality Date    APPENDECTOMY      BLADDER REPAIR      CARDIAC CATHETERIZATION  09/2003    COLONOSCOPY N/A 1/9/2018    Procedure: COLONOSCOPY WITH ANESTHESIA;  Surgeon: Dick Espinosa DO;  Location: [facility];  Service:     COLOSTOMY      with colostomy reversal    KNEE SURGERY Right        Family History   Problem Relation Age of Onset    Hypertension Mother     Stroke Mother     Heart disease Mother     Hypertension Father     Heart disease Father     Hypertension Sister     Hypertension Brother        Social History     Socioeconomic History    Marital status:    Tobacco Use    Smoking status: Never    Smokeless tobacco: Never   Substance and Sexual Activity    Alcohol use: Yes     Comment: occasional    Drug use: No    Sexual activity: Defer           Objective   Physical Exam  Vitals and nursing note reviewed.   Constitutional:       Appearance: Normal appearance.      Comments: Nontoxic appearing. In no acute distress.    HENT:      Head: Normocephalic and atraumatic.      Right Ear: External ear normal.      Left Ear: External ear normal.      Nose: Nose normal.      Mouth/Throat:      Mouth: Mucous membranes are moist.      Pharynx: Oropharynx is clear.   Eyes:      Extraocular Movements: Extraocular movements intact.      Conjunctiva/sclera: Conjunctivae normal.      Pupils: Pupils are equal, round, and reactive to light.   Cardiovascular:      Rate and Rhythm: Tachycardia present. Rhythm  irregular.      Pulses: Normal pulses.      Heart sounds: Normal heart sounds.   Pulmonary:      Effort: Pulmonary effort is normal. No respiratory distress.      Breath sounds: Normal breath sounds. No wheezing.   Chest:      Chest wall: No tenderness.   Abdominal:      General: Bowel sounds are normal. There is no distension.      Palpations: Abdomen is soft.      Tenderness: There is no abdominal tenderness. There is no right CVA tenderness, left CVA tenderness, guarding or rebound.   Musculoskeletal:         General: Normal range of motion.      Cervical back: Normal range of motion and neck supple.      Right lower leg: No edema.      Left lower leg: No edema.   Skin:     General: Skin is warm and dry.      Capillary Refill: Capillary refill takes less than 2 seconds.   Neurological:      General: No focal deficit present.      Mental Status: He is alert and oriented to person, place, and time. Mental status is at baseline.   Psychiatric:         Mood and Affect: Mood normal.         Behavior: Behavior normal.         Thought Content: Thought content normal.         Judgment: Judgment normal.       Labs Reviewed   COMPREHENSIVE METABOLIC PANEL - Abnormal; Notable for the following components:       Result Value    Glucose 112 (*)     Creatinine 1.50 (*)     eGFR 53.0 (*)     All other components within normal limits    Narrative:     GFR Normal >60  Chronic Kidney Disease <60  Kidney Failure <15     TSH - Abnormal; Notable for the following components:    TSH 7.340 (*)     All other components within normal limits   CBC WITH AUTO DIFFERENTIAL - Abnormal; Notable for the following components:    WBC 11.19 (*)     Hemoglobin 11.7 (*)     MCV 77.3 (*)     MCH 22.5 (*)     MCHC 29.2 (*)     RDW 17.8 (*)     Lymphocyte % 9.8 (*)     Neutrophils, Absolute 8.48 (*)     Monocytes, Absolute 1.27 (*)     All other components within normal limits   SINGLE HS TROPONIN T - Abnormal; Notable for the following components:     HS Troponin T 23 (*)     All other components within normal limits    Narrative:     High Sensitive Troponin T Reference Range:  <14.0 ng/L- Negative Female for AMI  <22.0 ng/L- Negative Male for AMI  >=14 - Abnormal Female indicating possible myocardial injury.  >=22 - Abnormal Male indicating possible myocardial injury.   Clinicians would have to utilize clinical acumen, EKG, Troponin, and serial changes to determine if it is an Acute Myocardial Infarction or myocardial injury due to an underlying chronic condition.        BLOOD GAS, ARTERIAL W/CO-OXIMETRY - Abnormal; Notable for the following components:    pO2, Arterial 72.1 (*)     Base Excess, Arterial -0.4 (*)     O2 Saturation, Arterial 93.5 (*)     Hemoglobin, Blood Gas 12.0 (*)     Hematocrit, Blood Gas 36.8 (*)     Oxyhemoglobin 93.0 (*)     Potassium, Arterial 3.4 (*)     Ionized Calcium 4.57 (*)     pO2, Temperature Corrected 72.1 (*)     All other components within normal limits   PROTIME-INR - Normal   APTT - Normal   BNP (IN-HOUSE) - Normal    Narrative:     This assay is used as an aid in the diagnosis of individuals suspected of having heart failure. It can be used as an aid in the diagnosis of acute decompensated heart failure (ADHF) in patients presenting with signs and symptoms of ADHF to the emergency department (ED). In addition, NT-proBNP of <300 pg/mL indicates ADHF is not likely.    Age Range Result Interpretation  NT-proBNP Concentration (pg/mL:      <50             Positive            >450                   Gray                 300-450                    Negative             <300    50-75           Positive            >900                  Gray                300-900                  Negative            <300      >75             Positive            >1800                  Gray                300-1800                  Negative            <300   T4, FREE - Normal   MAGNESIUM - Normal   PROCALCITONIN - Normal    Narrative:     As a Marker for  "Sepsis (Non-Neonates):    1. <0.5 ng/mL represents a low risk of severe sepsis and/or septic shock.  2. >2 ng/mL represents a high risk of severe sepsis and/or septic shock.    As a Marker for Lower Respiratory Tract Infections that require antibiotic therapy:    PCT on Admission    Antibiotic Therapy       6-12 Hrs later    >0.5                Strongly Recommended  >0.25 - <0.5        Recommended   0.1 - 0.25          Discouraged              Remeasure/reassess PCT  <0.1                Strongly Discouraged     Remeasure/reassess PCT    As 28 day mortality risk marker: \"Change in Procalcitonin Result\" (>80% or <=80%) if Day 0 (or Day 1) and Day 4 values are available. Refer to http://www.[email or web address] [employer]ct-calculator.com    Change in PCT <=80%  A decrease of PCT levels below or equal to 80% defines a positive change in PCT test result representing a higher risk for 28-day all-cause mortality of patients diagnosed with severe sepsis for septic shock.    Change in PCT >80%  A decrease of PCT levels of more than 80% defines a negative change in PCT result representing a lower risk for 28-day all-cause mortality of patients diagnosed with severe sepsis or septic shock.      LACTIC ACID, PLASMA - Normal   BLOOD CULTURE   BLOOD CULTURE   RAINBOW DRAW    Narrative:     The following orders were created for panel order [place] Draw.  Procedure                               Abnormality         Status                     ---------                               -----------         ------                     Green Top (Gel)[[id number]]                                  Final result               Lavender Top[[id number]]                                     Final result               Red Top[[id number]]                                          Final result               Weaver Top[[id number]]                                         Final result               Light Blue Top[[id number]]                                   Final result             "     Please view results for these tests on the individual orders.   BLOOD GAS, ARTERIAL W/CO-OXIMETRY   GREEN TOP   LAVENDER TOP   RED TOP   GRAY TOP   LIGHT BLUE TOP   CBC AND DIFFERENTIAL    Narrative:     The following orders were created for panel order CBC & Differential.  Procedure                               Abnormality         Status                     ---------                               -----------         ------                     CBC Auto Differential[[id number]]        Abnormal            Final result                 Please view results for these tests on the individual orders.      CT Angiogram Chest   Final Result   1. No evidence of pulmonary thromboembolic disease. The thoracic aorta   and great vessels are normal in caliber.   2. There is a right-sided pleural effusion with right basilar   atelectasis. There is diffuse bronchial wall thickening with mixed   groundglass and airspace consolidation (right greater than left). There   are also increased reticular opacities within the periphery of the   lungs. I would favor that this represents changes of pulmonary venous   hypertension/volume overload with pulmonary edema. A mixed interstitial   and alveolar bilateral pneumonia would also be in the differential but   felt less likely.   3. Right middle lobe and left lower lobe nodule warranting follow-up.   These are slightly more conspicuous than on previous remote CT of 2016   but we also utilizing much thinner slice thickness. Follow-up CT imaging   without contrast in 6 months could be obtained to assure stability.   4. Cholelithiasis.       This report was signed and finalized on 7/22/2024 6:56 PM by Dr. Mars Blackwell MD.          XR Chest 1 View   Final Result       1.  Bilateral perihilar consolidation, greatest on the right.   Differential includes pulmonary edema and infectious process/pneumonia.   2.  Cardiac silhouette is enlarged.       This report was signed and finalized on  7/22/2024 5:04 PM by Dr. Stefan Lagos MD.             Procedures           ED Course  ED Course as of 07/22/24 1940 Mon Jul 22, 2024   1723 Patient with A-fib with RVR with pulmonary infiltrates will get a CT of the chest give IV antibiotics and admit to the medicine service. [TS]      ED Course User Index  [TS] Rodolfo Wiseman MD                                             Medical Decision Making  Ziyad Wilson is a 59 y.o. male who presents to the ED  by EMS from his primary care provider's office for shortness of breath and new onset A-fib with RVR.  PMH significant for asthma, viral cardiomyopathy, CHF, CAD, GERD, hyperlipidemia, hypertension, hypothyroidism and sleep apnea.  Patient states over the last 2 days he has been experiencing gradually worsening shortness of breath with both exertion and at rest.  He states shortness of breath is accompanied by a dry cough as well as lightheadedness.  Patient states he has been taking his Lasix over the last 3 days to help with fluid as he felt this was the cause of the shortness of breath.  Patient states he has had significant urine output with Lasix administration but reports symptoms did not improve.  He states today he noticed symptoms were worse than the last 2 days and reports that he took 3, 40 mg Lasix prior to arrival that again did not relieve any symptoms.  He states he followed up with his primary care provider and after obtaining EKG he was sent to the ER for further evaluation and treatment.  Patient reports only complaints are shortness of breath and pain with breathing, dry cough and lightheadedness.  He denies any chest pain or palpitations.  Denies any fevers, body aches, chills, or congestion.  Denies any dizziness, blurred vision, headache or near syncope.  Denies any urinary or other neurologic changes.  Denies any history of atrial fibrillation and reports he followed up with cardiology several years ago for his viral cardiomyopathy but  "was \"cleared by cardiology \".  Patient denies any missed doses of any other medications.  States he does not smoke but is a daily marijuana user but reports no use of marijuana in the last 2 days.    Patient was non-toxic appearing on arrival. No acute distress was noted.  Vital signs stable. Tachycardic upon arrival.     Past medical history, surgical history, and medication regimen reviewed.     Previous notes, labs, imaging and more reviewed.    Patient's presentation raises suspicion for differentials including, but not limited to, new onset A-fib RVR, electrolyte balance, dehydration, thyroid disease, PE.    Please refer to above section of note for lab and imaging results that were reviewed and interpreted by radiology as well as attending physician.     Medications administered,   sodium chloride 0.9 % flush 10 mL (has no administration in time range)  sodium chloride 0.9 % flush 10 mL (has no administration in time range)  dilTIAZem (CARDIZEM) 125 mg in 125 mL D5W infusion (has no administration in time range)  dilTIAZem (CARDIZEM) injection 10 mg (10 mg Intravenous Given 7/22/24 1751)  cefTRIAXone (ROCEPHIN) 1,000 mg in sodium chloride 0.9 % 100 mL MBP (1,000 mg Intravenous New Bag 7/22/24 1750)  iopamidol (ISOVUE-370) 76 % injection 100 mL (100 mL Intravenous Given 7/22/24 1814)     Spoke with hospitalist on call regarding need for admission for new onset atrial for with RVR.  He accepted patient for admission.     I reassessed the patient and discussed the findings of the work up so far with everyone in the room. I said that the next step in treatment would be admission to the hospital for further workup and care. I also said that there is always some diagnostic uncertainty in the ER, that symptoms may change, and new things may be found after being admitted. The hospitalist service assumed primary care of the patient and the patient was admitted to their service in stable condition.    Santiago " disclaimer:  Parts of this note may be an electronic transcription/translation of spoken language to printed text using the Dragon dictation system.       Problems Addressed:  Atrial fibrillation with RVR: complicated acute illness or injury    Amount and/or Complexity of Data Reviewed  Labs: ordered.  Radiology: ordered.  ECG/medicine tests: ordered.    Risk  Prescription drug management.  Decision regarding hospitalization.        Final diagnoses:   Atrial fibrillation with RVR       ED Disposition  ED Disposition       ED Disposition   Decision to Admit    Condition   --    Comment   Level of Care: Telemetry [5]   Diagnosis: Atrial fibrillation with RVR [[id number]]   Admitting Physician: MANINDER DIAZ [[id number]]   Attending Physician: MANINDER DIAZ [[id number]]   Certification: I Certify That Inpatient Hospital Services Are Medically Necessary For Greater Than 2 Midnights                 No follow-up provider specified.       Medication List      No changes were made to your prescriptions during this visit.            Valarie Mejia, APRN  07/22/24 1940

## 2024-07-23 ENCOUNTER — APPOINTMENT (OUTPATIENT)
Dept: CARDIOLOGY | Facility: HOSPITAL | Age: 59
End: 2024-07-23
Payer: MEDICARE

## 2024-07-23 PROBLEM — J81.0 ACUTE PULMONARY EDEMA: Status: ACTIVE | Noted: 2024-07-23

## 2024-07-23 PROBLEM — F12.20 TETRAHYDROCANNABINOL (THC) DEPENDENCE: Status: ACTIVE | Noted: 2024-07-23

## 2024-07-23 PROBLEM — I42.8 NICM (NONISCHEMIC CARDIOMYOPATHY): Chronic | Status: ACTIVE | Noted: 2024-07-23

## 2024-07-23 PROBLEM — D50.9 IRON DEFICIENCY ANEMIA: Status: ACTIVE | Noted: 2024-07-23

## 2024-07-23 PROBLEM — I42.8 NICM (NONISCHEMIC CARDIOMYOPATHY): Status: ACTIVE | Noted: 2024-07-23

## 2024-07-23 LAB
ALBUMIN SERPL-MCNC: 4 G/DL (ref 3.5–5.2)
ALBUMIN/GLOB SERPL: 1.3 G/DL
ALP SERPL-CCNC: 64 U/L (ref 39–117)
ALT SERPL W P-5'-P-CCNC: 28 U/L (ref 1–41)
AMPHET+METHAMPHET UR QL: NEGATIVE
AMPHETAMINES UR QL: NEGATIVE
ANION GAP SERPL CALCULATED.3IONS-SCNC: 13 MMOL/L (ref 5–15)
AST SERPL-CCNC: 29 U/L (ref 1–40)
BARBITURATES UR QL SCN: NEGATIVE
BASOPHILS # BLD AUTO: 0.05 10*3/MM3 (ref 0–0.2)
BASOPHILS NFR BLD AUTO: 0.4 % (ref 0–1.5)
BENZODIAZ UR QL SCN: POSITIVE
BH CV ECHO LEFT VENTRICLE GLOBAL LONGITUDINAL STRAIN: -9.4 %
BH CV ECHO MEAS - AO MAX PG: 5.3 MMHG
BH CV ECHO MEAS - AO MEAN PG: 3.5 MMHG
BH CV ECHO MEAS - AO ROOT DIAM: 3.6 CM
BH CV ECHO MEAS - AO V2 MAX: 112.5 CM/SEC
BH CV ECHO MEAS - AO V2 VTI: 17.8 CM
BH CV ECHO MEAS - AVA(I,D): 2.8 CM2
BH CV ECHO MEAS - EDV(CUBED): 193.2 ML
BH CV ECHO MEAS - EF(MOD-BP): 38 %
BH CV ECHO MEAS - ESV(CUBED): 146.2 ML
BH CV ECHO MEAS - FS: 8.9 %
BH CV ECHO MEAS - IVS/LVPW: 0.85 CM
BH CV ECHO MEAS - IVSD: 0.89 CM
BH CV ECHO MEAS - LA DIMENSION: 5.7 CM
BH CV ECHO MEAS - LAT PEAK E' VEL: 7 CM/SEC
BH CV ECHO MEAS - LV MASS(C)D: 222.8 GRAMS
BH CV ECHO MEAS - LV MAX PG: 2.8 MMHG
BH CV ECHO MEAS - LV MEAN PG: 1.62 MMHG
BH CV ECHO MEAS - LV V1 MAX: 83.4 CM/SEC
BH CV ECHO MEAS - LV V1 VTI: 15.2 CM
BH CV ECHO MEAS - LVIDD: 5.8 CM
BH CV ECHO MEAS - LVIDS: 5.3 CM
BH CV ECHO MEAS - LVOT AREA: 3.3 CM2
BH CV ECHO MEAS - LVOT DIAM: 2.06 CM
BH CV ECHO MEAS - LVPWD: 1.05 CM
BH CV ECHO MEAS - MED PEAK E' VEL: 7 CM/SEC
BH CV ECHO MEAS - MV E MAX VEL: 134.5 CM/SEC
BH CV ECHO MEAS - MV MAX PG: 9.2 MMHG
BH CV ECHO MEAS - MV MEAN PG: 3.5 MMHG
BH CV ECHO MEAS - MV V2 VTI: 29.9 CM
BH CV ECHO MEAS - MVA(VTI): 1.69 CM2
BH CV ECHO MEAS - PA V2 MAX: 61.2 CM/SEC
BH CV ECHO MEAS - PI END-D VEL: 141.1 CM/SEC
BH CV ECHO MEAS - RV MAX PG: 1.39 MMHG
BH CV ECHO MEAS - RV V1 MAX: 58.3 CM/SEC
BH CV ECHO MEAS - RV V1 VTI: 8.9 CM
BH CV ECHO MEAS - RVDD: 4.1 CM
BH CV ECHO MEAS - RVSP: 54 MMHG
BH CV ECHO MEAS - SV(LVOT): 50.6 ML
BH CV ECHO MEAS - SVI(LVOT): 25 ML/M2
BH CV ECHO MEAS - TAPSE (>1.6): 1.5 CM
BH CV ECHO MEAS - TR MAX PG: 40 MMHG
BH CV ECHO MEAS - TR MAX VEL: 316 CM/SEC
BH CV ECHO MEASUREMENTS AVERAGE E/E' RATIO: 19.21
BH CV XLRA - RV BASE: 3.7 CM
BH CV XLRA - RV LENGTH: 7.8 CM
BH CV XLRA - RV MID: 3.2 CM
BH CV XLRA - TDI S': 9 CM/SEC
BILIRUB SERPL-MCNC: 0.4 MG/DL (ref 0–1.2)
BUN SERPL-MCNC: 19 MG/DL (ref 8–23)
BUN/CREAT SERPL: 13.8 (ref 7–25)
BUPRENORPHINE SERPL-MCNC: NEGATIVE NG/ML
CALCIUM SPEC-SCNC: 8.8 MG/DL (ref 8.6–10.5)
CANNABINOIDS SERPL QL: NEGATIVE
CHLORIDE SERPL-SCNC: 100 MMOL/L (ref 98–107)
CO2 SERPL-SCNC: 25 MMOL/L (ref 22–29)
COCAINE UR QL: NEGATIVE
CREAT SERPL-MCNC: 1.38 MG/DL (ref 0.76–1.27)
DEPRECATED RDW RBC AUTO: 48.6 FL (ref 37–54)
EGFRCR SERPLBLD CKD-EPI 2021: 58.5 ML/MIN/1.73
EOSINOPHIL # BLD AUTO: 0.25 10*3/MM3 (ref 0–0.4)
EOSINOPHIL NFR BLD AUTO: 2.2 % (ref 0.3–6.2)
ERYTHROCYTE [DISTWIDTH] IN BLOOD BY AUTOMATED COUNT: 17.7 % (ref 12.3–15.4)
FENTANYL UR-MCNC: NEGATIVE NG/ML
FERRITIN SERPL-MCNC: 15.24 NG/ML (ref 30–400)
GLOBULIN UR ELPH-MCNC: 3.2 GM/DL
GLUCOSE SERPL-MCNC: 117 MG/DL (ref 65–99)
HCT VFR BLD AUTO: 39.7 % (ref 37.5–51)
HGB BLD-MCNC: 11.8 G/DL (ref 13–17.7)
IMM GRANULOCYTES # BLD AUTO: 0.05 10*3/MM3 (ref 0–0.05)
IMM GRANULOCYTES NFR BLD AUTO: 0.4 % (ref 0–0.5)
IRON 24H UR-MRATE: 32 MCG/DL (ref 59–158)
IRON SATN MFR SERPL: 8 % (ref 20–50)
LEFT ATRIUM VOLUME INDEX: 60 ML/M2
LEFT ATRIUM VOLUME: 117 ML
LYMPHOCYTES # BLD AUTO: 1.01 10*3/MM3 (ref 0.7–3.1)
LYMPHOCYTES NFR BLD AUTO: 8.7 % (ref 19.6–45.3)
MCH RBC QN AUTO: 22.7 PG (ref 26.6–33)
MCHC RBC AUTO-ENTMCNC: 29.7 G/DL (ref 31.5–35.7)
MCV RBC AUTO: 76.3 FL (ref 79–97)
METHADONE UR QL SCN: NEGATIVE
MONOCYTES # BLD AUTO: 1.66 10*3/MM3 (ref 0.1–0.9)
MONOCYTES NFR BLD AUTO: 14.3 % (ref 5–12)
NEUTROPHILS NFR BLD AUTO: 74 % (ref 42.7–76)
NEUTROPHILS NFR BLD AUTO: 8.58 10*3/MM3 (ref 1.7–7)
NRBC BLD AUTO-RTO: 0.4 /100 WBC (ref 0–0.2)
OPIATES UR QL: NEGATIVE
OXYCODONE UR QL SCN: NEGATIVE
PCP UR QL SCN: NEGATIVE
PHOSPHATE SERPL-MCNC: 3 MG/DL (ref 2.5–4.5)
PLATELET # BLD AUTO: 243 10*3/MM3 (ref 140–450)
PMV BLD AUTO: 10.7 FL (ref 6–12)
POTASSIUM SERPL-SCNC: 3.5 MMOL/L (ref 3.5–5.2)
PROT SERPL-MCNC: 7.2 G/DL (ref 6–8.5)
QT INTERVAL: 258 MS
QT INTERVAL: 364 MS
QTC INTERVAL: 400 MS
QTC INTERVAL: 452 MS
RBC # BLD AUTO: 5.2 10*6/MM3 (ref 4.14–5.8)
SODIUM SERPL-SCNC: 138 MMOL/L (ref 136–145)
TIBC SERPL-MCNC: 426 MCG/DL (ref 298–536)
TRANSFERRIN SERPL-MCNC: 286 MG/DL (ref 200–360)
TRICYCLICS UR QL SCN: POSITIVE
TROPONIN T SERPL HS-MCNC: 25 NG/L
WBC NRBC COR # BLD AUTO: 11.6 10*3/MM3 (ref 3.4–10.8)

## 2024-07-23 PROCEDURE — 94799 UNLISTED PULMONARY SVC/PX: CPT

## 2024-07-23 PROCEDURE — 93356 MYOCRD STRAIN IMG SPCKL TRCK: CPT | Performed by: INTERNAL MEDICINE

## 2024-07-23 PROCEDURE — 25010000002 NA FERRIC GLUC CPLX PER 12.5 MG

## 2024-07-23 PROCEDURE — 83540 ASSAY OF IRON: CPT

## 2024-07-23 PROCEDURE — 82728 ASSAY OF FERRITIN: CPT

## 2024-07-23 PROCEDURE — 93005 ELECTROCARDIOGRAM TRACING: CPT | Performed by: INTERNAL MEDICINE

## 2024-07-23 PROCEDURE — 36415 COLL VENOUS BLD VENIPUNCTURE: CPT

## 2024-07-23 PROCEDURE — 80053 COMPREHEN METABOLIC PANEL: CPT

## 2024-07-23 PROCEDURE — 25810000003 SODIUM CHLORIDE 0.9 % SOLUTION 250 ML FLEX CONT

## 2024-07-23 PROCEDURE — 94761 N-INVAS EAR/PLS OXIMETRY MLT: CPT

## 2024-07-23 PROCEDURE — 25010000002 BUMETANIDE PER 0.5 MG

## 2024-07-23 PROCEDURE — 84100 ASSAY OF PHOSPHORUS: CPT

## 2024-07-23 PROCEDURE — 99223 1ST HOSP IP/OBS HIGH 75: CPT | Performed by: INTERNAL MEDICINE

## 2024-07-23 PROCEDURE — 93010 ELECTROCARDIOGRAM REPORT: CPT | Performed by: INTERNAL MEDICINE

## 2024-07-23 PROCEDURE — 84466 ASSAY OF TRANSFERRIN: CPT

## 2024-07-23 PROCEDURE — 85025 COMPLETE CBC W/AUTO DIFF WBC: CPT

## 2024-07-23 PROCEDURE — 93306 TTE W/DOPPLER COMPLETE: CPT

## 2024-07-23 PROCEDURE — 84484 ASSAY OF TROPONIN QUANT: CPT | Performed by: INTERNAL MEDICINE

## 2024-07-23 PROCEDURE — 93356 MYOCRD STRAIN IMG SPCKL TRCK: CPT

## 2024-07-23 PROCEDURE — 80307 DRUG TEST PRSMV CHEM ANLYZR: CPT | Performed by: INTERNAL MEDICINE

## 2024-07-23 PROCEDURE — 94640 AIRWAY INHALATION TREATMENT: CPT

## 2024-07-23 PROCEDURE — 25010000002 MAGNESIUM SULFATE 2 GM/50ML SOLUTION

## 2024-07-23 PROCEDURE — 93306 TTE W/DOPPLER COMPLETE: CPT | Performed by: INTERNAL MEDICINE

## 2024-07-23 RX ORDER — POTASSIUM CHLORIDE 750 MG/1
40 CAPSULE, EXTENDED RELEASE ORAL EVERY 4 HOURS
Status: COMPLETED | OUTPATIENT
Start: 2024-07-23 | End: 2024-07-23

## 2024-07-23 RX ORDER — HYDROXYZINE HYDROCHLORIDE 25 MG/1
25 TABLET, FILM COATED ORAL 3 TIMES DAILY PRN
COMMUNITY
End: 2024-08-02 | Stop reason: HOSPADM

## 2024-07-23 RX ORDER — MAGNESIUM SULFATE HEPTAHYDRATE 40 MG/ML
2 INJECTION, SOLUTION INTRAVENOUS ONCE
Status: COMPLETED | OUTPATIENT
Start: 2024-07-23 | End: 2024-07-23

## 2024-07-23 RX ORDER — BUSPIRONE HYDROCHLORIDE 5 MG/1
5 TABLET ORAL 2 TIMES DAILY
COMMUNITY
End: 2024-08-02 | Stop reason: HOSPADM

## 2024-07-23 RX ORDER — SPIRONOLACTONE 25 MG/1
25 TABLET ORAL DAILY
Status: ON HOLD | COMMUNITY
End: 2024-07-23

## 2024-07-23 RX ORDER — ALBUTEROL SULFATE 2.5 MG/3ML
2.5 SOLUTION RESPIRATORY (INHALATION)
Status: DISCONTINUED | OUTPATIENT
Start: 2024-07-23 | End: 2024-07-26

## 2024-07-23 RX ORDER — CYCLOBENZAPRINE HCL 10 MG
5 TABLET ORAL 3 TIMES DAILY PRN
Status: DISCONTINUED | OUTPATIENT
Start: 2024-07-23 | End: 2024-08-02 | Stop reason: HOSPADM

## 2024-07-23 RX ORDER — TEMAZEPAM 15 MG/1
15 CAPSULE ORAL NIGHTLY PRN
Status: DISCONTINUED | OUTPATIENT
Start: 2024-07-23 | End: 2024-07-26

## 2024-07-23 RX ORDER — TESTOSTERONE CYPIONATE 200 MG/ML
1 VIAL (ML) INTRAMUSCULAR
COMMUNITY
End: 2024-08-02 | Stop reason: HOSPADM

## 2024-07-23 RX ORDER — AMITRIPTYLINE HYDROCHLORIDE 75 MG/1
75 TABLET ORAL NIGHTLY
COMMUNITY

## 2024-07-23 RX ORDER — LEVOTHYROXINE SODIUM 112 UG/1
112 TABLET ORAL DAILY
COMMUNITY

## 2024-07-23 RX ORDER — BUMETANIDE 0.25 MG/ML
2 INJECTION INTRAMUSCULAR; INTRAVENOUS
Status: DISCONTINUED | OUTPATIENT
Start: 2024-07-23 | End: 2024-07-23

## 2024-07-23 RX ORDER — MELOXICAM 15 MG/1
15 TABLET ORAL DAILY
COMMUNITY
End: 2024-08-02 | Stop reason: HOSPADM

## 2024-07-23 RX ORDER — FUROSEMIDE 40 MG/1
40 TABLET ORAL 2 TIMES DAILY
COMMUNITY
End: 2024-08-02 | Stop reason: HOSPADM

## 2024-07-23 RX ORDER — GUAIFENESIN 200 MG/10ML
200 LIQUID ORAL EVERY 4 HOURS PRN
Status: DISCONTINUED | OUTPATIENT
Start: 2024-07-23 | End: 2024-08-02 | Stop reason: HOSPADM

## 2024-07-23 RX ADMIN — AMITRIPTYLINE HYDROCHLORIDE 50 MG: 25 TABLET, FILM COATED ORAL at 17:51

## 2024-07-23 RX ADMIN — Medication 10 ML: at 09:04

## 2024-07-23 RX ADMIN — ATORVASTATIN CALCIUM 40 MG: 40 TABLET ORAL at 20:52

## 2024-07-23 RX ADMIN — FLUTICASONE PROPIONATE 2 SPRAY: 50 SPRAY, METERED NASAL at 09:03

## 2024-07-23 RX ADMIN — GUAIFENESIN 200 MG: 200 SOLUTION ORAL at 02:12

## 2024-07-23 RX ADMIN — APIXABAN 5 MG: 5 TABLET, FILM COATED ORAL at 10:51

## 2024-07-23 RX ADMIN — Medication 1 TABLET: at 08:59

## 2024-07-23 RX ADMIN — CARVEDILOL 50 MG: 25 TABLET, FILM COATED ORAL at 08:57

## 2024-07-23 RX ADMIN — ALBUTEROL SULFATE 2.5 MG: 2.5 SOLUTION RESPIRATORY (INHALATION) at 10:06

## 2024-07-23 RX ADMIN — Medication 10 ML: at 20:52

## 2024-07-23 RX ADMIN — DILTIAZEM HYDROCHLORIDE 30 MG: 30 TABLET, FILM COATED ORAL at 20:52

## 2024-07-23 RX ADMIN — POTASSIUM CHLORIDE 40 MEQ: 750 CAPSULE, EXTENDED RELEASE ORAL at 20:52

## 2024-07-23 RX ADMIN — CARVEDILOL 50 MG: 25 TABLET, FILM COATED ORAL at 20:52

## 2024-07-23 RX ADMIN — Medication 10 ML: at 00:16

## 2024-07-23 RX ADMIN — APIXABAN 5 MG: 5 TABLET, FILM COATED ORAL at 20:52

## 2024-07-23 RX ADMIN — Medication 10 MG/HR: at 03:45

## 2024-07-23 RX ADMIN — NITROGLYCERIN 0.4 MG: 0.4 TABLET SUBLINGUAL at 03:28

## 2024-07-23 RX ADMIN — BUDESONIDE AND FORMOTEROL FUMARATE DIHYDRATE 1 PUFF: 160; 4.5 AEROSOL RESPIRATORY (INHALATION) at 06:24

## 2024-07-23 RX ADMIN — SODIUM CHLORIDE 250 MG: 9 INJECTION, SOLUTION INTRAVENOUS at 10:49

## 2024-07-23 RX ADMIN — DILTIAZEM HYDROCHLORIDE 30 MG: 30 TABLET, FILM COATED ORAL at 08:56

## 2024-07-23 RX ADMIN — BUMETANIDE 2 MG: 0.25 INJECTION INTRAMUSCULAR; INTRAVENOUS at 09:19

## 2024-07-23 RX ADMIN — ESCITALOPRAM OXALATE 20 MG: 10 TABLET ORAL at 08:59

## 2024-07-23 RX ADMIN — ASPIRIN 325 MG: 325 TABLET, FILM COATED ORAL at 09:14

## 2024-07-23 RX ADMIN — ALBUTEROL SULFATE 2.5 MG: 2.5 SOLUTION RESPIRATORY (INHALATION) at 13:32

## 2024-07-23 RX ADMIN — MAGNESIUM SULFATE HEPTAHYDRATE 2 G: 2 INJECTION, SOLUTION INTRAVENOUS at 09:01

## 2024-07-23 RX ADMIN — TEMAZEPAM 15 MG: 15 CAPSULE ORAL at 02:49

## 2024-07-23 RX ADMIN — SPIRONOLACTONE 25 MG: 25 TABLET ORAL at 08:58

## 2024-07-23 RX ADMIN — ALBUTEROL SULFATE 2.5 MG: 2.5 SOLUTION RESPIRATORY (INHALATION) at 23:06

## 2024-07-23 RX ADMIN — PANTOPRAZOLE SODIUM 40 MG: 40 TABLET, DELAYED RELEASE ORAL at 08:56

## 2024-07-23 RX ADMIN — BUMETANIDE 0.5 MG/HR: 0.25 INJECTION INTRAMUSCULAR; INTRAVENOUS at 10:45

## 2024-07-23 RX ADMIN — POTASSIUM CHLORIDE 40 MEQ: 750 CAPSULE, EXTENDED RELEASE ORAL at 23:46

## 2024-07-23 RX ADMIN — ALBUTEROL SULFATE 2.5 MG: 2.5 SOLUTION RESPIRATORY (INHALATION) at 06:19

## 2024-07-23 RX ADMIN — BUPROPION HYDROCHLORIDE 300 MG: 150 TABLET, EXTENDED RELEASE ORAL at 08:59

## 2024-07-23 RX ADMIN — NITROGLYCERIN 0.4 MG: 0.4 TABLET SUBLINGUAL at 03:43

## 2024-07-23 RX ADMIN — QUETIAPINE FUMARATE 100 MG: 100 TABLET ORAL at 20:52

## 2024-07-23 RX ADMIN — BUDESONIDE AND FORMOTEROL FUMARATE DIHYDRATE 1 PUFF: 160; 4.5 AEROSOL RESPIRATORY (INHALATION) at 18:20

## 2024-07-23 RX ADMIN — DILTIAZEM HYDROCHLORIDE 30 MG: 30 TABLET, FILM COATED ORAL at 15:59

## 2024-07-23 RX ADMIN — ALBUTEROL SULFATE 2.5 MG: 2.5 SOLUTION RESPIRATORY (INHALATION) at 18:20

## 2024-07-23 RX ADMIN — LEVOTHYROXINE SODIUM 75 MCG: 0.07 TABLET ORAL at 05:35

## 2024-07-23 NOTE — ED NOTES
While rounding on the patient, the patient began to endorse 7/10 mid sternal chest pains.  EKG was ordered, and PRN nitro administered.  Provider Emmanuel notified of patients chest pain complaints.

## 2024-07-23 NOTE — PLAN OF CARE
Problem: Adult Inpatient Plan of Care  Goal: Plan of Care Review  Outcome: Ongoing, Progressing  Flowsheets (Taken 7/23/2024 0030)  Plan of Care Reviewed With: patient  Goal: Patient-Specific Goal (Individualized)  Outcome: Ongoing, Progressing  Goal: Absence of Hospital-Acquired Illness or Injury  Outcome: Ongoing, Progressing  Goal: Optimal Comfort and Wellbeing  Outcome: Ongoing, Progressing  Goal: Readiness for Transition of Care  Outcome: Ongoing, Progressing     Problem: Pain Acute  Goal: Acceptable Pain Control and Functional Ability  Outcome: Ongoing, Progressing     Problem: Fall Injury Risk  Goal: Absence of Fall and Fall-Related Injury  Outcome: Ongoing, Progressing     Problem: Dysrhythmia  Goal: Normalized Cardiac Rhythm  Outcome: Ongoing, Progressing   Goal Outcome Evaluation:      Patients HR remains controlled under 120 bpm.  Patient continues to deny chest pain or SOB.  Patient remains free from falls during ED stay.  Education  and plan of care reviewed with the patient.    Plan of Care Reviewed With: patient

## 2024-07-23 NOTE — H&P
[facility] Medicine Services  HISTORY AND PHYSICAL    Date of Admission: 7/22/2024  Primary Care Physician: Ashish Thurman, DO    Subjective   Primary Historian: patient    Chief Complaint: shortness of breath     History of Present Illness  This is a 59-year-old gentleman with past medical history significant for viral cardiomyopathy, CHF, coronary artery disease, GERD, hyperlipidemia, hypothyroidism and obstructive sleep apnea who presented to the emergency department earlier today with 2 days history of worsening shortness of breath on both exertion and at rest, his dyspnea is associated with dry cough and lightheadedness.  He has been taking furosemide p.o. over the past 3 days to help with fluid as he felt this was the cause of his shortness of breath.  Today he felt extremely dyspneic so he took 3 tablets of p.o. Lasix 40 mg he went to his primary care physician's office where an EKG was obtained and showed that he was in atrial fibrillation with rapid ventricular response hence he was sent to the emergency department for further evaluation and management.  Now in the emergency department he was found to be in atrial fibrillation with RVR hence was started on Cardizem drip after receiving bolus.  Also did receive 1 dose of Rocephin IV and Lasix IV.  Denied any chest pain at any time.        Review of Systems   Otherwise complete ROS reviewed and negative except as mentioned in the HPI.    Past Medical History:   Past Medical History:   Diagnosis Date    Asthma     Cardiomyopathy     non-ischemic    CHF (congestive heart failure)     Coronary artery disease     Foot pain, bilateral     GERD (gastroesophageal reflux disease)     Headache     Hyperlipemia, mixed     Hypertension     benign    Hypothyroidism     Obesity     Sleep apnea     SOB (shortness of breath)      Past Surgical History:  Past Surgical History:   Procedure Laterality Date    APPENDECTOMY      BLADDER  REPAIR      CARDIAC CATHETERIZATION  09/2003    COLONOSCOPY N/A 1/9/2018    Procedure: COLONOSCOPY WITH ANESTHESIA;  Surgeon: Dick Espinosa DO;  Location: [facility] ENDOSCOPY;  Service:     COLOSTOMY      with colostomy reversal    KNEE SURGERY Right      Social History:  reports that he has never smoked. He has never used smokeless tobacco. He reports current alcohol use. He reports that he does not use drugs.    Family History: family history includes Heart disease in his father and mother; Hypertension in his brother, father, mother, and sister; Stroke in his mother.       Allergies:  No Known Allergies    Medications:  Prior to Admission medications    Medication Sig Start Date End Date Taking? Authorizing Provider   albuterol sulfate  (90 Base) MCG/ACT inhaler Inhale 2 puffs Every 4 (Four) Hours As Needed for Wheezing or Shortness of Air. 9/16/20   Erika Dill APRN   amitriptyline (ELAVIL) 50 MG tablet TAKE 1 TABLET EVERY EVENING 1/13/22   Ashish Thurman DO   amLODIPine-valsartan (EXFORGE) 5-160 MG per tablet Take 1 tablet by mouth Daily. 3/16/21   Ashish Thurman DO   aspirin 325 MG tablet daily.    Provider, MD Yareli   atorvastatin (LIPITOR) 40 MG tablet Take 1 tablet by mouth Daily. 2/17/22   Erika Dill APRN   buPROPion XL (WELLBUTRIN XL) 300 MG 24 hr tablet Take 1 tablet by mouth Daily. 2/1/22   Erika Dill APRN   butalbital-acetaminophen-caffeine (FIORICET, ESGIC) -40 MG per tablet Take 1 tablet by mouth Daily As Needed for Migraine. 5/27/21   Ashish Thurman DO   carvedilol (COREG) 25 MG tablet Take 2 tablets by mouth 2 (Two) Times a Day. 11/28/22   Staci Chamorro APRN   diclofenac (VOLTAREN) 1 % gel gel Apply 4 g topically to the appropriate area as directed 3 (Three) Times a Day. 9/16/20   Erika Dill APRN   escitalopram (LEXAPRO) 20 MG tablet Take 1 tablet by mouth Daily. 1/19/22   Ashish Thurman DO   fluticasone (Flonase) 50 MCG/ACT nasal  "spray 2 sprays into the nostril(s) as directed by provider Daily. Administer 2 sprays in each nostril for each dose. 11/25/20   Erika Dill APRN   fluticasone-salmeterol (ADVAIR) 100-50 MCG/DOSE DISKUS Inhale 1 puff 2 (Two) Times a Day. 12/1/20   Ashish Thurman, DO   furosemide (LASIX) 40 MG tablet TAKE 1 TABLET TWICE DAILY 2/17/22   Erika Dill APRN   icosapent ethyl (VASCEPA) 1 g capsule capsule Take 2 g by mouth 2 (Two) Times a Day With Meals. 2/19/20   Uri Lagos MD   levothyroxine (SYNTHROID, LEVOTHROID) 75 MCG tablet Take 1 tablet by mouth Daily. 9/2/21   Ashish Thurman,    Multiple Vitamins-Minerals (MULTIVITAMIN WITH MINERALS) tablet tablet Take 1 tablet by mouth Daily.    Provider, MD Yareli   nitroglycerin (NITROSTAT) 0.4 MG SL tablet Place 0.4 mg under the tongue as needed. Take no more than 3 doses in 15 minutes.    Provider, MD Yareli   omeprazole (priLOSEC) 20 MG capsule Take 1 capsule by mouth Daily. 2/11/22   Erika Dill APRN   QUEtiapine (SEROquel) 100 MG tablet Take 1 tablet by mouth every night at bedtime. 2/17/22   Erika Dill APRN   spironolactone (ALDACTONE) 25 MG tablet TAKE ONE TABLET BY MOUTH ONCE DAILY 11/28/16   Uri Lagos MD   Suvorexant (Belsomra) 10 MG tablet Take 1 tablet by mouth Every Night. 9/2/21   Erika Dill APRN     I have utilized all available immediate resources to obtain, update, or review the patient's current medications (including all prescriptions, over-the-counter products, herbals, cannabis/cannabidiol products, and vitamin/mineral/dietary (nutritional) supplements).    Objective     Vital Signs: /88   Pulse (!) 141 Comment: irregular  Temp 98.5 °F (36.9 °C) (Oral)   Resp 26   Ht 170.2 cm (67\")   Wt 90.7 kg (200 lb)   SpO2 94%   BMI 31.32 kg/m²   Physical Exam   General: Patient is alert, awake, oriented mild distress at the time of examination  HEENT: Normocephalic, atraumatic, pupils " "are equal reactive to light and accommodate.  Neck: Supple, no JVD, no carotid bruits  CVS: Irregular irregular rhythm  Lungs: Increased inspiratory effort, diminished breath sounds at the bases  Abdomen: Soft, nontender, nondistended bowel sounds present  Extremities: No cyanosis, no clubbing, no edema pulses are intact  Neurologic: No focal deficits  Psychiatric: Normal affect      Results Reviewed:  Lab Results (last 24 hours)       Procedure Component Value Units Date/Time    Blood Culture - Blood, Arm, Right [[id number]] Collected: 07/22/24 1747    Specimen: Blood from Arm, Right Updated: 07/22/24 1806    Procalcitonin [[id number]]  (Normal) Collected: 07/22/24 1632    Specimen: Blood Updated: 07/22/24 1755     Procalcitonin 0.06 ng/mL     Narrative:      As a Marker for Sepsis (Non-Neonates):    1. <0.5 ng/mL represents a low risk of severe sepsis and/or septic shock.  2. >2 ng/mL represents a high risk of severe sepsis and/or septic shock.    As a Marker for Lower Respiratory Tract Infections that require antibiotic therapy:    PCT on Admission    Antibiotic Therapy       6-12 Hrs later    >0.5                Strongly Recommended  >0.25 - <0.5        Recommended   0.1 - 0.25          Discouraged              Remeasure/reassess PCT  <0.1                Strongly Discouraged     Remeasure/reassess PCT    As 28 day mortality risk marker: \"Change in Procalcitonin Result\" (>80% or <=80%) if Day 0 (or Day 1) and Day 4 values are available. Refer to [email or web address]://[email or web address]-pct-[email or web address]    Change in PCT <=80%  A decrease of PCT levels below or equal to 80% defines a positive change in PCT test result representing a higher risk for 28-day all-cause mortality of patients diagnosed with severe sepsis for septic shock.    Change in PCT >80%  A decrease of PCT levels of more than 80% defines a negative change in PCT result representing a lower risk for 28-day all-cause mortality of patients diagnosed with severe sepsis or " septic shock.       Blood Culture - Blood, Arm, Left [[id number]] Collected: 07/22/24 1734    Specimen: Blood from Arm, Left Updated: 07/22/24 1754    Lactic Acid, Plasma [[id number]]  (Normal) Collected: 07/22/24 1632    Specimen: Blood Updated: 07/22/24 1733     Lactate 1.1 mmol/L     Single High Sensitivity Troponin T [[id number]]  (Abnormal) Collected: 07/22/24 1632    Specimen: Blood Updated: 07/22/24 1717     HS Troponin T 23 ng/L     Narrative:      High Sensitive Troponin T Reference Range:  <14.0 ng/L- Negative Female for AMI  <22.0 ng/L- Negative Male for AMI  >=14 - Abnormal Female indicating possible myocardial injury.  >=22 - Abnormal Male indicating possible myocardial injury.   Clinicians would have to utilize clinical acumen, EKG, Troponin, and serial changes to determine if it is an Acute Myocardial Infarction or myocardial injury due to an underlying chronic condition.         Blood Gas, Arterial With Co-Ox [[id number]]  (Abnormal) Collected: 07/22/24 1704    Specimen: Arterial Blood Updated: 07/22/24 1705     Site Left Radial     Jd's Test Positive     pH, Arterial 7.432 pH units      pCO2, Arterial 35.4 mm Hg      pO2, Arterial 72.1 mm Hg      Comment: 84 Value below reference range        HCO3, Arterial 23.6 mmol/L      Base Excess, Arterial -0.4 mmol/L      Comment: 84 Value below reference range        O2 Saturation, Arterial 93.5 %      Comment: 84 Value below reference range        Hemoglobin, Blood Gas 12.0 g/dL      Comment: 84 Value below reference range        Hematocrit, Blood Gas 36.8 %      Comment: 84 Value below reference range        Oxyhemoglobin 93.0 %      Comment: 84 Value below reference range        Methemoglobin 0.40 %      Carboxyhemoglobin 0.1 %      Temperature 37.0     Sodium, Arterial 143 mmol/L      Potassium, Arterial 3.4 mmol/L      Comment: 84 Value below reference range        Ionized Calcium 4.57 mg/dL      Comment: 84 Value below reference range        Barometric  Pressure for Blood Gas 752 mmHg      Modality Room Air     Ventilator Mode NA     Collected by [id number]     Comment: Meter: [id number]     :  Michael Negron CRT        pH, Temp Corrected 7.432 pH Units      pCO2, Temperature Corrected 35.4 mm Hg      pO2, Temperature Corrected 72.1 mm Hg     TSH [[id number]]  (Abnormal) Collected: 07/22/24 1632    Specimen: Blood Updated: 07/22/24 1704     TSH 7.340 uIU/mL     T4, Free [[id number]]  (Normal) Collected: 07/22/24 1632    Specimen: Blood Updated: 07/22/24 1704     Free T4 1.01 ng/dL     Magnesium [[id number]]  (Normal) Collected: 07/22/24 1632    Specimen: Blood Updated: 07/22/24 1659     Magnesium 1.6 mg/dL     Comprehensive Metabolic Panel [[id number]]  (Abnormal) Collected: 07/22/24 1632    Specimen: Blood Updated: 07/22/24 1659     Glucose 112 mg/dL      BUN 21 mg/dL      Creatinine 1.50 mg/dL      Sodium 141 mmol/L      Potassium 3.6 mmol/L      Chloride 104 mmol/L      CO2 26.0 mmol/L      Calcium 8.6 mg/dL      Total Protein 7.0 g/dL      Albumin 3.8 g/dL      ALT (SGPT) 28 U/L      AST (SGOT) 25 U/L      Alkaline Phosphatase 65 U/L      Total Bilirubin 0.3 mg/dL      Globulin 3.2 gm/dL      A/G Ratio 1.2 g/dL      BUN/Creatinine Ratio 14.0     Anion Gap 11.0 mmol/L      eGFR 53.0 mL/min/1.73     Narrative:      GFR Normal >60  Chronic Kidney Disease <60  Kidney Failure <15      BNP [[id number]]  (Normal) Collected: 07/22/24 1632    Specimen: Blood Updated: 07/22/24 1657     proBNP 602.9 pg/mL     Narrative:      This assay is used as an aid in the diagnosis of individuals suspected of having heart failure. It can be used as an aid in the diagnosis of acute decompensated heart failure (ADHF) in patients presenting with signs and symptoms of ADHF to the emergency department (ED). In addition, NT-proBNP of <300 pg/mL indicates ADHF is not likely.    Age Range Result Interpretation  NT-proBNP Concentration (pg/mL:      <50             Positive             >450                   Gray                 300-450                    Negative             <300    50-75           Positive            >900                  Gray                300-900                  Negative            <300      >75             Positive            >1800                  Gray                300-1800                  Negative            <300    Protime-INR [[id number]]  (Normal) Collected: 07/22/24 1632    Specimen: Blood Updated: 07/22/24 1648     Protime 13.7 Seconds      INR 1.01    aPTT [[id number]]  (Normal) Collected: 07/22/24 1632    Specimen: Blood Updated: 07/22/24 1648     PTT 31.3 seconds     [place] Draw [[id number]] Collected: 07/22/24 1632    Specimen: Blood Updated: 07/22/24 1645    Narrative:      The following orders were created for panel order [place] Draw.  Procedure                               Abnormality         Status                     ---------                               -----------         ------                     Green Top (Gel)[[id number]]                                  Final result               Lavender Top[[id number]]                                     Final result               Red Top[[id number]]                                          Final result               Weaver Top[[id number]]                                         Final result               Light Blue Top[[id number]]                                   Final result                 Please view results for these tests on the individual orders.    Green Top (Gel) [[id number]] Collected: 07/22/24 1632    Specimen: Blood Updated: 07/22/24 1645     Extra Tube Hold for add-ons.     Comment: Auto resulted.       Lavender Top [[id number]] Collected: 07/22/24 1632    Specimen: Blood Updated: 07/22/24 1645     Extra Tube hold for add-on     Comment: Auto resulted       Red Top [[id number]] Collected: 07/22/24 1632    Specimen: Blood Updated: 07/22/24 1645     Extra Tube Hold for add-ons.     Comment: Auto resulted.        Weaver Top [[id number]] Collected: 07/22/24 1632    Specimen: Blood Updated: 07/22/24 1645     Extra Tube Hold for add-ons.     Comment: Auto resulted.       Light Blue Top [[id number]] Collected: 07/22/24 1632    Specimen: Blood Updated: 07/22/24 1645     Extra Tube Hold for add-ons.     Comment: Auto resulted       CBC & Differential [[id number]]  (Abnormal) Collected: 07/22/24 1632    Specimen: Blood Updated: 07/22/24 1641    Narrative:      The following orders were created for panel order CBC & Differential.  Procedure                               Abnormality         Status                     ---------                               -----------         ------                     CBC Auto Differential[[id number]]        Abnormal            Final result                 Please view results for these tests on the individual orders.    CBC Auto Differential [[id number]]  (Abnormal) Collected: 07/22/24 1632    Specimen: Blood Updated: 07/22/24 1641     WBC 11.19 10*3/mm3      RBC 5.19 10*6/mm3      Hemoglobin 11.7 g/dL      Hematocrit 40.1 %      MCV 77.3 fL      MCH 22.5 pg      MCHC 29.2 g/dL      RDW 17.8 %      RDW-SD 49.0 fl      MPV 10.3 fL      Platelets 234 10*3/mm3      Neutrophil % 75.9 %      Lymphocyte % 9.8 %      Monocyte % 11.3 %      Eosinophil % 2.2 %      Basophil % 0.4 %      Immature Grans % 0.4 %      Neutrophils, Absolute 8.48 10*3/mm3      Lymphocytes, Absolute 1.10 10*3/mm3      Monocytes, Absolute 1.27 10*3/mm3      Eosinophils, Absolute 0.25 10*3/mm3      Basophils, Absolute 0.04 10*3/mm3      Immature Grans, Absolute 0.05 10*3/mm3      nRBC 0.0 /100 WBC           Imaging Results (Last 24 Hours)       Procedure Component Value Units Date/Time    CT Angiogram Chest [[id number]] Collected: 07/22/24 1842     Updated: 07/22/24 1859    Narrative:      Exam: CT angiogram of the of the chest with intravenous contrast.  7/22/2024     CLINICAL HISTORY: New-onset atrial fibrillation. Shortness of air.      DOSE:  913.86 mGy.cm . All CT scans are performed using dose  optimization techniques as appropriate to the performed exam and  including at least one of the following: Automated exposure control,  adjustment of the mA and/or kV according to size, and the use of the  iterative reconstruction technique.     TECHNIQUE: Contiguous axial images were obtained through the thorax  following intravenous contrast administration with reformatted images  obtained in the sagittal and coronal projections from the original data  set. Three-dimensional reconstructions are also obtained.     COMPARISON: 2/16/2016     FINDINGS:     Pulmonary arteries:  There is normal enhancement of the pulmonary  arteries with no evidence of pulmonary thromboembolic disease.     Aorta:  The thoracic aorta and proximal great vessels are normal in  appearance. There is no evidence of aortic dissection or aneurysm.     LUNGS: There are patchy areas of mixed groundglass and airspace  consolidation within both lungs (right greater than left). A small right  effusion is present with right basilar atelectasis. There is diffuse  bronchial wall thickening. There is a 5 mm subpleural nodule in the  lateral left lower lobe image 112 series 11 slightly more conspicuous  than on the previous exam but with thinner slice thickness performed on  today's exam. There is a 7 mm nodule within the right middle lobe  abutting the minor fissure. This is more conspicuous than on the  previous exam but again this is a thinner slice thickness. Follow-up CT  imaging in 6 months recommended. Given the distribution of findings I  would rather favor that this represents pulmonary venous  hypertension/volume overload with pulmonary edema with a bilateral  pneumonia a less likely consideration.     Pleural spaces: Small right effusion. No left effusion.     HEART: No evidence of cardiac enlargement. Coronary artery  calcifications are noted in the LAD and circumflex  distribution.  Elevated right heart pressure is suspected with reflux of contrast into  the intrahepatic IVC. There is no pericardial effusion.     Bones: There is advanced arthritic changes of both shoulders. No acute  or suspicious bony abnormalities demonstrated.     CHEST WALL: No chest wall abnormalities are demonstrated.     Lymph nodes: No enlarged mediastinal or axillary lymphadenopathy is  present.     Upper abdomen: The adrenals are unremarkable. Cholelithiasis is present.  No pericholecystic inflammatory change.       Impression:      1. No evidence of pulmonary thromboembolic disease. The thoracic aorta  and great vessels are normal in caliber.  2. There is a right-sided pleural effusion with right basilar  atelectasis. There is diffuse bronchial wall thickening with mixed  groundglass and airspace consolidation (right greater than left). There  are also increased reticular opacities within the periphery of the  lungs. I would favor that this represents changes of pulmonary venous  hypertension/volume overload with pulmonary edema. A mixed interstitial  and alveolar bilateral pneumonia would also be in the differential but  felt less likely.  3. Right middle lobe and left lower lobe nodule warranting follow-up.  These are slightly more conspicuous than on previous remote CT of 2016  but we also utilizing much thinner slice thickness. Follow-up CT imaging  without contrast in 6 months could be obtained to assure stability.  4. Cholelithiasis.     This report was signed and finalized on 7/22/2024 6:56 PM by Dr. Mars Blackwell MD.       XR Chest 1 View [[id number]] Collected: 07/22/24 1703     Updated: 07/22/24 1707    Narrative:      EXAM/TECHNIQUE: XR CHEST 1 VW-     INDICATION: palpitations     COMPARISON: 12/29/2016     FINDINGS:     Cardiac silhouette is enlarged. Bilateral perihilar consolidation,  greatest on the right. No pleural effusion or visible pneumothorax.  Advanced degenerative change in  the right glenohumeral joint. No acute  osseous finding.       Impression:         1.  Bilateral perihilar consolidation, greatest on the right.  Differential includes pulmonary edema and infectious process/pneumonia.  2.  Cardiac silhouette is enlarged.     This report was signed and finalized on 7/22/2024 5:04 PM by Dr. Stefan Lagos MD.             I have personally reviewed and interpreted the radiology studies and ECG obtained at time of admission.     Assessment / Plan   Assessment:   Active Hospital Problems    Diagnosis     **Atrial fibrillation with RVR        Treatment Plan  The patient will be admitted to my service here at [facility].    1. Atrial fibrillation with rapid ventricular response  2.  Acute on chronic CHF  3.  History of coronary artery disease  4.  GERD  5.  Hyperlipidemia  6.  Hypertension  7.  Hypothyroidism  8.  Obstructive sleep apnea      The patient will be admitted to the hospital, he will be started on a Cardizem drip, patient received 1 dose of amiodarone IV, Lasix IV 40 mg every 12 hours, patient will be placed on fluid restriction sodium restriction, patient will be started on full dose Lovenox at 1 mg/kg SQ every 12 hours, cardiology consultation will be placed.  Will resume home medications.    Medical Decision Making  Number and Complexity of problems: 3  Differential Diagnosis: none    Conditions and Status             MDM Data  External documents reviewed: n/a  Cardiac tracing (EKG, telemetry) interpretation: atrial fibrillation  Radiology interpretation: yes   Labs reviewed: yes  Any tests that were considered but not ordered: none     Decision rules/scores evaluated (example [id number], [place], etc): 3     Discussed with: patient. RN     Care Planning  Shared decision making: patient  Code status and discussions: full    Disposition  Social Determinants of Health that impact treatment or disposition: none  Estimated length of stay is 3.     I confirmed that  the patient's advanced care plan is present, code status is documented, and a surrogate decision maker is listed in the patient's medical record.     The patient's surrogate decision maker is patient.     The patient was seen and examined by me on 7/22/2024 at  2030.    Electronically signed by Leobardo Benitez MD, 07/22/24, 20:55 CDT.

## 2024-07-23 NOTE — CONSULTS
Referring Provider: No ref. provider found    Reason for Consultation: Atrial Fibrillation     Chief complaint:   Chief Complaint   Patient presents with    afib rvr       Subjective .     History of present illness:  Ziyad Wilson is a 59 y.o. male with significant past medical history of coronary artery disease, nonischemic cardiomyopathy, hypertension, hypothyroidism, sleep apnea and hyperlipidemia. He was previously following with Dr Laogs in our office and was last seen in 2/2020. He underwent stress test in 2020 that showed low risk for ischemia. He presented to [facility] ER by EMS from PCP office with shortness of breath and new onset Atrial fibrillation. He reports that his shortness of breath has been progressively worse with exertion and at rest. He has taken lasix for the past 3 days, he reports increased urine output but no improvement in shortness of breath. He received Cardizem bolus and was started on Cardizem drip.   EKG revealed Atrial fibrillation with rate 145. HS troponin 23 with repeat 25. TSH 7.340. . Cr 1.50. CXR pulmonary edema vs infectious process/PNA. CTA Chest showed no evidence of pulmonary emboli. Right pleural effusion, right middle lobe and left lower lobe nodule that warrants follow up in 6 months.   Today he is lying in bed with labored breathing currently on O2 via NC 3L with oxygen saturation at 91-94%. He has had some chest tightness. He denies any leg swelling, orthopnea or PND. He denies any heart racing or palpitations. BP at home has been 120/60's at home. He has been started on Cardizem 30 mg every 6 hours by attending as well as bumex drip and iron infusion. Patient reports a history of sleep apnea that he had a surgery to remove adnoids/tonsils and stretch his throat about 30 years ago. He denies being retested since this surgery. He reports that he has been smoking marijuana consistently.     History  Past Medical History:   Diagnosis Date    Asthma      Cardiomyopathy     non-ischemic    CHF (congestive heart failure)     Coronary artery disease     Foot pain, bilateral     GERD (gastroesophageal reflux disease)     Headache     Hyperlipemia, mixed     Hypertension     benign    Hypothyroidism     Obesity     Sleep apnea     SOB (shortness of breath)    ,   Past Surgical History:   Procedure Laterality Date    APPENDECTOMY      BLADDER REPAIR      CARDIAC CATHETERIZATION  09/2003    COLONOSCOPY N/A 1/9/2018    Procedure: COLONOSCOPY WITH ANESTHESIA;  Surgeon: Dick Espinosa DO;  Location: [facility] ENDOSCOPY;  Service:     COLOSTOMY      with colostomy reversal    KNEE SURGERY Right    ,   Family History   Problem Relation Age of Onset    Hypertension Mother     Stroke Mother     Heart disease Mother     Hypertension Father     Heart disease Father     Hypertension Sister     Hypertension Brother    ,   Social History     Tobacco Use    Smoking status: Never    Smokeless tobacco: Never   Substance Use Topics    Alcohol use: Yes     Comment: occasional    Drug use: No   ,     Medications    Prior to Admission medications    Medication Sig Start Date End Date Taking? Authorizing Provider   albuterol sulfate  (90 Base) MCG/ACT inhaler Inhale 2 puffs Every 4 (Four) Hours As Needed for Wheezing or Shortness of Air. 9/16/20   Erika Dill APRN   amitriptyline (ELAVIL) 50 MG tablet TAKE 1 TABLET EVERY EVENING 1/13/22   Ashish Thurman DO   amLODIPine-valsartan (EXFORGE) 5-160 MG per tablet Take 1 tablet by mouth Daily. 3/16/21   Ashish Thurman DO   aspirin 325 MG tablet daily.    Provider, MD Yareli   atorvastatin (LIPITOR) 40 MG tablet Take 1 tablet by mouth Daily. 2/17/22   Erika Dill APRN   buPROPion XL (WELLBUTRIN XL) 300 MG 24 hr tablet Take 1 tablet by mouth Daily. 2/1/22   Erika Dill APRN   butalbital-acetaminophen-caffeine (FIORICET, ESGIC) -40 MG per tablet Take 1 tablet by mouth Daily As Needed for Migraine. 5/27/21    Ashish Thurman,    carvedilol (COREG) 25 MG tablet Take 2 tablets by mouth 2 (Two) Times a Day. 11/28/22   Staci Chamorro APRN   diclofenac (VOLTAREN) 1 % gel gel Apply 4 g topically to the appropriate area as directed 3 (Three) Times a Day. 9/16/20   Erika Dill APRN   escitalopram (LEXAPRO) 20 MG tablet Take 1 tablet by mouth Daily. 1/19/22   Ashish Thurman,    fluticasone (Flonase) 50 MCG/ACT nasal spray 2 sprays into the nostril(s) as directed by provider Daily. Administer 2 sprays in each nostril for each dose. 11/25/20   Erika Dill APRN   fluticasone-salmeterol (ADVAIR) 100-50 MCG/DOSE DISKUS Inhale 1 puff 2 (Two) Times a Day. 12/1/20   Ashish Thurman DO   furosemide (LASIX) 40 MG tablet TAKE 1 TABLET TWICE DAILY 2/17/22   Erika Dill APRN   icosapent ethyl (VASCEPA) 1 g capsule capsule Take 2 g by mouth 2 (Two) Times a Day With Meals. 2/19/20   Uri Lagos MD   levothyroxine (SYNTHROID, LEVOTHROID) 75 MCG tablet Take 1 tablet by mouth Daily. 9/2/21   Ashish Thurman,    Multiple Vitamins-Minerals (MULTIVITAMIN WITH MINERALS) tablet tablet Take 1 tablet by mouth Daily.    ProviderYareli MD   nitroglycerin (NITROSTAT) 0.4 MG SL tablet Place 0.4 mg under the tongue as needed. Take no more than 3 doses in 15 minutes.    ProviderYareli MD   omeprazole (priLOSEC) 20 MG capsule Take 1 capsule by mouth Daily. 2/11/22   Erika Dill APRN   QUEtiapine (SEROquel) 100 MG tablet Take 1 tablet by mouth every night at bedtime. 2/17/22   Erika Dill APRN   spironolactone (ALDACTONE) 25 MG tablet TAKE ONE TABLET BY MOUTH ONCE DAILY 11/28/16   Uri Lagos MD   Suvorexant (Belsomra) 10 MG tablet Take 1 tablet by mouth Every Night. 9/2/21   Erika Dill APRN       Current Facility-Administered Medications   Medication Dose Route Frequency Provider Last Rate Last Admin    albuterol (PROVENTIL) nebulizer solution 0.083% 2.5 mg/3mL  2.5 mg  Nebulization Q4H - RT Jhon Chowdhury MD   2.5 mg at 07/23/24 1006    amitriptyline (ELAVIL) tablet 50 mg  50 mg Oral Q PM Leobardo Benitez MD   50 mg at 07/22/24 2230    apixaban (ELIQUIS) tablet 5 mg  5 mg Oral Q12H Mihaela Otero APRN        atorvastatin (LIPITOR) tablet 40 mg  40 mg Oral Nightly Leobardo Benitez MD        benzonatate (TESSALON) capsule 100 mg  100 mg Oral TID PRN Leobardo Benitez MD        sennosides-docusate (PERICOLACE) 8.6-50 MG per tablet 2 tablet  2 tablet Oral BID PRN Leobardo Benitez MD        And    polyethylene glycol (MIRALAX) packet 17 g  17 g Oral Daily PRN Leobardo Benitez MD        And    bisacodyl (DULCOLAX) EC tablet 5 mg  5 mg Oral Daily PRN Leobardo Benitez MD        And    bisacodyl (DULCOLAX) suppository 10 mg  10 mg Rectal Daily PRN Leobardo Benitez MD        budesonide-formoterol (SYMBICORT) 160-4.5 MCG/ACT inhaler 1 puff  1 puff Inhalation BID - RT Leobardo Benitez MD   1 puff at 07/23/24 0624    bumetanide (BUMEX) 25 mg/100mL (0.25 mg/mL) infusion  0.5 mg/hr Intravenous Continuous Mihaela Otero APRN        buPROPion XL (WELLBUTRIN XL) 24 hr tablet 300 mg  300 mg Oral Daily Leobardo Benitez MD   300 mg at 07/23/24 0859    butalbital-acetaminophen-caffeine (FIORICET, ESGIC) -40 MG per tablet 1 tablet  1 tablet Oral Daily PRN Leobardo Benitez MD        Calcium Replacement - Follow Nurse / BPA Driven Protocol   Does not apply PRN Leobardo Benitez MD        carvedilol (COREG) tablet 50 mg  50 mg Oral BID Leobardo Benitez MD   50 mg at 07/23/24 0857    dilTIAZem (CARDIZEM) tablet 30 mg  30 mg Oral Q6H Mihaela Otero APRN   30 mg at 07/23/24 0856    escitalopram (LEXAPRO) tablet 20 mg  20 mg Oral Daily Leobardo Benitez MD   20 mg at 07/23/24 0859    ferric gluconate (FERRLECIT) 250 mg in sodium chloride 0.9 % 250 mL IVPB  250 mg Intravenous Daily Mihaela Otero APRN        fluticasone (FLONASE) 50 MCG/ACT nasal spray 2 spray  2 spray  Nasal Daily Leobardo Benitez MD   2 spray at 07/23/24 0903    guaifenesin (ROBITUSSIN) 100 MG/5ML liquid 200 mg  200 mg Oral Q4H PRN Leobardo Benitez MD   200 mg at 07/23/24 0212    levothyroxine (SYNTHROID, LEVOTHROID) tablet 75 mcg  75 mcg Oral Daily Leobardo Benitez MD   75 mcg at 07/23/24 0535    Magnesium Standard Dose Replacement - Follow Nurse / BPA Driven Protocol   Does not apply PRN Leobardo Benitez MD        multivitamin with minerals 1 tablet  1 tablet Oral Daily Leobardo Benitez MD   1 tablet at 07/23/24 0859    nitroglycerin (NITROSTAT) SL tablet 0.4 mg  0.4 mg Sublingual Q5 Min PRN Leobardo eBnitez MD   0.4 mg at 07/23/24 0343    pantoprazole (PROTONIX) EC tablet 40 mg  40 mg Oral Q AM Leobardo Benitez MD   40 mg at 07/23/24 0856    Phosphorus Replacement - Follow Nurse / BPA Driven Protocol   Does not apply PRN Leobardo Benitez MD        Potassium Replacement - Follow Nurse / BPA Driven Protocol   Does not apply PRN Leobardo Benitez MD        QUEtiapine (SEROquel) tablet 100 mg  100 mg Oral Nightly Leobardo Benitez MD   100 mg at 07/22/24 2231    sodium chloride 0.9 % flush 10 mL  10 mL Intravenous PRN Rodolfo Wiseman MD        sodium chloride 0.9 % flush 10 mL  10 mL Intravenous PRN Leobardo Benitez MD        sodium chloride 0.9 % flush 10 mL  10 mL Intravenous Q12H Leobardo Benitez MD   10 mL at 07/23/24 0904    sodium chloride 0.9 % flush 10 mL  10 mL Intravenous PRN Leobardo Benitez MD        sodium chloride 0.9 % infusion 40 mL  40 mL Intravenous PRN Leobardo Benitez MD        spironolactone (ALDACTONE) tablet 25 mg  25 mg Oral Daily Leobardo Benitez MD   25 mg at 07/23/24 0858    temazepam (RESTORIL) capsule 15 mg  15 mg Oral Nightly PRN Leobardo Benitez MD   15 mg at 07/23/24 0249     Current Outpatient Medications   Medication Sig Dispense Refill    albuterol sulfate  (90 Base) MCG/ACT inhaler Inhale 2 puffs Every 4 (Four) Hours As Needed for Wheezing or Shortness of  Air. 18 g 11    amitriptyline (ELAVIL) 50 MG tablet TAKE 1 TABLET EVERY EVENING 90 tablet 1    amLODIPine-valsartan (EXFORGE) 5-160 MG per tablet Take 1 tablet by mouth Daily. 90 tablet 3    aspirin 325 MG tablet daily.      atorvastatin (LIPITOR) 40 MG tablet Take 1 tablet by mouth Daily. 90 tablet 3    buPROPion XL (WELLBUTRIN XL) 300 MG 24 hr tablet Take 1 tablet by mouth Daily. 90 tablet 3    butalbital-acetaminophen-caffeine (FIORICET, ESGIC) -40 MG per tablet Take 1 tablet by mouth Daily As Needed for Migraine. 15 tablet 2    carvedilol (COREG) 25 MG tablet Take 2 tablets by mouth 2 (Two) Times a Day. 60 tablet 0    diclofenac (VOLTAREN) 1 % gel gel Apply 4 g topically to the appropriate area as directed 3 (Three) Times a Day. 100 g 3    escitalopram (LEXAPRO) 20 MG tablet Take 1 tablet by mouth Daily. 90 tablet 3    fluticasone (Flonase) 50 MCG/ACT nasal spray 2 sprays into the nostril(s) as directed by provider Daily. Administer 2 sprays in each nostril for each dose. 1 bottle 11    fluticasone-salmeterol (ADVAIR) 100-50 MCG/DOSE DISKUS Inhale 1 puff 2 (Two) Times a Day. 60 each 3    furosemide (LASIX) 40 MG tablet TAKE 1 TABLET TWICE DAILY 180 tablet 3    icosapent ethyl (VASCEPA) 1 g capsule capsule Take 2 g by mouth 2 (Two) Times a Day With Meals. 120 capsule 11    levothyroxine (SYNTHROID, LEVOTHROID) 75 MCG tablet Take 1 tablet by mouth Daily. 90 tablet 1    Multiple Vitamins-Minerals (MULTIVITAMIN WITH MINERALS) tablet tablet Take 1 tablet by mouth Daily.      nitroglycerin (NITROSTAT) 0.4 MG SL tablet Place 0.4 mg under the tongue as needed. Take no more than 3 doses in 15 minutes.      omeprazole (priLOSEC) 20 MG capsule Take 1 capsule by mouth Daily. 90 capsule 3    QUEtiapine (SEROquel) 100 MG tablet Take 1 tablet by mouth every night at bedtime. 90 tablet 1    spironolactone (ALDACTONE) 25 MG tablet TAKE ONE TABLET BY MOUTH ONCE DAILY 30 tablet 6    Suvorexant (Belsomra) 10 MG tablet Take 1  tablet by mouth Every Night. 90 tablet 1       Allergies:  Patient has no known allergies.    Review of Systems  Review of Systems   Cardiovascular:  Positive for dyspnea on exertion. Negative for chest pain, irregular heartbeat, leg swelling, near-syncope, orthopnea and palpitations.   Respiratory:  Positive for shortness of breath.    Genitourinary:  Negative for hematuria.   Neurological:  Positive for light-headedness. Negative for dizziness and weakness.   All other systems reviewed and are negative.      Objective     Physical Exam:  Patient Vitals for the past 24 hrs:   BP Temp Temp src Pulse Resp SpO2 Height Weight   07/23/24 0755 102/85 98 °F (36.7 °C) Oral -- 20 -- -- --   07/23/24 0626 -- -- -- 88 26 95 % -- --   07/23/24 0624 -- -- -- 95 -- 95 % -- --   07/23/24 0619 -- -- -- 82 (!) 30 94 % -- --   07/23/24 0615 98/84 -- -- 84 -- -- -- --   07/23/24 0613 98/84 97.8 °F (36.6 °C) Oral 97 20 91 % -- --   07/23/24 0353 -- -- -- 79 -- 90 % -- --   07/23/24 0350 96/66 -- -- 91 -- 90 % -- --   07/23/24 0343 103/78 -- -- 90 -- -- -- --   07/23/24 0340 103/78 -- -- 90 -- (!) 87 % -- --   07/23/24 0328 108/88 -- -- 87 -- -- -- --   07/23/24 0212 108/88 -- -- 101 -- (!) 88 % -- --   07/23/24 0211 -- -- -- 91 -- 90 % -- --   07/23/24 0202 108/88 -- -- 98 -- -- -- --   07/23/24 0016 115/78 -- -- 110 -- -- -- --   07/22/24 2316 110/97 -- -- 99 -- 91 % -- --   07/22/24 2231 104/74 -- -- 101 -- -- -- --   07/22/24 2216 104/86 -- -- 110 -- -- -- --   07/22/24 2215 -- -- -- -- -- 95 % -- --   07/22/24 2156 101/77 -- -- 102 -- -- -- --   07/22/24 2047 -- -- -- -- -- 95 % -- --   07/22/24 2046 121/92 -- -- 114 -- -- -- --   07/22/24 2025 110/88 -- -- (!) 141 -- -- -- --   07/22/24 2018 115/93 -- -- (!) 148 -- 94 % -- --   07/22/24 2009 113/79 -- -- (!) 147 -- -- -- --   07/22/24 2004 -- -- -- (!) 164 -- 95 % -- --   07/22/24 2001 (!) 116/101 -- -- -- -- -- -- --   07/22/24 2000 -- -- -- (!) 141 -- 94 % -- --   07/22/24 1953  "-- -- -- (!) 157 -- -- -- --   07/22/24 1946 137/98 -- -- (!) 153 -- 95 % -- --   07/22/24 1930 119/82 -- -- (!) 143 -- -- -- --   07/22/24 1731 (!) 120/105 -- -- (!) 154 -- 97 % -- --   07/22/24 1627 103/81 98.5 °F (36.9 °C) Oral (!) 162 26 94 % -- --   07/22/24 1625 -- -- -- -- -- -- 170.2 cm (67\") 90.7 kg (200 lb)     Vitals reviewed.   Constitutional:       General: Not in acute distress.     Appearance: Normal appearance. Well-developed. Obese.      Interventions: Nasal cannula in place.      Comments: 3L   Eyes:      Pupils: Pupils are equal, round, and reactive to light.   HENT:      Head: Normocephalic and atraumatic.      Nose: Nose normal.   Neck:      Vascular: No carotid bruit.   Pulmonary:      Effort: Tachypnea present. No respiratory distress.      Breath sounds: No wheezing. Rales present.   Cardiovascular:      Mildly tachycardia   Irregularly irregular rhythm.      Murmurs: There is no murmur.   Edema:     Peripheral edema absent.   Abdominal:      General: There is no distension.      Palpations: Abdomen is soft.   Musculoskeletal: Normal range of motion.      Cervical back: Normal range of motion and neck supple. Skin:     General: Skin is warm.      Findings: No erythema or rash.   Neurological:      General: No focal deficit present.      Mental Status: Alert and oriented to person, place, and time.   Psychiatric:         Attention and Perception: Attention normal.         Mood and Affect: Mood normal.         Speech: Speech normal.         Behavior: Behavior normal.         Thought Content: Thought content normal.         Judgment: Judgment normal.         Results Review:   I reviewed the patient's new clinical results.    Lab Results (last 72 hours)       Procedure Component Value Units Date/Time    Iron Profile [[id number]]  (Abnormal) Collected: 07/23/24 0347    Specimen: Blood from Arm, Right Updated: 07/23/24 0813     Iron 32 mcg/dL      Iron Saturation (TSAT) 8 %      Transferrin 286 " "mg/dL      TIBC 426 mcg/dL     Phosphorus [[id number]]  (Normal) Collected: 07/23/24 0347    Specimen: Blood from Arm, Right Updated: 07/23/24 0813     Phosphorus 3.0 mg/dL     Ferritin [[id number]]  (Abnormal) Collected: 07/23/24 0347    Specimen: Blood from Arm, Right Updated: 07/23/24 0813     Ferritin 15.24 ng/mL     Narrative:      Results may be falsely decreased if patient taking Biotin.      Single High Sensitivity Troponin T [[id number]]  (Abnormal) Collected: 07/23/24 0347    Specimen: Blood from Arm, Right Updated: 07/23/24 0416     HS Troponin T 25 ng/L     Narrative:      High Sensitive Troponin T Reference Range:  <14.0 ng/L- Negative Female for AMI  <22.0 ng/L- Negative Male for AMI  >=14 - Abnormal Female indicating possible myocardial injury.  >=22 - Abnormal Male indicating possible myocardial injury.   Clinicians would have to utilize clinical acumen, EKG, Troponin, and serial changes to determine if it is an Acute Myocardial Infarction or myocardial injury due to an underlying chronic condition.         Blood Culture - Blood, Arm, Right [[id number]] Collected: 07/22/24 1747    Specimen: Blood from Arm, Right Updated: 07/22/24 1806    Procalcitonin [[id number]]  (Normal) Collected: 07/22/24 1632    Specimen: Blood Updated: 07/22/24 1755     Procalcitonin 0.06 ng/mL     Narrative:      As a Marker for Sepsis (Non-Neonates):    1. <0.5 ng/mL represents a low risk of severe sepsis and/or septic shock.  2. >2 ng/mL represents a high risk of severe sepsis and/or septic shock.    As a Marker for Lower Respiratory Tract Infections that require antibiotic therapy:    PCT on Admission    Antibiotic Therapy       6-12 Hrs later    >0.5                Strongly Recommended  >0.25 - <0.5        Recommended   0.1 - 0.25          Discouraged              Remeasure/reassess PCT  <0.1                Strongly Discouraged     Remeasure/reassess PCT    As 28 day mortality risk marker: \"Change in Procalcitonin Result\" " (>80% or <=80%) if Day 0 (or Day 1) and Day 4 values are available. Refer to http://www.[facility]-pct-calculator.com    Change in PCT <=80%  A decrease of PCT levels below or equal to 80% defines a positive change in PCT test result representing a higher risk for 28-day all-cause mortality of patients diagnosed with severe sepsis for septic shock.    Change in PCT >80%  A decrease of PCT levels of more than 80% defines a negative change in PCT result representing a lower risk for 28-day all-cause mortality of patients diagnosed with severe sepsis or septic shock.       Blood Culture - Blood, Arm, Left [[id number]] Collected: 07/22/24 1734    Specimen: Blood from Arm, Left Updated: 07/22/24 1754    Lactic Acid, Plasma [[id number]]  (Normal) Collected: 07/22/24 1632    Specimen: Blood Updated: 07/22/24 1733     Lactate 1.1 mmol/L     Single High Sensitivity Troponin T [[id number]]  (Abnormal) Collected: 07/22/24 1632    Specimen: Blood Updated: 07/22/24 1717     HS Troponin T 23 ng/L     Narrative:      High Sensitive Troponin T Reference Range:  <14.0 ng/L- Negative Female for AMI  <22.0 ng/L- Negative Male for AMI  >=14 - Abnormal Female indicating possible myocardial injury.  >=22 - Abnormal Male indicating possible myocardial injury.   Clinicians would have to utilize clinical acumen, EKG, Troponin, and serial changes to determine if it is an Acute Myocardial Infarction or myocardial injury due to an underlying chronic condition.         Blood Gas, Arterial With Co-Ox [[id number]]  (Abnormal) Collected: 07/22/24 1704    Specimen: Arterial Blood Updated: 07/22/24 1705     Site Left Radial     Jd's Test Positive     pH, Arterial 7.432 pH units      pCO2, Arterial 35.4 mm Hg      pO2, Arterial 72.1 mm Hg      Comment: 84 Value below reference range        HCO3, Arterial 23.6 mmol/L      Base Excess, Arterial -0.4 mmol/L      Comment: 84 Value below reference range        O2 Saturation, Arterial 93.5 %      Comment: 84  Value below reference range        Hemoglobin, Blood Gas 12.0 g/dL      Comment: 84 Value below reference range        Hematocrit, Blood Gas 36.8 %      Comment: 84 Value below reference range        Oxyhemoglobin 93.0 %      Comment: 84 Value below reference range        Methemoglobin 0.40 %      Carboxyhemoglobin 0.1 %      Temperature 37.0     Sodium, Arterial 143 mmol/L      Potassium, Arterial 3.4 mmol/L      Comment: 84 Value below reference range        Ionized Calcium 4.57 mg/dL      Comment: 84 Value below reference range        Barometric Pressure for Blood Gas 752 mmHg      Modality Room Air     Ventilator Mode NA     Collected by [id number]     Comment: Meter: [id number]     :  Michael Negron, CRT        pH, Temp Corrected 7.432 pH Units      pCO2, Temperature Corrected 35.4 mm Hg      pO2, Temperature Corrected 72.1 mm Hg     TSH [[id number]]  (Abnormal) Collected: 07/22/24 1632    Specimen: Blood Updated: 07/22/24 1704     TSH 7.340 uIU/mL     T4, Free [[id number]]  (Normal) Collected: 07/22/24 1632    Specimen: Blood Updated: 07/22/24 1704     Free T4 1.01 ng/dL     Magnesium [[id number]]  (Normal) Collected: 07/22/24 1632    Specimen: Blood Updated: 07/22/24 1659     Magnesium 1.6 mg/dL     Comprehensive Metabolic Panel [[id number]]  (Abnormal) Collected: 07/22/24 1632    Specimen: Blood Updated: 07/22/24 1659     Glucose 112 mg/dL      BUN 21 mg/dL      Creatinine 1.50 mg/dL      Sodium 141 mmol/L      Potassium 3.6 mmol/L      Chloride 104 mmol/L      CO2 26.0 mmol/L      Calcium 8.6 mg/dL      Total Protein 7.0 g/dL      Albumin 3.8 g/dL      ALT (SGPT) 28 U/L      AST (SGOT) 25 U/L      Alkaline Phosphatase 65 U/L      Total Bilirubin 0.3 mg/dL      Globulin 3.2 gm/dL      A/G Ratio 1.2 g/dL      BUN/Creatinine Ratio 14.0     Anion Gap 11.0 mmol/L      eGFR 53.0 mL/min/1.73     Narrative:      GFR Normal >60  Chronic Kidney Disease <60  Kidney Failure <15      BNP [[id number]]  (Normal)  Collected: 07/22/24 1632    Specimen: Blood Updated: 07/22/24 1657     proBNP 602.9 pg/mL     Narrative:      This assay is used as an aid in the diagnosis of individuals suspected of having heart failure. It can be used as an aid in the diagnosis of acute decompensated heart failure (ADHF) in patients presenting with signs and symptoms of ADHF to the emergency department (ED). In addition, NT-proBNP of <300 pg/mL indicates ADHF is not likely.    Age Range Result Interpretation  NT-proBNP Concentration (pg/mL:      <50             Positive            >450                   Gray                 300-450                    Negative             <300    50-75           Positive            >900                  Gray                300-900                  Negative            <300      >75             Positive            >1800                  Gray                300-1800                  Negative            <300    Protime-INR [[id number]]  (Normal) Collected: 07/22/24 1632    Specimen: Blood Updated: 07/22/24 1648     Protime 13.7 Seconds      INR 1.01    aPTT [[id number]]  (Normal) Collected: 07/22/24 1632    Specimen: Blood Updated: 07/22/24 1648     PTT 31.3 seconds     [facility]bert Draw [[id number]] Collected: 07/22/24 1632    Specimen: Blood Updated: 07/22/24 1645    Narrative:      The following orders were created for panel order [facility] Draw.  Procedure                               Abnormality         Status                     ---------                               -----------         ------                     Green Top (Gel)[[id number]]                                  Final result               Lavender Top[[id number]]                                     Final result               Red Top[[id number]]                                          Final result               Weaver Top[[id number]]                                         Final result               Light Blue Top[[id number]]                                   Final  result                 Please view results for these tests on the individual orders.    Green Top (Gel) [[id number]] Collected: 07/22/24 1632    Specimen: Blood Updated: 07/22/24 1645     Extra Tube Hold for add-ons.     Comment: Auto resulted.       Lavender Top [[id number]] Collected: 07/22/24 1632    Specimen: Blood Updated: 07/22/24 1645     Extra Tube hold for add-on     Comment: Auto resulted       Red Top [[id number]] Collected: 07/22/24 1632    Specimen: Blood Updated: 07/22/24 1645     Extra Tube Hold for add-ons.     Comment: Auto resulted.       Gray Top [[id number]] Collected: 07/22/24 1632    Specimen: Blood Updated: 07/22/24 1645     Extra Tube Hold for add-ons.     Comment: Auto resulted.       Light Blue Top [[id number]] Collected: 07/22/24 1632    Specimen: Blood Updated: 07/22/24 1645     Extra Tube Hold for add-ons.     Comment: Auto resulted       CBC & Differential [[id number]]  (Abnormal) Collected: 07/22/24 1632    Specimen: Blood Updated: 07/22/24 1641    Narrative:      The following orders were created for panel order CBC & Differential.  Procedure                               Abnormality         Status                     ---------                               -----------         ------                     CBC Auto Differential[[id number]]        Abnormal            Final result                 Please view results for these tests on the individual orders.    CBC Auto Differential [[id number]]  (Abnormal) Collected: 07/22/24 1632    Specimen: Blood Updated: 07/22/24 1641     WBC 11.19 10*3/mm3      RBC 5.19 10*6/mm3      Hemoglobin 11.7 g/dL      Hematocrit 40.1 %      MCV 77.3 fL      MCH 22.5 pg      MCHC 29.2 g/dL      RDW 17.8 %      RDW-SD 49.0 fl      MPV 10.3 fL      Platelets 234 10*3/mm3      Neutrophil % 75.9 %      Lymphocyte % 9.8 %      Monocyte % 11.3 %      Eosinophil % 2.2 %      Basophil % 0.4 %      Immature Grans % 0.4 %      Neutrophils, Absolute 8.48 10*3/mm3       "Lymphocytes, Absolute 1.10 10*3/mm3      Monocytes, Absolute 1.27 10*3/mm3      Eosinophils, Absolute 0.25 10*3/mm3      Basophils, Absolute 0.04 10*3/mm3      Immature Grans, Absolute 0.05 10*3/mm3      nRBC 0.0 /100 WBC             No results found for: \"ECHOEFEST\"    Imaging Results (Last 72 Hours)       Procedure Component Value Units Date/Time    CT Angiogram Chest [[id number]] Collected: 07/22/24 1842     Updated: 07/22/24 1859    Narrative:      Exam: CT angiogram of the of the chest with intravenous contrast.  7/22/2024     CLINICAL HISTORY: New-onset atrial fibrillation. Shortness of air.     DOSE:  913.86 mGy.cm . All CT scans are performed using dose  optimization techniques as appropriate to the performed exam and  including at least one of the following: Automated exposure control,  adjustment of the mA and/or kV according to size, and the use of the  iterative reconstruction technique.     TECHNIQUE: Contiguous axial images were obtained through the thorax  following intravenous contrast administration with reformatted images  obtained in the sagittal and coronal projections from the original data  set. Three-dimensional reconstructions are also obtained.     COMPARISON: 2/16/2016     FINDINGS:     Pulmonary arteries:  There is normal enhancement of the pulmonary  arteries with no evidence of pulmonary thromboembolic disease.     Aorta:  The thoracic aorta and proximal great vessels are normal in  appearance. There is no evidence of aortic dissection or aneurysm.     LUNGS: There are patchy areas of mixed groundglass and airspace  consolidation within both lungs (right greater than left). A small right  effusion is present with right basilar atelectasis. There is diffuse  bronchial wall thickening. There is a 5 mm subpleural nodule in the  lateral left lower lobe image 112 series 11 slightly more conspicuous  than on the previous exam but with thinner slice thickness performed on  today's exam. There is " a 7 mm nodule within the right middle lobe  abutting the minor fissure. This is more conspicuous than on the  previous exam but again this is a thinner slice thickness. Follow-up CT  imaging in 6 months recommended. Given the distribution of findings I  would rather favor that this represents pulmonary venous  hypertension/volume overload with pulmonary edema with a bilateral  pneumonia a less likely consideration.     Pleural spaces: Small right effusion. No left effusion.     HEART: No evidence of cardiac enlargement. Coronary artery  calcifications are noted in the LAD and circumflex distribution.  Elevated right heart pressure is suspected with reflux of contrast into  the intrahepatic IVC. There is no pericardial effusion.     Bones: There is advanced arthritic changes of both shoulders. No acute  or suspicious bony abnormalities demonstrated.     CHEST WALL: No chest wall abnormalities are demonstrated.     Lymph nodes: No enlarged mediastinal or axillary lymphadenopathy is  present.     Upper abdomen: The adrenals are unremarkable. Cholelithiasis is present.  No pericholecystic inflammatory change.       Impression:      1. No evidence of pulmonary thromboembolic disease. The thoracic aorta  and great vessels are normal in caliber.  2. There is a right-sided pleural effusion with right basilar  atelectasis. There is diffuse bronchial wall thickening with mixed  groundglass and airspace consolidation (right greater than left). There  are also increased reticular opacities within the periphery of the  lungs. I would favor that this represents changes of pulmonary venous  hypertension/volume overload with pulmonary edema. A mixed interstitial  and alveolar bilateral pneumonia would also be in the differential but  felt less likely.  3. Right middle lobe and left lower lobe nodule warranting follow-up.  These are slightly more conspicuous than on previous remote CT of 2016  but we also utilizing much thinner  slice thickness. Follow-up CT imaging  without contrast in 6 months could be obtained to assure stability.  4. Cholelithiasis.     This report was signed and finalized on 7/22/2024 6:56 PM by Dr. Mars Blackwell MD.       XR Chest 1 View [[id number]] Collected: 07/22/24 1703     Updated: 07/22/24 1707    Narrative:      EXAM/TECHNIQUE: XR CHEST 1 VW-     INDICATION: palpitations     COMPARISON: 12/29/2016     FINDINGS:     Cardiac silhouette is enlarged. Bilateral perihilar consolidation,  greatest on the right. No pleural effusion or visible pneumothorax.  Advanced degenerative change in the right glenohumeral joint. No acute  osseous finding.       Impression:         1.  Bilateral perihilar consolidation, greatest on the right.  Differential includes pulmonary edema and infectious process/pneumonia.  2.  Cardiac silhouette is enlarged.     This report was signed and finalized on 7/22/2024 5:04 PM by Dr. Stefan Lagos MD.             Assessment & Plan     Atrial Fibrillation with RVR: New onset. Rates have improved. Telemetry shows . Continue Carvedilol and eliquis. Continue Cardizem.     Acute on Chronic CHF: Echo pending. CTA chest showed right pleural effusion. Continue carvedilol and spironolactone. Bumex drip started per attending.     Hx of CAD: patient remains chest pain free.     Hypertension    Hyperlipidemia: Continue atorvastatin    Obstructive sleep apnea: Patient reports a history of sleep apnea that he had a surgery to remove adnoids/tonsils and stretch his throat about 30 years ago. He denies being retested since this surgery. Recommend outpatient testing.     Hypothyroidism: TSH 7.340    GERD    Plan:   - echo ordered per attending  - bumex drip ordered per attending  - continue cardizem and titrate for rate control  - continue carvedilol and spironolactone  - continue to monitor Telemetry   - monitor kidney function and electrolytes closely   - monitor I/O and daily weights    Further  orders per Dr Mathew    Thank you for asking us to follow this patient with you.     Please note this cardiology consultation note is the result of a face to face consultation with the patient, in addition to reviewing medical records at length by myself, VIRGILIO Dozier    Electronically signed by VIRGILIO Dozier, 07/23/24, 10:08 AM CDT.

## 2024-07-23 NOTE — PROGRESS NOTES
Patient Name: Ziyad Wilson  Date of Admission: 7/22/2024  Today's Date: 07/23/24  Length of Stay: 1  Primary Care Physician: Ashish Thurman DO    Subjective   Chief Complaint: Shortness of breath  HPI   Ziyad Wilson is a 59-year-old male with a history of asthma, nonischemic viral cardiomyopathy, CAD, GERD, hyperlipidemia, hypertension, hypothyroidism, obesity, KOLTON CKD stage II and iron deficiency anemia.  Patient presented to [facility] emergency department on 7/22/2024 with complaints of worsening shortness of breath at rest and with exertion.  He has an associated dry cough and complaints of dizziness.  He had felt that he was building up fluid and he began to take increased dose of Lasix over the last 2 days he took approximately 120 mg/day without relief.  He reported to his primary care provider in the morning where an EKG was obtained and showed that he was in atrial fibrillation with rapid ventricular response and he was asked to report to the emergency department.  Upon admission he was found to be in A-fib RVR, heart rate at 150.  Amiodarone 150 was given and Cardizem gtt. was initiated.  Due to patient's possible pneumonia per x-ray versus pulmonary edema 1 g of Rocephin x 1 was given with 40 mg of Lasix.  ED course chest x-ray revealed bilateral perihilar consolidation greatest on the right.  Differential for pulmonary edema versus pneumonia.  Subsequent CTA revealed right greater than left pleural effusion with diffuse bronchial wall thickening and mixed groundglass and airspace consolidation.  For representation of pulmonary venous hypertension/volume overload with pulmonary edema.  Patient was then admitted for continued evaluation and treatment    Today: Patient is resting in bed on on 2 L of oxygen.  He is alert and oriented and able to participate in assessment and history however patient appears to be in extreme respiratory distress.  Patient is using extensive accessory muscles to  breathe, he is unable to articulate a sentence without stopping for breath.  He is beginning to have painful breathing and he feels like he is drowning.  Patient was immediately placed on a Bumex drip, oxygen was adjusted to 3 L patient was pulled up in bed and a more tripod position.  Patient was made aware that cardiology would be consulted and a new echocardiogram will be done to evaluate for possible valve involvement.  As a new murmur appears to be present with the atrial fibrillation.  Currently his A-fib is rate controlled, placed on p.o. Cardizem and gtt. discontinued.  All questions were answered to the best my ability and patient will be reevaluated in a few hours to determine tolerance to Bumex.  Nursing staff was told to call me with any decline in his respiratory status.      This afternoon patient was reevaluated he is currently on 2 L with significant improvement to respiratory status.  Patient is able to articulate without discomfort and has been able to rest due to his decreased respirations.  Will continue with current plan of care, echo remains pending.    Review of Systems   All pertinent negatives and positives are as above. All other systems have been reviewed and are negative unless otherwise stated.     Objective    Temp:  [97.8 °F (36.6 °C)-98.5 °F (36.9 °C)] 98.3 °F (36.8 °C)  Heart Rate:  [] 108  Resp:  [20-35] 22  BP: ()/() 114/94  Physical Exam  Vitals and nursing note reviewed.   Constitutional:       Appearance: He is ill-appearing.   HENT:      Head: Normocephalic.      Nose: Nose normal.      Mouth/Throat:      Mouth: Mucous membranes are moist.      Pharynx: Oropharynx is clear.   Eyes:      Pupils: Pupils are equal, round, and reactive to light.   Cardiovascular:      Rate and Rhythm: Tachycardia present. Rhythm irregular.      Heart sounds: Murmur heard.   Pulmonary:      Effort: Tachypnea and accessory muscle usage present.      Breath sounds: Decreased air  movement present. Decreased breath sounds present.   Abdominal:      General: There is distension.   Genitourinary:     Comments: Voiding per urinal, no scrotal edema present  Musculoskeletal:         General: Normal range of motion.      Cervical back: Normal range of motion.   Skin:     General: Skin is warm.      Capillary Refill: Capillary refill takes less than 2 seconds.      Coloration: Skin is pale.   Neurological:      General: No focal deficit present.      Mental Status: He is alert.   Psychiatric:         Mood and Affect: Mood normal.         Behavior: Behavior normal.         Thought Content: Thought content normal.         Judgment: Judgment normal.             Results Review:  I have reviewed the labs, radiology results, and diagnostic studies.    Laboratory Data:   Results from last 7 days   Lab Units 07/23/24  1334 07/22/24  1632   WBC 10*3/mm3 11.60* 11.19*   HEMOGLOBIN g/dL 11.8* 11.7*   HEMATOCRIT % 39.7 40.1   PLATELETS 10*3/mm3 243 234        Results from last 7 days   Lab Units 07/23/24  1334 07/22/24  1704 07/22/24  1632   SODIUM mmol/L 138  --  141   SODIUM, ARTERIAL mmol/L  --  143  --    POTASSIUM mmol/L 3.5  --  3.6   CHLORIDE mmol/L 100  --  104   CO2 mmol/L 25.0  --  26.0   BUN mg/dL 19  --  21   CREATININE mg/dL 1.38*  --  1.50*   CALCIUM mg/dL 8.8  --  8.6   BILIRUBIN mg/dL 0.4  --  0.3   ALK PHOS U/L 64  --  65   ALT (SGPT) U/L 28  --  28   AST (SGOT) U/L 29  --  25   GLUCOSE mg/dL 117*  --  112*       Microbiology Results (last 10 days)       ** No results found for the last 240 hours. **             Radiology Data:   Imaging Results (Last 24 Hours)       Procedure Component Value Units Date/Time    CT Angiogram Chest [[id number]] Collected: 07/22/24 1842     Updated: 07/22/24 1859    Narrative:      Exam: CT angiogram of the of the chest with intravenous contrast.  7/22/2024     CLINICAL HISTORY: New-onset atrial fibrillation. Shortness of air.     DOSE:  913.86 mGy.cm . All CT  scans are performed using dose  optimization techniques as appropriate to the performed exam and  including at least one of the following: Automated exposure control,  adjustment of the mA and/or kV according to size, and the use of the  iterative reconstruction technique.     TECHNIQUE: Contiguous axial images were obtained through the thorax  following intravenous contrast administration with reformatted images  obtained in the sagittal and coronal projections from the original data  set. Three-dimensional reconstructions are also obtained.     COMPARISON: 2/16/2016     FINDINGS:     Pulmonary arteries:  There is normal enhancement of the pulmonary  arteries with no evidence of pulmonary thromboembolic disease.     Aorta:  The thoracic aorta and proximal great vessels are normal in  appearance. There is no evidence of aortic dissection or aneurysm.     LUNGS: There are patchy areas of mixed groundglass and airspace  consolidation within both lungs (right greater than left). A small right  effusion is present with right basilar atelectasis. There is diffuse  bronchial wall thickening. There is a 5 mm subpleural nodule in the  lateral left lower lobe image 112 series 11 slightly more conspicuous  than on the previous exam but with thinner slice thickness performed on  today's exam. There is a 7 mm nodule within the right middle lobe  abutting the minor fissure. This is more conspicuous than on the  previous exam but again this is a thinner slice thickness. Follow-up CT  imaging in 6 months recommended. Given the distribution of findings I  would rather favor that this represents pulmonary venous  hypertension/volume overload with pulmonary edema with a bilateral  pneumonia a less likely consideration.     Pleural spaces: Small right effusion. No left effusion.     HEART: No evidence of cardiac enlargement. Coronary artery  calcifications are noted in the LAD and circumflex distribution.  Elevated right heart  pressure is suspected with reflux of contrast into  the intrahepatic IVC. There is no pericardial effusion.     Bones: There is advanced arthritic changes of both shoulders. No acute  or suspicious bony abnormalities demonstrated.     CHEST WALL: No chest wall abnormalities are demonstrated.     Lymph nodes: No enlarged mediastinal or axillary lymphadenopathy is  present.     Upper abdomen: The adrenals are unremarkable. Cholelithiasis is present.  No pericholecystic inflammatory change.       Impression:      1. No evidence of pulmonary thromboembolic disease. The thoracic aorta  and great vessels are normal in caliber.  2. There is a right-sided pleural effusion with right basilar  atelectasis. There is diffuse bronchial wall thickening with mixed  groundglass and airspace consolidation (right greater than left). There  are also increased reticular opacities within the periphery of the  lungs. I would favor that this represents changes of pulmonary venous  hypertension/volume overload with pulmonary edema. A mixed interstitial  and alveolar bilateral pneumonia would also be in the differential but  felt less likely.  3. Right middle lobe and left lower lobe nodule warranting follow-up.  These are slightly more conspicuous than on previous remote CT of 2016  but we also utilizing much thinner slice thickness. Follow-up CT imaging  without contrast in 6 months could be obtained to assure stability.  4. Cholelithiasis.     This report was signed and finalized on 7/22/2024 6:56 PM by Dr. Masr Blackwell MD.       XR Chest 1 View [[id number]] Collected: 07/22/24 1703     Updated: 07/22/24 1707    Narrative:      EXAM/TECHNIQUE: XR CHEST 1 VW-     INDICATION: palpitations     COMPARISON: 12/29/2016     FINDINGS:     Cardiac silhouette is enlarged. Bilateral perihilar consolidation,  greatest on the right. No pleural effusion or visible pneumothorax.  Advanced degenerative change in the right glenohumeral joint. No  acute  osseous finding.       Impression:         1.  Bilateral perihilar consolidation, greatest on the right.  Differential includes pulmonary edema and infectious process/pneumonia.  2.  Cardiac silhouette is enlarged.     This report was signed and finalized on 7/22/2024 5:04 PM by Dr. Stefan Lagos MD.               I have reviewed the patient's current medications.     albuterol, 2.5 mg, Nebulization, Q4H - RT  amitriptyline, 50 mg, Oral, Q PM  apixaban, 5 mg, Oral, Q12H  atorvastatin, 40 mg, Oral, Nightly  budesonide-formoterol, 1 puff, Inhalation, BID - RT  buPROPion XL, 300 mg, Oral, Daily  carvedilol, 50 mg, Oral, BID  dilTIAZem, 30 mg, Oral, Q6H  escitalopram, 20 mg, Oral, Daily  ferric gluconate, 250 mg, Intravenous, Daily  fluticasone, 2 spray, Nasal, Daily  levothyroxine, 75 mcg, Oral, Daily  multivitamin with minerals, 1 tablet, Oral, Daily  pantoprazole, 40 mg, Oral, Q AM  QUEtiapine, 100 mg, Oral, Nightly  sodium chloride, 10 mL, Intravenous, Q12H  spironolactone, 25 mg, Oral, Daily         Assessment/Plan   Assessment  Active Hospital Problems    Diagnosis     **Atrial fibrillation with RVR     Iron deficiency anemia     NICM (nonischemic cardiomyopathy), viral etiology     Tetrahydrocannabinol (THC) dependence     Acute pulmonary edema     CKD (chronic kidney disease) stage 2, GFR 60-89 ml/min     Class 1 obesity due to excess calories with serious comorbidity and body mass index (BMI) of 30.0 to 30.9 in adult        Treatment Plan  1.  Atrial fibrillation with rapid ventricular response-patient initially presented in rapid ventricular response, heart rate 154.  Patient was initially given 150 of amiodarone and 2 g of magnesium.  Patient was then placed on a Cardizem gtt. and this a.m. heart rate has been sustaining in the 70s and 80s.  Cardizem gtt. discontinued and patient placed on 30 mg every 6 hours to evaluate for tolerance.  Eliquis initiated at 5 mg twice daily.  Cardiology consulted  for cardiomyopathy, agrees with current plan for atrial fibrillation.  Continue cardiac telemetry and every 4 vital signs.  [id number] score of 2    2.  Iron deficiency anemia-due to patient's complaints of extreme lethargy and previous anemia.  Iron profile revealed need for infusion, ferric gluconate 250 mg IV x 72 hours.    3.  Nonischemic cardiomyopathy, viral etiology/acute pulmonary edema-upon assessment patient was in extreme respiratory distress with accessory muscle use and the inability to speak without extreme difficulty.  Patient was placed on a Bumex drip, currently output is approximately 2.5 L.  Cardiology consultation for guidance and optimization.  Agrees with current plan.  Continue daily weights, strict intake and output, patient is currently optimized on Coreg, Aldactone and home Cozaar will be held until evaluation of BP tolerance.    4.  THC dependence-patient was educated on the possibility of nausea due to withdrawals.  Oertli patient has no withdrawal symptoms, Zofran as needed for nausea.    5.  CKD stage II-creatinine clearance 61.1, baseline creatinine 1.5, currently at 1.3.  Continue to monitor labs closely with Bumex drip initiation.  BMP daily.    6.  Obesity-dietitian consult for help with cardiac diet.  Low fat and low salt diet initiated.    VTE prophylaxis with level Eliquis.  Labs in AM    Medical Decision Making  Atrial fibrillation, acute, moderate complexity, improving  Iron deficiency anemia, acute on chronic, moderate complexity, unchanged  Nonischemic cardiomyopathy, acute on chronic, moderate complexity, unchanged  THC dependence, chronic, low complexity, stable  CKD stage II, chronic, moderate complexity, stable  Obesity class I, chronic, moderate complexity, unchanged      Number and Complexity of problems: 6  Differential Diagnosis: None    Conditions and Status        Condition is unchanged.     [facility] Data  External documents reviewed: None  Cardiac tracing (EKG,  telemetry) interpretation: 7/22/2024 EKG per cardiology reviewed  Radiology interpretation: 7/22/2024 CT of the chest and chest x-ray per radiology reviewed  Labs reviewed: 7/22/2024 CBC with differential, CMP, PT/INR, PTT, BNP, TSH, T4, magnesium, procalcitonin, lactic acid, ABG reviewed  7/23/2024 iron profile, ferritin, phosphorus, CBC, BMP reviewed repeat in a.m.  Any tests that were considered but not ordered: None     Decision rules/scores evaluated (example [id number], Wells, etc): [id number] score of 2     Discussed with: Dr. Chowdhury and patient     Care Planning  Shared decision making: Dr. Chowdhury and patient   code status and discussions: Full code per patient  Surrogate Decision Maker i his brother Guy Wilson  Disposition  Social Determinants of Health that impact treatment or disposition: None at this time  I expect the patient to be discharged to home in 2-3 days.     Electronically signed by VIRGILIO Kinney, 07/23/24, 16:14 CDT.

## 2024-07-24 ENCOUNTER — APPOINTMENT (OUTPATIENT)
Dept: ULTRASOUND IMAGING | Facility: HOSPITAL | Age: 59
End: 2024-07-24
Payer: MEDICARE

## 2024-07-24 PROBLEM — I50.21 ACUTE SYSTOLIC CHF (CONGESTIVE HEART FAILURE): Status: ACTIVE | Noted: 2024-07-24

## 2024-07-24 PROBLEM — I34.0 SEVERE MITRAL REGURGITATION: Status: ACTIVE | Noted: 2024-07-24

## 2024-07-24 LAB
ANION GAP SERPL CALCULATED.3IONS-SCNC: 13 MMOL/L (ref 5–15)
BUN SERPL-MCNC: 18 MG/DL (ref 8–23)
BUN/CREAT SERPL: 15.3 (ref 7–25)
CALCIUM SPEC-SCNC: 8 MG/DL (ref 8.6–10.5)
CHLORIDE SERPL-SCNC: 101 MMOL/L (ref 98–107)
CO2 SERPL-SCNC: 26 MMOL/L (ref 22–29)
CREAT SERPL-MCNC: 1.18 MG/DL (ref 0.76–1.27)
DEPRECATED RDW RBC AUTO: 47.8 FL (ref 37–54)
EGFRCR SERPLBLD CKD-EPI 2021: 70.6 ML/MIN/1.73
ERYTHROCYTE [DISTWIDTH] IN BLOOD BY AUTOMATED COUNT: 17.4 % (ref 12.3–15.4)
GLUCOSE SERPL-MCNC: 102 MG/DL (ref 65–99)
HBA1C MFR BLD: 5.9 % (ref 4.8–5.6)
HCT VFR BLD AUTO: 37.2 % (ref 37.5–51)
HGB BLD-MCNC: 10.9 G/DL (ref 13–17.7)
MCH RBC QN AUTO: 22.2 PG (ref 26.6–33)
MCHC RBC AUTO-ENTMCNC: 29.3 G/DL (ref 31.5–35.7)
MCV RBC AUTO: 75.8 FL (ref 79–97)
PLATELET # BLD AUTO: 234 10*3/MM3 (ref 140–450)
PMV BLD AUTO: 11.1 FL (ref 6–12)
POTASSIUM SERPL-SCNC: 3.8 MMOL/L (ref 3.5–5.2)
POTASSIUM SERPL-SCNC: 3.8 MMOL/L (ref 3.5–5.2)
RBC # BLD AUTO: 4.91 10*6/MM3 (ref 4.14–5.8)
SODIUM SERPL-SCNC: 140 MMOL/L (ref 136–145)
WBC NRBC COR # BLD AUTO: 9.73 10*3/MM3 (ref 3.4–10.8)

## 2024-07-24 PROCEDURE — 83036 HEMOGLOBIN GLYCOSYLATED A1C: CPT | Performed by: NURSE PRACTITIONER

## 2024-07-24 PROCEDURE — 36415 COLL VENOUS BLD VENIPUNCTURE: CPT

## 2024-07-24 PROCEDURE — 94799 UNLISTED PULMONARY SVC/PX: CPT

## 2024-07-24 PROCEDURE — 94761 N-INVAS EAR/PLS OXIMETRY MLT: CPT

## 2024-07-24 PROCEDURE — 25010000002 NA FERRIC GLUC CPLX PER 12.5 MG

## 2024-07-24 PROCEDURE — 80048 BASIC METABOLIC PNL TOTAL CA: CPT

## 2024-07-24 PROCEDURE — 85027 COMPLETE CBC AUTOMATED: CPT

## 2024-07-24 PROCEDURE — 94664 DEMO&/EVAL PT USE INHALER: CPT

## 2024-07-24 PROCEDURE — 99233 SBSQ HOSP IP/OBS HIGH 50: CPT | Performed by: INTERNAL MEDICINE

## 2024-07-24 PROCEDURE — 93010 ELECTROCARDIOGRAM REPORT: CPT | Performed by: EMERGENCY MEDICINE

## 2024-07-24 PROCEDURE — 93880 EXTRACRANIAL BILAT STUDY: CPT

## 2024-07-24 PROCEDURE — 25810000003 SODIUM CHLORIDE 0.9 % SOLUTION 250 ML FLEX CONT

## 2024-07-24 PROCEDURE — 84132 ASSAY OF SERUM POTASSIUM: CPT | Performed by: FAMILY MEDICINE

## 2024-07-24 PROCEDURE — 25010000002 ONDANSETRON PER 1 MG: Performed by: INTERNAL MEDICINE

## 2024-07-24 PROCEDURE — 99223 1ST HOSP IP/OBS HIGH 75: CPT | Performed by: SURGERY

## 2024-07-24 PROCEDURE — 93880 EXTRACRANIAL BILAT STUDY: CPT | Performed by: SURGERY

## 2024-07-24 PROCEDURE — 93005 ELECTROCARDIOGRAM TRACING: CPT | Performed by: FAMILY MEDICINE

## 2024-07-24 RX ORDER — AMITRIPTYLINE HYDROCHLORIDE 25 MG/1
50 TABLET, FILM COATED ORAL NIGHTLY
Status: DISCONTINUED | OUTPATIENT
Start: 2024-07-24 | End: 2024-07-27

## 2024-07-24 RX ORDER — ONDANSETRON 2 MG/ML
4 INJECTION INTRAMUSCULAR; INTRAVENOUS EVERY 6 HOURS PRN
Status: DISCONTINUED | OUTPATIENT
Start: 2024-07-24 | End: 2024-08-02 | Stop reason: HOSPADM

## 2024-07-24 RX ADMIN — GUAIFENESIN 200 MG: 200 SOLUTION ORAL at 17:48

## 2024-07-24 RX ADMIN — ONDANSETRON 4 MG: 2 INJECTION INTRAMUSCULAR; INTRAVENOUS at 20:36

## 2024-07-24 RX ADMIN — APIXABAN 5 MG: 5 TABLET, FILM COATED ORAL at 20:36

## 2024-07-24 RX ADMIN — ESCITALOPRAM OXALATE 20 MG: 10 TABLET ORAL at 09:07

## 2024-07-24 RX ADMIN — BUDESONIDE AND FORMOTEROL FUMARATE DIHYDRATE 1 PUFF: 160; 4.5 AEROSOL RESPIRATORY (INHALATION) at 06:24

## 2024-07-24 RX ADMIN — ALBUTEROL SULFATE 2.5 MG: 2.5 SOLUTION RESPIRATORY (INHALATION) at 09:58

## 2024-07-24 RX ADMIN — ALBUTEROL SULFATE 2.5 MG: 2.5 SOLUTION RESPIRATORY (INHALATION) at 22:55

## 2024-07-24 RX ADMIN — FLUTICASONE PROPIONATE 2 SPRAY: 50 SPRAY, METERED NASAL at 10:34

## 2024-07-24 RX ADMIN — SODIUM CHLORIDE 250 MG: 9 INJECTION, SOLUTION INTRAVENOUS at 09:20

## 2024-07-24 RX ADMIN — DILTIAZEM HYDROCHLORIDE 30 MG: 30 TABLET, FILM COATED ORAL at 21:03

## 2024-07-24 RX ADMIN — CARVEDILOL 50 MG: 25 TABLET, FILM COATED ORAL at 20:36

## 2024-07-24 RX ADMIN — BUDESONIDE AND FORMOTEROL FUMARATE DIHYDRATE 1 PUFF: 160; 4.5 AEROSOL RESPIRATORY (INHALATION) at 20:38

## 2024-07-24 RX ADMIN — ALBUTEROL SULFATE 2.5 MG: 2.5 SOLUTION RESPIRATORY (INHALATION) at 16:25

## 2024-07-24 RX ADMIN — ATORVASTATIN CALCIUM 40 MG: 40 TABLET ORAL at 20:36

## 2024-07-24 RX ADMIN — ALBUTEROL SULFATE 2.5 MG: 2.5 SOLUTION RESPIRATORY (INHALATION) at 02:58

## 2024-07-24 RX ADMIN — LEVOTHYROXINE SODIUM 75 MCG: 0.07 TABLET ORAL at 05:01

## 2024-07-24 RX ADMIN — Medication 1 TABLET: at 09:11

## 2024-07-24 RX ADMIN — BUPROPION HYDROCHLORIDE 300 MG: 150 TABLET, EXTENDED RELEASE ORAL at 09:09

## 2024-07-24 RX ADMIN — APIXABAN 5 MG: 5 TABLET, FILM COATED ORAL at 09:14

## 2024-07-24 RX ADMIN — ALBUTEROL SULFATE 2.5 MG: 2.5 SOLUTION RESPIRATORY (INHALATION) at 13:42

## 2024-07-24 RX ADMIN — QUETIAPINE FUMARATE 100 MG: 100 TABLET ORAL at 20:36

## 2024-07-24 RX ADMIN — ALBUTEROL SULFATE 2.5 MG: 2.5 SOLUTION RESPIRATORY (INHALATION) at 06:24

## 2024-07-24 RX ADMIN — PANTOPRAZOLE SODIUM 40 MG: 40 TABLET, DELAYED RELEASE ORAL at 09:14

## 2024-07-24 RX ADMIN — Medication 10 ML: at 20:36

## 2024-07-24 RX ADMIN — SPIRONOLACTONE 25 MG: 25 TABLET ORAL at 09:09

## 2024-07-24 RX ADMIN — DILTIAZEM HYDROCHLORIDE 30 MG: 30 TABLET, FILM COATED ORAL at 05:01

## 2024-07-24 RX ADMIN — DILTIAZEM HYDROCHLORIDE 30 MG: 30 TABLET, FILM COATED ORAL at 17:15

## 2024-07-24 RX ADMIN — Medication 10 ML: at 09:10

## 2024-07-24 RX ADMIN — CARVEDILOL 50 MG: 25 TABLET, FILM COATED ORAL at 09:14

## 2024-07-24 RX ADMIN — DILTIAZEM HYDROCHLORIDE 30 MG: 30 TABLET, FILM COATED ORAL at 09:07

## 2024-07-24 RX ADMIN — AMITRIPTYLINE HYDROCHLORIDE 50 MG: 25 TABLET, FILM COATED ORAL at 20:37

## 2024-07-24 NOTE — CONSULTS
Referring Provider: Dr. Mathew  Reason for Consultation: Severe mitral valve regurgitation    Patient Care Team:  Ashish Thurman DO as PCP - General (Internal Medicine)  Douglas Ruano MD as Consulting Physician (Pain Medicine)  Uri Lagos MD (Inactive) as Cardiologist (Cardiology)    Chief complaint shortness of breath    Subjective .     History of present illness: Mr. Wilson is a 59-year-old male with known coronary artery disease, sleep apnea, obesity, hyperlipidemia, hypertension, hypothyroid, congestive heart failure, nonischemic cardiomyopathy, and marijuana use.  He is a previous patient of Dr. Lagos.  He presented to [facility] ER on 7/22/2024 with complaints of progressively worsening shortness of breath.  He began taking Lasix at home with no improvement.  Upon arrival to the ER he was found to be in atrial fibrillation with rates in the 140s and Cardizem was started.  Creatinine was 1.5.  Chest x-ray revealed pulmonary edema and CTA of the chest showed no evidence of pulmonary embolism.  He was admitted to the hospitalist service for further workup and treatment.  Cardiology was consulted yesterday and he underwent transthoracic echocardiogram revealing EF 36 to 40% along with severe mitral valve regurgitation.  Cardiothoracic surgery has been consulted for the aforementioned.  He is currently resting in bed on 4 L nasal cannula.  He remains on Bumex drip.  He does state breathing has improved since admission but is not at his baseline.  He remains in atrial fibrillation with rate 113.  He states prior to this he is overall pretty active but does report worsening shortness of breath over the past several weeks.  He is a non-smoker.  He does have stage II chronic kidney disease, creatinine today is improved at 1.18.  He does have iron deficiency anemia and is receiving iron infusion.  He has been started on Eliquis.    History  Code Status and Medical Interventions:    Ordered at: 07/22/24 2008     Level Of Support Discussed With:    Patient     Code Status (Patient has no pulse and is not breathing):    CPR (Attempt to Resuscitate)     Medical Interventions (Patient has pulse or is breathing):    Full Support         Past Medical History:   Diagnosis Date    Asthma     Cardiomyopathy     non-ischemic    CHF (congestive heart failure)     Coronary artery disease     Foot pain, bilateral     GERD (gastroesophageal reflux disease)     Headache     Hyperlipemia, mixed     Hypertension     benign    Hypothyroidism     Obesity     Sleep apnea     SOB (shortness of breath)    ,   Past Surgical History:   Procedure Laterality Date    APPENDECTOMY      BLADDER REPAIR      CARDIAC CATHETERIZATION  09/2003    COLONOSCOPY N/A 1/9/2018    Procedure: COLONOSCOPY WITH ANESTHESIA;  Surgeon: Dick Espinosa DO;  Location: [facility] ENDOSCOPY;  Service:     COLOSTOMY      with colostomy reversal    KNEE SURGERY Right    ,   Family History   Problem Relation Age of Onset    Hypertension Mother     Stroke Mother     Heart disease Mother     Hypertension Father     Heart disease Father     Hypertension Sister     Hypertension Brother    ,   Social History     Tobacco Use    Smoking status: Never    Smokeless tobacco: Never   Vaping Use    Vaping status: Never Used   Substance Use Topics    Alcohol use: Yes     Comment: occasional    Drug use: Yes     Types: Marijuana     Comment: COUPLE WEEKS AGO   ,   Medications Prior to Admission   Medication Sig Dispense Refill Last Dose    albuterol sulfate  (90 Base) MCG/ACT inhaler Inhale 2 puffs Every 4 (Four) Hours As Needed for Wheezing or Shortness of Air. 18 g 11 Past Week    amitriptyline (ELAVIL) 75 MG tablet Take 1 tablet by mouth Every Night.       amLODIPine-valsartan (EXFORGE) 5-160 MG per tablet Take 1 tablet by mouth Daily. 90 tablet 3     aspirin 325 MG tablet Take 1 tablet by mouth Daily.       atorvastatin (LIPITOR) 40 MG tablet Take 1  tablet by mouth Daily. 90 tablet 3     busPIRone (BUSPAR) 5 MG tablet Take 1 tablet by mouth 2 (Two) Times a Day.       carvedilol (COREG) 25 MG tablet Take 2 tablets by mouth 2 (Two) Times a Day. 60 tablet 0     escitalopram (LEXAPRO) 20 MG tablet Take 1 tablet by mouth Daily. 90 tablet 3     furosemide (LASIX) 40 MG tablet Take 1 tablet by mouth 2 (Two) Times a Day.       levothyroxine (SYNTHROID, LEVOTHROID) 112 MCG tablet Take 1 tablet by mouth Daily.       Multiple Vitamins-Minerals (MULTIVITAMIN WITH MINERALS) tablet tablet Take 1 tablet by mouth Daily.       omeprazole (priLOSEC) 20 MG capsule Take 1 capsule by mouth Daily. 90 capsule 3     QUEtiapine (SEROquel) 100 MG tablet Take 1 tablet by mouth every night at bedtime. 90 tablet 1     Testosterone Cypionate 200 MG/ML solution Inject 1 mL as directed Every 14 (Fourteen) Days.   Past Week    butalbital-acetaminophen-caffeine (FIORICET, ESGIC) -40 MG per tablet Take 1 tablet by mouth Daily As Needed for Migraine. 15 tablet 2     diclofenac (VOLTAREN) 1 % gel gel Apply 4 g topically to the appropriate area as directed 3 (Three) Times a Day. 100 g 3     fluticasone (Flonase) 50 MCG/ACT nasal spray 2 sprays into the nostril(s) as directed by provider Daily. Administer 2 sprays in each nostril for each dose. 1 bottle 11     fluticasone-salmeterol (ADVAIR) 100-50 MCG/DOSE DISKUS Inhale 1 puff 2 (Two) Times a Day. 60 each 3     hydrOXYzine (ATARAX) 25 MG tablet Take 1 tablet by mouth 3 (Three) Times a Day As Needed for Itching.       meloxicam (MOBIC) 15 MG tablet Take 1 tablet by mouth Daily.      , Allergies: Patient has no known allergies.    Review of Systems  Review of Systems   Constitutional:  Positive for fatigue. Negative for chills, diaphoresis and fever.   HENT:  Negative for trouble swallowing and voice change.    Eyes:  Negative for visual disturbance.   Respiratory:  Positive for shortness of breath. Negative for chest tightness.   "  Cardiovascular:  Positive for leg swelling. Negative for chest pain and palpitations.   Gastrointestinal:  Negative for abdominal pain, diarrhea, nausea and vomiting.   Genitourinary:  Negative for difficulty urinating, dysuria and hematuria.   Musculoskeletal:  Negative for arthralgias and myalgias.   Skin:  Negative for color change, pallor, rash and wound.   Allergic/Immunologic: Negative for immunocompromised state.   Neurological:  Negative for dizziness, syncope and light-headedness.   Psychiatric/Behavioral:  Negative for agitation, confusion and sleep disturbance.         Objective     Vital Signs   Visit Vitals  /73 (BP Location: Left arm, Patient Position: Lying)   Pulse 107   Temp 97.5 °F (36.4 °C) (Oral)   Resp 18   Ht 170.2 cm (67\")   Wt 93.4 kg (206 lb)   SpO2 94%   BMI 32.26 kg/m²       Physical Exam  Vitals reviewed.   Constitutional:       General: He is not in acute distress.     Appearance: He is obese.   HENT:      Head: Normocephalic.   Eyes:      Pupils: Pupils are equal, round, and reactive to light.   Cardiovascular:      Rate and Rhythm: Tachycardia present. Rhythm irregular.      Pulses: Normal pulses.      Heart sounds: Murmur heard.   Pulmonary:      Breath sounds: No wheezing or rales.      Comments: Tachypnea with diminished breath sounds bilaterally  Abdominal:      General: There is no distension.      Palpations: Abdomen is soft.      Tenderness: There is no abdominal tenderness.   Musculoskeletal:         General: No swelling or tenderness.   Skin:     General: Skin is warm and dry.   Neurological:      General: No focal deficit present.      Mental Status: He is alert and oriented to person, place, and time.   Psychiatric:         Mood and Affect: Mood normal.         Behavior: Behavior normal.         Thought Content: Thought content normal.         Judgment: Judgment normal.           LAB:   CBC:  Results from last 7 days   Lab Units 07/24/24  0452 07/23/24  1334 " 07/22/24  1632   WBC 10*3/mm3 9.73 11.60* 11.19*   HEMATOCRIT % 37.2* 39.7 40.1   PLATELETS 10*3/mm3 234 243 234          BMP:)  Results from last 7 days   Lab Units 07/24/24  0453 07/23/24  1334 07/22/24  1704 07/22/24  1632   SODIUM mmol/L 140 138  --  141   SODIUM, ARTERIAL mmol/L  --   --  143  --    POTASSIUM mmol/L 3.8  3.8 3.5  --  3.6   CHLORIDE mmol/L 101 100  --  104   CO2 mmol/L 26.0 25.0  --  26.0   GLUCOSE mg/dL 102* 117*  --  112*   BUN mg/dL 18 19  --  21   CREATININE mg/dL 1.18 1.38*  --  1.50*           COAG:  Results from last 7 days   Lab Units 07/22/24  1632   INR  1.01   APTT seconds 31.3           IMAGES:       Imaging Results (Last 24 Hours)       ** No results found for the last 24 hours. **                         Assessment & Plan        Atrial fibrillation with RVR    Class 1 obesity due to excess calories with serious comorbidity and body mass index (BMI) of 30.0 to 30.9 in adult    CKD (chronic kidney disease) stage 2, GFR 60-89 ml/min    Iron deficiency anemia    NICM (nonischemic cardiomyopathy), viral etiology    Tetrahydrocannabinol (THC) dependence    Acute pulmonary edema    Dr. Moe discussed with patient at length findings of transthoracic echo.  He will eventually need surgical intervention on his mitral valve.  He understands this.  Recommend proceeding with LAUREN and coronary angiography for surgery planning purposes.  In the interim recommend ongoing medical optimization with diuresis, he does state he is feeling some better from a respiratory standpoint after diuresis although remains on supplemental O2.  We will complete his testing as an inpatient including bilateral carotid ultrasound and hemoglobin A1C.  Likely plan on discharge home and return as an outpatient for MVR/maze after further medical optimization.  Discussed with patient and Mihaela , VIRGILIO Morris  07/24/24  09:52 CDT

## 2024-07-24 NOTE — PROGRESS NOTES
Patient Name: Ziyad Wilson  Date of Admission: 7/22/2024  Today's Date: 07/24/24  Length of Stay: 2  Primary Care Physician: Ashish Thurman DO    Subjective   Chief Complaint: Shortness of breath  HPI   Ziyad Wilson is a 59-year-old male with a history of asthma, nonischemic viral cardiomyopathy, CAD, GERD, hyperlipidemia, hypertension, hypothyroidism, obesity, KOLTON CKD stage II and iron deficiency anemia.  Patient presented to [facility] emergency department on 7/22/2024 with complaints of worsening shortness of breath at rest and with exertion.  He has an associated dry cough and complaints of dizziness.  He had felt that he was building up fluid and he began to take increased dose of Lasix over the last 2 days he took approximately 120 mg/day without relief.  He reported to his primary care provider in the morning where an EKG was obtained and showed that he was in atrial fibrillation with rapid ventricular response and he was asked to report to the emergency department.  Upon admission he was found to be in A-fib RVR, heart rate at 150.  Amiodarone 150 was given and Cardizem gtt. was initiated.  Due to patient's possible pneumonia per x-ray versus pulmonary edema 1 g of Rocephin x 1 was given with 40 mg of Lasix.  ED course chest x-ray revealed bilateral perihilar consolidation greatest on the right.  Differential for pulmonary edema versus pneumonia.  Subsequent CTA revealed right greater than left pleural effusion with diffuse bronchial wall thickening and mixed groundglass and airspace consolidation.  For representation of pulmonary venous hypertension/volume overload with pulmonary edema.  Patient was then admitted for continued evaluation and treatment    Today: Patient is resting in bed on 2 L of oxygen, oxygen saturation 93%.  Patient is alert and oriented and able to participate in assessment.  Patient states he feels much better able to speak without difficulty, patient is still using  accessory muscles but significantly improved.  Has had approximately 4200 mL of output overnight.  Discussed with patient that we will continue Bumex gtt. for another 24 hours and continue to follow symptoms and kidney function.  Continues to have no complaints of chest pain.    Patient's echocardiogram revealed a significantly decreased EF at 36-40% and severe mitral regurgitation.  CT surgery was consulted, per Dr. Moe the patient will need a LAUREN and coronary angiography for surgery planning purposes.  Likely will plan for MVR/maze, however that may be done outpatient when patient is optimized.  They did order some preoperative testing and will follow with cardiology's plan.    Per discussion with Clyde Sheridan, cardiology.  The plan at this time is to continue Bumex drip, and to maximize Coreg.  Cardizem will remain however due to his significant drop in LVEF will attempt to wean as fluid levels decrease and hopefully atrial fibrillation becomes rate controlled.  Patient transition to Lovenox for possible angiography during hospitalization.      Review of Systems   All pertinent negatives and positives are as above. All other systems have been reviewed and are negative unless otherwise stated.     Objective    Temp:  [97.5 °F (36.4 °C)-98.6 °F (37 °C)] 98 °F (36.7 °C)  Heart Rate:  [] 94  Resp:  [18-32] 18  BP: ()/(51-94) 110/57  Physical Exam  Vitals and nursing note reviewed.   Constitutional:       Appearance: He is ill-appearing.   HENT:      Head: Normocephalic.      Nose: Nose normal.      Mouth/Throat:      Mouth: Mucous membranes are moist.      Pharynx: Oropharynx is clear.   Eyes:      Pupils: Pupils are equal, round, and reactive to light.   Cardiovascular:      Rate and Rhythm: Tachycardia present. Rhythm irregular.      Heart sounds: Murmur heard.   Pulmonary:      Effort: Tachypnea and accessory muscle usage present.      Breath sounds: Decreased air movement present. Decreased breath  sounds present.   Abdominal:      General: Abdomen is flat.      Palpations: Abdomen is soft.   Genitourinary:     Comments: Voiding per urinal, no scrotal edema present  Musculoskeletal:         General: Normal range of motion.      Cervical back: Normal range of motion.   Skin:     General: Skin is warm.      Capillary Refill: Capillary refill takes less than 2 seconds.      Coloration: Skin is pale.   Neurological:      General: No focal deficit present.      Mental Status: He is alert.   Psychiatric:         Mood and Affect: Mood normal.         Behavior: Behavior normal.         Thought Content: Thought content normal.         Judgment: Judgment normal.         Results Review:  I have reviewed the labs, radiology results, and diagnostic studies.    Laboratory Data:   Results from last 7 days   Lab Units 07/24/24  0452 07/23/24  1334 07/22/24  1632   WBC 10*3/mm3 9.73 11.60* 11.19*   HEMOGLOBIN g/dL 10.9* 11.8* 11.7*   HEMATOCRIT % 37.2* 39.7 40.1   PLATELETS 10*3/mm3 234 243 234        Results from last 7 days   Lab Units 07/24/24  0453 07/23/24  1334 07/22/24  1704 07/22/24  1632   SODIUM mmol/L 140 138  --  141   SODIUM, ARTERIAL mmol/L  --   --  143  --    POTASSIUM mmol/L 3.8  3.8 3.5  --  3.6   CHLORIDE mmol/L 101 100  --  104   CO2 mmol/L 26.0 25.0  --  26.0   BUN mg/dL 18 19  --  21   CREATININE mg/dL 1.18 1.38*  --  1.50*   CALCIUM mg/dL 8.0* 8.8  --  8.6   BILIRUBIN mg/dL  --  0.4  --  0.3   ALK PHOS U/L  --  64  --  65   ALT (SGPT) U/L  --  28  --  28   AST (SGOT) U/L  --  29  --  25   GLUCOSE mg/dL 102* 117*  --  112*       Microbiology Results (last 10 days)       Procedure Component Value - Date/Time    Blood Culture - Blood, Arm, Right [[id number]]  (Normal) Collected: 07/22/24 1747    Lab Status: Preliminary result Specimen: Blood from Arm, Right Updated: 07/23/24 1815     Blood Culture No growth at 24 hours    Blood Culture - Blood, Arm, Left [[id number]]  (Normal) Collected: 07/22/24 1486    Lab  Status: Preliminary result Specimen: Blood from Arm, Left Updated: 07/23/24 1800     Blood Culture No growth at 24 hours            Results for orders placed during the hospital encounter of 07/22/24    Adult Transthoracic Echo Complete w/ Color, Spectral and Contrast if Necessary Per Protocol    Interpretation Summary    Left ventricular systolic function is moderately decreased. Left ventricular ejection fraction appears to be 36 - 40%.    The left ventricular cavity is mildly dilated.    The left atrial cavity is severely dilated.    Normal right ventricular cavity size and systolic function noted.    Severe mitral valve regurgitation is present.    Estimated right ventricular systolic pressure from tricuspid regurgitation is moderately elevated (45-55 mmHg).     Radiology Data:   Imaging Results (Last 24 Hours)       Procedure Component Value Units Date/Time    US Carotid Bilateral [[id number]] Resulted: 07/24/24 1055     Updated: 07/24/24 1104          I have reviewed the patient's current medications.     albuterol, 2.5 mg, Nebulization, Q4H - RT  amitriptyline, 50 mg, Oral, Q PM  apixaban, 5 mg, Oral, Q12H  atorvastatin, 40 mg, Oral, Nightly  budesonide-formoterol, 1 puff, Inhalation, BID - RT  buPROPion XL, 300 mg, Oral, Daily  carvedilol, 50 mg, Oral, BID  dilTIAZem, 30 mg, Oral, Q6H  escitalopram, 20 mg, Oral, Daily  ferric gluconate, 250 mg, Intravenous, Daily  fluticasone, 2 spray, Nasal, Daily  levothyroxine, 75 mcg, Oral, Daily  multivitamin with minerals, 1 tablet, Oral, Daily  pantoprazole, 40 mg, Oral, Q AM  QUEtiapine, 100 mg, Oral, Nightly  sodium chloride, 10 mL, Intravenous, Q12H  spironolactone, 25 mg, Oral, Daily         Assessment/Plan   Assessment  Active Hospital Problems    Diagnosis     **Atrial fibrillation with RVR     Severe mitral regurgitation     Acute systolic CHF (congestive heart failure)     Iron deficiency anemia     NICM (nonischemic cardiomyopathy), viral etiology      Tetrahydrocannabinol (THC) dependence     Acute pulmonary edema     CKD (chronic kidney disease) stage 2, GFR 60-89 ml/min     Class 1 obesity due to excess calories with serious comorbidity and body mass index (BMI) of 30.0 to 30.9 in adult        Treatment Plan  1.  Atrial fibrillation with rapid ventricular response-patient initially presented in rapid ventricular response, heart rate 154.  Patient was initially given 150 of amiodarone and 2 g of magnesium.  Patient was then placed on a Cardizem gtt. and this a.m. heart rate has been sustaining in the 70s and 80s.  Continue Coreg 30 mg every 6 hours, with plan to wean off as heart rate improves due to significant decrease in LVEF.  Eliquis discontinued and Lovenox initiated due to possible need for angiography during hospitalization. n.  Continue cardiac telemetry and every 4 vital signs.  [id number] score of 2    2.  Iron deficiency anemia-due to patient's complaints of extreme lethargy and previous anemia.  Iron profile revealed need for infusion, continue ferric gluconate 250 mg IV 2 of 3.    3.  Nonischemic cardiomyopathy, viral etiology/acute pulmonary edema/acute systolic congestive heart failure- Continue Bumex drip, 24-hour output 4.2 L.  Cardiology agrees with current plan.  Continue continue daily weights, strict intake and output, patient is currently optimized on Coreg, Aldactone and home Cozaar will be held until evaluation of BP tolerance.    4.  THC dependence-patient was educated on the possibility of nausea due to withdrawals.  Oertli patient has no withdrawal symptoms, Zofran as needed for nausea.    5.  CKD stage II-creatinine clearance 61.1, baseline creatinine 1.5, currently normalized at 1.18.  Continue to monitor labs closely with Bumex gtt continuation.  BMP daily.    6.  Obesity-dietitian consult for help with cardiac diet.  Low fat and low salt diet initiated.    7.  Severe mitral valve regurgitation-echocardiogram 7/23/2024 revealed  severe mitral valve regurgitation.  CT consult initiated, per Dr. Moe patient will require LAUREN/cardiac angiography for surgical planning purposes.  Today preoperative carotids will be performed.  Most likely patient will discharged home with follow-up for MVR/maze after optimization.  Cardiology will monitor patient's response to diuresis, as LAUREN/angiography may need to be performed after discharge and resolution of acute exacerbation.    VTE prophylaxis with Lovenox Labs in AM    Medical Decision Making  Atrial fibrillation, acute, moderate complexity, improving  Iron deficiency anemia, acute on chronic, moderate complexity, unchanged  Nonischemic cardiomyopathy, acute on chronic, moderate complexity, unchanged  THC dependence, chronic, low complexity, stable  CKD stage II, chronic, moderate complexity, stable  Obesity class I, chronic, moderate complexity, unchanged  Severe mitral valve regurgitation, acute, high complexity, unchanged  Acute systolic congestive heart failure, acute, high complexity, improving  Acute pulmonary edema, acute, high complexity, improving  Number and Complexity of problems: 9  Differential Diagnosis: None    Conditions and Status        Condition is unchanged.     [facility] Data  External documents reviewed: None  Cardiac tracing (EKG, telemetry) interpretation: Overnight telemetry atrial fibs  radiology interpretation: Echocardiogram per cardiology reviewed   Labs reviewed: 7/24/2024 CBC with differential, CMP, A1c, reviewed, repeat in a.m.  Any tests that were considered but not ordered: None     Decision rules/scores evaluated (example [id number], Wells, etc): [id number] score of 2     Discussed with: Dr. Chowdhury, Dr. Moe, cardiothoracic surgery, VIRGILIO Frias, cardiology and patient  Care Planning  Shared decision making: Dr. Chowdhury, Dr. Moe, cardiothoracic surgery, VIRGILIO Frias, cardiology and patient  code status and discussions: Full code per  patient  Surrogate Decision Maker i his brother Guy Wilson  Disposition  Social Determinants of Health that impact treatment or disposition: None at this time  I expect the patient to be discharged to home in 2-3 days.     Electronically signed by VIRGILIO Kinney, 07/24/24, 11:57 CDT.

## 2024-07-24 NOTE — CASE MANAGEMENT/SOCIAL WORK
Continued Stay Note   [place]     Patient Name: Ziyad Wilson  MRN: [id number]  Today's Date: 7/24/2024    Admit Date: 7/22/2024    Plan: Home   Discharge Plan       Row Name 07/24/24 1016       Plan    Plan Home    Patient/Family in Agreement with Plan yes    Final Discharge Disposition Code 01 - home or self-care    Final Note Pt has no ride home.   has provided a cab voucher in pt's hard chart.                   Discharge Codes    No documentation.                 Expected Discharge Date and Time       Expected Discharge Date Expected Discharge Time    Jul 25, 2024               ARTUR Oliveira

## 2024-07-24 NOTE — CASE MANAGEMENT/SOCIAL WORK
Discharge Planning Assessment  [facility]     Patient Name: Ziyad Wilson  MRN: [id number]  Today's Date: 7/24/2024    Admit Date: 7/22/2024    Plan: Home   Discharge Needs Assessment       Row Name 07/24/24 1047       Living Environment    People in Home sibling(s)    Current Living Arrangements home    Potentially Unsafe Housing Conditions none    In the past 12 months has the electric, gas, oil, or water company threatened to shut off services in your home? No    Primary Care Provided by self    Provides Primary Care For no one    Family Caregiver if Needed sibling(s)    Quality of Family Relationships helpful;supportive    Able to Return to Prior Arrangements yes       Resource/Environmental Concerns    Resource/Environmental Concerns none    Transportation Concerns none       Transportation Needs    In the past 12 months, has lack of transportation kept you from medical appointments or from getting medications? no    In the past 12 months, has lack of transportation kept you from meetings, work, or from getting things needed for daily living? No       Food Insecurity    Within the past 12 months, you worried that your food would run out before you got the money to buy more. Never true    Within the past 12 months, the food you bought just didn't last and you didn't have money to get more. Never true       Transition Planning    Patient/Family Anticipates Transition to home    Patient/Family Anticipated Services at Transition none    Transportation Anticipated car, drives self;family or friend will provide       Discharge Needs Assessment    Equipment Currently Used at Home none    Concerns to be Addressed no discharge needs identified    Anticipated Changes Related to Illness none    Equipment Needed After Discharge none    Provided Post Acute Provider List? N/A    Provided Post Acute Provider Quality & Resource List? N/A    Discharge Coordination/Progress Lives with his brother. Drives self and has a vehicle.  Has PCP and Rx coverage. No D/C needs identified at this time.                   Discharge Plan       Row Name 07/24/24 1016       Plan    Plan Home    Patient/Family in Agreement with Plan yes    Final Discharge Disposition Code 01 - home or self-care    Final Note Pt has no ride home.  SW has provided a cab voucher in pt's hard chart.                  Continued Care and Services - Admitted Since 7/22/2024    No active coordination exists for this encounter.       Expected Discharge Date and Time       Expected Discharge Date Expected Discharge Time    Jul 25, 2024            Demographic Summary    No documentation.                  Functional Status    No documentation.                  Psychosocial    No documentation.                  Abuse/Neglect    No documentation.                  Legal    No documentation.                  Substance Abuse    No documentation.                  Patient Forms    No documentation.                     Karel Lafleur RN

## 2024-07-24 NOTE — PLAN OF CARE
Goal Outcome Evaluation:    Problem: Adult Inpatient Plan of Care  Goal: Plan of Care Review  Outcome: Ongoing, Not Progressing  Flowsheets (Taken 7/24/2024 7584)  Progress: no change  Plan of Care Reviewed With: patient  Outcome Evaluation: No c/o pain as of this time. VSS. AF  and up to 150's per tele. IV bumex gtt infusing at this time. Plan of care ongoing.

## 2024-07-24 NOTE — PROGRESS NOTES
[facility] -  Progress Note     LOS: 2 days   Patient Care Team:  Ashish Thurman DO as PCP - General (Internal Medicine)  Douglas Ruano MD as Consulting Physician (Pain Medicine)  Uri Lagos MD (Inactive) as Cardiologist (Cardiology)    Chief Complaint:Shortness of Breath    Subjective     Interval History:   Patient notes he is feeling better. Still short of breath. Requiring 4L NC. Asymptomatic to elevated heart rates. Good urine output.     Review of Systems:     Review of Systems   Constitutional:  Positive for fatigue.   Respiratory:  Positive for cough and shortness of breath.    Cardiovascular:  Positive for leg swelling.     Objective     Vital Sign Min/Max for last 24 hours  Temp  Min: 97.5 °F (36.4 °C)  Max: 98.6 °F (37 °C)   BP  Min: 84/51  Max: 135/83   Pulse  Min: 75  Max: 118   Resp  Min: 18  Max: 32   SpO2  Min: 90 %  Max: 100 %   No data recorded   Weight  Min: 90.7 kg (200 lb)  Max: 93.4 kg (206 lb)         07/24/24  0546   Weight: 93.4 kg (206 lb)       Telemetry: A-fib 100-130      Physical Exam:    Constitutional:       Appearance: Well-developed. Acutely ill-appearing.      Interventions: Nasal cannula in place.   Eyes:      Pupils: Pupils are equal, round, and reactive to light.   HENT:      Head: Normocephalic and atraumatic.   Neck:      Vascular: No carotid bruit or JVD.   Pulmonary:      Effort: Pulmonary effort is normal. Increased respiratory effort.   Cardiovascular:      Tachycardia present. Irregular rhythm.      Murmurs: There is a systolic murmur.   Pulses:     Intact distal pulses.   Edema:     Peripheral edema absent.   Abdominal:      General: Bowel sounds are normal. There is distension.   Musculoskeletal: Normal range of motion.      Cervical back: Normal range of motion and neck supple. Skin:     General: Skin is warm and dry.   Neurological:      Mental Status: Alert, oriented to person, place, and time and oriented to person,  place and time.      Deep Tendon Reflexes: Reflexes are normal and symmetric.   Psychiatric:         Behavior: Behavior normal.         Thought Content: Thought content normal.         Judgment: Judgment normal.       Results Review:   Lab Results (last 72 hours)       Procedure Component Value Units Date/Time    Hemoglobin A1c [[id number]] Collected: 07/24/24 0452    Specimen: Blood Updated: 07/24/24 1011    Potassium [[id number]]  (Normal) Collected: 07/24/24 0453    Specimen: Blood Updated: 07/24/24 0554     Potassium 3.8 mmol/L     Basic Metabolic Panel [[id number]]  (Abnormal) Collected: 07/24/24 0453    Specimen: Blood Updated: 07/24/24 0554     Glucose 102 mg/dL      BUN 18 mg/dL      Creatinine 1.18 mg/dL      Sodium 140 mmol/L      Potassium 3.8 mmol/L      Chloride 101 mmol/L      CO2 26.0 mmol/L      Calcium 8.0 mg/dL      BUN/Creatinine Ratio 15.3     Anion Gap 13.0 mmol/L      eGFR 70.6 mL/min/1.73     Narrative:      GFR Normal >60  Chronic Kidney Disease <60  Kidney Failure <15      CBC (No Diff) [[id number]]  (Abnormal) Collected: 07/24/24 0452    Specimen: Blood Updated: 07/24/24 0536     WBC 9.73 10*3/mm3      RBC 4.91 10*6/mm3      Hemoglobin 10.9 g/dL      Hematocrit 37.2 %      MCV 75.8 fL      MCH 22.2 pg      MCHC 29.3 g/dL      RDW 17.4 %      RDW-SD 47.8 fl      MPV 11.1 fL      Platelets 234 10*3/mm3     Blood Culture - Blood, Arm, Right [[id number]]  (Normal) Collected: 07/22/24 1747    Specimen: Blood from Arm, Right Updated: 07/23/24 1815     Blood Culture No growth at 24 hours    Fentanyl, Urine - Urine, Clean Catch [[id number]]  (Normal) Collected: 07/23/24 1731    Specimen: Urine, Clean Catch Updated: 07/23/24 1801     Fentanyl, Urine Negative    Narrative:      Negative Threshold:      Fentanyl 5 ng/mL     The normal value for the drug tested is negative. This report includes final unconfirmed screening results to be used for medical treatment purposes only. Unconfirmed results must not be  used for non-medical purposes such as employment or legal testing. Clinical consideration should be applied to any drug of abuse test, particularly when unconfirmed results are used.           Urine Drug Screen - Urine, Clean Catch [[id number]]  (Abnormal) Collected: 07/23/24 1731    Specimen: Urine, Clean Catch Updated: 07/23/24 1800     THC, Screen, Urine Negative     Phencyclidine (PCP), Urine Negative     Cocaine Screen, Urine Negative     Methamphetamine, Ur Negative     Opiate Screen Negative     Amphetamine Screen, Urine Negative     Benzodiazepine Screen, Urine Positive     Tricyclic Antidepressants Screen Positive     Methadone Screen, Urine Negative     Barbiturates Screen, Urine Negative     Oxycodone Screen, Urine Negative     Buprenorphine, Screen, Urine Negative    Narrative:      Cutoff For Drugs Screened:    Amphetamines               500 ng/ml  Barbiturates               200 ng/ml  Benzodiazepines            150 ng/ml  Cocaine                    150 ng/ml  Methadone                  200 ng/ml  Opiates                    100 ng/ml  Phencyclidine               25 ng/ml  THC                         50 ng/ml  Methamphetamine            500 ng/ml  Tricyclic Antidepressants  300 ng/ml  Oxycodone                  100 ng/ml  Buprenorphine               10 ng/ml    The normal value for all drugs tested is negative. This report includes unconfirmed screening results, with the cutoff values listed, to be used for medical treatment purposes only.  Unconfirmed results must not be used for non-medical purposes such as employment or legal testing.  Clinical consideration should be applied to any drug of abuse test, particularly when unconfirmed results are used.      Blood Culture - Blood, Arm, Left [[id number]]  (Normal) Collected: 07/22/24 1734    Specimen: Blood from Arm, Left Updated: 07/23/24 1800     Blood Culture No growth at 24 hours    Comprehensive Metabolic Panel [[id number]]  (Abnormal) Collected:  07/23/24 1334    Specimen: Blood Updated: 07/23/24 1508     Glucose 117 mg/dL      BUN 19 mg/dL      Creatinine 1.38 mg/dL      Sodium 138 mmol/L      Potassium 3.5 mmol/L      Chloride 100 mmol/L      CO2 25.0 mmol/L      Calcium 8.8 mg/dL      Total Protein 7.2 g/dL      Albumin 4.0 g/dL      ALT (SGPT) 28 U/L      AST (SGOT) 29 U/L      Alkaline Phosphatase 64 U/L      Total Bilirubin 0.4 mg/dL      Globulin 3.2 gm/dL      A/G Ratio 1.3 g/dL      BUN/Creatinine Ratio 13.8     Anion Gap 13.0 mmol/L      eGFR 58.5 mL/min/1.73     Narrative:      GFR Normal >60  Chronic Kidney Disease <60  Kidney Failure <15      CBC & Differential [[id number]]  (Abnormal) Collected: 07/23/24 1334    Specimen: Blood Updated: 07/23/24 1443    Narrative:      The following orders were created for panel order CBC & Differential.  Procedure                               Abnormality         Status                     ---------                               -----------         ------                     CBC Auto Differential[[id number]]        Abnormal            Final result                 Please view results for these tests on the individual orders.    CBC Auto Differential [[id number]]  (Abnormal) Collected: 07/23/24 1334    Specimen: Blood Updated: 07/23/24 1443     WBC 11.60 10*3/mm3      RBC 5.20 10*6/mm3      Hemoglobin 11.8 g/dL      Hematocrit 39.7 %      MCV 76.3 fL      MCH 22.7 pg      MCHC 29.7 g/dL      RDW 17.7 %      RDW-SD 48.6 fl      MPV 10.7 fL      Platelets 243 10*3/mm3      Neutrophil % 74.0 %      Lymphocyte % 8.7 %      Monocyte % 14.3 %      Eosinophil % 2.2 %      Basophil % 0.4 %      Immature Grans % 0.4 %      Neutrophils, Absolute 8.58 10*3/mm3      Lymphocytes, Absolute 1.01 10*3/mm3      Monocytes, Absolute 1.66 10*3/mm3      Eosinophils, Absolute 0.25 10*3/mm3      Basophils, Absolute 0.05 10*3/mm3      Immature Grans, Absolute 0.05 10*3/mm3      nRBC 0.4 /100 WBC     Iron Profile [[id number]]  (Abnormal)  "Collected: 07/23/24 0347    Specimen: Blood from Arm, Right Updated: 07/23/24 0813     Iron 32 mcg/dL      Iron Saturation (TSAT) 8 %      Transferrin 286 mg/dL      TIBC 426 mcg/dL     Phosphorus [[id number]]  (Normal) Collected: 07/23/24 0347    Specimen: Blood from Arm, Right Updated: 07/23/24 0813     Phosphorus 3.0 mg/dL     Ferritin [[id number]]  (Abnormal) Collected: 07/23/24 0347    Specimen: Blood from Arm, Right Updated: 07/23/24 0813     Ferritin 15.24 ng/mL     Narrative:      Results may be falsely decreased if patient taking Biotin.      Single High Sensitivity Troponin T [[id number]]  (Abnormal) Collected: 07/23/24 0347    Specimen: Blood from Arm, Right Updated: 07/23/24 0416     HS Troponin T 25 ng/L     Narrative:      High Sensitive Troponin T Reference Range:  <14.0 ng/L- Negative Female for AMI  <22.0 ng/L- Negative Male for AMI  >=14 - Abnormal Female indicating possible myocardial injury.  >=22 - Abnormal Male indicating possible myocardial injury.   Clinicians would have to utilize clinical acumen, EKG, Troponin, and serial changes to determine if it is an Acute Myocardial Infarction or myocardial injury due to an underlying chronic condition.         Procalcitonin [[id number]]  (Normal) Collected: 07/22/24 1632    Specimen: Blood Updated: 07/22/24 1755     Procalcitonin 0.06 ng/mL     Narrative:      As a Marker for Sepsis (Non-Neonates):    1. <0.5 ng/mL represents a low risk of severe sepsis and/or septic shock.  2. >2 ng/mL represents a high risk of severe sepsis and/or septic shock.    As a Marker for Lower Respiratory Tract Infections that require antibiotic therapy:    PCT on Admission    Antibiotic Therapy       6-12 Hrs later    >0.5                Strongly Recommended  >0.25 - <0.5        Recommended   0.1 - 0.25          Discouraged              Remeasure/reassess PCT  <0.1                Strongly Discouraged     Remeasure/reassess PCT    As 28 day mortality risk marker: \"Change in " "Procalcitonin Result\" (>80% or <=80%) if Day 0 (or Day 1) and Day 4 values are available. Refer to [email or web address]    Change in PCT <=80%  A decrease of PCT levels below or equal to 80% defines a positive change in PCT test result representing a higher risk for 28-day all-cause mortality of patients diagnosed with severe sepsis for septic shock.    Change in PCT >80%  A decrease of PCT levels of more than 80% defines a negative change in PCT result representing a lower risk for 28-day all-cause mortality of patients diagnosed with severe sepsis or septic shock.       Lactic Acid, Plasma [[id number]]  (Normal) Collected: 07/22/24 1632    Specimen: Blood Updated: 07/22/24 1733     Lactate 1.1 mmol/L     Single High Sensitivity Troponin T [[id number]]  (Abnormal) Collected: 07/22/24 1632    Specimen: Blood Updated: 07/22/24 1717     HS Troponin T 23 ng/L     Narrative:      High Sensitive Troponin T Reference Range:  <14.0 ng/L- Negative Female for AMI  <22.0 ng/L- Negative Male for AMI  >=14 - Abnormal Female indicating possible myocardial injury.  >=22 - Abnormal Male indicating possible myocardial injury.   Clinicians would have to utilize clinical acumen, EKG, Troponin, and serial changes to determine if it is an Acute Myocardial Infarction or myocardial injury due to an underlying chronic condition.         Blood Gas, Arterial With Co-Ox [[id number]]  (Abnormal) Collected: 07/22/24 1704    Specimen: Arterial Blood Updated: 07/22/24 1705     Site Left Radial     Jd's Test Positive     pH, Arterial 7.432 pH units      pCO2, Arterial 35.4 mm Hg      pO2, Arterial 72.1 mm Hg      Comment: 84 Value below reference range        HCO3, Arterial 23.6 mmol/L      Base Excess, Arterial -0.4 mmol/L      Comment: 84 Value below reference range        O2 Saturation, Arterial 93.5 %      Comment: 84 Value below reference range        Hemoglobin, Blood Gas 12.0 g/dL      Comment: 84 Value below reference " range        Hematocrit, Blood Gas 36.8 %      Comment: 84 Value below reference range        Oxyhemoglobin 93.0 %      Comment: 84 Value below reference range        Methemoglobin 0.40 %      Carboxyhemoglobin 0.1 %      Temperature 37.0     Sodium, Arterial 143 mmol/L      Potassium, Arterial 3.4 mmol/L      Comment: 84 Value below reference range        Ionized Calcium 4.57 mg/dL      Comment: 84 Value below reference range        Barometric Pressure for Blood Gas 752 mmHg      Modality Room Air     Ventilator Mode NA     Collected by [id number]     Comment: Meter: [id number]     :  Michael Negron, CRT        pH, Temp Corrected 7.432 pH Units      pCO2, Temperature Corrected 35.4 mm Hg      pO2, Temperature Corrected 72.1 mm Hg     TSH [[id number]]  (Abnormal) Collected: 07/22/24 1632    Specimen: Blood Updated: 07/22/24 1704     TSH 7.340 uIU/mL     T4, Free [[id number]]  (Normal) Collected: 07/22/24 1632    Specimen: Blood Updated: 07/22/24 1704     Free T4 1.01 ng/dL     Magnesium [[id number]]  (Normal) Collected: 07/22/24 1632    Specimen: Blood Updated: 07/22/24 1659     Magnesium 1.6 mg/dL     Comprehensive Metabolic Panel [[id number]]  (Abnormal) Collected: 07/22/24 1632    Specimen: Blood Updated: 07/22/24 1659     Glucose 112 mg/dL      BUN 21 mg/dL      Creatinine 1.50 mg/dL      Sodium 141 mmol/L      Potassium 3.6 mmol/L      Chloride 104 mmol/L      CO2 26.0 mmol/L      Calcium 8.6 mg/dL      Total Protein 7.0 g/dL      Albumin 3.8 g/dL      ALT (SGPT) 28 U/L      AST (SGOT) 25 U/L      Alkaline Phosphatase 65 U/L      Total Bilirubin 0.3 mg/dL      Globulin 3.2 gm/dL      A/G Ratio 1.2 g/dL      BUN/Creatinine Ratio 14.0     Anion Gap 11.0 mmol/L      eGFR 53.0 mL/min/1.73     Narrative:      GFR Normal >60  Chronic Kidney Disease <60  Kidney Failure <15      BNP [[id number]]  (Normal) Collected: 07/22/24 1632    Specimen: Blood Updated: 07/22/24 1657     proBNP 602.9 pg/mL     Narrative:       This assay is used as an aid in the diagnosis of individuals suspected of having heart failure. It can be used as an aid in the diagnosis of acute decompensated heart failure (ADHF) in patients presenting with signs and symptoms of ADHF to the emergency department (ED). In addition, NT-proBNP of <300 pg/mL indicates ADHF is not likely.    Age Range Result Interpretation  NT-proBNP Concentration (pg/mL:      <50             Positive            >450                   Gray                 300-450                    Negative             <300    50-75           Positive            >900                  Gray                300-900                  Negative            <300      >75             Positive            >1800                  Gray                300-1800                  Negative            <300    Protime-INR [[id number]]  (Normal) Collected: 07/22/24 1632    Specimen: Blood Updated: 07/22/24 1648     Protime 13.7 Seconds      INR 1.01    aPTT [[id number]]  (Normal) Collected: 07/22/24 1632    Specimen: Blood Updated: 07/22/24 1648     PTT 31.3 seconds     Eros Draw [[id number]] Collected: 07/22/24 1632    Specimen: Blood Updated: 07/22/24 1645    Narrative:      The following orders were created for panel order Eros Draw.  Procedure                               Abnormality         Status                     ---------                               -----------         ------                     Green Top (Gel)[[id number]]                                  Final result               Lavender Top[[id number]]                                     Final result               Red Top[[id number]]                                          Final result               Weaver Top[[id number]]                                         Final result               Light Blue Top[[id number]]                                   Final result                 Please view results for these tests on the individual orders.    Green Top (Gel)  [[id number]] Collected: 07/22/24 1632    Specimen: Blood Updated: 07/22/24 1645     Extra Tube Hold for add-ons.     Comment: Auto resulted.       Lavender Top [[id number]] Collected: 07/22/24 1632    Specimen: Blood Updated: 07/22/24 1645     Extra Tube hold for add-on     Comment: Auto resulted       Red Top [[id number]] Collected: 07/22/24 1632    Specimen: Blood Updated: 07/22/24 1645     Extra Tube Hold for add-ons.     Comment: Auto resulted.       Gray Top [[id number]] Collected: 07/22/24 1632    Specimen: Blood Updated: 07/22/24 1645     Extra Tube Hold for add-ons.     Comment: Auto resulted.       Light Blue Top [[id number]] Collected: 07/22/24 1632    Specimen: Blood Updated: 07/22/24 1645     Extra Tube Hold for add-ons.     Comment: Auto resulted       CBC & Differential [[id number]]  (Abnormal) Collected: 07/22/24 1632    Specimen: Blood Updated: 07/22/24 1641    Narrative:      The following orders were created for panel order CBC & Differential.  Procedure                               Abnormality         Status                     ---------                               -----------         ------                     CBC Auto Differential[[id number]]        Abnormal            Final result                 Please view results for these tests on the individual orders.    CBC Auto Differential [[id number]]  (Abnormal) Collected: 07/22/24 1632    Specimen: Blood Updated: 07/22/24 1641     WBC 11.19 10*3/mm3      RBC 5.19 10*6/mm3      Hemoglobin 11.7 g/dL      Hematocrit 40.1 %      MCV 77.3 fL      MCH 22.5 pg      MCHC 29.2 g/dL      RDW 17.8 %      RDW-SD 49.0 fl      MPV 10.3 fL      Platelets 234 10*3/mm3      Neutrophil % 75.9 %      Lymphocyte % 9.8 %      Monocyte % 11.3 %      Eosinophil % 2.2 %      Basophil % 0.4 %      Immature Grans % 0.4 %      Neutrophils, Absolute 8.48 10*3/mm3      Lymphocytes, Absolute 1.10 10*3/mm3      Monocytes, Absolute 1.27 10*3/mm3      Eosinophils, Absolute 0.25 10*3/mm3       Basophils, Absolute 0.04 10*3/mm3      Immature Grans, Absolute 0.05 10*3/mm3      nRBC 0.0 /100 WBC              Results from last 7 days   Lab Units 07/24/24  0453 07/23/24  1334 07/22/24  1704 07/22/24  1632   SODIUM mmol/L 140 138  --  141   SODIUM, ARTERIAL mmol/L  --   --  143  --    POTASSIUM mmol/L 3.8  3.8 3.5  --  3.6   CHLORIDE mmol/L 101 100  --  104   CO2 mmol/L 26.0 25.0  --  26.0   BUN mg/dL 18 19  --  21   CREATININE mg/dL 1.18 1.38*  --  1.50*   GLUCOSE mg/dL 102* 117*  --  112*   CALCIUM mg/dL 8.0* 8.8  --  8.6         Echo EF Estimated  Results for orders placed during the hospital encounter of 07/22/24    Adult Transthoracic Echo Complete w/ Color, Spectral and Contrast if Necessary Per Protocol    Interpretation Summary    Left ventricular systolic function is moderately decreased. Left ventricular ejection fraction appears to be 36 - 40%.    The left ventricular cavity is mildly dilated.    The left atrial cavity is severely dilated.    Normal right ventricular cavity size and systolic function noted.    Severe mitral valve regurgitation is present.    Estimated right ventricular systolic pressure from tricuspid regurgitation is moderately elevated (45-55 mmHg).       Medication Review: yes  Current Facility-Administered Medications   Medication Dose Route Frequency Provider Last Rate Last Admin    albuterol (PROVENTIL) nebulizer solution 0.083% 2.5 mg/3mL  2.5 mg Nebulization Q4H - RT Jhon Chowdhury MD   2.5 mg at 07/24/24 0958    amitriptyline (ELAVIL) tablet 50 mg  50 mg Oral Q PM Leobardo Benitez MD   50 mg at 07/23/24 1751    apixaban (ELIQUIS) tablet 5 mg  5 mg Oral Q12H Mihaela APRN   5 mg at 07/24/24 0914    atorvastatin (LIPITOR) tablet 40 mg  40 mg Oral Nightly Leobardo Benitez MD   40 mg at 07/23/24 2052    benzonatate (TESSALON) capsule 100 mg  100 mg Oral TID PRN Leobardo Benitez MD        sennosides-docusate (PERICOLACE) 8.6-50 MG per tablet 2 tablet   2 tablet Oral BID PRN Leobardo Benitez MD        And    polyethylene glycol (MIRALAX) packet 17 g  17 g Oral Daily PRN Leobardo Benitez MD        And    bisacodyl (DULCOLAX) EC tablet 5 mg  5 mg Oral Daily PRN Leobardo Benitez MD        And    bisacodyl (DULCOLAX) suppository 10 mg  10 mg Rectal Daily PRN Leobardo Benitez MD        budesonide-formoterol (SYMBICORT) 160-4.5 MCG/ACT inhaler 1 puff  1 puff Inhalation BID - RT Leobardo Benitez MD   1 puff at 07/24/24 0624    bumetanide (BUMEX) 25 mg/100mL (0.25 mg/mL) infusion  0.5 mg/hr Intravenous Continuous Mihaela Otero APRN 2 mL/hr at 07/23/24 1258 0.5 mg/hr at 07/23/24 1258    buPROPion XL (WELLBUTRIN XL) 24 hr tablet 300 mg  300 mg Oral Daily Leobardo Benitez MD   300 mg at 07/24/24 0909    butalbital-acetaminophen-caffeine (FIORICET, ESGIC) -40 MG per tablet 1 tablet  1 tablet Oral Daily PRN Leobardo Benitez MD        Calcium Replacement - Follow Nurse / BPA Driven Protocol   Does not apply PRN Leobardo Benitez MD        carvedilol (COREG) tablet 50 mg  50 mg Oral BID Leobardo Benitez MD   50 mg at 07/24/24 0914    cyclobenzaprine (FLEXERIL) tablet 5 mg  5 mg Oral TID PRN Remi Mathew MD        dilTIAZem (CARDIZEM) tablet 30 mg  30 mg Oral Q6H Mihaela Otero APRN   30 mg at 07/24/24 0907    escitalopram (LEXAPRO) tablet 20 mg  20 mg Oral Daily Leobardo Benitez MD   20 mg at 07/24/24 0907    ferric gluconate (FERRLECIT) 250 mg in sodium chloride 0.9 % 250 mL IVPB  250 mg Intravenous Daily Mihaela Otero APRN 125 mL/hr at 07/24/24 0920 250 mg at 07/24/24 0920    fluticasone (FLONASE) 50 MCG/ACT nasal spray 2 spray  2 spray Nasal Daily Leobardo Benitez MD   2 spray at 07/24/24 1034    guaifenesin (ROBITUSSIN) 100 MG/5ML liquid 200 mg  200 mg Oral Q4H PRN Leobardo Benitez MD   200 mg at 07/23/24 0212    levothyroxine (SYNTHROID, LEVOTHROID) tablet 75 mcg  75 mcg Oral Daily Leobardo Benitez MD   75 mcg at 07/24/24 0501    Magnesium  Standard Dose Replacement - Follow Nurse / BPA Driven Protocol   Does not apply PRN Leobardo Benitez MD        multivitamin with minerals 1 tablet  1 tablet Oral Daily Leobardo Benitez MD   1 tablet at 07/24/24 0911    nitroglycerin (NITROSTAT) SL tablet 0.4 mg  0.4 mg Sublingual Q5 Min PRN Leobardo Benitez MD   0.4 mg at 07/23/24 0343    pantoprazole (PROTONIX) EC tablet 40 mg  40 mg Oral Q AM Leobardo Benitez MD   40 mg at 07/24/24 0914    Phosphorus Replacement - Follow Nurse / BPA Driven Protocol   Does not apply PRN Leobardo Benitez MD        Potassium Replacement - Follow Nurse / BPA Driven Protocol   Does not apply PRN Leobardo Benitez MD        QUEtiapine (SEROquel) tablet 100 mg  100 mg Oral Nightly Leobardo Benitez MD   100 mg at 07/23/24 2052    sodium chloride 0.9 % flush 10 mL  10 mL Intravenous PRN Rodolfo Wiseman MD        sodium chloride 0.9 % flush 10 mL  10 mL Intravenous PRN Leobardo Benitez MD        sodium chloride 0.9 % flush 10 mL  10 mL Intravenous Q12H Leobardo Benitez MD   10 mL at 07/24/24 0910    sodium chloride 0.9 % flush 10 mL  10 mL Intravenous PRN Leobardo Benitez MD        sodium chloride 0.9 % infusion 40 mL  40 mL Intravenous PRN Leobardo Benitez MD        spironolactone (ALDACTONE) tablet 25 mg  25 mg Oral Daily Leobardo Benitez MD   25 mg at 07/24/24 0909    temazepam (RESTORIL) capsule 15 mg  15 mg Oral Nightly PRN Leobardo Benitez MD   15 mg at 07/23/24 0249         Assessment & Plan       Atrial fibrillation with RVR    Class 1 obesity due to excess calories with serious comorbidity and body mass index (BMI) of 30.0 to 30.9 in adult    CKD (chronic kidney disease) stage 2, GFR 60-89 ml/min    Iron deficiency anemia    NICM (nonischemic cardiomyopathy), viral etiology    Tetrahydrocannabinol (THC) dependence    Acute pulmonary edema    Severe mitral regurgitation    Acute systolic CHF (congestive heart failure)    Plan:  Acute Systolic Congestive Heart Failure-  LVEF 36-40%- LVEF noted to be 40-45% in 2014. On Coreg 50mg BID and Aldactone. On Exforge (amlodopine and valsartan) outpatient but on hold. BP has been soft.  Consider adding Jardiance. On Bumex drip- good urine output and and renal function improved. Low salt Diet. Daily weights. Strict I&O. CTS has recommended heart cath and LAUREN.     Severe Mitral Regurgitation -CTS work up ongoing. Has recommended LAUREN and Heart Cath. Will switch Eliquis to Lovenox for now. Patient remains volume overloaded and labored breathing requiring 4 L NC. He has eaten breakfast this morning. Mild MR noted in 2014.     Atrial Fibrillation RVR- new diagnosis. On Coreg 50 mg BID. Was on cardizem drip but has been weaned to PO. Rates remain elevated. Ideally would avoid cardizem given drop in LVEF but maximized on Coreg. On Eliquis but will switch to Loveonx at this time until clear plans for timing of heart cath.     Iron Deficiency Anemia- receiving Iron infusion. Treatment per primary team.     Sleep Apnea    Hypertension- blood pressure soft.     Electronically signed by VIRGILIO Frias, 07/24/24, 10:41 AM CDT.

## 2024-07-25 ENCOUNTER — ANESTHESIA EVENT (OUTPATIENT)
Dept: CARDIOLOGY | Facility: HOSPITAL | Age: 59
End: 2024-07-25
Payer: MEDICARE

## 2024-07-25 ENCOUNTER — ANESTHESIA (OUTPATIENT)
Dept: CARDIOLOGY | Facility: HOSPITAL | Age: 59
End: 2024-07-25
Payer: MEDICARE

## 2024-07-25 ENCOUNTER — APPOINTMENT (OUTPATIENT)
Dept: CARDIOLOGY | Facility: HOSPITAL | Age: 59
End: 2024-07-25
Payer: MEDICARE

## 2024-07-25 ENCOUNTER — APPOINTMENT (OUTPATIENT)
Dept: GENERAL RADIOLOGY | Facility: HOSPITAL | Age: 59
End: 2024-07-25
Payer: MEDICARE

## 2024-07-25 LAB
ANION GAP SERPL CALCULATED.3IONS-SCNC: 11 MMOL/L (ref 5–15)
BUN SERPL-MCNC: 22 MG/DL (ref 8–23)
BUN/CREAT SERPL: 15.9 (ref 7–25)
CALCIUM SPEC-SCNC: 7.9 MG/DL (ref 8.6–10.5)
CHLORIDE SERPL-SCNC: 98 MMOL/L (ref 98–107)
CO2 SERPL-SCNC: 29 MMOL/L (ref 22–29)
CREAT SERPL-MCNC: 1.38 MG/DL (ref 0.76–1.27)
DEPRECATED RDW RBC AUTO: 49.2 FL (ref 37–54)
EGFRCR SERPLBLD CKD-EPI 2021: 58.5 ML/MIN/1.73
ERYTHROCYTE [DISTWIDTH] IN BLOOD BY AUTOMATED COUNT: 17.7 % (ref 12.3–15.4)
GLUCOSE SERPL-MCNC: 105 MG/DL (ref 65–99)
HCT VFR BLD AUTO: 36.9 % (ref 37.5–51)
HGB BLD-MCNC: 10.6 G/DL (ref 13–17.7)
MCH RBC QN AUTO: 22.6 PG (ref 26.6–33)
MCHC RBC AUTO-ENTMCNC: 28.7 G/DL (ref 31.5–35.7)
MCV RBC AUTO: 78.7 FL (ref 79–97)
PLATELET # BLD AUTO: 230 10*3/MM3 (ref 140–450)
PMV BLD AUTO: 11.1 FL (ref 6–12)
POTASSIUM SERPL-SCNC: 3.7 MMOL/L (ref 3.5–5.2)
QT INTERVAL: 306 MS
QTC INTERVAL: 402 MS
RBC # BLD AUTO: 4.69 10*6/MM3 (ref 4.14–5.8)
SODIUM SERPL-SCNC: 138 MMOL/L (ref 136–145)
WBC NRBC COR # BLD AUTO: 11.34 10*3/MM3 (ref 3.4–10.8)

## 2024-07-25 PROCEDURE — 93325 DOPPLER ECHO COLOR FLOW MAPG: CPT

## 2024-07-25 PROCEDURE — 94799 UNLISTED PULMONARY SVC/PX: CPT

## 2024-07-25 PROCEDURE — 71046 X-RAY EXAM CHEST 2 VIEWS: CPT

## 2024-07-25 PROCEDURE — 25010000002 BUMETANIDE PER 0.5 MG

## 2024-07-25 PROCEDURE — 80048 BASIC METABOLIC PNL TOTAL CA: CPT

## 2024-07-25 PROCEDURE — 25010000002 PROPOFOL 1000 MG/100ML EMULSION

## 2024-07-25 PROCEDURE — 85027 COMPLETE CBC AUTOMATED: CPT

## 2024-07-25 PROCEDURE — 25010000002 ONDANSETRON PER 1 MG: Performed by: INTERNAL MEDICINE

## 2024-07-25 PROCEDURE — 93321 DOPPLER ECHO F-UP/LMTD STD: CPT

## 2024-07-25 PROCEDURE — 25810000003 SODIUM CHLORIDE 0.9 % SOLUTION 250 ML FLEX CONT

## 2024-07-25 PROCEDURE — 25010000002 ENOXAPARIN PER 10 MG

## 2024-07-25 PROCEDURE — 93312 ECHO TRANSESOPHAGEAL: CPT | Performed by: INTERNAL MEDICINE

## 2024-07-25 PROCEDURE — 93325 DOPPLER ECHO COLOR FLOW MAPG: CPT | Performed by: INTERNAL MEDICINE

## 2024-07-25 PROCEDURE — 25010000002 DIGOXIN PER 500 MCG

## 2024-07-25 PROCEDURE — 94760 N-INVAS EAR/PLS OXIMETRY 1: CPT

## 2024-07-25 PROCEDURE — 94664 DEMO&/EVAL PT USE INHALER: CPT

## 2024-07-25 PROCEDURE — 93321 DOPPLER ECHO F-UP/LMTD STD: CPT | Performed by: INTERNAL MEDICINE

## 2024-07-25 PROCEDURE — 25010000002 NA FERRIC GLUC CPLX PER 12.5 MG

## 2024-07-25 PROCEDURE — 93312 ECHO TRANSESOPHAGEAL: CPT

## 2024-07-25 RX ORDER — DIGOXIN 0.25 MG/ML
250 INJECTION INTRAMUSCULAR; INTRAVENOUS ONCE
Status: COMPLETED | OUTPATIENT
Start: 2024-07-25 | End: 2024-07-25

## 2024-07-25 RX ORDER — DIGOXIN 125 MCG
125 TABLET ORAL DAILY
Status: DISCONTINUED | OUTPATIENT
Start: 2024-07-26 | End: 2024-07-29

## 2024-07-25 RX ORDER — ENOXAPARIN SODIUM 100 MG/ML
1 INJECTION SUBCUTANEOUS EVERY 12 HOURS
Status: DISCONTINUED | OUTPATIENT
Start: 2024-07-25 | End: 2024-07-30

## 2024-07-25 RX ORDER — PROPOFOL 10 MG/ML
INJECTION, EMULSION INTRAVENOUS AS NEEDED
Status: DISCONTINUED | OUTPATIENT
Start: 2024-07-25 | End: 2024-07-25 | Stop reason: SURG

## 2024-07-25 RX ORDER — DIGOXIN 125 MCG
125 TABLET ORAL DAILY
Status: DISCONTINUED | OUTPATIENT
Start: 2024-07-25 | End: 2024-07-25

## 2024-07-25 RX ORDER — BUMETANIDE 0.25 MG/ML
2 INJECTION INTRAMUSCULAR; INTRAVENOUS
Status: DISCONTINUED | OUTPATIENT
Start: 2024-07-25 | End: 2024-07-26

## 2024-07-25 RX ORDER — DILTIAZEM HYDROCHLORIDE 60 MG/1
60 TABLET, FILM COATED ORAL EVERY 6 HOURS SCHEDULED
Status: DISCONTINUED | OUTPATIENT
Start: 2024-07-25 | End: 2024-07-25

## 2024-07-25 RX ORDER — DIGOXIN 0.25 MG/ML
250 INJECTION INTRAMUSCULAR; INTRAVENOUS
Status: CANCELLED | OUTPATIENT
Start: 2024-07-26

## 2024-07-25 RX ORDER — ACETAMINOPHEN 325 MG/1
650 TABLET ORAL EVERY 6 HOURS PRN
Status: DISCONTINUED | OUTPATIENT
Start: 2024-07-25 | End: 2024-08-02 | Stop reason: HOSPADM

## 2024-07-25 RX ORDER — LIDOCAINE HYDROCHLORIDE 20 MG/ML
INJECTION, SOLUTION EPIDURAL; INFILTRATION; INTRACAUDAL; PERINEURAL AS NEEDED
Status: DISCONTINUED | OUTPATIENT
Start: 2024-07-25 | End: 2024-07-25 | Stop reason: SURG

## 2024-07-25 RX ADMIN — ACETAMINOPHEN 650 MG: 325 TABLET, FILM COATED ORAL at 22:16

## 2024-07-25 RX ADMIN — SACUBITRIL AND VALSARTAN 1 TABLET: 24; 26 TABLET, FILM COATED ORAL at 12:09

## 2024-07-25 RX ADMIN — AMITRIPTYLINE HYDROCHLORIDE 50 MG: 25 TABLET, FILM COATED ORAL at 22:06

## 2024-07-25 RX ADMIN — Medication 10 ML: at 12:09

## 2024-07-25 RX ADMIN — LIDOCAINE HYDROCHLORIDE 100 MG: 20 INJECTION, SOLUTION EPIDURAL; INFILTRATION; INTRACAUDAL; PERINEURAL at 10:31

## 2024-07-25 RX ADMIN — QUETIAPINE FUMARATE 100 MG: 100 TABLET ORAL at 22:06

## 2024-07-25 RX ADMIN — GUAIFENESIN 200 MG: 200 SOLUTION ORAL at 22:52

## 2024-07-25 RX ADMIN — ACETAMINOPHEN 650 MG: 325 TABLET, FILM COATED ORAL at 15:55

## 2024-07-25 RX ADMIN — ENOXAPARIN SODIUM 90 MG: 100 INJECTION SUBCUTANEOUS at 13:16

## 2024-07-25 RX ADMIN — BUPROPION HYDROCHLORIDE 300 MG: 150 TABLET, EXTENDED RELEASE ORAL at 12:09

## 2024-07-25 RX ADMIN — SACUBITRIL AND VALSARTAN 1 TABLET: 24; 26 TABLET, FILM COATED ORAL at 22:06

## 2024-07-25 RX ADMIN — DIGOXIN 250 MCG: 0.25 INJECTION INTRAMUSCULAR; INTRAVENOUS at 17:54

## 2024-07-25 RX ADMIN — Medication 1 TABLET: at 12:09

## 2024-07-25 RX ADMIN — DILTIAZEM HYDROCHLORIDE 30 MG: 30 TABLET, FILM COATED ORAL at 03:21

## 2024-07-25 RX ADMIN — DILTIAZEM HYDROCHLORIDE 30 MG: 30 TABLET, FILM COATED ORAL at 12:09

## 2024-07-25 RX ADMIN — ATORVASTATIN CALCIUM 40 MG: 40 TABLET ORAL at 22:06

## 2024-07-25 RX ADMIN — ALBUTEROL SULFATE 2.5 MG: 2.5 SOLUTION RESPIRATORY (INHALATION) at 23:07

## 2024-07-25 RX ADMIN — ONDANSETRON 4 MG: 2 INJECTION INTRAMUSCULAR; INTRAVENOUS at 22:17

## 2024-07-25 RX ADMIN — LEVOTHYROXINE SODIUM 75 MCG: 0.07 TABLET ORAL at 05:38

## 2024-07-25 RX ADMIN — SODIUM CHLORIDE 250 MG: 9 INJECTION, SOLUTION INTRAVENOUS at 12:08

## 2024-07-25 RX ADMIN — BUDESONIDE AND FORMOTEROL FUMARATE DIHYDRATE 1 PUFF: 160; 4.5 AEROSOL RESPIRATORY (INHALATION) at 18:53

## 2024-07-25 RX ADMIN — DOCUSATE SODIUM 50 MG AND SENNOSIDES 8.6 MG 2 TABLET: 8.6; 5 TABLET, FILM COATED ORAL at 22:17

## 2024-07-25 RX ADMIN — ALBUTEROL SULFATE 2.5 MG: 2.5 SOLUTION RESPIRATORY (INHALATION) at 13:37

## 2024-07-25 RX ADMIN — ALBUTEROL SULFATE 2.5 MG: 2.5 SOLUTION RESPIRATORY (INHALATION) at 18:53

## 2024-07-25 RX ADMIN — PROPOFOL 170 MG: 10 INJECTION, EMULSION INTRAVENOUS at 10:31

## 2024-07-25 RX ADMIN — SPIRONOLACTONE 25 MG: 25 TABLET ORAL at 12:09

## 2024-07-25 RX ADMIN — ESCITALOPRAM OXALATE 20 MG: 10 TABLET ORAL at 12:09

## 2024-07-25 RX ADMIN — ONDANSETRON 4 MG: 2 INJECTION INTRAMUSCULAR; INTRAVENOUS at 15:55

## 2024-07-25 RX ADMIN — PANTOPRAZOLE SODIUM 40 MG: 40 TABLET, DELAYED RELEASE ORAL at 12:12

## 2024-07-25 RX ADMIN — BUMETANIDE 2 MG: 0.25 INJECTION INTRAMUSCULAR; INTRAVENOUS at 12:09

## 2024-07-25 RX ADMIN — BUDESONIDE AND FORMOTEROL FUMARATE DIHYDRATE 1 PUFF: 160; 4.5 AEROSOL RESPIRATORY (INHALATION) at 06:19

## 2024-07-25 RX ADMIN — CARVEDILOL 50 MG: 25 TABLET, FILM COATED ORAL at 12:09

## 2024-07-25 RX ADMIN — BENZONATATE 100 MG: 100 CAPSULE ORAL at 22:17

## 2024-07-25 RX ADMIN — Medication 10 ML: at 22:06

## 2024-07-25 RX ADMIN — FLUTICASONE PROPIONATE 2 SPRAY: 50 SPRAY, METERED NASAL at 12:10

## 2024-07-25 RX ADMIN — ALBUTEROL SULFATE 2.5 MG: 2.5 SOLUTION RESPIRATORY (INHALATION) at 06:19

## 2024-07-25 NOTE — ANESTHESIA PREPROCEDURE EVALUATION
Anesthesia Evaluation     Patient summary reviewed and Nursing notes reviewed   no history of anesthetic complications:   NPO Solid Status: > 8 hours  NPO Liquid Status: > 8 hours           Airway   Mallampati: III  no difficulty expected  Dental    (+) upper dentures and lower dentures    Pulmonary    (+) asthma,shortness of breath, sleep apnea  Cardiovascular   Exercise tolerance: good (4-7 METS)    Patient on routine beta blocker    (+) hypertension, valvular problems/murmurs MR, CAD, CHF , hyperlipidemia    ROS comment: Dilated cardiomyopathy, 2014 echo EF 40-45%. Exercise tolerance mets>4    Neuro/Psych- negative ROS  (-) seizures, CVA  GI/Hepatic/Renal/Endo    (+) obesity, morbid obesity, GERD, hypothyroidism  (-) liver disease, no renal disease, diabetes    Musculoskeletal (-) negative ROS    Abdominal    Substance History - negative use     OB/GYN negative ob/gyn ROS         Other                      Anesthesia Plan    ASA 3     MAC     intravenous induction     Anesthetic plan, risks, benefits, and alternatives have been provided, discussed and informed consent has been obtained with: patient.    Plan discussed with CRNA.

## 2024-07-25 NOTE — PROGRESS NOTES
Patient Name: Ziyad Wilson  Date of Admission: 7/22/2024  Today's Date: 07/25/24  Length of Stay: 3  Primary Care Physician: Ashish Thurman DO    Subjective   Chief Complaint: Shortness of breath  HPI   Ziyad Wilson is a 59-year-old male with a history of asthma, nonischemic viral cardiomyopathy, CAD, GERD, hyperlipidemia, hypertension, hypothyroidism, obesity, KOLTON CKD stage II and iron deficiency anemia.  Patient presented to [facility] emergency department on 7/22/2024 with complaints of worsening shortness of breath at rest and with exertion.  He has an associated dry cough and complaints of dizziness.  He had felt that he was building up fluid and he began to take increased dose of Lasix over the last 2 days he took approximately 120 mg/day without relief.  He reported to his primary care provider in the morning where an EKG was obtained and showed that he was in atrial fibrillation with rapid ventricular response and he was asked to report to the emergency department.  Upon admission he was found to be in A-fib RVR, heart rate at 150.  Amiodarone 150 was given and Cardizem gtt. was initiated.  Due to patient's possible pneumonia per x-ray versus pulmonary edema 1 g of Rocephin x 1 was given with 40 mg of Lasix.  ED course chest x-ray revealed bilateral perihilar consolidation greatest on the right.  Differential for pulmonary edema versus pneumonia.  Subsequent CTA revealed right greater than left pleural effusion with diffuse bronchial wall thickening and mixed groundglass and airspace consolidation.  For representation of pulmonary venous hypertension/volume overload with pulmonary edema.  Patient was then admitted for continued evaluation and treatment    Today: Patient is resting in bed on 4 L of oxygen, oxygen saturation 93%.  Patient is alert and oriented and able to participate in assessment.  Patient continues to state that he feels much better, abdominal edema significantly improved.  Lungs  remain diminished in the bases however significantly improved airflow.  Overnight output approximately 275.  Patient continues to have no complaints of chest pain and he states his shortness of breath is significantly improved at rest and with ambulation.  Bumex drip discontinued 2 mg IV every 12 initiated, will continue to follow closely for declining kidney function.    Per discussion with VIRGILIO Dejesus.  Dr. Mathew is scheduled to take patient for a transesophageal echocardiogram today to evaluate valvular function and probable cardiac angiography in the coming days due to patient receiving Eliquis p.o., last dose on 7/24/2024 at 9 PM.  Initiate Lovenox 1 Mg/KG until plan of care is solidified.    All questions were answered to the best my ability and patient is in agreement with current plan of care    Review of Systems   All pertinent negatives and positives are as above. All other systems have been reviewed and are negative unless otherwise stated.     Objective    Temp:  [97.6 °F (36.4 °C)-98.2 °F (36.8 °C)] 98.2 °F (36.8 °C)  Heart Rate:  [] 116  Resp:  [14-27] 16  BP: ()/() 116/70  Physical Exam  Vitals and nursing note reviewed.   Constitutional:       Appearance: He is ill-appearing.   HENT:      Head: Normocephalic.      Nose: Nose normal.      Mouth/Throat:      Mouth: Mucous membranes are moist.      Pharynx: Oropharynx is clear.   Eyes:      Pupils: Pupils are equal, round, and reactive to light.   Cardiovascular:      Rate and Rhythm: Tachycardia present. Rhythm irregular.      Heart sounds: Murmur heard.   Pulmonary:      Effort: Tachypnea and accessory muscle usage present.      Breath sounds: Decreased air movement present. Decreased breath sounds present.   Abdominal:      General: Abdomen is flat.      Palpations: Abdomen is soft.   Genitourinary:     Comments: Voiding per urinal, no scrotal edema present  Musculoskeletal:         General: Normal range of motion.       Cervical back: Normal range of motion.   Skin:     General: Skin is warm.      Capillary Refill: Capillary refill takes less than 2 seconds.      Coloration: Skin is pale.   Neurological:      General: No focal deficit present.      Mental Status: He is alert.   Psychiatric:         Mood and Affect: Mood normal.         Behavior: Behavior normal.         Thought Content: Thought content normal.         Judgment: Judgment normal.         Results Review:  I have reviewed the labs, radiology results, and diagnostic studies.    Laboratory Data:   Results from last 7 days   Lab Units 07/25/24  0402 07/24/24  0452 07/23/24  1334   WBC 10*3/mm3 11.34* 9.73 11.60*   HEMOGLOBIN g/dL 10.6* 10.9* 11.8*   HEMATOCRIT % 36.9* 37.2* 39.7   PLATELETS 10*3/mm3 230 234 243        Results from last 7 days   Lab Units 07/25/24  0402 07/24/24  0453 07/23/24  1334 07/22/24  1704 07/22/24  1632   SODIUM mmol/L 138 140 138  --  141   SODIUM, ARTERIAL   --   --   --    < >  --    POTASSIUM mmol/L 3.7 3.8  3.8 3.5  --  3.6   CHLORIDE mmol/L 98 101 100  --  104   CO2 mmol/L 29.0 26.0 25.0  --  26.0   BUN mg/dL 22 18 19  --  21   CREATININE mg/dL 1.38* 1.18 1.38*  --  1.50*   CALCIUM mg/dL 7.9* 8.0* 8.8  --  8.6   BILIRUBIN mg/dL  --   --  0.4  --  0.3   ALK PHOS U/L  --   --  64  --  65   ALT (SGPT) U/L  --   --  28  --  28   AST (SGOT) U/L  --   --  29  --  25   GLUCOSE mg/dL 105* 102* 117*  --  112*    < > = values in this interval not displayed.       Microbiology Results (last 10 days)       Procedure Component Value - Date/Time    Blood Culture - Blood, Arm, Right [[id number]]  (Normal) Collected: 07/22/24 1747    Lab Status: Preliminary result Specimen: Blood from Arm, Right Updated: 07/24/24 1815     Blood Culture No growth at 2 days    Blood Culture - Blood, Arm, Left [[id number]]  (Normal) Collected: 07/22/24 6131    Lab Status: Preliminary result Specimen: Blood from Arm, Left Updated: 07/24/24 1800     Blood Culture No growth at 2  days            Results for orders placed during the hospital encounter of 07/22/24    Adult Transesophageal Echo (LAUREN) W/ Cont if Necessary Per Protocol    Interpretation Summary    Left ventricular systolic function is moderately decreased.    The left ventricular cavity is mild to moderately dilated.    The left atrial cavity is severely dilated.    The right atrial cavity is dilated.    Severe mitral valve regurgitation is present. Pulmonary vein flow reversal is present.     Radiology Data:   Imaging Results (Last 24 Hours)       Procedure Component Value Units Date/Time    XR Chest PA & Lateral [[id number]] Collected: 07/25/24 0904     Updated: 07/25/24 0909    Narrative:      EXAM/TECHNIQUE: XR CHEST PA AND LATERAL-     INDICATION: reevaluate effusion; I48.91-Unspecified atrial fibrillation;  I50.21-Acute systolic (congestive) heart failure; I34.0-Nonrheumatic  mitral (valve) insufficiency     COMPARISON: Chest CT 7/22/2024, chest radiograph 7/22/2024     FINDINGS:     Cardiac silhouette is prominent but stable.     Slight increase in bilateral perihilar consolidation. No change in trace  right-sided pleural effusion. No definite left-sided effusion. No  visible pneumothorax.     No acute osseous finding.       Impression:         1. Slight increase in bilateral perihilar consolidation.  2. No change in trace right-sided pleural effusion.     This report was signed and finalized on 7/25/2024 9:06 AM by Dr. Stefan Lagos MD.       US Carotid Bilateral [[id number]] Collected: 07/24/24 1825     Updated: 07/24/24 1828    Narrative:      History: Carotid occlusive disease       Impression:      Impression:  1. There is less than 50% stenosis of the right internal carotid artery.  2. There is less than 50% stenosis of the left internal carotid artery.  3. Antegrade flow is demonstrated in bilateral vertebral arteries.     Comments: Bilateral carotid vertebral arterial duplex scan was  performed.     Grayscale  imaging shows intimal thickening and calcified elements at the  carotid bifurcation. The right internal carotid artery peak systolic  velocity is 77.5 cm/sec. The end-diastolic velocity is 29.9 cm/sec. The  right ICA/CCA ratio is approximately 0.9. These findings correlate with  less than 50% stenosis of the right internal carotid artery.     Grayscale imaging shows intimal thickening and calcified elements at the  carotid bifurcation. The left internal carotid artery peak systolic  velocity is 72 cm/sec. The end-diastolic velocity is 34.4 cm/sec. The  left ICA/CCA ratio is approximately 1.3. These findings correlate with  less than 50% stenosis of the left internal carotid artery.     Antegrade flow is demonstrated in bilateral vertebral arteries.        This report was signed and finalized on 7/24/2024 6:25 PM by Dr. Oscar Espinosa MD.             I have reviewed the patient's current medications.     albuterol, 2.5 mg, Nebulization, Q4H - RT  amitriptyline, 50 mg, Oral, Nightly  [Held by provider] apixaban, 5 mg, Oral, Q12H  atorvastatin, 40 mg, Oral, Nightly  budesonide-formoterol, 1 puff, Inhalation, BID - RT  bumetanide, 2 mg, Intravenous, BID  buPROPion XL, 300 mg, Oral, Daily  carvedilol, 50 mg, Oral, BID  dilTIAZem, 30 mg, Oral, Q6H  escitalopram, 20 mg, Oral, Daily  ferric gluconate, 250 mg, Intravenous, Daily  fluticasone, 2 spray, Nasal, Daily  levothyroxine, 75 mcg, Oral, Daily  multivitamin with minerals, 1 tablet, Oral, Daily  pantoprazole, 40 mg, Oral, Q AM  QUEtiapine, 100 mg, Oral, Nightly  sacubitril-valsartan, 1 tablet, Oral, Q12H  sodium chloride, 10 mL, Intravenous, Q12H  spironolactone, 25 mg, Oral, Daily         Assessment/Plan   Assessment  Active Hospital Problems    Diagnosis     **Atrial fibrillation with RVR     Severe mitral regurgitation     Acute systolic CHF (congestive heart failure)     Iron deficiency anemia     NICM (nonischemic cardiomyopathy), viral etiology      Tetrahydrocannabinol (THC) dependence     Acute pulmonary edema     CKD (chronic kidney disease) stage 2, GFR 60-89 ml/min     Class 1 obesity due to excess calories with serious comorbidity and body mass index (BMI) of 30.0 to 30.9 in adult        Treatment Plan  1.  Atrial fibrillation with rapid ventricular response/atrial flutter-patient initially presented in rapid ventricular response-Continue Cardizem 30 mg every 6 hours, with plan to wean off as heart rate improves due to significant decrease in LVEF.  Will discuss possibility of initiating digoxin with cardiology however may be contraindicated due to large dose of Coreg.  Eliquis discontinued and Lovenox initiated due to the possibility of angiography during hospitalization.  Continue cardiac telemetry, every 4 vital signs and notify provider of any worsening or increasing rates.   [id number] score of 2    2.  Iron deficiency anemia-due to patient's complaints of extreme lethargy and previous anemia.  Iron profile revealed need for infusion, continue ferric gluconate 250 mg IV 3 of 3.    3.  Nonischemic cardiomyopathy, viral etiology/acute pulmonary edema/acute systolic congestive heart failure-discontinue Bumex drip, initiate 2 mg IV every 12 hours, overnight output 2275.   Continue continue daily weights, strict intake and output, patient is currently optimized on Coreg, Aldactone and home Cozaar will be held until evaluation of BP tolerance.  ReDs vest evaluation pending    4.  THC dependence-patient was educated on the possibility of nausea due to withdrawals.  Oertli patient has no withdrawal symptoms, Zofran as needed for nausea.    5.  CKD stage II-creatinine clearance 61.1, baseline creatinine 1.5, slight increase overnight to 1.38.  Bumex gtt. discontinued, initiated 2 mg IV Bumex every 12 continue to monitor labs closely.  BMP daily.    6.  Obesity-dietitian consult for help with cardiac diet.  Low fat and low salt diet initiated.    7.  Severe  mitral valve regurgitation-echocardiogram 7/23/2024 revealed severe mitral valve regurgitation.  CT consult initiated, per Dr. Moe patient will require LAUREN/cardiac angiography for surgical planning purposes.  LAUREN pending to be performed today and patient will most likely be scheduled for outpatient MVR/Maze procedure      VTE prophylaxis with Lovenox   Labs in AM    Medical Decision Making  Atrial fibrillation, acute, moderate complexity, improving  Iron deficiency anemia, acute on chronic, moderate complexity, unchanged  Nonischemic cardiomyopathy, acute on chronic, moderate complexity, unchanged  THC dependence, chronic, low complexity, stable  CKD stage II, chronic, moderate complexity, stable  Obesity class I, chronic, moderate complexity, unchanged  Severe mitral valve regurgitation, acute, high complexity, unchanged  Acute systolic congestive heart failure, acute, high complexity, improving  Acute pulmonary edema, acute, high complexity, improving  Number and Complexity of problems: 9  Differential Diagnosis: None    Conditions and Status        Condition is unchanged.     [facility] Data  External documents reviewed: None  Cardiac tracing (EKG, telemetry) interpretation: Overnight telemetry AFL  radiology interpretation: Transesophageal echocardiogram per cardiology reviewed  Labs reviewed: 7/25/2024 CBC with differential, CMP, reviewed, repeat in a.m.  Any tests that were considered but not ordered: None     Decision rules/scores evaluated (example [id number], Wells, etc): [id number] score of 2     Discussed with: Dr. Chowdhury, Dr. Moe, cardiothoracic surgery, VIRGILIO Dejesus, cardiology and patient  Care Planning  Shared decision making: Dr. Chowdhury, Dr. Moe, cardiothoracic surgery, Barbara POOLE, cardiology and patient  code status and discussions: Full code per patient  Surrogate Decision Maker his brother Guy Thomas  Social Determinants of Health that impact treatment or  disposition: None at this time  I expect the patient to be discharged to home in 2-3 days.     Electronically signed by VIRGILIO Kinney, 07/25/24, 12:18 CDT.

## 2024-07-25 NOTE — ANESTHESIA POSTPROCEDURE EVALUATION
"Patient: Ziyad Wilson    Procedure Summary       Date: 07/25/24 Room / Location: [facility] CATH LAB; [facility] CARDIOLOGY    Anesthesia Start: 1027 Anesthesia Stop: 1052    Procedure: ADULT TRANSESOPHAGEAL ECHO (LAUREN) W/ CONT IF NECESSARY PER PROTOCOL Diagnosis:       (Valvular Disease)      (Mitral Valve Assessment)    Scheduled Providers: Remi Mathew MD Provider: Stefan Lane CRNA    Anesthesia Type: MAC ASA Status: 3            Anesthesia Type: MAC    Vitals  No vitals data found for the desired time range.          Post Anesthesia Care and Evaluation    Patient location during evaluation: PHASE II  Patient participation: complete - patient participated  Level of consciousness: awake and alert  Pain management: adequate    Airway patency: patent  Anesthetic complications: No anesthetic complications  PONV Status: none  Cardiovascular status: acceptable  Respiratory status: acceptable  Hydration status: acceptable    Comments: Blood pressure 115/81, pulse (!) 127, temperature 98.1 °F (36.7 °C), temperature source Oral, resp. rate 14, height 170.2 cm (67\"), weight 93.4 kg (206 lb), SpO2 94%.    No anesthesia care post op    "

## 2024-07-26 LAB
ABSOLUTE LUNG FLUID CONTENT: 55 % (ref 20–35)
ANION GAP SERPL CALCULATED.3IONS-SCNC: 8 MMOL/L (ref 5–15)
BUN SERPL-MCNC: 23 MG/DL (ref 8–23)
BUN/CREAT SERPL: 16 (ref 7–25)
CALCIUM SPEC-SCNC: 7.9 MG/DL (ref 8.6–10.5)
CHLORIDE SERPL-SCNC: 98 MMOL/L (ref 98–107)
CO2 SERPL-SCNC: 32 MMOL/L (ref 22–29)
CREAT SERPL-MCNC: 1.44 MG/DL (ref 0.76–1.27)
DEPRECATED RDW RBC AUTO: 49.9 FL (ref 37–54)
EGFRCR SERPLBLD CKD-EPI 2021: 55.6 ML/MIN/1.73
ERYTHROCYTE [DISTWIDTH] IN BLOOD BY AUTOMATED COUNT: 18.6 % (ref 12.3–15.4)
GLUCOSE SERPL-MCNC: 96 MG/DL (ref 65–99)
HCT VFR BLD AUTO: 39.4 % (ref 37.5–51)
HGB BLD-MCNC: 11.1 G/DL (ref 13–17.7)
MCH RBC QN AUTO: 22.4 PG (ref 26.6–33)
MCHC RBC AUTO-ENTMCNC: 28.2 G/DL (ref 31.5–35.7)
MCV RBC AUTO: 79.6 FL (ref 79–97)
PLATELET # BLD AUTO: 211 10*3/MM3 (ref 140–450)
PMV BLD AUTO: 11.1 FL (ref 6–12)
POTASSIUM SERPL-SCNC: 3.7 MMOL/L (ref 3.5–5.2)
PROCALCITONIN SERPL-MCNC: 0.12 NG/ML (ref 0–0.25)
RBC # BLD AUTO: 4.95 10*6/MM3 (ref 4.14–5.8)
SODIUM SERPL-SCNC: 138 MMOL/L (ref 136–145)
WBC NRBC COR # BLD AUTO: 8.88 10*3/MM3 (ref 3.4–10.8)

## 2024-07-26 PROCEDURE — 84145 PROCALCITONIN (PCT): CPT

## 2024-07-26 PROCEDURE — 94726 PLETHYSMOGRAPHY LUNG VOLUMES: CPT | Performed by: HOSPITALIST

## 2024-07-26 PROCEDURE — 36415 COLL VENOUS BLD VENIPUNCTURE: CPT | Performed by: INTERNAL MEDICINE

## 2024-07-26 PROCEDURE — 85027 COMPLETE CBC AUTOMATED: CPT | Performed by: INTERNAL MEDICINE

## 2024-07-26 PROCEDURE — 25010000002 ONDANSETRON PER 1 MG: Performed by: INTERNAL MEDICINE

## 2024-07-26 PROCEDURE — 99232 SBSQ HOSP IP/OBS MODERATE 35: CPT | Performed by: NURSE PRACTITIONER

## 2024-07-26 PROCEDURE — 99222 1ST HOSP IP/OBS MODERATE 55: CPT | Performed by: STUDENT IN AN ORGANIZED HEALTH CARE EDUCATION/TRAINING PROGRAM

## 2024-07-26 PROCEDURE — 80048 BASIC METABOLIC PNL TOTAL CA: CPT | Performed by: INTERNAL MEDICINE

## 2024-07-26 PROCEDURE — 25010000002 ENOXAPARIN PER 10 MG: Performed by: INTERNAL MEDICINE

## 2024-07-26 PROCEDURE — 99232 SBSQ HOSP IP/OBS MODERATE 35: CPT | Performed by: SURGERY

## 2024-07-26 PROCEDURE — 94799 UNLISTED PULMONARY SVC/PX: CPT

## 2024-07-26 PROCEDURE — 94664 DEMO&/EVAL PT USE INHALER: CPT

## 2024-07-26 RX ORDER — IPRATROPIUM BROMIDE AND ALBUTEROL SULFATE 2.5; .5 MG/3ML; MG/3ML
3 SOLUTION RESPIRATORY (INHALATION)
Status: DISCONTINUED | OUTPATIENT
Start: 2024-07-26 | End: 2024-07-28

## 2024-07-26 RX ORDER — ALBUTEROL SULFATE 2.5 MG/3ML
2.5 SOLUTION RESPIRATORY (INHALATION) EVERY 4 HOURS PRN
Status: DISCONTINUED | OUTPATIENT
Start: 2024-07-26 | End: 2024-07-28

## 2024-07-26 RX ORDER — METOPROLOL TARTRATE 1 MG/ML
2.5 INJECTION, SOLUTION INTRAVENOUS ONCE
Status: COMPLETED | OUTPATIENT
Start: 2024-07-26 | End: 2024-07-26

## 2024-07-26 RX ORDER — TEMAZEPAM 15 MG/1
15 CAPSULE ORAL NIGHTLY PRN
Status: DISCONTINUED | OUTPATIENT
Start: 2024-07-26 | End: 2024-08-02 | Stop reason: HOSPADM

## 2024-07-26 RX ADMIN — LEVOTHYROXINE SODIUM 75 MCG: 0.07 TABLET ORAL at 06:08

## 2024-07-26 RX ADMIN — METOPROLOL TARTRATE 2.5 MG: 1 INJECTION, SOLUTION INTRAVENOUS at 13:03

## 2024-07-26 RX ADMIN — DOCUSATE SODIUM 50 MG AND SENNOSIDES 8.6 MG 2 TABLET: 8.6; 5 TABLET, FILM COATED ORAL at 08:54

## 2024-07-26 RX ADMIN — QUETIAPINE FUMARATE 100 MG: 100 TABLET ORAL at 21:53

## 2024-07-26 RX ADMIN — POLYETHYLENE GLYCOL 3350 17 G: 17 POWDER, FOR SOLUTION ORAL at 21:56

## 2024-07-26 RX ADMIN — ALBUTEROL SULFATE 2.5 MG: 2.5 SOLUTION RESPIRATORY (INHALATION) at 10:22

## 2024-07-26 RX ADMIN — FLUTICASONE PROPIONATE 2 SPRAY: 50 SPRAY, METERED NASAL at 08:33

## 2024-07-26 RX ADMIN — AMITRIPTYLINE HYDROCHLORIDE 50 MG: 25 TABLET, FILM COATED ORAL at 21:53

## 2024-07-26 RX ADMIN — GUAIFENESIN 200 MG: 200 SOLUTION ORAL at 08:31

## 2024-07-26 RX ADMIN — DOCUSATE SODIUM 50 MG AND SENNOSIDES 8.6 MG 2 TABLET: 8.6; 5 TABLET, FILM COATED ORAL at 21:54

## 2024-07-26 RX ADMIN — METOPROLOL TARTRATE 25 MG: 25 TABLET, FILM COATED ORAL at 18:50

## 2024-07-26 RX ADMIN — Medication 10 ML: at 08:34

## 2024-07-26 RX ADMIN — Medication 1 TABLET: at 08:32

## 2024-07-26 RX ADMIN — ACETAMINOPHEN 650 MG: 325 TABLET, FILM COATED ORAL at 15:51

## 2024-07-26 RX ADMIN — BISACODYL 5 MG: 5 TABLET, COATED ORAL at 06:08

## 2024-07-26 RX ADMIN — ONDANSETRON 4 MG: 2 INJECTION INTRAMUSCULAR; INTRAVENOUS at 15:52

## 2024-07-26 RX ADMIN — BENZONATATE 100 MG: 100 CAPSULE ORAL at 06:08

## 2024-07-26 RX ADMIN — ALBUTEROL SULFATE 2.5 MG: 2.5 SOLUTION RESPIRATORY (INHALATION) at 14:01

## 2024-07-26 RX ADMIN — ONDANSETRON 4 MG: 2 INJECTION INTRAMUSCULAR; INTRAVENOUS at 22:21

## 2024-07-26 RX ADMIN — BUDESONIDE AND FORMOTEROL FUMARATE DIHYDRATE 1 PUFF: 160; 4.5 AEROSOL RESPIRATORY (INHALATION) at 06:00

## 2024-07-26 RX ADMIN — ATORVASTATIN CALCIUM 40 MG: 40 TABLET ORAL at 21:53

## 2024-07-26 RX ADMIN — ALBUTEROL SULFATE 2.5 MG: 2.5 SOLUTION RESPIRATORY (INHALATION) at 19:02

## 2024-07-26 RX ADMIN — BUDESONIDE AND FORMOTEROL FUMARATE DIHYDRATE 1 PUFF: 160; 4.5 AEROSOL RESPIRATORY (INHALATION) at 19:10

## 2024-07-26 RX ADMIN — ESCITALOPRAM OXALATE 20 MG: 10 TABLET ORAL at 08:32

## 2024-07-26 RX ADMIN — ALBUTEROL SULFATE 2.5 MG: 2.5 SOLUTION RESPIRATORY (INHALATION) at 05:59

## 2024-07-26 RX ADMIN — ENOXAPARIN SODIUM 90 MG: 100 INJECTION SUBCUTANEOUS at 13:03

## 2024-07-26 RX ADMIN — ENOXAPARIN SODIUM 90 MG: 100 INJECTION SUBCUTANEOUS at 03:54

## 2024-07-26 RX ADMIN — GUAIFENESIN 200 MG: 200 SOLUTION ORAL at 21:56

## 2024-07-26 RX ADMIN — ONDANSETRON 4 MG: 2 INJECTION INTRAMUSCULAR; INTRAVENOUS at 08:54

## 2024-07-26 RX ADMIN — ACETAMINOPHEN 650 MG: 325 TABLET, FILM COATED ORAL at 21:53

## 2024-07-26 RX ADMIN — Medication 10 ML: at 21:56

## 2024-07-26 RX ADMIN — PANTOPRAZOLE SODIUM 40 MG: 40 TABLET, DELAYED RELEASE ORAL at 08:32

## 2024-07-26 RX ADMIN — ALBUTEROL SULFATE 2.5 MG: 2.5 SOLUTION RESPIRATORY (INHALATION) at 03:14

## 2024-07-26 RX ADMIN — BENZONATATE 100 MG: 100 CAPSULE ORAL at 21:53

## 2024-07-26 RX ADMIN — BUPROPION HYDROCHLORIDE 300 MG: 150 TABLET, EXTENDED RELEASE ORAL at 08:33

## 2024-07-26 RX ADMIN — DIGOXIN 125 MCG: 0.12 TABLET ORAL at 08:32

## 2024-07-26 RX ADMIN — METOPROLOL TARTRATE 12.5 MG: 25 TABLET, FILM COATED ORAL at 08:31

## 2024-07-26 NOTE — PROGRESS NOTES
"Patient name: Ziyad Wilson  Patient : 1965  VISIT # [id number]  MR #[id number]    Procedure:  Procedure Date:  POD:* No surgery found *    Subjective   Chief Complaint   Patient presents with    afib rvr     Patient is resting in bed tolerating 3 L nasal cannula.  Creatinine today up to 1.4.  LAUREN completed yesterday revealing severe MRYahaira  Patient states his breathing is improved but not at his baseline.    ROS: No fevers or chills.  Shortness of breath improving.  No chest pain.       Objective     Visit Vitals  BP 97/68 Comment: spot check, patient dizzy   Pulse 81   Temp 98.6 °F (37 °C) (Oral)   Resp 18   Ht 170.2 cm (67\")   Wt 94.2 kg (207 lb 9.6 oz)   SpO2 92%   BMI 32.51 kg/m²       Intake/Output Summary (Last 24 hours) at 2024 0927  Last data filed at 2024 0405  Gross per 24 hour   Intake 120 ml   Output 1075 ml   Net -955 ml       Lab:     CBC:  Results from last 7 days   Lab Units 24  0348 24  0402 24  0452   WBC 10*3/mm3 8.88 11.34* 9.73   HEMATOCRIT % 39.4 36.9* 37.2*   PLATELETS 10*3/mm3 211 230 234          BMP:  Results from last 7 days   Lab Units 24  0348 24  0402 24  0453   SODIUM mmol/L 138 138 140   POTASSIUM mmol/L 3.7 3.7 3.8  3.8   CHLORIDE mmol/L 98 98 101   CO2 mmol/L 32.0* 29.0 26.0   GLUCOSE mg/dL 96 105* 102*   BUN mg/dL 23 22 18   CREATININE mg/dL 1.44* 1.38* 1.18          COAG:  Results from last 7 days   Lab Units 24  1632   INR  1.01   APTT seconds 31.3       IMAGES:       Imaging Results (Last 24 Hours)       ** No results found for the last 24 hours. **              Physical Exam:  General: Alert, oriented. No apparent distress.  Obese  Cardiovascular: Atrial fibrillation with murmur, rubs, or gallops.    Pulmonary: Clear to auscultation bilaterally without wheezing, rubs, or rales..   Abdomen: Soft, nondistended, and nontender.  Extremities: Warm, moves all extremities. No edema.   Neurologic:  Grossly intact with no focal " deficits.            Impression:  Atrial fibrillation with RVR  Severe mitral valve regurgitation  Chronic kidney disease, stage II  Acute pulmonary edema  Obesity, BMI 32            Plan:  LAUREN reviewed by Dr. Moe.  No plans for surgical intervention during this admission.  Recommend ongoing medical optimization and diuresis as kidneys will tolerate.  Plan for outpatient follow-up with Dr. Moe in 2 to 3 weeks after discharge to discuss timing of surgery.  He will also need heart cath prior to surgery.  Discussed with patient and Peggy VIRGILIO    Atrial fibrillation with RVR, CKD, pulmonary edema management per hospitalist and cardiology      VIRGILIO De Anda  07/26/24  09:27 CDT

## 2024-07-26 NOTE — PROGRESS NOTES
Patient Name: Ziyad Wilson  Date of Admission: 7/22/2024  Today's Date: 07/26/24  Length of Stay: 4  Primary Care Physician: Ashish Thurman DO    Subjective   Chief Complaint: Shortness of breath  HPI   Ziyad Wilson is a 59-year-old male with a history of asthma, nonischemic viral cardiomyopathy, CAD, GERD, hyperlipidemia, hypertension, hypothyroidism, obesity, KOLTON CKD stage II and iron deficiency anemia.  Patient presented to [facility] emergency department on 7/22/2024 with complaints of worsening shortness of breath at rest and with exertion.  He has an associated dry cough and complaints of dizziness.  He had felt that he was building up fluid and he began to take increased dose of Lasix over the last 2 days he took approximately 120 mg/day without relief.  He reported to his primary care provider in the morning where an EKG was obtained and showed that he was in atrial fibrillation with rapid ventricular response and he was asked to report to the emergency department.  Upon admission he was found to be in A-fib RVR, heart rate at 150.  Amiodarone 150 was given and Cardizem gtt. was initiated.  Due to patient's possible pneumonia per x-ray versus pulmonary edema 1 g of Rocephin x 1 was given with 40 mg of Lasix.  ED course chest x-ray revealed bilateral perihilar consolidation greatest on the right.  Differential for pulmonary edema versus pneumonia.  Subsequent CTA revealed right greater than left pleural effusion with diffuse bronchial wall thickening and mixed groundglass and airspace consolidation.  For representation of pulmonary venous hypertension/volume overload with pulmonary edema.  Patient was then admitted for continued evaluation and treatment    Today: Patient is resting comfortably on the side of the bed with his wife at bedside.  Remains on 3.5 L of oxygen, saturations remain 89 to 92%.  Patient is in very good spirits today and continues to state that his breathing continues to  improve however his oxygenation does not reflect.  Additionally unsure of weight gain as patient's abdominal girth and distention are  significantly decreased and patient is feeling so much better.  Repeat chest x-ray performed yesterday revealed, slight increase in bilateral perihilar consolidation, still favoring pulmonary edema over pneumonia as WBC improving and procalcitonin negative.  Nursing staff notified me yesterday of patient's hypotensive episodes.  Multiple medication changes initiated, patient's Cardizem was discontinued per recommendation from VIRGILIO Frias who recommended that as soon as possible we wean that off due to his severe mitral valve regurgitation.  Initiated digoxin 250 mcg IV x 1 and 125 mcg p.o. daily, discontinued Coreg, held overnight and after discussion with VIRGILIO Dejesus cardiology initiated low-dose Lopressor, will continue to monitor closely for tolerance.  Dr. Mathew saw the patient 7/25/2024 in the evening and initiated Entresto, due to patient's continuous hypertension and need for rate control and beta-blocker, it was held this a.m. and will reevaluate in the afternoon.    Patient's LAUREN yesterday confirmed left ventricular systolic function moderately decreased, left ventricular cavity is mild to moderately dilated, left atrial cavity is severely dilated, the right atrial cavity is dilated, and severe mitral valve regurgitation is present.    Patient will continue on Lovenox, holding Eliquis until angiography during hospitalization is ruled out.  Initial weight 7/22/2024 200.0 (bed scale), Today's weight 207.9 LB's    This afternoon patient continued to have persistent A-fib RVR, initiated Lopressor 2.5 mg IV x 1 as he tolerated his morning Lopressor.  Consult Dr. Begum with cardiac electrophysiology due to patient's persistent A-fib RVR, intolerable to attempted medication management, LV dysfunction, and severe mitral valve regurgitation which is pending MVR/MAZE  procedure.      All questions were answered to the best my ability and patient is in agreement with current plan of care    Review of Systems   All pertinent negatives and positives are as above. All other systems have been reviewed and are negative unless otherwise stated.     Objective    Temp:  [98.2 °F (36.8 °C)-98.6 °F (37 °C)] 98.6 °F (37 °C)  Heart Rate:  [] 81  Resp:  [15-27] 18  BP: ()/() 97/68  Physical Exam  Vitals and nursing note reviewed.   Constitutional:       Appearance: He is ill-appearing.   HENT:      Head: Normocephalic.      Nose: Nose normal.      Mouth/Throat:      Mouth: Mucous membranes are moist.      Pharynx: Oropharynx is clear.   Eyes:      Pupils: Pupils are equal, round, and reactive to light.   Cardiovascular:      Rate and Rhythm: Tachycardia present. Rhythm irregular.      Heart sounds: Murmur heard.      Comments: AFL  with rates as high as 150  Pulmonary:      Effort: Tachypnea and accessory muscle usage present.      Breath sounds: Decreased air movement present. Examination of the right-lower field reveals decreased breath sounds. Examination of the left-lower field reveals decreased breath sounds. Decreased breath sounds present.      Comments: Patient continues to have mild tachypnea and mild accessory muscle use however significantly improved.  Oxygen saturations remained 89 to 92% on 3.5 L.  Abdominal:      General: Abdomen is flat.      Palpations: Abdomen is soft.   Genitourinary:     Comments: Voiding per urinal, no scrotal edema present  Musculoskeletal:         General: Normal range of motion.      Cervical back: Normal range of motion.   Skin:     General: Skin is warm.      Capillary Refill: Capillary refill takes less than 2 seconds.      Coloration: Skin is pale.   Neurological:      General: No focal deficit present.      Mental Status: He is alert.   Psychiatric:         Mood and Affect: Mood normal.         Behavior: Behavior normal.          Thought Content: Thought content normal.         Judgment: Judgment normal.         Results Review:  I have reviewed the labs, radiology results, and diagnostic studies.    Laboratory Data:   Results from last 7 days   Lab Units 07/26/24  0348 07/25/24  0402 07/24/24  0452   WBC 10*3/mm3 8.88 11.34* 9.73   HEMOGLOBIN g/dL 11.1* 10.6* 10.9*   HEMATOCRIT % 39.4 36.9* 37.2*   PLATELETS 10*3/mm3 211 230 234        Results from last 7 days   Lab Units 07/26/24  0348 07/25/24  0402 07/24/24  0453 07/23/24  1334 07/22/24  1704 07/22/24  1632   SODIUM mmol/L 138 138 140 138  --  141   SODIUM, ARTERIAL   --   --   --   --    < >  --    POTASSIUM mmol/L 3.7 3.7 3.8  3.8 3.5  --  3.6   CHLORIDE mmol/L 98 98 101 100  --  104   CO2 mmol/L 32.0* 29.0 26.0 25.0  --  26.0   BUN mg/dL 23 22 18 19  --  21   CREATININE mg/dL 1.44* 1.38* 1.18 1.38*  --  1.50*   CALCIUM mg/dL 7.9* 7.9* 8.0* 8.8  --  8.6   BILIRUBIN mg/dL  --   --   --  0.4  --  0.3   ALK PHOS U/L  --   --   --  64  --  65   ALT (SGPT) U/L  --   --   --  28  --  28   AST (SGOT) U/L  --   --   --  29  --  25   GLUCOSE mg/dL 96 105* 102* 117*  --  112*    < > = values in this interval not displayed.       Microbiology Results (last 10 days)       Procedure Component Value - Date/Time    Blood Culture - Blood, Arm, Right [[id number]]  (Normal) Collected: 07/22/24 1747    Lab Status: Preliminary result Specimen: Blood from Arm, Right Updated: 07/25/24 1815     Blood Culture No growth at 3 days    Blood Culture - Blood, Arm, Left [[id number]]  (Normal) Collected: 07/22/24 1734    Lab Status: Preliminary result Specimen: Blood from Arm, Left Updated: 07/25/24 1800     Blood Culture No growth at 3 days            Results for orders placed during the hospital encounter of 07/22/24    Adult Transesophageal Echo (LAUREN) W/ Cont if Necessary Per Protocol    Interpretation Summary    Left ventricular systolic function is moderately decreased.    The left ventricular cavity is mild  to moderately dilated.    The left atrial cavity is severely dilated.    The right atrial cavity is dilated.    Severe mitral valve regurgitation is present. Pulmonary vein flow reversal is present.     Radiology Data:   Imaging Results (Last 24 Hours)       ** No results found for the last 24 hours. **          I have reviewed the patient's current medications.     albuterol, 2.5 mg, Nebulization, Q4H - RT  amitriptyline, 50 mg, Oral, Nightly  atorvastatin, 40 mg, Oral, Nightly  budesonide-formoterol, 1 puff, Inhalation, BID - RT  buPROPion XL, 300 mg, Oral, Daily  digoxin, 125 mcg, Oral, Daily  enoxaparin, 1 mg/kg, Subcutaneous, Q12H  escitalopram, 20 mg, Oral, Daily  fluticasone, 2 spray, Nasal, Daily  levothyroxine, 75 mcg, Oral, Daily  metoprolol tartrate, 12.5 mg, Oral, Q12H  multivitamin with minerals, 1 tablet, Oral, Daily  pantoprazole, 40 mg, Oral, Q AM  QUEtiapine, 100 mg, Oral, Nightly  [Held by provider] sacubitril-valsartan, 1 tablet, Oral, Q12H  sodium chloride, 10 mL, Intravenous, Q12H  [Held by provider] spironolactone, 25 mg, Oral, Daily         Assessment/Plan   Assessment  Active Hospital Problems    Diagnosis     **Atrial fibrillation with RVR     Severe mitral regurgitation     Acute systolic CHF (congestive heart failure)     Iron deficiency anemia     NICM (nonischemic cardiomyopathy), viral etiology     Tetrahydrocannabinol (THC) dependence     Acute pulmonary edema     CKD (chronic kidney disease) stage 2, GFR 60-89 ml/min     Class 1 obesity due to excess calories with serious comorbidity and body mass index (BMI) of 30.0 to 30.9 in adult        Treatment Plan  1.  Atrial fibrillation with rapid ventricular response/atrial flutter-overnight AFL  with rates as high as 150.  Due to patient's continuous hypotension, Cardizem was discontinued, digoxin 250 mcg IV x 1 given with p.o. 1.25 mcg this a.m and Coreg discontinued. After discussion with VIRGILIO Dejesus, cardiology initiated  Lopressor which will be utilized in the attempt to possibly wean off when rates are controlled. Lovenox  continues due to the possibility of angiography during hospitalization.,  Eliquis will be initiated at discharge.  Continue cardiac telemetry, every 4 vital signs and notify provider of any worsening or increasing rates.  [id number] score of 2    2.  Iron deficiency anemia-due to patient's complaints of extreme lethargy and previous anemia.  Iron profile revealed need for infusion, patient has completed 3 doses of ferric gluconate.      3.  Nonischemic cardiomyopathy, viral etiology/acute pulmonary edema/acute systolic congestive heart failure-due to patient's increasing creatinine, diuretics will be on hold for 24 hours and reevaluate in the a.m.   As stated above patient has been hypotensive and not tolerating rate or rhythm medications.  Dr. Mathew initiated Entresto 7/25/2020, due to persistent hypotension and will be held this morning and reinitiated digoxin and change beta-blocker to Lopressor 12.5, continue to monitor for tolerance.  Currently patient is optimized on beta-blocker, Entresto and Aldactone on hold due to hypotension and decreasing kidney function.  ReDs vest evaluation pending      4.  THC dependence-patient was educated on the possibility of nausea due to withdrawals. Patient continues to have no complaints of  withdrawal symptoms, Zofran as needed for nausea.    5.  CKD stage II-creatinine clearance 61.1, baseline creatinine 1.5, creatinine continues to decline, this a.m. is 1.44.  Diuretics discontinued for 24 hours, will reevaluate in a.m. BMP daily.    6.  Obesity-dietitian consult for help with cardiac diet.  Low fat and low salt diet initiated.    7.  Severe mitral valve regurgitation-echocardiogram 7/23/2024 revealed severe mitral valve regurgitation.  LAUREN performed yesterday confirmed severe mitral valve regurgitation and moderate LV dysfunction.  CT surgery continues to follow  however plans remain to be worked up for outpatient MVR/MAZE procedure.cardiology continuing to follow, Eliquis remains on hold until definitive plan for outpatient angiography is established.        VTE prophylaxis with Lovenox   Labs in AM    Medical Decision Making  Atrial fibrillation, acute, moderate complexity, improving  Iron deficiency anemia, acute on chronic, moderate complexity, unchanged  Nonischemic cardiomyopathy, acute on chronic, moderate complexity, unchanged  THC dependence, chronic, low complexity, stable  CKD stage II, chronic, moderate complexity, stable  Obesity class I, chronic, moderate complexity, unchanged  Severe mitral valve regurgitation, acute, high complexity, unchanged  Acute systolic congestive heart failure, acute, high complexity, improving  Acute pulmonary edema, acute, high complexity, improving  Number and Complexity of problems: 9  Differential Diagnosis: None    Conditions and Status        Condition is unchanged.     [facility] Data  External documents reviewed: None  Cardiac tracing (EKG, telemetry) interpretation: Overnight telemetry AFL    Radiology interpretation: Transesophageal echocardiogram per cardiology reviewed  Labs reviewed: 7/26/2024 CBC with differential, CMP, reviewed, repeat in a.m.  Any tests that were considered but not ordered: None     Decision rules/scores evaluated (example [id number], Wells, etc): [id number] score of 2     Discussed with: Yane Zepeda APRN, cardiothoracic surgery, VIRGILIO Dejesus, cardiology and patient  Care Planning  Shared decision making: Yane Zepeda, cardiothoracic surgery, Barbara POOLE, cardiology and patient  code status and discussions: Full code per patient  Surrogate Decision Maker his brother Gyu Wilson  Disposition  Social Determinants of Health that impact treatment or disposition: None at this time  I expect the patient to be discharged to home in 2-3 days.      Electronically signed by VIRGILIO Kinney, 07/26/24, 10:03 CDT.

## 2024-07-26 NOTE — PROGRESS NOTES
[facility]    Date:  07/26/24     Vitals:    07/26/24 0900   BP: 97/68   Pulse:    Resp:    Temp:    SpO2:         Indication:  Heart Failure    Procedure:  ReDS device sensor unit applied to right side of chest and right side of back.  Appropriate positioning confirmed based off of the unit's calculation.  Chest measurement obtained with the chest size ruler.  Measurement session performed over 45 seconds.      Results:  ReDS Value= 55%    Interpretation:  >46% - markedly elevated lung fluid content    Gloria Guzman RN 07/26/24 10:19 CDT

## 2024-07-26 NOTE — CASE MANAGEMENT/SOCIAL WORK
Continued Stay Note   Rishi     Patient Name: Ziyad Wilson  MRN: [id number]  Today's Date: 7/26/2024    Admit Date: 7/22/2024    Plan: Home   Discharge Plan       Row Name 07/26/24 1431       Plan    Plan Home    Plan Comments Chart reviewed. Anticipate dc home soon. Cab voucher is in hard chart at nurse's station if needed at dc.    Final Discharge Disposition Code 01 - home or self-care             VALARIE Stevenson

## 2024-07-26 NOTE — PROGRESS NOTES
[facility] -  Progress Note     LOS: 4 days   Patient Care Team:  Ashish Thurman DO as PCP - General (Internal Medicine)  Douglas Ruano MD as Consulting Physician (Pain Medicine)  Uri Lagos MD (Inactive) as Cardiologist (Cardiology)    Chief Complaint: CHF/AF follow up    Subjective     Interval History: The patient reports to me that he continues to feel short of breath, but improved since admission. He is dizzy and nauseated when standing. His urine output has been adequate but his renal function has worsened slightly. He has no chest pain or tightness. He reports a cough with clear sputum production. He has no swelling bilaterally. He has had intermittent headaches.    Tele: AF RVR elevated heart rates with graphic trend noting consistent rates 120-140    Review of Systems:     Review of Systems   Constitutional:  Positive for activity change and fatigue.   Respiratory:  Positive for cough and shortness of breath. Negative for chest tightness.    Cardiovascular:  Negative for chest pain, palpitations and leg swelling.   Gastrointestinal:  Positive for nausea. Negative for abdominal pain and vomiting.   Neurological:  Positive for dizziness, weakness and headaches. Negative for tremors and syncope.   Psychiatric/Behavioral:  Negative for confusion. The patient is not nervous/anxious.      Objective     Vital Sign Min/Max for last 24 hours  Temp  Min: 98.2 °F (36.8 °C)  Max: 98.6 °F (37 °C)   BP  Min: 86/60  Max: 124/82   Pulse  Min: 62  Max: 137   Resp  Min: 15  Max: 18   SpO2  Min: 82 %  Max: 100 %   No data recorded   Weight  Min: 94.2 kg (207 lb 9.6 oz)  Max: 94.2 kg (207 lb 9.6 oz)         07/26/24  0500   Weight: 94.2 kg (207 lb 9.6 oz)       Physical Exam:    Vitals reviewed.   Constitutional:       General: Awake.      Appearance: Well-developed, well-groomed and not in distress. Obese. Ill-appearing and acutely ill-appearing.      Interventions: Nasal cannula  in place.   HENT:      Head: Normocephalic and atraumatic.   Pulmonary:      Effort: Pulmonary effort is normal.      Breath sounds: Decreased air movement present. Examination of the right-lower field reveals decreased breath sounds. Examination of the left-lower field reveals decreased breath sounds. Decreased breath sounds present.   Cardiovascular:      Tachycardia present. Irregularly irregular rhythm.      Murmurs: There is a systolic murmur.   Edema:     Peripheral edema absent.   Musculoskeletal:      Cervical back: Normal range of motion and neck supple. Skin:     General: Skin is warm and dry.   Neurological:      Mental Status: Alert, oriented to person, place, and time and oriented to person, place and time.   Psychiatric:         Attention and Perception: Attention normal.         Mood and Affect: Mood normal.         Speech: Speech normal.         Behavior: Behavior normal. Behavior is cooperative.         Thought Content: Thought content normal.         Cognition and Memory: Cognition and memory normal.         Judgment: Judgment normal.       Results Review:   Lab Results   Component Value Date    WBC 8.88 07/26/2024    HGB 11.1 (L) 07/26/2024    HCT 39.4 07/26/2024    MCV 79.6 07/26/2024     07/26/2024     Lab Results   Component Value Date    GLUCOSE 96 07/26/2024    CALCIUM 7.9 (L) 07/26/2024     07/26/2024    K 3.7 07/26/2024    CO2 32.0 (H) 07/26/2024    CL 98 07/26/2024    BUN 23 07/26/2024    CREATININE 1.44 (H) 07/26/2024    EGFR 55.6 (L) 07/26/2024    BCR 16.0 07/26/2024    ANIONGAP 8.0 07/26/2024       Medication Review: yes  Current Facility-Administered Medications   Medication Dose Route Frequency Provider Last Rate Last Admin    acetaminophen (TYLENOL) tablet 650 mg  650 mg Oral Q6H PRN Mihaela APRN   650 mg at 07/25/24 2216    albuterol (PROVENTIL) nebulizer solution 0.083% 2.5 mg/3mL  2.5 mg Nebulization Q4H - RT Remi Mathew MD   2.5 mg at 07/26/24 1022     amitriptyline (ELAVIL) tablet 50 mg  50 mg Oral Nightly Remi Mathew MD   50 mg at 07/25/24 2206    atorvastatin (LIPITOR) tablet 40 mg  40 mg Oral Nightly Remi Mathew MD   40 mg at 07/25/24 2206    benzonatate (TESSALON) capsule 100 mg  100 mg Oral TID PRN Remi Mathew MD   100 mg at 07/26/24 0608    sennosides-docusate (PERICOLACE) 8.6-50 MG per tablet 2 tablet  2 tablet Oral BID PRN Remi Mathew MD   2 tablet at 07/26/24 0854    And    polyethylene glycol (MIRALAX) packet 17 g  17 g Oral Daily PRN Remi Mathew MD        And    bisacodyl (DULCOLAX) EC tablet 5 mg  5 mg Oral Daily PRN Remi Mathew MD   5 mg at 07/26/24 0608    And    bisacodyl (DULCOLAX) suppository 10 mg  10 mg Rectal Daily PRN Remi Mathew MD        budesonide-formoterol (SYMBICORT) 160-4.5 MCG/ACT inhaler 1 puff  1 puff Inhalation BID - RT Remi Mathew MD   1 puff at 07/26/24 0600    buPROPion XL (WELLBUTRIN XL) 24 hr tablet 300 mg  300 mg Oral Daily Remi Mathew MD   300 mg at 07/26/24 0833    butalbital-acetaminophen-caffeine (FIORICET, ESGIC) -40 MG per tablet 1 tablet  1 tablet Oral Daily PRN Remi Mathew MD        Calcium Replacement - Follow Nurse / BPA Driven Protocol   Does not apply PRN Remi Mathew MD        cyclobenzaprine (FLEXERIL) tablet 5 mg  5 mg Oral TID PRN Remi Mathew MD        digoxin (LANOXIN) tablet 125 mcg  125 mcg Oral Daily Mihaela APRN   125 mcg at 07/26/24 0832    Enoxaparin Sodium (LOVENOX) syringe 90 mg  1 mg/kg Subcutaneous Q12H Remi Mathew MD   90 mg at 07/26/24 0354    escitalopram (LEXAPRO) tablet 20 mg  20 mg Oral Daily Remi Mathew MD   20 mg at 07/26/24 0832    fluticasone (FLONASE) 50 MCG/ACT nasal spray 2 spray  2 spray Nasal Daily Remi Mathew MD   2 spray at 07/26/24 0833    guaifenesin (ROBITUSSIN) 100 MG/5ML liquid 200 mg  200 mg Oral Q4H PRN Remi Mathew MD   200 mg at 07/26/24 0831    levothyroxine (SYNTHROID,  LEVOTHROID) tablet 75 mcg  75 mcg Oral Daily Remi Mathew MD   75 mcg at 07/26/24 0608    Magnesium Standard Dose Replacement - Follow Nurse / BPA Driven Protocol   Does not apply PRN Remi Mathew MD        metoprolol tartrate (LOPRESSOR) tablet 12.5 mg  12.5 mg Oral Q12H Mihaela Otero APRN   12.5 mg at 07/26/24 0831    multivitamin with minerals 1 tablet  1 tablet Oral Daily Remi Mathew MD   1 tablet at 07/26/24 0832    nitroglycerin (NITROSTAT) SL tablet 0.4 mg  0.4 mg Sublingual Q5 Min PRN Remi Mathew MD   0.4 mg at 07/23/24 0343    ondansetron (ZOFRAN) injection 4 mg  4 mg Intravenous Q6H PRN Remi Mathew MD   4 mg at 07/26/24 0854    pantoprazole (PROTONIX) EC tablet 40 mg  40 mg Oral Q AM Remi Mathew MD   40 mg at 07/26/24 0832    Phosphorus Replacement - Follow Nurse / BPA Driven Protocol   Does not apply PRN Remi Mathew MD        Potassium Replacement - Follow Nurse / BPA Driven Protocol   Does not apply BEATAN Remi Mathew MD        QUEtiapine (SEROquel) tablet 100 mg  100 mg Oral Nightly Remi Mathew MD   100 mg at 07/25/24 2206    [Held by provider] sacubitril-valsartan (ENTRESTO) 24-26 MG tablet 1 tablet  1 tablet Oral Q12H Mihaela APRN   1 tablet at 07/25/24 2206    sodium chloride 0.9 % flush 10 mL  10 mL Intravenous PRN Remi Mathew MD        sodium chloride 0.9 % flush 10 mL  10 mL Intravenous Q12H Remi Mathew MD   10 mL at 07/26/24 0834    sodium chloride 0.9 % infusion 40 mL  40 mL Intravenous PRN Remi Mathew MD        [Held by provider] spironolactone (ALDACTONE) tablet 25 mg  25 mg Oral Daily Remi Mathew MD   25 mg at 07/25/24 1209    temazepam (RESTORIL) capsule 15 mg  15 mg Oral Nightly PRN Remi Mathew MD   15 mg at 07/23/24 0249     Results for orders placed during the hospital encounter of 07/22/24    Adult Transesophageal Echo (LAUREN) W/ Cont if Necessary Per Protocol    Interpretation Summary    Left ventricular  systolic function is moderately decreased.    The left ventricular cavity is mild to moderately dilated.    The left atrial cavity is severely dilated.    The right atrial cavity is dilated.    Severe mitral valve regurgitation is present. Pulmonary vein flow reversal is present.      Interpretation Summary  Transthoracic echocardiogram 7/23/2024 (Dr. Mathew)       Left ventricular systolic function is moderately decreased. Left ventricular ejection fraction appears to be 36 - 40%.    The left ventricular cavity is mildly dilated.    The left atrial cavity is severely dilated.    Normal right ventricular cavity size and systolic function noted.    Severe mitral valve regurgitation is present.    Estimated right ventricular systolic pressure from tricuspid regurgitation is moderately elevated (45-55 mmHg).    Assessment & Plan     1.  Acute on chronic combined systolic and diastolic congestive heart failure: Has been managed for atrial fibrillation with rapid ventricular response this admission, left ventricular ejection fraction found to be 36 to 40% as well as component of valvular heart disease with severe MR.  History of cardiomyopathy.  Old notes indicate nonischemic cardiomyopathy.  Initially managed on bumetanide continuous drip.  Intermittent dosing of IV Bumex was initiated however discontinued last night.  Had been on a high dose of carvedilol (50 mg BID) however transitioned to Lopressor for now due to hypotension.  Also had been on Entresto and spironolactone however those are currently on hold.  Standing weights over the last 3 days have been the same. He has diuresed with a net negative fluid balance of 4L based off of documentation. Remains on supplemental oxygen. Chest x ray yesterday did not show improvement of his volume status.     2.  Atrial fibrillation with rapid ventricular response: Initially was being managed on Cardizem.  It was recommended to wean Cardizem for other rate controlling  medications given findings of depressed left ventricular ejection fraction.  Given hypotension the patient has received digoxin.  Now on low-dose Lopressor to titrate as tolerated with plans to transition to guideline directed medical therapy in future.  Currently anticoagulated with Lovenox. Heart rates remain elevated 120s-150s. EP has been consulted.     3.  Acute kidney injury: Creatinine 1.44 up from 1.18 two days ago. Has been on IV diuretics, had CT angio at admission, and started Entresto yesterday. Entresto on hold. Diuretics on hold. Monitoring renal function.     4.  Severe MR: on echo, has also had LAUREN. CTS following. Recommend optimization medically with diuresis. CTS will follow up in the outpatient setting to discuss timing of surgical intervention. He will also need coronary angiography.     5.  Coronary artery disease: Documentation indicates the patient has a history of coronary artery disease but no specific test results or workup to indicate extent of findings or what diagnostic testing was performed.  Previously followed in the office for what was documented to be nonischemic cardiomyopathy.    6.  Hypertension: has had low BP recently and med changes made  7.  Hyperlipidemia: on statin therapy  8.  Obstructive sleep apnea  9.  Anemia: stable   10.  Obesity BMI 32.51      This is my first day to evaluate the patient. He has improved since admission reporting improvement in breathing and less coughing but continues to feel poorly. He is not feeling well when standing or trying to ambulate due to dizziness and nausea. His heart rates are elevated, and EP has been consulted for recommendations for rate control.     I would suggest holding off on Entresto until he is more euvolemic.  He seems to me to still be overloaded with need for diuretics. He has diminished breath sounds and not much improvement on CXR still requiring oxygen.     He was on a high dose of coreg and reports he has been on that  dose for many years. This was stopped last night due to hypotension I believe. He has been placed on Lopressor and digoxin.     At this time, defer rate controlled recommendations to EP who has been consulted. But I would think he may need increased beta blocker and resumption of diuretics with plans to slowly add Entresto and other heart failure therapies as blood pressure will allow.     Hold Entresto.  Labs in AM and likely resume intermittent IV diuretics.         Electronically signed by VIRGILIO Herrera, 07/26/24, 12:54 PM CDT.

## 2024-07-26 NOTE — PLAN OF CARE
Goal Outcome Evaluation:  Plan of Care Reviewed With: patient  Progress: no change  Outcome Evaluation: AFL HR:  on tele. Pt c/o headache, PRN PO Tylenol given as ordered with good relief noted. Pt c/o nausea, IV Zofran given as ordered with good relief noted. Pt has been resting throughout the shift. Pt has not had a bowel movement since 7/22/2024. PRN pericolace given as ordered with results pending. Will notify dayshift to give other prn meds to help him have a bowel movement today. Pt c/o shortness of breath at the beginning of the shift, O2 sats stable. Pt is on 3.5L of O2.  in place. Frequent nonproductive cough noted, prn cough medicine given as ordered. Safety maintained. Possible heart cath soon in the future.

## 2024-07-27 LAB
ANION GAP SERPL CALCULATED.3IONS-SCNC: 7 MMOL/L (ref 5–15)
BACTERIA SPEC AEROBE CULT: NORMAL
BACTERIA SPEC AEROBE CULT: NORMAL
BUN SERPL-MCNC: 17 MG/DL (ref 8–23)
BUN/CREAT SERPL: 13.7 (ref 7–25)
CALCIUM SPEC-SCNC: 8.1 MG/DL (ref 8.6–10.5)
CHLORIDE SERPL-SCNC: 99 MMOL/L (ref 98–107)
CO2 SERPL-SCNC: 32 MMOL/L (ref 22–29)
CREAT SERPL-MCNC: 1.24 MG/DL (ref 0.76–1.27)
DEPRECATED RDW RBC AUTO: 50.2 FL (ref 37–54)
EGFRCR SERPLBLD CKD-EPI 2021: 66.6 ML/MIN/1.73
ERYTHROCYTE [DISTWIDTH] IN BLOOD BY AUTOMATED COUNT: 19.2 % (ref 12.3–15.4)
GLUCOSE SERPL-MCNC: 102 MG/DL (ref 65–99)
HCT VFR BLD AUTO: 40.9 % (ref 37.5–51)
HGB BLD-MCNC: 11.3 G/DL (ref 13–17.7)
MCH RBC QN AUTO: 22.3 PG (ref 26.6–33)
MCHC RBC AUTO-ENTMCNC: 27.6 G/DL (ref 31.5–35.7)
MCV RBC AUTO: 80.7 FL (ref 79–97)
PLATELET # BLD AUTO: 235 10*3/MM3 (ref 140–450)
PMV BLD AUTO: 10.5 FL (ref 6–12)
POTASSIUM SERPL-SCNC: 3.6 MMOL/L (ref 3.5–5.2)
POTASSIUM SERPL-SCNC: 4 MMOL/L (ref 3.5–5.2)
RBC # BLD AUTO: 5.07 10*6/MM3 (ref 4.14–5.8)
SODIUM SERPL-SCNC: 138 MMOL/L (ref 136–145)
WBC NRBC COR # BLD AUTO: 9.07 10*3/MM3 (ref 3.4–10.8)

## 2024-07-27 PROCEDURE — 84132 ASSAY OF SERUM POTASSIUM: CPT | Performed by: FAMILY MEDICINE

## 2024-07-27 PROCEDURE — 94799 UNLISTED PULMONARY SVC/PX: CPT

## 2024-07-27 PROCEDURE — 25010000002 ONDANSETRON PER 1 MG: Performed by: INTERNAL MEDICINE

## 2024-07-27 PROCEDURE — 80048 BASIC METABOLIC PNL TOTAL CA: CPT | Performed by: INTERNAL MEDICINE

## 2024-07-27 PROCEDURE — 99232 SBSQ HOSP IP/OBS MODERATE 35: CPT | Performed by: STUDENT IN AN ORGANIZED HEALTH CARE EDUCATION/TRAINING PROGRAM

## 2024-07-27 PROCEDURE — 25010000002 BUMETANIDE PER 0.5 MG: Performed by: NURSE PRACTITIONER

## 2024-07-27 PROCEDURE — 99232 SBSQ HOSP IP/OBS MODERATE 35: CPT | Performed by: INTERNAL MEDICINE

## 2024-07-27 PROCEDURE — 25010000002 ENOXAPARIN PER 10 MG: Performed by: INTERNAL MEDICINE

## 2024-07-27 PROCEDURE — 94761 N-INVAS EAR/PLS OXIMETRY MLT: CPT

## 2024-07-27 PROCEDURE — 85027 COMPLETE CBC AUTOMATED: CPT | Performed by: INTERNAL MEDICINE

## 2024-07-27 RX ORDER — BUMETANIDE 0.25 MG/ML
1 INJECTION INTRAMUSCULAR; INTRAVENOUS EVERY 12 HOURS
Status: DISCONTINUED | OUTPATIENT
Start: 2024-07-27 | End: 2024-07-27

## 2024-07-27 RX ORDER — POTASSIUM CHLORIDE 750 MG/1
10 CAPSULE, EXTENDED RELEASE ORAL DAILY
Status: DISCONTINUED | OUTPATIENT
Start: 2024-07-27 | End: 2024-08-02 | Stop reason: HOSPADM

## 2024-07-27 RX ORDER — BUMETANIDE 0.25 MG/ML
2 INJECTION INTRAMUSCULAR; INTRAVENOUS ONCE
Status: COMPLETED | OUTPATIENT
Start: 2024-07-27 | End: 2024-07-27

## 2024-07-27 RX ORDER — POTASSIUM CHLORIDE 750 MG/1
40 CAPSULE, EXTENDED RELEASE ORAL EVERY 4 HOURS
Status: DISCONTINUED | OUTPATIENT
Start: 2024-07-27 | End: 2024-07-27

## 2024-07-27 RX ORDER — PAROXETINE HYDROCHLORIDE 20 MG/1
20 TABLET, FILM COATED ORAL DAILY
Status: DISCONTINUED | OUTPATIENT
Start: 2024-07-28 | End: 2024-08-02 | Stop reason: HOSPADM

## 2024-07-27 RX ORDER — CALCIUM CARBONATE 500 MG/1
1 TABLET, CHEWABLE ORAL 3 TIMES DAILY PRN
Status: DISCONTINUED | OUTPATIENT
Start: 2024-07-27 | End: 2024-08-02 | Stop reason: HOSPADM

## 2024-07-27 RX ORDER — METOPROLOL TARTRATE 50 MG/1
50 TABLET, FILM COATED ORAL EVERY 6 HOURS
Status: DISCONTINUED | OUTPATIENT
Start: 2024-07-27 | End: 2024-07-29

## 2024-07-27 RX ORDER — AMITRIPTYLINE HYDROCHLORIDE 25 MG/1
25 TABLET, FILM COATED ORAL NIGHTLY
Status: DISCONTINUED | OUTPATIENT
Start: 2024-07-27 | End: 2024-08-02 | Stop reason: HOSPADM

## 2024-07-27 RX ORDER — BUMETANIDE 0.25 MG/ML
1 INJECTION INTRAMUSCULAR; INTRAVENOUS EVERY 12 HOURS
Status: DISCONTINUED | OUTPATIENT
Start: 2024-07-27 | End: 2024-07-30

## 2024-07-27 RX ADMIN — Medication 10 ML: at 22:12

## 2024-07-27 RX ADMIN — PANTOPRAZOLE SODIUM 40 MG: 40 TABLET, DELAYED RELEASE ORAL at 06:48

## 2024-07-27 RX ADMIN — DOCUSATE SODIUM 50 MG AND SENNOSIDES 8.6 MG 2 TABLET: 8.6; 5 TABLET, FILM COATED ORAL at 22:38

## 2024-07-27 RX ADMIN — BUPROPION HYDROCHLORIDE 300 MG: 150 TABLET, EXTENDED RELEASE ORAL at 08:18

## 2024-07-27 RX ADMIN — AMITRIPTYLINE HYDROCHLORIDE 25 MG: 25 TABLET, FILM COATED ORAL at 22:12

## 2024-07-27 RX ADMIN — BUMETANIDE 1 MG: 0.25 INJECTION INTRAMUSCULAR; INTRAVENOUS at 17:48

## 2024-07-27 RX ADMIN — DIGOXIN 125 MCG: 0.12 TABLET ORAL at 08:25

## 2024-07-27 RX ADMIN — BISACODYL 5 MG: 5 TABLET, COATED ORAL at 01:55

## 2024-07-27 RX ADMIN — Medication 1 TABLET: at 08:19

## 2024-07-27 RX ADMIN — METOPROLOL TARTRATE 25 MG: 25 TABLET, FILM COATED ORAL at 06:49

## 2024-07-27 RX ADMIN — BUDESONIDE AND FORMOTEROL FUMARATE DIHYDRATE 1 PUFF: 160; 4.5 AEROSOL RESPIRATORY (INHALATION) at 06:30

## 2024-07-27 RX ADMIN — ACETAMINOPHEN 650 MG: 325 TABLET, FILM COATED ORAL at 17:48

## 2024-07-27 RX ADMIN — POTASSIUM CHLORIDE 10 MEQ: 750 CAPSULE, EXTENDED RELEASE ORAL at 12:50

## 2024-07-27 RX ADMIN — Medication 10 ML: at 08:25

## 2024-07-27 RX ADMIN — ONDANSETRON 4 MG: 2 INJECTION INTRAMUSCULAR; INTRAVENOUS at 08:25

## 2024-07-27 RX ADMIN — ENOXAPARIN SODIUM 90 MG: 100 INJECTION SUBCUTANEOUS at 16:14

## 2024-07-27 RX ADMIN — POLYETHYLENE GLYCOL 3350 17 G: 17 POWDER, FOR SOLUTION ORAL at 08:25

## 2024-07-27 RX ADMIN — FLUTICASONE PROPIONATE 2 SPRAY: 50 SPRAY, METERED NASAL at 08:19

## 2024-07-27 RX ADMIN — QUETIAPINE FUMARATE 100 MG: 100 TABLET ORAL at 22:11

## 2024-07-27 RX ADMIN — BUDESONIDE AND FORMOTEROL FUMARATE DIHYDRATE 1 PUFF: 160; 4.5 AEROSOL RESPIRATORY (INHALATION) at 19:03

## 2024-07-27 RX ADMIN — IPRATROPIUM BROMIDE AND ALBUTEROL SULFATE 3 ML: 2.5; .5 SOLUTION RESPIRATORY (INHALATION) at 14:47

## 2024-07-27 RX ADMIN — DOCUSATE SODIUM 50 MG AND SENNOSIDES 8.6 MG 2 TABLET: 8.6; 5 TABLET, FILM COATED ORAL at 06:48

## 2024-07-27 RX ADMIN — METOPROLOL TARTRATE 25 MG: 25 TABLET, FILM COATED ORAL at 01:55

## 2024-07-27 RX ADMIN — ACETAMINOPHEN 650 MG: 325 TABLET, FILM COATED ORAL at 08:25

## 2024-07-27 RX ADMIN — BUMETANIDE 2 MG: 0.25 INJECTION INTRAMUSCULAR; INTRAVENOUS at 12:46

## 2024-07-27 RX ADMIN — METOPROLOL TARTRATE 50 MG: 50 TABLET, FILM COATED ORAL at 12:50

## 2024-07-27 RX ADMIN — BENZONATATE 100 MG: 100 CAPSULE ORAL at 22:38

## 2024-07-27 RX ADMIN — IPRATROPIUM BROMIDE AND ALBUTEROL SULFATE 3 ML: 2.5; .5 SOLUTION RESPIRATORY (INHALATION) at 06:25

## 2024-07-27 RX ADMIN — ENOXAPARIN SODIUM 90 MG: 100 INJECTION SUBCUTANEOUS at 01:55

## 2024-07-27 RX ADMIN — ESCITALOPRAM OXALATE 20 MG: 10 TABLET ORAL at 08:19

## 2024-07-27 RX ADMIN — IPRATROPIUM BROMIDE AND ALBUTEROL SULFATE 3 ML: 2.5; .5 SOLUTION RESPIRATORY (INHALATION) at 19:03

## 2024-07-27 RX ADMIN — ATORVASTATIN CALCIUM 40 MG: 40 TABLET ORAL at 22:11

## 2024-07-27 RX ADMIN — IPRATROPIUM BROMIDE AND ALBUTEROL SULFATE 3 ML: 2.5; .5 SOLUTION RESPIRATORY (INHALATION) at 10:40

## 2024-07-27 RX ADMIN — METOPROLOL TARTRATE 50 MG: 50 TABLET, FILM COATED ORAL at 17:48

## 2024-07-27 RX ADMIN — METOPROLOL TARTRATE 50 MG: 50 TABLET, FILM COATED ORAL at 23:14

## 2024-07-27 RX ADMIN — POTASSIUM CHLORIDE 40 MEQ: 750 CAPSULE, EXTENDED RELEASE ORAL at 06:49

## 2024-07-27 RX ADMIN — LEVOTHYROXINE SODIUM 75 MCG: 0.07 TABLET ORAL at 06:48

## 2024-07-27 RX ADMIN — GUAIFENESIN 200 MG: 200 SOLUTION ORAL at 23:14

## 2024-07-27 NOTE — PROGRESS NOTES
[facility] -  Progress Note     LOS: 5 days   Patient Care Team:  Ashish Thurman DO as PCP - General (Internal Medicine)  Douglas Ruano MD as Consulting Physician (Pain Medicine)  Uri Lagos MD (Inactive) as Cardiologist (Cardiology)    Chief Complaint: CHF/AF follow up    Subjective     Interval History: Awake, alert, and oriented.  Lying in the bed.  Reports feeling worse today he has developed increased shortness of breath with minimal exertion.  He feels like he cannot get any air despite having oxygen on.  He denies chest pain or palpitations.  Heart rates remain elevated.  His weight is up 1 pound from yesterday.  Renal function has proved.    Tele: Atrial fibrillation with rapid ventricular response    Review of Systems:     Review of Systems   Constitutional:  Positive for activity change and fatigue.   Respiratory:  Positive for cough and shortness of breath. Negative for chest tightness and wheezing.    Cardiovascular:  Negative for chest pain, palpitations and leg swelling.   Gastrointestinal:  Negative for abdominal pain, nausea and vomiting.   Neurological:  Positive for dizziness and weakness. Negative for tremors and syncope.   Psychiatric/Behavioral:  Negative for agitation and confusion. The patient is not nervous/anxious.      Objective     Vital Sign Min/Max for last 24 hours  Temp  Min: 97.7 °F (36.5 °C)  Max: 98.6 °F (37 °C)   BP  Min: 90/62  Max: 139/59   Pulse  Min: 102  Max: 123   Resp  Min: 18  Max: 18   SpO2  Min: 90 %  Max: 100 %   No data recorded   Weight  Min: 94.7 kg (208 lb 12.8 oz)  Max: 94.7 kg (208 lb 12.8 oz)         07/27/24  0500   Weight: 94.7 kg (208 lb 12.8 oz)       Physical Exam:    Vitals reviewed.   Constitutional:       General: Awake.      Appearance: Well-developed, well-groomed and not in distress. Obese. Ill-appearing and acutely ill-appearing.      Interventions: Nasal cannula in place.   HENT:      Head: Normocephalic  and atraumatic.   Pulmonary:      Effort: Pulmonary effort is normal.      Breath sounds: Decreased air movement present. Examination of the right-lower field reveals decreased breath sounds. Examination of the left-lower field reveals decreased breath sounds. Decreased breath sounds present. Bibasilar Rales present.   Cardiovascular:      Tachycardia present. Irregularly irregular rhythm.      Murmurs: There is a systolic murmur.   Edema:     Peripheral edema absent.   Musculoskeletal:      Cervical back: Normal range of motion and neck supple. Skin:     General: Skin is warm and dry.   Neurological:      Mental Status: Alert, oriented to person, place, and time and oriented to person, place and time.   Psychiatric:         Attention and Perception: Attention normal.         Mood and Affect: Mood normal.         Speech: Speech normal.         Behavior: Behavior normal. Behavior is cooperative.         Thought Content: Thought content normal.         Cognition and Memory: Cognition and memory normal.         Judgment: Judgment normal.       Results Review:   Lab Results   Component Value Date    WBC 9.07 07/27/2024    HGB 11.3 (L) 07/27/2024    HCT 40.9 07/27/2024    MCV 80.7 07/27/2024     07/27/2024     Lab Results   Component Value Date    GLUCOSE 102 (H) 07/27/2024    CALCIUM 8.1 (L) 07/27/2024     07/27/2024    K 3.6 07/27/2024    CO2 32.0 (H) 07/27/2024    CL 99 07/27/2024    BUN 17 07/27/2024    CREATININE 1.24 07/27/2024    EGFR 66.6 07/27/2024    BCR 13.7 07/27/2024    ANIONGAP 7.0 07/27/2024       Medication Review: yes  Current Facility-Administered Medications   Medication Dose Route Frequency Provider Last Rate Last Admin    acetaminophen (TYLENOL) tablet 650 mg  650 mg Oral Q6H PRN Mihaela Otero APRN   650 mg at 07/27/24 0825    albuterol (PROVENTIL) nebulizer solution 0.083% 2.5 mg/3mL  2.5 mg Nebulization Q4H PRN Jhon Chowdhury MD        amitriptyline (ELAVIL) tablet 25 mg   25 mg Oral Nightly Jem Begum MD        atorvastatin (LIPITOR) tablet 40 mg  40 mg Oral Nightly Remi Mathew MD   40 mg at 07/26/24 2153    benzonatate (TESSALON) capsule 100 mg  100 mg Oral TID PRN Remi Mathew MD   100 mg at 07/26/24 2153    sennosides-docusate (PERICOLACE) 8.6-50 MG per tablet 2 tablet  2 tablet Oral BID PRN Remi Mathew MD   2 tablet at 07/27/24 0648    And    polyethylene glycol (MIRALAX) packet 17 g  17 g Oral Daily PRN Remi Mathew MD   17 g at 07/27/24 0825    And    bisacodyl (DULCOLAX) EC tablet 5 mg  5 mg Oral Daily PRN Remi Mathew MD   5 mg at 07/27/24 0155    And    bisacodyl (DULCOLAX) suppository 10 mg  10 mg Rectal Daily PRN Remi Mathew MD        budesonide-formoterol (SYMBICORT) 160-4.5 MCG/ACT inhaler 1 puff  1 puff Inhalation BID - RT Remi Mathew MD   1 puff at 07/27/24 0630    bumetanide (BUMEX) injection 1 mg  1 mg Intravenous Q12H Barbara Pereira APRN        buPROPion XL (WELLBUTRIN XL) 24 hr tablet 300 mg  300 mg Oral Daily Remi Mathew MD   300 mg at 07/27/24 0818    butalbital-acetaminophen-caffeine (FIORICET, ESGIC) -40 MG per tablet 1 tablet  1 tablet Oral Daily PRN Remi Mathew MD        calcium carbonate (TUMS) chewable tablet 500 mg (200 mg elemental)  1 tablet Oral TID PRN Jhon Chowdhury MD        Calcium Replacement - Follow Nurse / BPA Driven Protocol   Does not apply PRN Remi Mathew MD        cyclobenzaprine (FLEXERIL) tablet 5 mg  5 mg Oral TID PRN Remi Mathew MD        digoxin (LANOXIN) tablet 125 mcg  125 mcg Oral Daily Mihaela Otero APRN   125 mcg at 07/27/24 0825    Enoxaparin Sodium (LOVENOX) syringe 90 mg  1 mg/kg Subcutaneous Q12H Remi Mathew MD   90 mg at 07/27/24 0155    fluticasone (FLONASE) 50 MCG/ACT nasal spray 2 spray  2 spray Nasal Daily Remi Mathew MD   2 spray at 07/27/24 0819    guaifenesin (ROBITUSSIN) 100 MG/5ML liquid 200 mg  200 mg Oral Q4H PRN Remi Mathew,  MD   200 mg at 07/26/24 2156    ipratropium-albuterol (DUO-NEB) nebulizer solution 3 mL  3 mL Nebulization 4x Daily - RT Jhon Chowdhury MD   3 mL at 07/27/24 0625    levothyroxine (SYNTHROID, LEVOTHROID) tablet 75 mcg  75 mcg Oral Daily Remi Mathew MD   75 mcg at 07/27/24 0648    Magnesium Standard Dose Replacement - Follow Nurse / BPA Driven Protocol   Does not apply PRN Remi Mathew MD        metoprolol tartrate (LOPRESSOR) tablet 50 mg  50 mg Oral Q6H Jem Begum MD        multivitamin with minerals 1 tablet  1 tablet Oral Daily Remi Mathew MD   1 tablet at 07/27/24 0819    nitroglycerin (NITROSTAT) SL tablet 0.4 mg  0.4 mg Sublingual Q5 Min PRN Remi Mathew MD   0.4 mg at 07/23/24 0343    ondansetron (ZOFRAN) injection 4 mg  4 mg Intravenous Q6H PRN Remi Mathew MD   4 mg at 07/27/24 0825    pantoprazole (PROTONIX) EC tablet 40 mg  40 mg Oral Q AM Remi Mathew MD   40 mg at 07/27/24 0648    [START ON 7/28/2024] PARoxetine (PAXIL) tablet 20 mg  20 mg Oral Daily Jem Begum MD        Phosphorus Replacement - Follow Nurse / BPA Driven Protocol   Does not apply PRN Remi Mathew MD        potassium chloride (MICRO-K/KLOR-CON) CR capsule  10 mEq Oral Daily Barbara Pereira APRN        Potassium Replacement - Follow Nurse / BPA Driven Protocol   Does not apply PRN Remi Mathew MD        QUEtiapine (SEROquel) tablet 100 mg  100 mg Oral Nightly Remi Mathew MD   100 mg at 07/26/24 2153    [Held by provider] sacubitril-valsartan (ENTRESTO) 24-26 MG tablet 1 tablet  1 tablet Oral Q12H Mihaela Otero APRN   1 tablet at 07/25/24 2206    sodium chloride 0.9 % flush 10 mL  10 mL Intravenous PRN Remi Mathew MD        sodium chloride 0.9 % flush 10 mL  10 mL Intravenous Q12H Remi Mathew MD   10 mL at 07/27/24 0825    sodium chloride 0.9 % infusion 40 mL  40 mL Intravenous PRN Remi Mathew MD        [Held by provider] spironolactone (ALDACTONE) tablet 25 mg  25  mg Oral Daily Remi Mathew MD   25 mg at 07/25/24 1209    temazepam (RESTORIL) capsule 15 mg  15 mg Oral Nightly PRN , VIRGILIO Chairez         Results for orders placed during the hospital encounter of 07/22/24    Adult Transesophageal Echo (LAUREN) W/ Cont if Necessary Per Protocol    Interpretation Summary    Left ventricular systolic function is moderately decreased.    The left ventricular cavity is mild to moderately dilated.    The left atrial cavity is severely dilated.    The right atrial cavity is dilated.    Severe mitral valve regurgitation is present. Pulmonary vein flow reversal is present.      Interpretation Summary  Transthoracic echocardiogram 7/23/2024 (Dr. Mathew)       Left ventricular systolic function is moderately decreased. Left ventricular ejection fraction appears to be 36 - 40%.    The left ventricular cavity is mildly dilated.    The left atrial cavity is severely dilated.    Normal right ventricular cavity size and systolic function noted.    Severe mitral valve regurgitation is present.    Estimated right ventricular systolic pressure from tricuspid regurgitation is moderately elevated (45-55 mmHg).    Assessment & Plan     1.  Acute on chronic combined systolic and diastolic congestive heart failure: Has been managed for atrial fibrillation with rapid ventricular response this admission, left ventricular ejection fraction found to be 36 to 40% as well as component of valvular heart disease with severe MR.  History of cardiomyopathy.  Old notes indicate nonischemic cardiomyopathy.  Initially managed on bumetanide continuous drip.  Intermittent dosing of IV Bumex was initiated however discontinued last night.  Had been on a high dose of carvedilol (50 mg BID) however transitioned to Lopressor for now due to hypotension.  Also had been on Entresto and spironolactone however those are currently on hold.  Standing weights had been unchanged and now have increased by 1 pound the past 2  days.  He has diuresed with a net negative fluid balance of 4L based off of documentation. Remains on supplemental oxygen. Chest x ray 7/25/24 did not show improvement of his volume status.  After holding diuretics renal function has improved however the patient is significantly short of breath and has evidence of volume overload on exam.  Resume IV diuretics.  Bumex 2 mg now.  Then 1 mg twice daily thereafter.  Potassium 10 mEq daily.  Monitor renal function and electrolytes.    2.  Atrial fibrillation with rapid ventricular response: Initially was being managed on Cardizem.  It was recommended to wean Cardizem for other rate controlling medications given findings of depressed left ventricular ejection fraction.  Given hypotension the patient has received digoxin.  Now on low-dose Lopressor to titrate as tolerated with plans to transition to guideline directed medical therapy in future.  Currently anticoagulated with Lovenox. Heart rates remain elevated 120s-150s. EP has been consulted.  They have made adjustments to his beta-blocker therapy.  Plan to continue on anticoagulation as the patient would benefit from restoration of sinus rhythm prior to valvular intervention.    3.  Acute kidney injury: Resolved.  Continue to monitor renal function closely due to need for IV diuretics.    4.  Severe MR: on echo, has also had LAUREN. CTS following. Recommend optimization medically with diuresis. CTS plans to follow up in the outpatient setting to discuss timing of surgical intervention. He will also need coronary angiography.     5.  Coronary artery disease: Documentation indicates the patient has a history of coronary artery disease but no specific test results or workup to indicate extent of findings or what diagnostic testing was performed.  Previously followed in the office for what was documented to be nonischemic cardiomyopathy.    6.  Hypertension: has had low BP recently and med changes made  7.  Hyperlipidemia: on  statin therapy  8.  Obstructive sleep apnea  9.  Anemia: stable   10.  Obesity BMI 32.70      Resume IV diuretics.  Bumex 2 mg IV now  Potassium 10 mill equivalents daily  Rate controlling medications per EP  Anticoagulation with Lovenox.   Defer plans for cardioversion to electrophysiology -tentative plans for cardioversion Monday if he remains in atrial fibrillation over the weekend.    The patient needs aggressive volume control and management of his arrhythmia.  He will continue to have issues with shortness of breath and volume decompensation if heart rates are not better managed. EP has plans for rhythm management Monday if he does not spontaneously convert to SR prior. At this time, cardiothoracic surgery was planning to follow-up with the patient outpatient.  He will need optimization of medical therapy prior to discharge for management of volume status however given heart rates, increased shortness of breath and need for IV diuretics would hold off on initiating Entresto, spironolactone at this time as he has had issues with hypotension and acute kidney injury.  Those therapies can be added once he is more euvolemic and his heart rates are better controlled.    In regards to his coronary angiography, this can be deferred at this time. He is having symptoms of heart failure currently, no suspicion for angina. Coronary anatomy can be evaluated prior to surgery but is not something that has to be done during this hospitalization, and can be considered outpatient. Hopefully with better management of his heart failure and restoring SR he will improve symptomatically be optimized medically, thus have improvement of his symptoms felt to be related to functional MR, moderate LV systolic dysfunction, and AF RVR.     Discussed with Dr. Louis Noel.     Electronically signed by VIRGILIO Herrera, 07/27/24, 10:31 AM CDT.

## 2024-07-27 NOTE — CONSULTS
"EP CONSULT NOTE    Subjective        History of Present Illness    EP Problems:  1.  Persistent atrial fibrillation  2.  Severe biatrial enlargement    Cardiology Problems:  1.  Severe MR  2.  Nonischemic cardiomyopathy  3.  Chronic systolic heart failure  4.  Hypertension  5.  Hyperlipidemia    Medical Problems:  1.  Obesity  2.  Obstructive sleep apnea  3.  Chronic normocytic anemia    Ziyad Wilson is a 59 y.o. male with problem list as above for whom EP is consulted regarding persistent atrial fibrillation with RVR.  He initially presented to the hospital on 7/23/2024 with shortness of breath and lower extremity edema.  He was found to be new diagnosis atrial fibrillation with rapid ventricular rates.  He has been treated with rate control since he has been here.  He underwent LAUREN on 7/25/2024 which revealed no evidence of left atrial appendage thrombus.  His rates remain uncontrolled and he continues to feel short of breath.  Given these findings, EP is consulted for further evaluation.    Family History:  family history includes Heart disease in his father and mother; Hypertension in his brother, father, mother, and sister; Stroke in his mother.    Social History:  Social History     Tobacco Use    Smoking status: Never    Smokeless tobacco: Never   Vaping Use    Vaping status: Never Used   Substance Use Topics    Alcohol use: Yes     Comment: occasional    Drug use: Yes     Types: Marijuana     Comment: COUPLE WEEKS AGO       Allergies:  Patient has no known allergies.      Objective   Vital Signs:  /57 (BP Location: Left arm, Patient Position: Lying)   Pulse 111   Temp 97.7 °F (36.5 °C) (Oral)   Resp 18   Ht 170.2 cm (67\")   Wt 94.7 kg (208 lb 12.8 oz)   SpO2 100%   BMI 32.70 kg/m²   Estimated body mass index is 32.7 kg/m² as calculated from the following:    Height as of this encounter: 170.2 cm (67\").    Weight as of this encounter: 94.7 kg (208 lb 12.8 oz).      Physical Exam  Vitals " reviewed.   Constitutional:       Appearance: Normal appearance.   Cardiovascular:      Rate and Rhythm: Tachycardia present. Rhythm irregular.      Pulses: Normal pulses.      Heart sounds: Murmur heard.   Pulmonary:      Effort: Pulmonary effort is normal.      Breath sounds: Normal breath sounds.   Musculoskeletal:         General: No swelling.   Neurological:      Mental Status: He is alert and oriented to person, place, and time.   Psychiatric:         Mood and Affect: Mood normal.         Judgment: Judgment normal.          Result Review :  The following data was reviewed by: Jem Begum MD on 07/22/2024:  Labs today:  CBC: Hemoglobin 11.1, WBC 8.8  BMP: Creatinine 1.44, CO2 32  Procalcitonin 0.12    LAUREN from 7/25/2024 directly visualized independently reviewed: No left atrial appendage thrombus, severe MR, severe reduction in EF, severe atrial enlargement      [id number] SCORE   [id number] Score: 3 (7/27/2024  7:55 AM)             Assessment and Plan   Problems:  Persistent atrial fibrillation with RVR, new diagnosis  Severe MR    Ziyad Wilson is a 59 y.o. male with problem list as above for whom EP is consulted regarding persistent atrial fibrillation with RVR.  Rates remain uncontrolled.  Will increase metoprolol frequency.  Would benefit from restoration of sinus rhythm given concomitant systolic heart failure.  Can perform this Monday if atrial fibrillation does not spontaneously resolve.  Will need to consider timing of anticoagulation with procedures such as left heart catheterization.    Plan:  -Increase metoprolol to 25 mg every 6 hours  -No additional medication changes for now  -Continue anticoagulation with enoxaparin for now given possible upcoming procedures  -Long-term will benefit from either Maze procedure at the time of mitral valve surgery or ablation               Part of this note may be an electronic transcription/translation of spoken language to printed text using the Dragon  Dictation System.

## 2024-07-27 NOTE — PLAN OF CARE
Goal Outcome Evaluation:  Plan of Care Reviewed With: patient        Progress: improving  Outcome Evaluation: VSS. -142, averaging in the 120s. C/o headache today, resolved with Tylenol. Slight nausea this morning, resolved with Zofran. Remains on 2L O2; c/o SOB mid-morning at rest, otherwise only SOB with exertion. Bumex IV given with good output. BM today. Safety maintained.

## 2024-07-27 NOTE — PLAN OF CARE
Goal Outcome Evaluation:  Plan of Care Reviewed With: patient      Progress: no change  Outcome Evaluation: AFL HR:  on tele. Pt c/o anterior headache behind bilateral eyes, PRN PO Tylenol given with good relief noted. Pt c/o nausea, IV Zofran given as ordered with good relief noted. Voiding per urinal. Pt still has not had a bowel movement, PRN meds given with results pending. Safety maintained. Plans for heart cath in the future.

## 2024-07-27 NOTE — PROGRESS NOTES
Assessment tool to be used for patients with existing breathing treatments ordered by hospitalist                               Respiratory Therapist Driven Protocol - RT to Assess and Treat Algorithm    Item 0 Points 1 Point 2 Points 3 Points 4 Points Subtotal   Mental Status Alert, orientated, cooperative Lethargic, follows commands Confused, not following commands Obtunded or Somnolent Comatose 0   Respiratory Pattern Regular RR  8-16 breaths/minute Increased RR  18-25 breaths/minute Dyspnea on exertion, irregular RR  26-30/minute Shortness of breath,  RR 31-35 breaths/minute Accessory muscle use, severe SOB  RR > 35 breath/minute 1   Breath Sounds Clear Decreased unilaterally Decreased bilaterally Basilar crackles Wheezing and/or rhonchi 2   Cough Strong, spontaneous non-productive Strong productive Weak, non-productive Weak productive or weak with rhonchi Absent or may require suctioning 0   Pulmonary Status Nonsmoker or no previous history > 1 year quit < 1 PPD  < 1 year quit >  or = 1 PPD Diagnosed pulmonary disease (severe or chronic) Severe or chronic pulmonary disease with exacerbation 3   Surgical Status None General surgery (non-abdominal or non-thoracic) Lower abdominal Thoracic or upper abdominal Thoracic with pulmonary disease 0   Chest X-ray Clear Chronic changes Infiltrates, atelectasis or pleural effusion Infiltrates > 1 lobe Diffuse infiltrates and atelectasis and/or effusions 2   Activity level Ambulatory Ambulatory with assistance Non-ambulatory Paraplegic Quadriplegic 0                     Total Score 8   Score    Drug Therapy Frequency  20 or >    Q4 Duoneb & Q3 Albuterol PRN 15 - 19     Q6 Duoneb & Q4 Albuterol PRN 10 - 14    QID Duoneb & Q4 Albuterol PRN 5 - 9    TID Duoneb & Q6 Albuterol PRN 0 - 4    Q4 PRN Duoneb or Q4 PRN Albuterol    Incentive Spirometry - Initial RT instruct    Lung Expansion Therapy (PEP) Bronchopulmonary Hygiene (CPT)   Q4 & PRN - Severe atelectasis,  poor oxygenation Q4 - copious secretions, dyspnea, unable to sleep, mucus plugging   QID - High risk for persistent atelectasis, existence of atelectasis QID & Q4 PRN - Moderate secretion production   TID - At risk for developing atelectasis TID - small amounts of secretions with poor cough   BID - prevention of atelectasis BID - unable to breathe deeply and cough spontaneously   *RT Protocol patients will be re-assessed/re-evaluated every 48 hours.    *Patients who are home nebulizer treatments will be protocoled to no less than their home regimen and will remain     on their home regimen with re-evaluations as needed with changes in patient condition.    RT Comments/Recommendations: tx qid duoned with q4 prn proventil

## 2024-07-27 NOTE — PROGRESS NOTES
"EP Problems:  1.  Persistent atrial fibrillation  2.  Severe biatrial enlargement     Cardiology Problems:  1.  Severe MR  2.  Nonischemic cardiomyopathy  3.  Chronic systolic heart failure  4.  Hypertension  5.  Hyperlipidemia     Medical Problems:  1.  Obesity  2.  Obstructive sleep apnea  3.  Chronic normocytic anemia    Patient ID:  Ziyad Wilson is a 59 y.o. male with problem list as above as above who EP is following for persistent atrial fibrillation.    Subjective:  Remains in atrial fibrillation with RVR.  No significant improvement in symptoms.    Objective:  /83 (BP Location: Left arm, Patient Position: Lying)   Pulse 110   Temp 98.1 °F (36.7 °C) (Oral)   Resp 18   Ht 170.2 cm (67\")   Wt 94.7 kg (208 lb 12.8 oz)   SpO2 93%   BMI 32.70 kg/m²     Chronically ill-appearing, obese, wearing oxygen, mildly increased work of breathing  Bibasilar crackles  Tachycardic, irregular, apical murmur  1+ lower extremity edema      Labs today:  Lab Results   Component Value Date    GLUCOSE 102 (H) 07/27/2024    CALCIUM 8.1 (L) 07/27/2024     07/27/2024    K 3.6 07/27/2024    CO2 32.0 (H) 07/27/2024    BUN 17 07/27/2024    CREATININE 1.24 07/27/2024    EGFR 66.6 07/27/2024     Lab Results   Component Value Date    WBC 9.07 07/27/2024    HGB 11.3 (L) 07/27/2024    HCT 40.9 07/27/2024     07/27/2024     Telemetry directly visualized independently reviewed: Remains in atrial fibrillation, with RVR, heart rates predominantly 110 to 130 bpm    Assessment:  Persistent atrial fibrillation with acute exacerbation, new diagnosis    Plan:  -Increase metoprolol to 50 mg every 6 hours  -Plan for cardioversion prior to discharge  -Long-term plans regarding ablation versus maze procedure is dependent upon surgical plans for mitral valve  -Dofetilide would be the best medication for short-term rhythm control, however he is on multiple QT prolonging medications for his history of depression.  Will start by " decreasing amitriptyline to 25 mg daily and switching escitalopram to paroxetine instead.    Part of this note may be an electronic transcription/translation of spoken language to printed text using the Dragon Dictation System.

## 2024-07-28 LAB
ANION GAP SERPL CALCULATED.3IONS-SCNC: 7 MMOL/L (ref 5–15)
BUN SERPL-MCNC: 17 MG/DL (ref 8–23)
BUN/CREAT SERPL: 13.2 (ref 7–25)
CALCIUM SPEC-SCNC: 8.6 MG/DL (ref 8.6–10.5)
CHLORIDE SERPL-SCNC: 99 MMOL/L (ref 98–107)
CO2 SERPL-SCNC: 33 MMOL/L (ref 22–29)
CREAT SERPL-MCNC: 1.29 MG/DL (ref 0.76–1.27)
DEPRECATED RDW RBC AUTO: 51.1 FL (ref 37–54)
EGFRCR SERPLBLD CKD-EPI 2021: 63.5 ML/MIN/1.73
ERYTHROCYTE [DISTWIDTH] IN BLOOD BY AUTOMATED COUNT: 19.8 % (ref 12.3–15.4)
GLUCOSE SERPL-MCNC: 84 MG/DL (ref 65–99)
HCT VFR BLD AUTO: 39.8 % (ref 37.5–51)
HGB BLD-MCNC: 11 G/DL (ref 13–17.7)
MCH RBC QN AUTO: 22.5 PG (ref 26.6–33)
MCHC RBC AUTO-ENTMCNC: 27.6 G/DL (ref 31.5–35.7)
MCV RBC AUTO: 81.4 FL (ref 79–97)
PLATELET # BLD AUTO: 267 10*3/MM3 (ref 140–450)
PMV BLD AUTO: 11.8 FL (ref 6–12)
POTASSIUM SERPL-SCNC: 4 MMOL/L (ref 3.5–5.2)
RBC # BLD AUTO: 4.89 10*6/MM3 (ref 4.14–5.8)
SODIUM SERPL-SCNC: 139 MMOL/L (ref 136–145)
WBC NRBC COR # BLD AUTO: 8.45 10*3/MM3 (ref 3.4–10.8)

## 2024-07-28 PROCEDURE — 94664 DEMO&/EVAL PT USE INHALER: CPT

## 2024-07-28 PROCEDURE — 99232 SBSQ HOSP IP/OBS MODERATE 35: CPT | Performed by: INTERNAL MEDICINE

## 2024-07-28 PROCEDURE — 25010000002 BUMETANIDE PER 0.5 MG: Performed by: NURSE PRACTITIONER

## 2024-07-28 PROCEDURE — 94761 N-INVAS EAR/PLS OXIMETRY MLT: CPT

## 2024-07-28 PROCEDURE — 99232 SBSQ HOSP IP/OBS MODERATE 35: CPT | Performed by: STUDENT IN AN ORGANIZED HEALTH CARE EDUCATION/TRAINING PROGRAM

## 2024-07-28 PROCEDURE — 25010000002 ENOXAPARIN PER 10 MG: Performed by: INTERNAL MEDICINE

## 2024-07-28 PROCEDURE — 85027 COMPLETE CBC AUTOMATED: CPT | Performed by: INTERNAL MEDICINE

## 2024-07-28 PROCEDURE — 36415 COLL VENOUS BLD VENIPUNCTURE: CPT | Performed by: INTERNAL MEDICINE

## 2024-07-28 PROCEDURE — 25010000002 ONDANSETRON PER 1 MG: Performed by: INTERNAL MEDICINE

## 2024-07-28 PROCEDURE — 94799 UNLISTED PULMONARY SVC/PX: CPT

## 2024-07-28 PROCEDURE — 80048 BASIC METABOLIC PNL TOTAL CA: CPT | Performed by: INTERNAL MEDICINE

## 2024-07-28 RX ORDER — IPRATROPIUM BROMIDE AND ALBUTEROL SULFATE 2.5; .5 MG/3ML; MG/3ML
3 SOLUTION RESPIRATORY (INHALATION)
Status: DISCONTINUED | OUTPATIENT
Start: 2024-07-28 | End: 2024-08-02 | Stop reason: HOSPADM

## 2024-07-28 RX ORDER — ALBUTEROL SULFATE 2.5 MG/3ML
2.5 SOLUTION RESPIRATORY (INHALATION) EVERY 6 HOURS PRN
Status: DISCONTINUED | OUTPATIENT
Start: 2024-07-28 | End: 2024-08-02 | Stop reason: HOSPADM

## 2024-07-28 RX ADMIN — QUETIAPINE FUMARATE 100 MG: 100 TABLET ORAL at 20:03

## 2024-07-28 RX ADMIN — BUMETANIDE 1 MG: 0.25 INJECTION INTRAMUSCULAR; INTRAVENOUS at 06:14

## 2024-07-28 RX ADMIN — METOPROLOL TARTRATE 50 MG: 50 TABLET, FILM COATED ORAL at 17:58

## 2024-07-28 RX ADMIN — IPRATROPIUM BROMIDE AND ALBUTEROL SULFATE 3 ML: 2.5; .5 SOLUTION RESPIRATORY (INHALATION) at 10:07

## 2024-07-28 RX ADMIN — ENOXAPARIN SODIUM 90 MG: 100 INJECTION SUBCUTANEOUS at 14:17

## 2024-07-28 RX ADMIN — DIGOXIN 125 MCG: 0.12 TABLET ORAL at 09:22

## 2024-07-28 RX ADMIN — ENOXAPARIN SODIUM 90 MG: 100 INJECTION SUBCUTANEOUS at 04:21

## 2024-07-28 RX ADMIN — ATORVASTATIN CALCIUM 40 MG: 40 TABLET ORAL at 20:03

## 2024-07-28 RX ADMIN — BUTALBITAL, ACETAMINOPHEN AND CAFFEINE 1 TABLET: 325; 50; 40 TABLET ORAL at 06:41

## 2024-07-28 RX ADMIN — Medication 1 TABLET: at 09:22

## 2024-07-28 RX ADMIN — BENZONATATE 100 MG: 100 CAPSULE ORAL at 22:12

## 2024-07-28 RX ADMIN — POLYETHYLENE GLYCOL 3350 17 G: 17 POWDER, FOR SOLUTION ORAL at 09:22

## 2024-07-28 RX ADMIN — LEVOTHYROXINE SODIUM 75 MCG: 0.07 TABLET ORAL at 06:13

## 2024-07-28 RX ADMIN — METOPROLOL TARTRATE 50 MG: 50 TABLET, FILM COATED ORAL at 06:14

## 2024-07-28 RX ADMIN — BUMETANIDE 1 MG: 0.25 INJECTION INTRAMUSCULAR; INTRAVENOUS at 17:57

## 2024-07-28 RX ADMIN — DOCUSATE SODIUM 50 MG AND SENNOSIDES 8.6 MG 2 TABLET: 8.6; 5 TABLET, FILM COATED ORAL at 20:03

## 2024-07-28 RX ADMIN — IPRATROPIUM BROMIDE AND ALBUTEROL SULFATE 3 ML: 2.5; .5 SOLUTION RESPIRATORY (INHALATION) at 06:30

## 2024-07-28 RX ADMIN — Medication 10 ML: at 09:25

## 2024-07-28 RX ADMIN — ONDANSETRON 4 MG: 2 INJECTION INTRAMUSCULAR; INTRAVENOUS at 06:41

## 2024-07-28 RX ADMIN — METOPROLOL TARTRATE 50 MG: 50 TABLET, FILM COATED ORAL at 13:10

## 2024-07-28 RX ADMIN — PAROXETINE 20 MG: 20 TABLET, FILM COATED ORAL at 09:24

## 2024-07-28 RX ADMIN — IPRATROPIUM BROMIDE AND ALBUTEROL SULFATE 3 ML: .5; 3 SOLUTION RESPIRATORY (INHALATION) at 14:14

## 2024-07-28 RX ADMIN — DOCUSATE SODIUM 50 MG AND SENNOSIDES 8.6 MG 2 TABLET: 8.6; 5 TABLET, FILM COATED ORAL at 09:23

## 2024-07-28 RX ADMIN — BISACODYL 5 MG: 5 TABLET, COATED ORAL at 06:14

## 2024-07-28 RX ADMIN — BUDESONIDE AND FORMOTEROL FUMARATE DIHYDRATE 1 PUFF: 160; 4.5 AEROSOL RESPIRATORY (INHALATION) at 18:50

## 2024-07-28 RX ADMIN — PANTOPRAZOLE SODIUM 40 MG: 40 TABLET, DELAYED RELEASE ORAL at 06:41

## 2024-07-28 RX ADMIN — BUDESONIDE AND FORMOTEROL FUMARATE DIHYDRATE 1 PUFF: 160; 4.5 AEROSOL RESPIRATORY (INHALATION) at 06:30

## 2024-07-28 RX ADMIN — FLUTICASONE PROPIONATE 2 SPRAY: 50 SPRAY, METERED NASAL at 09:24

## 2024-07-28 RX ADMIN — Medication 10 ML: at 20:03

## 2024-07-28 RX ADMIN — ONDANSETRON 4 MG: 2 INJECTION INTRAMUSCULAR; INTRAVENOUS at 22:12

## 2024-07-28 RX ADMIN — IPRATROPIUM BROMIDE AND ALBUTEROL SULFATE 3 ML: .5; 3 SOLUTION RESPIRATORY (INHALATION) at 18:50

## 2024-07-28 RX ADMIN — ACETAMINOPHEN 650 MG: 325 TABLET, FILM COATED ORAL at 22:12

## 2024-07-28 RX ADMIN — BUPROPION HYDROCHLORIDE 300 MG: 150 TABLET, EXTENDED RELEASE ORAL at 09:22

## 2024-07-28 RX ADMIN — AMITRIPTYLINE HYDROCHLORIDE 25 MG: 25 TABLET, FILM COATED ORAL at 20:03

## 2024-07-28 RX ADMIN — POTASSIUM CHLORIDE 10 MEQ: 750 CAPSULE, EXTENDED RELEASE ORAL at 09:22

## 2024-07-28 NOTE — PROGRESS NOTES
Assessment tool to be used for patients with existing breathing treatments ordered by hospitalist                               Respiratory Therapist Driven Protocol - RT to Assess and Treat Algorithm    Item 0 Points 1 Point 2 Points 3 Points 4 Points Subtotal   Mental Status Alert, orientated, cooperative Lethargic, follows commands Confused, not following commands Obtunded or Somnolent Comatose 0   Respiratory Pattern Regular RR  8-16 breaths/minute Increased RR  18-25 breaths/minute Dyspnea on exertion, irregular RR  26-30/minute Shortness of breath,  RR 31-35 breaths/minute Accessory muscle use, severe SOB  RR > 35 breath/minute 0   Breath Sounds Clear Decreased unilaterally Decreased bilaterally Basilar crackles Wheezing and/or rhonchi 2   Cough Strong, spontaneous non-productive Strong productive Weak, non-productive Weak productive or weak with rhonchi Absent or may require suctioning 0   Pulmonary Status Nonsmoker or no previous history > 1 year quit < 1 PPD  < 1 year quit >  or = 1 PPD Diagnosed pulmonary disease (severe or chronic) Severe or chronic pulmonary disease with exacerbation 3   Surgical Status None General surgery (non-abdominal or non-thoracic) Lower abdominal Thoracic or upper abdominal Thoracic with pulmonary disease 0   Chest X-ray Clear Chronic changes Infiltrates, atelectasis or pleural effusion Infiltrates > 1 lobe Diffuse infiltrates and atelectasis and/or effusions 2   Activity level Ambulatory Ambulatory with assistance Non-ambulatory Paraplegic Quadriplegic 0                     Total Score 7   Score    Drug Therapy Frequency  20 or >    Q4 Duoneb & Q3 Albuterol PRN 15 - 19     Q6 Duoneb & Q4 Albuterol PRN 10 - 14    QID Duoneb & Q4 Albuterol PRN 5 - 9    TID Duoneb & Q6 Albuterol PRN 0 - 4    Q4 PRN Duoneb or Q4 PRN Albuterol    Incentive Spirometry - Initial RT instruct    Lung Expansion Therapy (PEP) Bronchopulmonary Hygiene (CPT)   Q4 & PRN - Severe atelectasis,  poor oxygenation Q4 - copious secretions, dyspnea, unable to sleep, mucus plugging   QID - High risk for persistent atelectasis, existence of atelectasis QID & Q4 PRN - Moderate secretion production   TID - At risk for developing atelectasis TID - small amounts of secretions with poor cough   BID - prevention of atelectasis BID - unable to breathe deeply and cough spontaneously   *RT Protocol patients will be re-assessed/re-evaluated every 48 hours.    *Patients who are home nebulizer treatments will be protocoled to no less than their home regimen and will remain     on their home regimen with re-evaluations as needed with changes in patient condition.    RT Comments/Recommendations:Changing duonbe to TID, q6prn albuterol due to protocol.

## 2024-07-28 NOTE — PROGRESS NOTES
[facility]  INPATIENT PROGRESS NOTE    Patient Name: Ziyad Wilson  Date of Admission: 7/22/2024  Today's Date: 07/27/24  Length of Stay: 5  Primary Care Physician: Ashish Thurman DO    Subjective   Chief Complaint: Shortness of breath  HPI   Patient resting today, no new complaints. 's at rest.     Review of Systems   All pertinent negatives and positives are as above. All other systems have been reviewed and are negative unless otherwise stated.     Objective    Temp:  [97.7 °F (36.5 °C)-98.4 °F (36.9 °C)] 98.1 °F (36.7 °C)  Heart Rate:  [101-126] 101  Resp:  [18] 18  BP: ()/(57-83) 110/78  Physical Exam  Constitutional:       Appearance: He is ill-appearing.   HENT:      Head: Normocephalic.      Nose: Nose normal.      Mouth/Throat:      Mouth: Mucous membranes are moist.      Pharynx: Oropharynx is clear.   Eyes:      Pupils: Pupils are equal, round, and reactive to light.   Cardiovascular:      Rate and Rhythm: Tachycardia present. Rhythm irregular.      Heart sounds: Murmur heard.      Comments: AFL  with rates as high as 150  Pulmonary:      Effort: Tachypnea and accessory muscle usage present.      Breath sounds: Decreased air movement present. Examination of the right-lower field reveals decreased breath sounds. Examination of the left-lower field reveals decreased breath sounds. Decreased breath sounds present.      Comments: Patient continues to have mild tachypnea and mild accessory muscle use however significantly improved.  Oxygen saturations remained 89 to 92% on 3.5 L.  Abdominal:      General: Abdomen is flat.      Palpations: Abdomen is soft.   Genitourinary:     Comments: Voiding per urinal, no scrotal edema present  Musculoskeletal:         General: Normal range of motion.      Cervical back: Normal range of motion.   Skin:     General: Skin is warm.      Capillary Refill: Capillary refill takes less than 2 seconds.       Coloration: Skin is pale.   Neurological:      General: No focal deficit present.      Mental Status: He is alert.   Psychiatric:         Mood and Affect: Mood normal.         Behavior: Behavior normal.         Thought Content: Thought content normal.         Judgment: Judgment normal.       Results Review:  I have reviewed the labs, radiology results, and diagnostic studies.    Laboratory Data:   Results from last 7 days   Lab Units 07/27/24  0156 07/26/24  0348 07/25/24  0402   WBC 10*3/mm3 9.07 8.88 11.34*   HEMOGLOBIN g/dL 11.3* 11.1* 10.6*   HEMATOCRIT % 40.9 39.4 36.9*   PLATELETS 10*3/mm3 235 211 230        Results from last 7 days   Lab Units 07/27/24  1410 07/27/24  0156 07/26/24  0348 07/25/24  0402 07/24/24  0453 07/23/24  1334 07/22/24  1704 07/22/24  1632   SODIUM mmol/L  --  138 138 138   < > 138  --  141   SODIUM, ARTERIAL   --   --   --   --   --   --    < >  --    POTASSIUM mmol/L 4.0 3.6 3.7 3.7   < > 3.5  --  3.6   CHLORIDE mmol/L  --  99 98 98   < > 100  --  104   CO2 mmol/L  --  32.0* 32.0* 29.0   < > 25.0  --  26.0   BUN mg/dL  --  17 23 22   < > 19  --  21   CREATININE mg/dL  --  1.24 1.44* 1.38*   < > 1.38*  --  1.50*   CALCIUM mg/dL  --  8.1* 7.9* 7.9*   < > 8.8  --  8.6   BILIRUBIN mg/dL  --   --   --   --   --  0.4  --  0.3   ALK PHOS U/L  --   --   --   --   --  64  --  65   ALT (SGPT) U/L  --   --   --   --   --  28  --  28   AST (SGOT) U/L  --   --   --   --   --  29  --  25   GLUCOSE mg/dL  --  102* 96 105*   < > 117*  --  112*    < > = values in this interval not displayed.       Culture Data:   Blood Culture   Date Value Ref Range Status   07/22/2024 No growth at 5 days  Final   07/22/2024 No growth at 5 days  Final       Radiology Data:   Imaging Results (Last 24 Hours)       ** No results found for the last 24 hours. **            I have reviewed the patient's current medications.     Assessment/Plan   Assessment  Active Hospital Problems    Diagnosis     **Atrial fibrillation  with RVR     Severe mitral regurgitation     Acute systolic CHF (congestive heart failure)     Iron deficiency anemia     NICM (nonischemic cardiomyopathy), viral etiology     Tetrahydrocannabinol (THC) dependence     Acute pulmonary edema     CKD (chronic kidney disease) stage 2, GFR 60-89 ml/min     Class 1 obesity due to excess calories with serious comorbidity and body mass index (BMI) of 30.0 to 30.9 in adult        Treatment Plan  1.  Atrial fibrillation with rapid ventricular response/atrial flutter  Due to patient's continuous hypotension, Cardizem was discontinued, digoxin 250 mcg IV x 1 given with p.o. 1.25 mcg   Coreg discontinued.   Lopressor 50 mg PO q6h  Lovenox  continues due to the possibility of angiography during hospitalization.,  Eliquis will be initiated at discharge.    EP consult in place> Plan for CV prior to discharge. Ablation versus Maze depending on surgical plans for mitral valve  Continue cardiac telemetry,   every 4 vital signs and notify provider of any worsening or increasing rates.    [id number] score of 2     2.  Iron deficiency anemia-due to patient's complaints of extreme lethargy and previous anemia.  Iron profile revealed need for infusion, patient has completed 3 doses of ferric gluconate.       3.  Nonischemic cardiomyopathy, viral etiology/acute pulmonary edema/acute systolic congestive heart failure-due to patient's increasing creatinine, diuretics will be on hold for 24 hours and reevaluate in the a.m.   As stated above patient has been hypotensive and not tolerating rate or rhythm medications.  Dr. Mathew initiated Entresto 7/25/2020, due to persistent hypotension and will be held this morning and reinitiated digoxin and change beta-blocker to Lopressor 12.5, continue to monitor for tolerance.  Currently patient is optimized on beta-blocker, Entresto and Aldactone on hold due to hypotension and decreasing kidney function.  ReDs vest evaluation pending      4.  THC  dependence-patient was educated on the possibility of nausea due to withdrawals. Patient continues to have no complaints of  withdrawal symptoms, Zofran as needed for nausea.     5.  CKD stage II-creatinine clearance 61.1, baseline creatinine 1.5, creatinine continues to decline, this a.m. is 1.44.  Diuretics discontinued for 24 hours, will reevaluate in a.m. BMP daily.     6.  Obesity-dietitian consult for help with cardiac diet.  Low fat and low salt diet initiated.     7.  Severe mitral valve regurgitation-echocardiogram 7/23/2024 revealed severe mitral valve regurgitation.  LAUREN performed yesterday confirmed severe mitral valve regurgitation and moderate LV dysfunction.  CT surgery continues to follow however plans remain to be worked up for outpatient MVR/MAZE procedure.cardiology continuing to follow, Eliquis remains on hold until definitive plan for outpatient angiography is established.       VTE prophylaxis with Lovenox   Labs in AM     Medical Decision Making  Atrial fibrillation, acute, moderate complexity, improving  Iron deficiency anemia, acute on chronic, moderate complexity, unchanged  Nonischemic cardiomyopathy, acute on chronic, moderate complexity, unchanged  THC dependence, chronic, low complexity, stable  CKD stage II, chronic, moderate complexity, stable  Obesity class I, chronic, moderate complexity, unchanged  Severe mitral valve regurgitation, acute, high complexity, unchanged  Acute systolic congestive heart failure, acute, high complexity, improving  Acute pulmonary edema, acute, high complexity, improving  Number and Complexity of problems: 9  Differential Diagnosis: None     Conditions and Status        Condition is unchanged.     [facility] Data  External documents reviewed: None  Cardiac tracing (EKG, telemetry) interpretation: Overnight telemetry AFL    Radiology interpretation: See above  Labs reviewed: See above  Any tests that were considered but not ordered: None     Decision  rules/scores evaluated (example [id number], Wells, etc): [id number] score elevated     Discussed with: Patient and Dr Begum  Care Planning  Shared decision making: Patient  code status and discussions: Full code per patient  Surrogate Decision Maker his brother Guy Wilson  Disposition  Social Determinants of Health that impact treatment or disposition: None at this time  I expect the patient to be discharged to home in 2-3 days.        Electronically signed by Jhon Chowdhury MD, 07/27/24, 19:04 CDT.   No

## 2024-07-28 NOTE — PROGRESS NOTES
[facility] -  Progress Note     LOS: 6 days   Patient Care Team:  Ashish Thurman DO as PCP - General (Internal Medicine)  Douglas Ruano MD as Consulting Physician (Pain Medicine)  Uri Lagos MD (Inactive) as Cardiologist (Cardiology)    Chief Complaint: CHF/AF follow up    Subjective     Interval History: In bed, family at the bedside.  Reports that he felt significantly improved yesterday after initial resumption of IV diuretics.  He did feel some increased shortness of breath overnight but nothing as profound as he had felt yesterday morning.  He reports good urinary output since resuming diuretic therapy.  Denies chest pain or palpitations.  Heart rates are improved but still remain elevated.  He has no lower extremity swelling.  Remains on supplemental oxygen at 2 L.  Reports a headache today.    Tele: Atrial fibrillation with rapid ventricular response although rates have improved    Review of Systems:     Review of Systems   Constitutional:  Positive for activity change and fatigue. Negative for fever.   Respiratory:  Positive for shortness of breath. Negative for cough, chest tightness and wheezing.    Cardiovascular:  Negative for chest pain, palpitations and leg swelling.   Gastrointestinal:  Negative for abdominal pain, nausea and vomiting.   Neurological:  Positive for headaches. Negative for dizziness, tremors, syncope and weakness.   Psychiatric/Behavioral:  Negative for agitation and confusion. The patient is not nervous/anxious.      Objective     Vital Sign Min/Max for last 24 hours  Temp  Min: 98.1 °F (36.7 °C)  Max: 98.5 °F (36.9 °C)   BP  Min: 100/68  Max: 122/78   Pulse  Min: 83  Max: 123   Resp  Min: 16  Max: 24   SpO2  Min: 88 %  Max: 99 %   No data recorded   Weight  Min: 95.1 kg (209 lb 9.6 oz)  Max: 95.1 kg (209 lb 9.6 oz)         07/28/24  0608   Weight: 95.1 kg (209 lb 9.6 oz)       Physical Exam:    Vitals reviewed.   Constitutional:        General: Awake.      Appearance: Well-developed, well-groomed and not in distress. Obese. Ill-appearing and acutely ill-appearing.      Interventions: Nasal cannula in place.   HENT:      Head: Normocephalic and atraumatic.   Pulmonary:      Effort: Pulmonary effort is normal.      Breath sounds: Examination of the right-lower field reveals decreased breath sounds. Examination of the left-lower field reveals decreased breath sounds. Bibasilar Rales present.   Cardiovascular:      Tachycardia present. Irregularly irregular rhythm.      Murmurs: There is a systolic murmur.   Edema:     Peripheral edema absent.   Musculoskeletal:      Cervical back: Normal range of motion and neck supple. Skin:     General: Skin is warm and dry.   Neurological:      Mental Status: Alert, oriented to person, place, and time and oriented to person, place and time.   Psychiatric:         Attention and Perception: Attention normal.         Mood and Affect: Mood normal.         Speech: Speech normal.         Behavior: Behavior normal. Behavior is cooperative.         Thought Content: Thought content normal.         Cognition and Memory: Cognition and memory normal.         Judgment: Judgment normal.       Results Review:   Lab Results   Component Value Date    WBC 8.45 07/28/2024    HGB 11.0 (L) 07/28/2024    HCT 39.8 07/28/2024    MCV 81.4 07/28/2024     07/28/2024     Lab Results   Component Value Date    GLUCOSE 84 07/28/2024    CALCIUM 8.6 07/28/2024     07/28/2024    K 4.0 07/28/2024    CO2 33.0 (H) 07/28/2024    CL 99 07/28/2024    BUN 17 07/28/2024    CREATININE 1.29 (H) 07/28/2024    EGFR 63.5 07/28/2024    BCR 13.2 07/28/2024    ANIONGAP 7.0 07/28/2024       Medication Review: yes  Current Facility-Administered Medications   Medication Dose Route Frequency Provider Last Rate Last Admin    acetaminophen (TYLENOL) tablet 650 mg  650 mg Oral Q6H PRN Mihaela APRN   650 mg at 07/27/24 1748    albuterol (PROVENTIL)  nebulizer solution 0.083% 2.5 mg/3mL  2.5 mg Nebulization Q6H PRN Jhon Chowdhury MD        amitriptyline (ELAVIL) tablet 25 mg  25 mg Oral Nightly Jem Begum MD   25 mg at 07/27/24 2212    atorvastatin (LIPITOR) tablet 40 mg  40 mg Oral Nightly Remi Mathew MD   40 mg at 07/27/24 2211    benzonatate (TESSALON) capsule 100 mg  100 mg Oral TID PRN Remi Mathew MD   100 mg at 07/27/24 2238    sennosides-docusate (PERICOLACE) 8.6-50 MG per tablet 2 tablet  2 tablet Oral BID PRN Remi Mathew MD   2 tablet at 07/28/24 0923    And    polyethylene glycol (MIRALAX) packet 17 g  17 g Oral Daily PRN Remi Mathew MD   17 g at 07/28/24 0922    And    bisacodyl (DULCOLAX) EC tablet 5 mg  5 mg Oral Daily PRN Remi Mathew MD   5 mg at 07/28/24 0614    And    bisacodyl (DULCOLAX) suppository 10 mg  10 mg Rectal Daily PRN Remi Mathew MD        budesonide-formoterol (SYMBICORT) 160-4.5 MCG/ACT inhaler 1 puff  1 puff Inhalation BID - RT Remi Mathew MD   1 puff at 07/28/24 0630    bumetanide (BUMEX) injection 1 mg  1 mg Intravenous Q12H Barbara Pereira APRN   1 mg at 07/28/24 0614    buPROPion XL (WELLBUTRIN XL) 24 hr tablet 300 mg  300 mg Oral Daily Remi Mathew MD   300 mg at 07/28/24 0922    butalbital-acetaminophen-caffeine (FIORICET, ESGIC) -40 MG per tablet 1 tablet  1 tablet Oral Daily PRN Remi Mathew MD   1 tablet at 07/28/24 0641    calcium carbonate (TUMS) chewable tablet 500 mg (200 mg elemental)  1 tablet Oral TID PRN Jhon Chowdhury MD        Calcium Replacement - Follow Nurse / BPA Driven Protocol   Does not apply PRN Remi Mathew MD        cyclobenzaprine (FLEXERIL) tablet 5 mg  5 mg Oral TID PRN Remi Mathew MD        digoxin (LANOXIN) tablet 125 mcg  125 mcg Oral Daily , VIRGILIO Chairez   125 mcg at 07/28/24 0962    Enoxaparin Sodium (LOVENOX) syringe 90 mg  1 mg/kg Subcutaneous Q12H Remi Mathew MD   90 mg at 07/28/24 4584     fluticasone (FLONASE) 50 MCG/ACT nasal spray 2 spray  2 spray Nasal Daily Remi Mathew MD   2 spray at 07/28/24 0924    guaifenesin (ROBITUSSIN) 100 MG/5ML liquid 200 mg  200 mg Oral Q4H PRN Remi Mathew MD   200 mg at 07/27/24 2314    ipratropium-albuterol (DUO-NEB) nebulizer solution 3 mL  3 mL Nebulization TID - RT Jhon Chowdhury MD        levothyroxine (SYNTHROID, LEVOTHROID) tablet 75 mcg  75 mcg Oral Daily Remi Mathew MD   75 mcg at 07/28/24 0613    Magnesium Standard Dose Replacement - Follow Nurse / BPA Driven Protocol   Does not apply PRN Remi Mathew MD        metoprolol tartrate (LOPRESSOR) tablet 50 mg  50 mg Oral Q6H Jem Begum MD   50 mg at 07/28/24 0614    multivitamin with minerals 1 tablet  1 tablet Oral Daily Remi Mathew MD   1 tablet at 07/28/24 0922    nitroglycerin (NITROSTAT) SL tablet 0.4 mg  0.4 mg Sublingual Q5 Min PRN Remi Mathew MD   0.4 mg at 07/23/24 0343    ondansetron (ZOFRAN) injection 4 mg  4 mg Intravenous Q6H PRN Remi Mathew MD   4 mg at 07/28/24 0641    pantoprazole (PROTONIX) EC tablet 40 mg  40 mg Oral Q AM Remi Mathew MD   40 mg at 07/28/24 0641    PARoxetine (PAXIL) tablet 20 mg  20 mg Oral Daily Jem Begum MD   20 mg at 07/28/24 0924    Phosphorus Replacement - Follow Nurse / BPA Driven Protocol   Does not apply PRN Remi Mathew MD        potassium chloride (MICRO-K/KLOR-CON) CR capsule  10 mEq Oral Daily Barbara Pereira APRN   10 mEq at 07/28/24 0922    Potassium Replacement - Follow Nurse / BPA Driven Protocol   Does not apply PRN Remi Mathew MD        QUEtiapine (SEROquel) tablet 100 mg  100 mg Oral Nightly Remi Mathew MD   100 mg at 07/27/24 2211    [Held by provider] sacubitril-valsartan (ENTRESTO) 24-26 MG tablet 1 tablet  1 tablet Oral Q12H , VIRGILIO Chairez   1 tablet at 07/25/24 2206    sodium chloride 0.9 % flush 10 mL  10 mL Intravenous PRN Remi Mathew MD        sodium chloride 0.9 %  flush 10 mL  10 mL Intravenous Q12H Remi Mathew MD   10 mL at 07/28/24 0925    sodium chloride 0.9 % infusion 40 mL  40 mL Intravenous PRN Remi Mathew MD        [Held by provider] spironolactone (ALDACTONE) tablet 25 mg  25 mg Oral Daily Remi Mathew MD   25 mg at 07/25/24 1209    temazepam (RESTORIL) capsule 15 mg  15 mg Oral Nightly PRN , VIRGILIO Chairez         Results for orders placed during the hospital encounter of 07/22/24    Adult Transesophageal Echo (LAUREN) W/ Cont if Necessary Per Protocol    Interpretation Summary    Left ventricular systolic function is moderately decreased.    The left ventricular cavity is mild to moderately dilated.    The left atrial cavity is severely dilated.    The right atrial cavity is dilated.    Severe mitral valve regurgitation is present. Pulmonary vein flow reversal is present.      Interpretation Summary  Transthoracic echocardiogram 7/23/2024 (Dr. Mathew)       Left ventricular systolic function is moderately decreased. Left ventricular ejection fraction appears to be 36 - 40%.    The left ventricular cavity is mildly dilated.    The left atrial cavity is severely dilated.    Normal right ventricular cavity size and systolic function noted.    Severe mitral valve regurgitation is present.    Estimated right ventricular systolic pressure from tricuspid regurgitation is moderately elevated (45-55 mmHg).    Assessment & Plan     1.  Acute on chronic combined systolic and diastolic congestive heart failure: Has been managed for atrial fibrillation with rapid ventricular response this admission, left ventricular ejection fraction found to be 36 to 40% as well as component of valvular heart disease with severe MR.  History of cardiomyopathy.  Old notes indicate nonischemic cardiomyopathy.  Initially managed on bumetanide continuous drip.  Intermittent dosing of IV Bumex was initiated however discontinued last night.  Had been on a high dose of carvedilol (50 mg  BID) however transitioned to Lopressor for now due to hypotension.  Also had been on Entresto and spironolactone however those are currently on hold.  Standing weights had been unchanged and now have increased by 1 pound the past 2 days.  He has diuresed with a net negative fluid balance of 4L based off of documentation. Remains on supplemental oxygen. Chest x ray 7/25/24 did not show improvement of his volume status.  After holding diuretics renal function has improved however the patient is significantly short of breath and has evidence of volume overload on exam.  Resume IV diuretics.  Bumex 2 mg now.  Then 1 mg twice daily thereafter.  Potassium 10 mEq daily.  Monitor renal function and electrolytes.    2.  Atrial fibrillation with rapid ventricular response: Initially was being managed on Cardizem.  It was recommended to wean Cardizem for other rate controlling medications given findings of depressed left ventricular ejection fraction.  Given hypotension the patient has received digoxin.  Now on Lopressor to titrate as tolerated with plans to transition to guideline directed medical therapy in future.  Currently anticoagulated with Lovenox. EP has been consulted.  They have made adjustments to his beta-blocker therapy.  Plan to continue on anticoagulation as the patient would benefit from restoration of sinus rhythm prior to valvular intervention.    3.  Acute kidney injury: has improved during hospital stay - Continue to monitor renal function closely due to need for IV diuretics.    4.  Severe MR: Functional. Noted initially on echo, has also had LAUREN. CTS following. Recommend optimization medically with diuresis. CTS plans to follow up in the outpatient setting to discuss timing/need for surgical intervention. He will also need coronary angiography at some point.     5.  Coronary artery disease: Documentation indicates the patient has a history of coronary artery disease but no specific test results or workup  to indicate extent of findings or what diagnostic testing was performed.  Previously followed in the office for what was documented to be nonischemic cardiomyopathy. CT Images reviewed by Dr. Noel reveal coronary artery calcification. Defer angiogram at this time as he is optimized for CHF and management of AF.     6.  Hypertension: has had low BP with diuretics and med changes. Ideally, Entresto would be added in future for CHF management. We have held this as well as aldactone to allow for more aggressive diuretic and rate control management.     7.  Hyperlipidemia: on statin therapy  8.  Obstructive sleep apnea  9.  Anemia: stable   10.  Obesity BMI 32.83      Continue Diuretics  Continue K supplement  Rate controlling medications per EP  Anticoagulation with Lovenox.   Defer plans for cardioversion to electrophysiology:NPO after midnight  Daily monitoring of renal function  Strict intake and output  Daily weights  Low Sodium diet       In regards to his coronary angiography, this can be deferred at this time. He is having symptoms of heart failure currently, no suspicion for angina. Coronary anatomy can be evaluated prior to surgery but is not something that has to be done during this hospitalization, and can be considered outpatient. Hopefully with better management of his heart failure and restoring SR he will improve symptomatically be optimized medically, thus have improvement of his symptoms felt to be related to functional MR, moderate LV systolic dysfunction, and AF RVR.       Electronically signed by VIRGILIO Herrera, 07/28/24, 10:47 AM CDT.

## 2024-07-28 NOTE — PLAN OF CARE
Goal Outcome Evaluation:  Plan of Care Reviewed With: patient        Progress: improving  Outcome Evaluation: VSS. -132 with short run of rvr ^ 172, mf coup on tele. No c/o pain. Unsuccessful attempt to titrate O2 from 2L to 1L. Not a remarkable amount of output today with bumex. Patient was ambulated in hallway; tolerated well but required 4L O2. NPO at midnight for cardioversion in the morning, consent signed.

## 2024-07-28 NOTE — PROGRESS NOTES
[facility] Medicine Services  INPATIENT PROGRESS NOTE    Patient Name: Ziyad Wilson  Date of Admission: 7/22/2024  Today's Date: 07/28/24  Length of Stay: 6  Primary Care Physician: Ashish Thurman DO    Subjective   Chief Complaint: Shortness of breath  HPI   Patient resting today, no new complaints. 's at rest.     Review of Systems   All pertinent negatives and positives are as above. All other systems have been reviewed and are negative unless otherwise stated.     Objective    Temp:  [98.1 °F (36.7 °C)-98.5 °F (36.9 °C)] 98.3 °F (36.8 °C)  Heart Rate:  [] 105  Resp:  [16-24] 22  BP: (100-110)/(68-87) 102/73  Physical Exam  Constitutional:       Appearance: He is ill-appearing.   HENT:      Head: Normocephalic.      Nose: Nose normal.      Mouth/Throat:      Mouth: Mucous membranes are moist.      Pharynx: Oropharynx is clear.   Eyes:      Pupils: Pupils are equal, round, and reactive to light.   Cardiovascular:      Rate and Rhythm: Tachycardia present. Rhythm irregular.      Heart sounds: Murmur heard.      Comments: AFL    Pulmonary:      Effort: Normal.      Breath sounds: Decreased air movement present. Examination of the right-lower field reveals decreased breath sounds. Examination of the left-lower field reveals decreased breath sounds. Decreased breath sounds present.      Comments: Dyspnea with minimal activity.  Oxygen saturations remained 89 to 92% on 2 L.  Abdominal:      General: Abdomen is flat.      Palpations: Abdomen is soft.   Genitourinary:     Comments: Voiding per urinal, no scrotal edema present  Musculoskeletal:         General: Normal range of motion.      Cervical back: Normal range of motion.   Skin:     General: Skin is warm.      Capillary Refill: Capillary refill takes less than 2 seconds.      Coloration: Skin is pale.   2 plus edema around ankles  Neurological:      General: No focal deficit present.      Mental Status:  He is alert.   Psychiatric:         Mood and Affect: Mood normal.         Behavior: Behavior normal.         Thought Content: Thought content normal.         Judgment: Judgment normal.       Results Review:  I have reviewed the labs, radiology results, and diagnostic studies.    Laboratory Data:   Results from last 7 days   Lab Units 07/28/24  0330 07/27/24  0156 07/26/24  0348   WBC 10*3/mm3 8.45 9.07 8.88   HEMOGLOBIN g/dL 11.0* 11.3* 11.1*   HEMATOCRIT % 39.8 40.9 39.4   PLATELETS 10*3/mm3 267 235 211        Results from last 7 days   Lab Units 07/28/24  0330 07/27/24  1410 07/27/24  0156 07/26/24  0348 07/24/24  0453 07/23/24  1334 07/22/24  1704 07/22/24  1632   SODIUM mmol/L 139  --  138 138   < > 138  --  141   SODIUM, ARTERIAL   --   --   --   --   --   --    < >  --    POTASSIUM mmol/L 4.0 4.0 3.6 3.7   < > 3.5  --  3.6   CHLORIDE mmol/L 99  --  99 98   < > 100  --  104   CO2 mmol/L 33.0*  --  32.0* 32.0*   < > 25.0  --  26.0   BUN mg/dL 17  --  17 23   < > 19  --  21   CREATININE mg/dL 1.29*  --  1.24 1.44*   < > 1.38*  --  1.50*   CALCIUM mg/dL 8.6  --  8.1* 7.9*   < > 8.8  --  8.6   BILIRUBIN mg/dL  --   --   --   --   --  0.4  --  0.3   ALK PHOS U/L  --   --   --   --   --  64  --  65   ALT (SGPT) U/L  --   --   --   --   --  28  --  28   AST (SGOT) U/L  --   --   --   --   --  29  --  25   GLUCOSE mg/dL 84  --  102* 96   < > 117*  --  112*    < > = values in this interval not displayed.       Culture Data:   Blood Culture   Date Value Ref Range Status   07/22/2024 No growth at 5 days  Final   07/22/2024 No growth at 5 days  Final       Radiology Data:   Imaging Results (Last 24 Hours)       ** No results found for the last 24 hours. **            I have reviewed the patient's current medications.     Assessment/Plan   Assessment  Active Hospital Problems    Diagnosis     **Atrial fibrillation with RVR     Severe mitral regurgitation     Acute systolic CHF (congestive heart failure)     Iron deficiency  anemia     NICM (nonischemic cardiomyopathy), viral etiology     Tetrahydrocannabinol (THC) dependence     Acute pulmonary edema     CKD (chronic kidney disease) stage 2, GFR 60-89 ml/min     Class 1 obesity due to excess calories with serious comorbidity and body mass index (BMI) of 30.0 to 30.9 in adult        Treatment Plan  1.  Atrial fibrillation with rapid ventricular response/atrial flutter  Due to patient's continuous hypotension, Cardizem was discontinued, digoxin 250 mcg IV x 1 given with p.o. 1.25 mcg   Coreg discontinued.   Lopressor 50 mg PO q6h per EP  Lovenox  continues due to the possibility of angiography during hospitalization.,  Eliquis will be initiated at discharge.    EP consult in place> plan for cardioversion tomorrow  Continue cardiac telemetry,   every 4 vital signs and notify provider of any worsening or increasing rates.    [id number] score elevated     2.  Iron deficiency anemia-due to patient's complaints of extreme lethargy and previous anemia.  Iron profile revealed need for infusion, patient has completed 3 doses of ferric gluconate.       3.  Nonischemic cardiomyopathy, viral etiology/acute pulmonary edema/acute systolic congestive heart failure-due to patient's increasing creatinine, diuretics Bumex 1 mg IV every 12 hours.  As stated above patient has been hypotensive and not tolerating rate or rhythm medications.  Dr. Mathew initiated Entresto 7/25/2020, due to persistent hypotension continues to be held.    Reinitiated digoxin and change beta-blocker to Lopressor, continue to monitor for tolerance.    Entresto and Aldactone on hold due to hypotension and decreasing kidney function.  ReDs vest evaluation       4.  THC dependence-patient was educated on the possibility of nausea due to withdrawals. Patient continues to have no complaints of  withdrawal symptoms, Zofran as needed for nausea.     5.  CKD stage II-creatinine clearance 61.1, baseline creatinine around 1.5, creatinine  continues to trend around 1.1-1.4.  1.4     6.  Obesity-dietitian consult for help with cardiac diet.  Low fat and low salt diet initiated.     7.  Severe mitral valve regurgitation-echocardiogram 7/23/2024 revealed severe mitral valve regurgitation.  LAUREN confirmed severe mitral valve regurgitation and moderate LV dysfunction.  CT surgery continues to follow however plans remain to be worked up for outpatient MVR/MAZE procedure.cardiology continuing to follow, Eliquis remains on hold until definitive plan for outpatient angiography is established.       VTE prophylaxis with Lovenox     Medical Decision Making  Atrial fibrillation, acute, moderate complexity, improving  Iron deficiency anemia, acute on chronic, moderate complexity, unchanged  Nonischemic cardiomyopathy, acute on chronic, moderate complexity, unchanged  THC dependence, chronic, low complexity, stable  CKD stage II, chronic, moderate complexity, stable  Obesity class I, chronic, moderate complexity, unchanged  Severe mitral valve regurgitation, acute, high complexity, unchanged  Acute systolic congestive heart failure, acute, high complexity, improving  Acute pulmonary edema, acute, high complexity, improving  Number and Complexity of problems: 9  Differential Diagnosis: None     Conditions and Status        Condition is unchanged.     [facility] Data  External documents reviewed: None  Cardiac tracing (EKG, telemetry) interpretation: Overnight telemetry AFL    Radiology interpretation: See above  Labs reviewed: See above  Any tests that were considered but not ordered: None     Decision rules/scores evaluated (example [id number], Wells, etc): [id number] score high     Discussed with: Patient  Care Planning  Shared decision making: Patient  code status and discussions: Full code per patient  Surrogate Decision Maker his brother Guy Wilson  Disposition  Social Determinants of Health that impact treatment or disposition: None at this time  I expect  the patient to be discharged to home in 2-3 days.        Electronically signed by Jhon Chowdhury MD, 07/28/24, 14:37 CDT.

## 2024-07-28 NOTE — PLAN OF CARE
Problem: Adult Inpatient Plan of Care  Goal: Plan of Care Review  7/28/2024 0656 by Pia Howard RN  Outcome: Ongoing, Progressing  Flowsheets (Taken 7/28/2024 0656)  Progress: no change  Plan of Care Reviewed With: patient  Outcome Evaluation: AFL HR:  on tele. Frequent cough still noted. PRN Cough medicine given as ordered. Pt screened sepsis positive this shift due to cough, hypoxia on room air, HR>90, and RR=24. MD notified, no new orders. Pt reports he only had a very small BM yesterday, giving PRN meds as ordered. Pt had no complaints of pain until this morning. Pt rested well this shift. Pt c/o headache this AM, PRN PO medicine given as ordered with results pending. Pt c/o nausea this AM, PRN IV Zofran given as ordered with results pending. IV Bumex given as ordered. Safety maintained.

## 2024-07-28 NOTE — PROGRESS NOTES
"EP Problems:  1.  Persistent atrial fibrillation  2.  Severe biatrial enlargement     Cardiology Problems:  1.  Severe MR  2.  Nonischemic cardiomyopathy  3.  Chronic systolic heart failure  4.  Hypertension  5.  Hyperlipidemia     Medical Problems:  1.  Obesity  2.  Obstructive sleep apnea  3.  Chronic normocytic anemia    Patient ID:  Ziyad Wilson is a 59 y.o. male with problem list as above as above who EP is following for persistent atrial fibrillation.    Subjective:  Still having intermittent RVR.    Objective:  /73 (BP Location: Left arm, Patient Position: Lying)   Pulse 92   Temp 98.3 °F (36.8 °C) (Oral)   Resp 22   Ht 170.2 cm (67\")   Wt 95.1 kg (209 lb 9.6 oz)   SpO2 94%   BMI 32.83 kg/m²     Chronically ill-appearing, obese, wearing oxygen, mildly increased work of breathing  Bibasilar crackles  Tachycardic, irregular, apical murmur  Trace lower extremity edema      Labs today:  Lab Results   Component Value Date    GLUCOSE 84 07/28/2024    CALCIUM 8.6 07/28/2024     07/28/2024    K 4.0 07/28/2024    CO2 33.0 (H) 07/28/2024    BUN 17 07/28/2024    CREATININE 1.29 (H) 07/28/2024    EGFR 63.5 07/28/2024     Lab Results   Component Value Date    WBC 8.45 07/28/2024    HGB 11.0 (L) 07/28/2024    HCT 39.8 07/28/2024     07/28/2024     Telemetry directly visualized independently reviewed: Remains in atrial fibrillation, with RVR, heart rates predominantly ~110    Assessment:  Persistent atrial fibrillation with acute exacerbation, new diagnosis    Plan:  -Continue metoprolol for now for rate control  -Plan for cardioversion tomorrow  -N.p.o. at midnight  -Continue anticoagulation given elevated [id number]    Part of this note may be an electronic transcription/translation of spoken language to printed text using the Dragon Dictation System.    "

## 2024-07-29 ENCOUNTER — ANESTHESIA EVENT (OUTPATIENT)
Dept: CARDIOLOGY | Facility: HOSPITAL | Age: 59
End: 2024-07-29
Payer: MEDICARE

## 2024-07-29 ENCOUNTER — ANESTHESIA (OUTPATIENT)
Dept: CARDIOLOGY | Facility: HOSPITAL | Age: 59
End: 2024-07-29
Payer: MEDICARE

## 2024-07-29 ENCOUNTER — APPOINTMENT (OUTPATIENT)
Dept: CARDIOLOGY | Facility: HOSPITAL | Age: 59
End: 2024-07-29
Payer: MEDICARE

## 2024-07-29 VITALS — OXYGEN SATURATION: 93 % | SYSTOLIC BLOOD PRESSURE: 151 MMHG | HEART RATE: 82 BPM | DIASTOLIC BLOOD PRESSURE: 100 MMHG

## 2024-07-29 LAB
ANION GAP SERPL CALCULATED.3IONS-SCNC: 5 MMOL/L (ref 5–15)
BUN SERPL-MCNC: 16 MG/DL (ref 8–23)
BUN/CREAT SERPL: 12.3 (ref 7–25)
CALCIUM SPEC-SCNC: 8.9 MG/DL (ref 8.6–10.5)
CHLORIDE SERPL-SCNC: 97 MMOL/L (ref 98–107)
CO2 SERPL-SCNC: 35 MMOL/L (ref 22–29)
CREAT SERPL-MCNC: 1.3 MG/DL (ref 0.76–1.27)
DEPRECATED RDW RBC AUTO: 51.2 FL (ref 37–54)
EGFRCR SERPLBLD CKD-EPI 2021: 62.9 ML/MIN/1.73
ERYTHROCYTE [DISTWIDTH] IN BLOOD BY AUTOMATED COUNT: 20.4 % (ref 12.3–15.4)
GLUCOSE SERPL-MCNC: 94 MG/DL (ref 65–99)
HCT VFR BLD AUTO: 39.1 % (ref 37.5–51)
HGB BLD-MCNC: 11 G/DL (ref 13–17.7)
MAGNESIUM SERPL-MCNC: 1.7 MG/DL (ref 1.6–2.4)
MCH RBC QN AUTO: 23.2 PG (ref 26.6–33)
MCHC RBC AUTO-ENTMCNC: 28.1 G/DL (ref 31.5–35.7)
MCV RBC AUTO: 82.5 FL (ref 79–97)
PLATELET # BLD AUTO: 236 10*3/MM3 (ref 140–450)
PMV BLD AUTO: 10.4 FL (ref 6–12)
POTASSIUM SERPL-SCNC: 3.7 MMOL/L (ref 3.5–5.2)
RBC # BLD AUTO: 4.74 10*6/MM3 (ref 4.14–5.8)
SODIUM SERPL-SCNC: 137 MMOL/L (ref 136–145)
WBC NRBC COR # BLD AUTO: 8.99 10*3/MM3 (ref 3.4–10.8)

## 2024-07-29 PROCEDURE — 25010000002 ENOXAPARIN PER 10 MG: Performed by: STUDENT IN AN ORGANIZED HEALTH CARE EDUCATION/TRAINING PROGRAM

## 2024-07-29 PROCEDURE — 93010 ELECTROCARDIOGRAM REPORT: CPT | Performed by: HOSPITALIST

## 2024-07-29 PROCEDURE — 92960 CARDIOVERSION ELECTRIC EXT: CPT

## 2024-07-29 PROCEDURE — 5A2204Z RESTORATION OF CARDIAC RHYTHM, SINGLE: ICD-10-PCS | Performed by: STUDENT IN AN ORGANIZED HEALTH CARE EDUCATION/TRAINING PROGRAM

## 2024-07-29 PROCEDURE — 99232 SBSQ HOSP IP/OBS MODERATE 35: CPT | Performed by: NURSE PRACTITIONER

## 2024-07-29 PROCEDURE — 97162 PT EVAL MOD COMPLEX 30 MIN: CPT

## 2024-07-29 PROCEDURE — 80048 BASIC METABOLIC PNL TOTAL CA: CPT | Performed by: INTERNAL MEDICINE

## 2024-07-29 PROCEDURE — 25010000002 PROPOFOL 10 MG/ML EMULSION: Performed by: NURSE ANESTHETIST, CERTIFIED REGISTERED

## 2024-07-29 PROCEDURE — 25010000002 BUMETANIDE PER 0.5 MG: Performed by: NURSE PRACTITIONER

## 2024-07-29 PROCEDURE — 25010000002 BUMETANIDE PER 0.5 MG: Performed by: STUDENT IN AN ORGANIZED HEALTH CARE EDUCATION/TRAINING PROGRAM

## 2024-07-29 PROCEDURE — 85027 COMPLETE CBC AUTOMATED: CPT | Performed by: INTERNAL MEDICINE

## 2024-07-29 PROCEDURE — 92960 CARDIOVERSION ELECTRIC EXT: CPT | Performed by: STUDENT IN AN ORGANIZED HEALTH CARE EDUCATION/TRAINING PROGRAM

## 2024-07-29 PROCEDURE — 93005 ELECTROCARDIOGRAM TRACING: CPT | Performed by: FAMILY MEDICINE

## 2024-07-29 PROCEDURE — 25010000002 ENOXAPARIN PER 10 MG: Performed by: INTERNAL MEDICINE

## 2024-07-29 PROCEDURE — 94664 DEMO&/EVAL PT USE INHALER: CPT

## 2024-07-29 PROCEDURE — 94799 UNLISTED PULMONARY SVC/PX: CPT

## 2024-07-29 PROCEDURE — 25010000002 ONDANSETRON PER 1 MG: Performed by: STUDENT IN AN ORGANIZED HEALTH CARE EDUCATION/TRAINING PROGRAM

## 2024-07-29 PROCEDURE — 94761 N-INVAS EAR/PLS OXIMETRY MLT: CPT

## 2024-07-29 PROCEDURE — 99222 1ST HOSP IP/OBS MODERATE 55: CPT | Performed by: STUDENT IN AN ORGANIZED HEALTH CARE EDUCATION/TRAINING PROGRAM

## 2024-07-29 PROCEDURE — 36415 COLL VENOUS BLD VENIPUNCTURE: CPT | Performed by: INTERNAL MEDICINE

## 2024-07-29 PROCEDURE — 83735 ASSAY OF MAGNESIUM: CPT | Performed by: STUDENT IN AN ORGANIZED HEALTH CARE EDUCATION/TRAINING PROGRAM

## 2024-07-29 PROCEDURE — 93005 ELECTROCARDIOGRAM TRACING: CPT | Performed by: STUDENT IN AN ORGANIZED HEALTH CARE EDUCATION/TRAINING PROGRAM

## 2024-07-29 RX ORDER — LIDOCAINE HYDROCHLORIDE 20 MG/ML
INJECTION, SOLUTION EPIDURAL; INFILTRATION; INTRACAUDAL; PERINEURAL AS NEEDED
Status: DISCONTINUED | OUTPATIENT
Start: 2024-07-29 | End: 2024-07-29 | Stop reason: SURG

## 2024-07-29 RX ORDER — LEVOTHYROXINE SODIUM 0.1 MG/1
100 TABLET ORAL DAILY
Status: DISCONTINUED | OUTPATIENT
Start: 2024-07-30 | End: 2024-08-02 | Stop reason: HOSPADM

## 2024-07-29 RX ORDER — AMIODARONE HYDROCHLORIDE 200 MG/1
400 TABLET ORAL EVERY 12 HOURS SCHEDULED
Status: DISCONTINUED | OUTPATIENT
Start: 2024-07-29 | End: 2024-08-02 | Stop reason: HOSPADM

## 2024-07-29 RX ORDER — METOLAZONE 5 MG/1
5 TABLET ORAL ONCE
Status: COMPLETED | OUTPATIENT
Start: 2024-07-29 | End: 2024-07-29

## 2024-07-29 RX ORDER — PROPOFOL 10 MG/ML
VIAL (ML) INTRAVENOUS AS NEEDED
Status: DISCONTINUED | OUTPATIENT
Start: 2024-07-29 | End: 2024-07-29 | Stop reason: SURG

## 2024-07-29 RX ORDER — METOPROLOL TARTRATE 100 MG/1
100 TABLET ORAL EVERY 6 HOURS
Status: DISCONTINUED | OUTPATIENT
Start: 2024-07-29 | End: 2024-07-30

## 2024-07-29 RX ADMIN — METOPROLOL TARTRATE 50 MG: 50 TABLET, FILM COATED ORAL at 01:51

## 2024-07-29 RX ADMIN — LEVOTHYROXINE SODIUM 75 MCG: 0.07 TABLET ORAL at 05:34

## 2024-07-29 RX ADMIN — PAROXETINE 20 MG: 20 TABLET, FILM COATED ORAL at 10:41

## 2024-07-29 RX ADMIN — ATORVASTATIN CALCIUM 40 MG: 40 TABLET ORAL at 20:37

## 2024-07-29 RX ADMIN — IPRATROPIUM BROMIDE AND ALBUTEROL SULFATE 3 ML: .5; 3 SOLUTION RESPIRATORY (INHALATION) at 14:49

## 2024-07-29 RX ADMIN — POTASSIUM CHLORIDE 10 MEQ: 750 CAPSULE, EXTENDED RELEASE ORAL at 10:41

## 2024-07-29 RX ADMIN — METOPROLOL TARTRATE 50 MG: 50 TABLET, FILM COATED ORAL at 05:34

## 2024-07-29 RX ADMIN — QUETIAPINE FUMARATE 100 MG: 100 TABLET ORAL at 20:37

## 2024-07-29 RX ADMIN — Medication 10 ML: at 10:43

## 2024-07-29 RX ADMIN — GUAIFENESIN 200 MG: 200 SOLUTION ORAL at 21:22

## 2024-07-29 RX ADMIN — AMITRIPTYLINE HYDROCHLORIDE 25 MG: 25 TABLET, FILM COATED ORAL at 20:37

## 2024-07-29 RX ADMIN — LIDOCAINE HYDROCHLORIDE 50 MG: 20 INJECTION, SOLUTION EPIDURAL; INFILTRATION; INTRACAUDAL; PERINEURAL at 08:29

## 2024-07-29 RX ADMIN — BUPROPION HYDROCHLORIDE 300 MG: 150 TABLET, EXTENDED RELEASE ORAL at 10:42

## 2024-07-29 RX ADMIN — ENOXAPARIN SODIUM 90 MG: 100 INJECTION SUBCUTANEOUS at 17:57

## 2024-07-29 RX ADMIN — BUDESONIDE AND FORMOTEROL FUMARATE DIHYDRATE 1 PUFF: 160; 4.5 AEROSOL RESPIRATORY (INHALATION) at 05:51

## 2024-07-29 RX ADMIN — PROPOFOL 70 MG: 10 INJECTION, EMULSION INTRAVENOUS at 08:29

## 2024-07-29 RX ADMIN — BUMETANIDE 1 MG: 0.25 INJECTION INTRAMUSCULAR; INTRAVENOUS at 17:57

## 2024-07-29 RX ADMIN — BUMETANIDE 1 MG: 0.25 INJECTION INTRAMUSCULAR; INTRAVENOUS at 05:34

## 2024-07-29 RX ADMIN — Medication 10 ML: at 20:40

## 2024-07-29 RX ADMIN — FLUTICASONE PROPIONATE 2 SPRAY: 50 SPRAY, METERED NASAL at 10:41

## 2024-07-29 RX ADMIN — IPRATROPIUM BROMIDE AND ALBUTEROL SULFATE 3 ML: .5; 3 SOLUTION RESPIRATORY (INHALATION) at 05:51

## 2024-07-29 RX ADMIN — BUDESONIDE AND FORMOTEROL FUMARATE DIHYDRATE 1 PUFF: 160; 4.5 AEROSOL RESPIRATORY (INHALATION) at 18:45

## 2024-07-29 RX ADMIN — ONDANSETRON 4 MG: 2 INJECTION INTRAMUSCULAR; INTRAVENOUS at 20:37

## 2024-07-29 RX ADMIN — METOPROLOL TARTRATE 100 MG: 100 TABLET, FILM COATED ORAL at 17:57

## 2024-07-29 RX ADMIN — PANTOPRAZOLE SODIUM 40 MG: 40 TABLET, DELAYED RELEASE ORAL at 05:34

## 2024-07-29 RX ADMIN — IPRATROPIUM BROMIDE AND ALBUTEROL SULFATE 3 ML: .5; 3 SOLUTION RESPIRATORY (INHALATION) at 18:45

## 2024-07-29 RX ADMIN — METOPROLOL TARTRATE 50 MG: 50 TABLET, FILM COATED ORAL at 11:07

## 2024-07-29 RX ADMIN — AMIODARONE HYDROCHLORIDE 400 MG: 200 TABLET ORAL at 20:37

## 2024-07-29 RX ADMIN — ENOXAPARIN SODIUM 90 MG: 100 INJECTION SUBCUTANEOUS at 01:51

## 2024-07-29 RX ADMIN — AMIODARONE HYDROCHLORIDE 400 MG: 200 TABLET ORAL at 11:07

## 2024-07-29 RX ADMIN — Medication 1 TABLET: at 10:42

## 2024-07-29 RX ADMIN — METOLAZONE 5 MG: 5 TABLET ORAL at 13:00

## 2024-07-29 RX ADMIN — ACETAMINOPHEN 650 MG: 325 TABLET, FILM COATED ORAL at 20:37

## 2024-07-29 NOTE — ANESTHESIA POSTPROCEDURE EVALUATION
"Patient: Ziyad Wilson    Procedure Summary       Date: 07/29/24 Room / Location: [facility] CATH LAB    Anesthesia Start: 0822 Anesthesia Stop: 0835    Procedure: CARDIOVERSION EXTERNAL IN CARDIOLOGY DEPARTMENT Diagnosis: (Persistent atrial fibrillation)    Scheduled Providers: Jem Begum MD Provider: Otilio Zacarias CRNA    Anesthesia Type: MAC ASA Status: 3            Anesthesia Type: MAC    Vitals  Vitals Value Taken Time   /100 07/29/24 0833   Temp     Pulse 82 07/29/24 0835   Resp 0 07/29/24 0835   SpO2 93 % 07/29/24 0835           Post Anesthesia Care and Evaluation    Patient location during evaluation: PACU  Patient participation: complete - patient participated  Level of consciousness: awake and alert  Pain management: adequate    Airway patency: patent  Anesthetic complications: No anesthetic complications    Cardiovascular status: acceptable  Respiratory status: acceptable  Hydration status: acceptable    Comments: Blood pressure 133/95, pulse 101, temperature 98.4 °F (36.9 °C), temperature source Oral, resp. rate 22, height 170.2 cm (67\"), weight 95.1 kg (209 lb 9.6 oz), SpO2 95%.    Pt discharged from PACU based on santo score >8    "

## 2024-07-29 NOTE — PLAN OF CARE
Goal Outcome Evaluation:           Progress: no change  Outcome Evaluation: Patient oriented x4 with VSS. C/o headache, nausea, and cough (see MAR). Patient has been resting during the shift and has been NPO since midnight.

## 2024-07-29 NOTE — PLAN OF CARE
Goal Outcome Evaluation:  Plan of Care Reviewed With: patient, sibling        Progress: no change  Outcome Evaluation: PT eval complete. Pt alert and oriented x4. Pt found in semi-reclined position and agreeable to therapy with sister at bed side. Pt reports being ind prior to this and able to ambulate short and long distances at home and in community without AD. Pt reports living with brother. Pt ind for sup<>sit and MMT found to be 5/5 in BLE and 5/5 in BUE. Pt O2 at rest in high 80s and upon sitting up O2 in low 90s with v/c for breathing to increase O2. Pt on 2L at rest and 3L for activity per nsg report. Pt able to ambulate 230 ft without AD. Pt only limitation noted with gait at this time is SOB with increasing distance. Pt also reports heaviness in legs with standing and walking. Pt able to recover breathing upon rest into mid 90s. A-fib noted 90s-110 throughout session. Pt left in semi-reclined position with all needs met. Pt would benfit from skilled PT to adress deficitis in endurance with gait, functional strength, and stair training. Anticipate d/c home with assist.      Anticipated Discharge Disposition (PT): home with assist

## 2024-07-29 NOTE — THERAPY EVALUATION
Patient Name: Ziyad Wilson  : 1965    MRN: [id number]                              Today's Date: 2024       Admit Date: 2024    Visit Dx:     ICD-10-CM ICD-9-CM   1. Atrial fibrillation with RVR  I48.91 427.31   2. Acute systolic CHF (congestive heart failure)  I50.21 428.21     428.0   3. Severe mitral regurgitation  I34.0 424.0     Patient Active Problem List   Diagnosis    Essential hypertension    Coronary artery disease    Acquired hypothyroidism    Chronic daily headache    Class 1 obesity due to excess calories with serious comorbidity and body mass index (BMI) of 30.0 to 30.9 in adult    Mild cognitive impairment    Asthma    GERD (gastroesophageal reflux disease)    Sleep apnea    Mixed hyperlipidemia    Moderate episode of recurrent major depressive disorder    Primary insomnia    Cervical facet joint syndrome    Impaired glucose tolerance    CKD (chronic kidney disease) stage 2, GFR 60-89 ml/min    Arthritis of right shoulder region    Chest pain, atypical    Atrial fibrillation with RVR    Iron deficiency anemia    NICM (nonischemic cardiomyopathy), viral etiology    Tetrahydrocannabinol (THC) dependence    Acute pulmonary edema    Severe mitral regurgitation    Acute systolic CHF (congestive heart failure)     Past Medical History:   Diagnosis Date    Asthma     Cardiomyopathy     non-ischemic    CHF (congestive heart failure)     Coronary artery disease     Foot pain, bilateral     GERD (gastroesophageal reflux disease)     Headache     Hyperlipemia, mixed     Hypertension     benign    Hypothyroidism     Obesity     Sleep apnea     SOB (shortness of breath)      Past Surgical History:   Procedure Laterality Date    APPENDECTOMY      BLADDER REPAIR      CARDIAC CATHETERIZATION  2003    COLONOSCOPY N/A 2018    Procedure: COLONOSCOPY WITH ANESTHESIA;  Surgeon: Dick Espinosa DO;  Location: [facility] ENDOSCOPY;  Service:     COLOSTOMY      with colostomy reversal    KNEE SURGERY  Right       General Information       Row Name 07/29/24 1445          Physical Therapy Time and Intention    Document Type evaluation  CC: SOB on exertion and rest; Dx: a-fib w/ RVR, 7/29/24 successful cardioversion but back into a-fib later in the day  -KAED (r) CN (t) KADE (c)     Mode of Treatment physical therapy  -KADE (r) CN (t) KADE (c)       Row Name 07/29/24 1445          General Information    Patient Profile Reviewed yes  -KADE (r) CN (t) KADE (c)     Prior Level of Function independent:;feeding;grooming;dressing;bathing;home management;all household mobility;community mobility;driving;bed mobility;ADL's  -KADE (r) CN (t) KADE (c)     Existing Precautions/Restrictions fall;oxygen therapy device and L/min  -KADE (r) CN (t) KADE (c)     Barriers to Rehab medically complex  -KADE (r) CN (t) KADE (c)       Row Name 07/29/24 1445          Living Environment    People in Home sibling(s)  -KADE (r) CN (t) KADE (c)       Row Name 07/29/24 1445          Home Main Entrance    Number of Stairs, Main Entrance four  -KADE (r) CN (t) KADE (c)     Stair Railings, Main Entrance none  -KADE (r) CN (t) KADE (c)       Row Name 07/29/24 1445          Stairs Within Home, Primary    Number of Stairs, Within Home, Primary none  -KADE (r) CN (t) KADE (c)       Row Name 07/29/24 1445          Cognition    Orientation Status (Cognition) oriented x 4  -KADE (r) CN (t) KADE (c)       Row Name 07/29/24 1445          Safety Issues, Functional Mobility    Impairments Affecting Function (Mobility) balance;endurance/activity tolerance;shortness of breath  -KADE (r) CN (t) KADE (c)               User Key  (r) = Recorded By, (t) = Taken By, (c) = Cosigned By      Initials Name Provider Type    Otto Mcclure, PT DPT Physical Therapist    Nuha Cruz, PT Student PT Student                   Mobility       Row Name 07/29/24 1445          Bed Mobility    Bed Mobility supine-sit;sit-supine  -KADE (r) CN (t) KADE (c)     Supine-Sit [place] (Bed Mobility) independent  -KADE  (r) CN (t) KADE (c)     Sit-Supine [place] (Bed Mobility) independent  -KADE (r) CN (t) KADE (c)     Assistive Device (Bed Mobility) head of bed elevated;bed rails  -KDAE (r) CN (t) KADE (c)       Row Name 07/29/24 1445          Sit-Stand Transfer    Sit-Stand [place] (Transfers) standby assist  -KADE (r) CN (t) KADE (c)       Row Name 07/29/24 1445          Gait/Stairs (Locomotion)    [place] Level (Gait) supervision  -KADE (r) CN (t) KADE (c)     Distance in Feet (Gait) 230  -KADE (r) CN (t) KADE (c)     Deviations/Abnormal Patterns (Gait) base of support, wide;tia decreased;gait speed decreased  -KADE (r) CN (t) KADE (c)               User Key  (r) = Recorded By, (t) = Taken By, (c) = Cosigned By      Initials Name Provider Type    Otto Mcclure, PT DPT Physical Therapist    Nuha Cruz, PT Student PT Student                   Obj/Interventions       Row Name 07/29/24 1445          Range of Motion Comprehensive    General Range of Motion bilateral upper extremity ROM WFL;bilateral lower extremity ROM WFL  -KADE (r) CN (t) KADE (c)       Row Name 07/29/24 1445          Strength Comprehensive (MMT)    Comment, General Manual Muscle Testing (MMT) Assessment BLE and BUE MMT found to be 5/5  -KADE (r) CN (t) KADE (c)       Row Name 07/29/24 1445          Balance    Balance Assessment sitting static balance;sitting dynamic balance;standing static balance;standing dynamic balance  -KADE (r) CN (t) KADE (c)     Static Sitting Balance independent  -KADE (r) CN (t) KADE (c)     Dynamic Sitting Balance independent  -KADE (r) CN (t) KADE (c)     Position, Sitting Balance unsupported;sitting edge of bed  -KADE (r) CN (t) KADE (c)     Static Standing Balance standby assist  -KADE (r) CN (t) KADE (c)     Dynamic Standing Balance supervision  -KADE (r) CN (t) KADE (c)     Position/Device Used, Standing Balance unsupported  -KADE (r) CN (t) KADE (c)       Row Name 07/29/24 1445          Sensory Assessment (Somatosensory)    Sensory Assessment  (Somatosensory) LE sensation intact  -KADE (r) CN (t) KADE (c)               User Key  (r) = Recorded By, (t) = Taken By, (c) = Cosigned By      Initials Name Provider Type    Otto Mcclure, PT DPT Physical Therapist    Nuha Cruz, PT Student PT Student                   Goals/Plan       Row Name 07/29/24 1449          Gait Training Goal 1 (PT)    Activity/Assistive Device (Gait Training Goal 1, PT) gait (walking locomotion);decrease fall risk;improve balance and speed;increase endurance/gait distance;increase energy conservation  -KADE (r) CN (t) KADE (c)     [place] Level (Gait Training Goal 1, PT) independent  -KADE (r) CN (t) KADE (c)     Distance (Gait Training Goal 1, PT) 300  -KADE (r) CN (t) KADE (c)     Time Frame (Gait Training Goal 1, PT) long term goal (LTG);10 days  -KADE (r) CN (t) KADE (c)     Progress/Outcome (Gait Training Goal 1, PT) new goal  -KADE (r) CN (t) KADE (c)       Row Name 07/29/24 1441          Stairs Goal 1 (PT)    Activity/Assistive Device (Stairs Goal 1, PT) ascending stairs;descending stairs;using handrail, left;using handrail, right;decrease fall risk;improve balance and speed  -KADE (r) CN (t) KADE (c)     [place] Level/Cues Needed (Stairs Goal 1, PT) independent  -KADE (r) CN (t) KADE (c)     Number of Stairs (Stairs Goal 1, PT) 5  -KADE (r) CN (t) KADE (c)     Time Frame (Stairs Goal 1, PT) long term goal (LTG);10 days  -KADE (r) CN (t) KADE (c)     Progress/Outcome (Stairs Goal 1, PT) new goal  -KADE (r) CN (t) KADE (c)       Row Name 07/29/24 1443          Therapy Assessment/Plan (PT)    Planned Therapy Interventions (PT) balance training;gait training;home exercise program;strengthening;stair training;ROM (range of motion);patient/family education;neuromuscular re-education;transfer training;bed mobility training;postural re-education  -KADE               User Key  (r) = Recorded By, (t) = Taken By, (c) = Cosigned By      Initials Name Provider Type    Otto Mcclure PT DPT Physical  Therapist    Nuha Cruz, PT Student PT Student                   Clinical Impression       Row Name 07/29/24 1445          Pain    Pretreatment Pain Rating 0/10 - no pain  -KADE (r) CN (t) KADE (c)     Posttreatment Pain Rating 0/10 - no pain  -KADE (r) CN (t) KADE (c)       Row Name 07/29/24 1445          Plan of Care Review    Plan of Care Reviewed With patient;sibling  -KADE (r) CN (t) KADE (c)     Progress no change  -KADE (r) CN (t) KADE (c)     Outcome Evaluation PT eval complete. Pt alert and oriented x4. Pt found in semi-reclined position and agreeable to therapy with sister at bed side. Pt reports being ind prior to this and able to ambulate short and long distances at home and in community without AD. Pt reports living with brother. Pt ind for sup<>sit and MMT found to be 5/5 in BLE and 5/5 in BUE. Pt O2 at rest in high 80s and upon sitting up O2 in low 90s with v/c for breathing to increase O2. Pt on 2L at rest and 3L for activity per nsg report. Pt able to ambulate 230 ft without AD. Pt only limitation noted with gait at this time is SOB with increasing distance. Pt also reports heaviness in legs with standing and walking. Pt able to recover breathing upon rest into mid 90s. A-fib noted 90s-110 throughout session. Pt left in semi-reclined position with all needs met. Pt would benfit from skilled PT to adress deficitis in endurance with gait, functional strength, and stair training. Anticipate d/c home with assist.  -KADE (r) CN (t) KADE (c)       Row Name 07/29/24 1445          Therapy Assessment/Plan (PT)    Patient/Family Therapy Goals Statement (PT) go home  -KADE (r) CN (t) KADE (c)     Rehab Potential (PT) good, to achieve stated therapy goals  -KADE (r) CN (t) KADE (c)     Criteria for Skilled Interventions Met (PT) yes;skilled treatment is necessary  -KADE (r) CN (t) KADE (c)     Therapy Frequency (PT) 2 times/day  -KADE (r) CN (t) KADE (c)     Predicted Duration of Therapy Intervention (PT) until d/c  -KADE (r) MAYLIN (t) KADE  (c)       Row Name 07/29/24 1445          Positioning and Restraints    Pre-Treatment Position in bed  -KADE (r) CN (t) KADE (c)     Post Treatment Position bed  -KADE (r) CN (t) KADE (c)     In Bed call light within reach;encouraged to call for assist;side rails up x2;with family/caregiver  -KADE (r) CN (t) KADE (c)               User Key  (r) = Recorded By, (t) = Taken By, (c) = Cosigned By      Initials Name Provider Type    Otto Mcclure, PT DPT Physical Therapist    Nuha Cruz, PT Student PT Student                   Outcome Measures       Row Name 07/29/24 1445 07/29/24 0700       How much help from another person do you currently need...    Turning from your back to your side while in flat bed without using bedrails? 4  -KADE (r) CN (t) KADE (c) 4  -AG    Moving from lying on back to sitting on the side of a flat bed without bedrails? 4  -KADE (r) CN (t) KADE (c) 4  -AG    Moving to and from a bed to a chair (including a wheelchair)? 4  -KADE (r) CN (t) KADE (c) 4  -AG    Standing up from a chair using your arms (e.g., wheelchair, bedside chair)? 4  -KADE (r) CN (t) KADE (c) 4  -AG    Climbing 3-5 steps with a railing? 3  -KADE 3  -AG    To walk in hospital room? 3  -KADE 4  -AG    AM-PAC 6 Clicks Score (PT) 22  -KADE 23  -AG    Highest Level of Mobility Goal 7 --> Walk 25 feet or more  -KADE 7 --> Walk 25 feet or more  -AG      Row Name 07/29/24 1445          Functional Assessment    Outcome Measure Options AM-PAC 6 Clicks Basic Mobility (PT)  -KADE               User Key  (r) = Recorded By, (t) = Taken By, (c) = Cosigned By      Initials Name Provider Type    Otto Mcclure, PT DPT Physical Therapist    Nancy Andres RN Registered Nurse    Nuha Cruz, PT Student PT Student                                 Physical Therapy Education       Title: PT OT SLP Therapies (In Progress)       Topic: Physical Therapy (In Progress)       Point: Mobility training (Done)       Learning Progress Summary              Patient Acceptance, E,TB, VU,DU by CN at 7/29/2024 1447    Comment: Educated on role of PT in pt care.                         Point: Home exercise program (Not Started)       Learner Progress:  Not documented in this visit.              Point: Body mechanics (Not Started)       Learner Progress:  Not documented in this visit.              Point: Precautions (Not Started)       Learner Progress:  Not documented in this visit.                              User Key       Initials Effective Dates Name Provider Type Discipline    MAYLIN 06/24/24 -  Nuha Gutierrez, PT Student PT Student PT                  PT Recommendation and Plan     Plan of Care Reviewed With: patient, sibling  Progress: no change  Outcome Evaluation: PT eval complete. Pt alert and oriented x4. Pt found in semi-reclined position and agreeable to therapy with sister at bed side. Pt reports being ind prior to this and able to ambulate short and long distances at home and in community without AD. Pt reports living with brother. Pt ind for sup<>sit and MMT found to be 5/5 in BLE and 5/5 in BUE. Pt O2 at rest in high 80s and upon sitting up O2 in low 90s with v/c for breathing to increase O2. Pt on 2L at rest and 3L for activity per nsg report. Pt able to ambulate 230 ft without AD. Pt only limitation noted with gait at this time is SOB with increasing distance. Pt also reports heaviness in legs with standing and walking. Pt able to recover breathing upon rest into mid 90s. A-fib noted 90s-110 throughout session. Pt left in semi-reclined position with all needs met. Pt would benfit from skilled PT to adress deficitis in endurance with gait, functional strength, and stair training. Anticipate d/c home with assist.     Time Calculation:         PT Charges       Row Name 07/29/24 1445             Time Calculation    Start Time 1445 (P)   -CN      Stop Time 1525 (P)   -CN      Time Calculation (min) 40 min (P)   -CN      PT Received On 07/29/24 (P)   -CN       PT Goal Re-Cert Due Date 08/08/24 (P)   -MAYLIN                User Key  (r) = Recorded By, (t) = Taken By, (c) = Cosigned By      Initials Name Provider Type    Nuha Cruz, PT Student PT Student                      PT G-Codes  Outcome Measure Options: AM-PAC 6 Clicks Basic Mobility (PT)  AM-PAC 6 Clicks Score (PT): 22  PT Discharge Summary  Anticipated Discharge Disposition (PT): home with assist    Nuha Gutierrez, PT Student  7/29/2024

## 2024-07-29 NOTE — PROGRESS NOTES
[facility] -  Progress Note     LOS: 7 days   Patient Care Team:  Ashish Thurman DO as PCP - General (Internal Medicine)  Douglas Ruano MD as Consulting Physician (Pain Medicine)  Uri Lagos MD (Inactive) as Cardiologist (Cardiology)    Chief Complaint: CHF/AF follow up    Subjective     Interval History: The patient went down this morning for cardioversion and is back to the room.  He had successful cardioversion however went back in atrial fibrillation a short time later.  Rates are improved.  Oral amiodarone has been started by EP.  He reports feeling okay no chest pain or palpitations.  Ongoing shortness of breath but no worse than yesterday.  He denies any dysuria.  Weights are unchanged.    Tele: AF rates     Review of Systems:     Review of Systems   Constitutional:  Positive for activity change and fatigue. Negative for fever.   Respiratory:  Positive for shortness of breath. Negative for cough, chest tightness and wheezing.    Cardiovascular:  Negative for chest pain, palpitations and leg swelling.   Gastrointestinal:  Negative for abdominal pain, nausea and vomiting.   Genitourinary:  Negative for difficulty urinating.   Neurological:  Negative for dizziness, tremors, syncope, weakness and headaches.   Psychiatric/Behavioral:  Negative for agitation and confusion. The patient is not nervous/anxious.      Objective     Vital Sign Min/Max for last 24 hours  Temp  Min: 98.3 °F (36.8 °C)  Max: 98.7 °F (37.1 °C)   BP  Min: 99/78  Max: 151/100   Pulse  Min: 74  Max: 115   Resp  Min: 0  Max: 28   SpO2  Min: 92 %  Max: 100 %   No data recorded   Weight  Min: 95.1 kg (209 lb 9.6 oz)  Max: 95.1 kg (209 lb 9.6 oz)         07/29/24  0424   Weight: 95.1 kg (209 lb 9.6 oz)       Physical Exam:    Vitals reviewed.   Constitutional:       General: Awake.      Appearance: Well-developed, well-groomed and not in distress. Obese. Ill-appearing and acutely ill-appearing.       Interventions: Nasal cannula in place.   HENT:      Head: Normocephalic and atraumatic.   Pulmonary:      Effort: Pulmonary effort is normal.      Breath sounds: Decreased breath sounds present. Rales present.   Cardiovascular:      Normal rate. Irregularly irregular rhythm.      Murmurs: There is a systolic murmur.   Edema:     Peripheral edema absent.   Musculoskeletal:      Cervical back: Normal range of motion and neck supple. Skin:     General: Skin is warm and dry.   Neurological:      Mental Status: Alert, oriented to person, place, and time and oriented to person, place and time.   Psychiatric:         Attention and Perception: Attention normal.         Mood and Affect: Mood normal.         Speech: Speech normal.         Behavior: Behavior normal. Behavior is cooperative.         Thought Content: Thought content normal.         Cognition and Memory: Cognition and memory normal.         Judgment: Judgment normal.       Results Review:   Lab Results   Component Value Date    WBC 8.99 07/29/2024    HGB 11.0 (L) 07/29/2024    HCT 39.1 07/29/2024    MCV 82.5 07/29/2024     07/29/2024     Lab Results   Component Value Date    GLUCOSE 94 07/29/2024    CALCIUM 8.9 07/29/2024     07/29/2024    K 3.7 07/29/2024    CO2 35.0 (H) 07/29/2024    CL 97 (L) 07/29/2024    BUN 16 07/29/2024    CREATININE 1.30 (H) 07/29/2024    EGFR 62.9 07/29/2024    BCR 12.3 07/29/2024    ANIONGAP 5.0 07/29/2024       Medication Review: yes  Current Facility-Administered Medications   Medication Dose Route Frequency Provider Last Rate Last Admin    acetaminophen (TYLENOL) tablet 650 mg  650 mg Oral Q6H PRN Mihaela APRN   650 mg at 07/28/24 2212    albuterol (PROVENTIL) nebulizer solution 0.083% 2.5 mg/3mL  2.5 mg Nebulization Q6H PRN Jhon Chowdhury MD        amiodarone (PACERONE) tablet 400 mg  400 mg Oral Q12H Jem Begum MD        amitriptyline (ELAVIL) tablet 25 mg  25 mg Oral Nightly Jem Begum MD    25 mg at 07/28/24 2003    atorvastatin (LIPITOR) tablet 40 mg  40 mg Oral Nightly Remi Mathew MD   40 mg at 07/28/24 2003    benzonatate (TESSALON) capsule 100 mg  100 mg Oral TID PRN Remi Mathew MD   100 mg at 07/28/24 2212    sennosides-docusate (PERICOLACE) 8.6-50 MG per tablet 2 tablet  2 tablet Oral BID PRN Remi Mathew MD   2 tablet at 07/28/24 2003    And    polyethylene glycol (MIRALAX) packet 17 g  17 g Oral Daily PRN Remi Mathew MD   17 g at 07/28/24 0922    And    bisacodyl (DULCOLAX) EC tablet 5 mg  5 mg Oral Daily PRN Remi Mathew MD   5 mg at 07/28/24 0614    And    bisacodyl (DULCOLAX) suppository 10 mg  10 mg Rectal Daily PRN Remi Mathew MD        budesonide-formoterol (SYMBICORT) 160-4.5 MCG/ACT inhaler 1 puff  1 puff Inhalation BID - RT Remi Mathew MD   1 puff at 07/29/24 0551    bumetanide (BUMEX) injection 1 mg  1 mg Intravenous Q12H Barbara Pereira APRN   1 mg at 07/29/24 0534    buPROPion XL (WELLBUTRIN XL) 24 hr tablet 300 mg  300 mg Oral Daily Remi Mathew MD   300 mg at 07/29/24 1042    butalbital-acetaminophen-caffeine (FIORICET, ESGIC) -40 MG per tablet 1 tablet  1 tablet Oral Daily PRN Remi Mathew MD   1 tablet at 07/28/24 0641    calcium carbonate (TUMS) chewable tablet 500 mg (200 mg elemental)  1 tablet Oral TID PRN Jhon Chowdhury MD        Calcium Replacement - Follow Nurse / BPA Driven Protocol   Does not apply PRN Remi Mathew MD        cyclobenzaprine (FLEXERIL) tablet 5 mg  5 mg Oral TID PRN Remi Mathew MD        Enoxaparin Sodium (LOVENOX) syringe 90 mg  1 mg/kg Subcutaneous Q12H Remi Mathew MD   90 mg at 07/29/24 0151    fluticasone (FLONASE) 50 MCG/ACT nasal spray 2 spray  2 spray Nasal Daily Remi Mathew MD   2 spray at 07/29/24 1041    guaifenesin (ROBITUSSIN) 100 MG/5ML liquid 200 mg  200 mg Oral Q4H PRN Remi Mathew MD   200 mg at 07/27/24 2314    ipratropium-albuterol (DUO-NEB) nebulizer  solution 3 mL  3 mL Nebulization TID - RT Jhon Chowdhury MD   3 mL at 07/29/24 0551    [START ON 7/30/2024] levothyroxine (SYNTHROID, LEVOTHROID) tablet 100 mcg  100 mcg Oral Daily Adriano Rapp MD        Magnesium Standard Dose Replacement - Follow Nurse / BPA Driven Protocol   Does not apply PRN Remi Mathew MD        metoprolol tartrate (LOPRESSOR) tablet 50 mg  50 mg Oral Q6H Jem Begum MD   50 mg at 07/29/24 0534    multivitamin with minerals 1 tablet  1 tablet Oral Daily Remi Mathew MD   1 tablet at 07/29/24 1042    nitroglycerin (NITROSTAT) SL tablet 0.4 mg  0.4 mg Sublingual Q5 Min PRN Remi Mathew MD   0.4 mg at 07/23/24 0343    ondansetron (ZOFRAN) injection 4 mg  4 mg Intravenous Q6H PRN Remi Mathew MD   4 mg at 07/28/24 2212    pantoprazole (PROTONIX) EC tablet 40 mg  40 mg Oral Q AM Remi Mathew MD   40 mg at 07/29/24 0534    PARoxetine (PAXIL) tablet 20 mg  20 mg Oral Daily Jem Begum MD   20 mg at 07/29/24 1041    Phosphorus Replacement - Follow Nurse / BPA Driven Protocol   Does not apply PRN Remi Mathew MD        potassium chloride (MICRO-K/KLOR-CON) CR capsule  10 mEq Oral Daily Barbara Pereira APRN   10 mEq at 07/29/24 1041    Potassium Replacement - Follow Nurse / BPA Driven Protocol   Does not apply PRN Remi Mathew MD        QUEtiapine (SEROquel) tablet 100 mg  100 mg Oral Nightly Remi Mathew MD   100 mg at 07/28/24 2003    [Held by provider] sacubitril-valsartan (ENTRESTO) 24-26 MG tablet 1 tablet  1 tablet Oral Q12H Mihaela Otero APRN   1 tablet at 07/25/24 2206    sodium chloride 0.9 % flush 10 mL  10 mL Intravenous PRN Remi Mathew MD        sodium chloride 0.9 % flush 10 mL  10 mL Intravenous Q12H Remi Mathew MD   10 mL at 07/29/24 1043    sodium chloride 0.9 % infusion 40 mL  40 mL Intravenous PRN Remi Mathew MD        [Held by provider] spironolactone (ALDACTONE) tablet 25 mg  25 mg Oral Daily Remi Mathew MD    25 mg at 07/25/24 1209    temazepam (RESTORIL) capsule 15 mg  15 mg Oral Nightly PRN Jhon Chowdhury MD         Results for orders placed during the hospital encounter of 07/22/24    Adult Transesophageal Echo (LAUREN) W/ Cont if Necessary Per Protocol    Interpretation Summary    Left ventricular systolic function is moderately decreased.    The left ventricular cavity is mild to moderately dilated.    The left atrial cavity is severely dilated.    The right atrial cavity is dilated.    Severe mitral valve regurgitation is present. Pulmonary vein flow reversal is present.      Interpretation Summary  Transthoracic echocardiogram 7/23/2024 (Dr. Mathew)       Left ventricular systolic function is moderately decreased. Left ventricular ejection fraction appears to be 36 - 40%.    The left ventricular cavity is mildly dilated.    The left atrial cavity is severely dilated.    Normal right ventricular cavity size and systolic function noted.    Severe mitral valve regurgitation is present.    Estimated right ventricular systolic pressure from tricuspid regurgitation is moderately elevated (45-55 mmHg).    Assessment & Plan     1.  Acute on chronic combined systolic and diastolic congestive heart failure: Has been managed for atrial fibrillation with rapid ventricular response this admission, left ventricular ejection fraction found to be 36 to 40% as well as component of valvular heart disease with severe MR.  History of cardiomyopathy.  Old notes indicate nonischemic cardiomyopathy.  Initially managed on bumetanide continuous drip.  Intermittent dosing of IV Bumex was initiated however discontinued. Had been on a high dose of carvedilol (50 mg BID) however transitioned to Lopressor for due to hypotension.  Also had been on Entresto and spironolactone however those are currently on hold.  Standing weights had been unchanged of recent. He has diuresed with a net negative fluid balance based off of documentation.  Remains on supplemental oxygen. Chest x ray 7/25/24 did not show improvement of his volume status.  After holding diuretics renal function has improved however the patient became significantly short of breath and IV diuretics were resumed.  Continue Bumex 1 mg twice daily.  Potassium 10 mEq daily.  Monitor renal function and electrolytes. Give dose of Metolazone today.     2.  Atrial fibrillation with rapid ventricular response: Initially was being managed on Cardizem.  It was recommended to wean Cardizem for other rate controlling medications given findings of depressed left ventricular ejection fraction.  Given hypotension the patient has received digoxin.  Now on Lopressor to titrate as tolerated with plans to transition to guideline directed medical therapy in future.  Currently anticoagulated with Lovenox. EP has been consulted.  Adjustments were made to beta-blocker therapy.  He was n.p.o. for cardioversion this morning.  This was successful however the patient had recurrent atrial fibrillation shortly thereafter.  He has been started on antiarrhythmic therapy with amiodarone.      3.  Acute kidney injury: has improved during hospital stay - Continue to monitor renal function closely due to need for IV diuretics. Stable Cr today.    4.  Severe MR: Functional. Noted initially on echo, has also had LAUREN. CTS following. Recommend optimization medically with diuresis. CTS plans to follow up in the outpatient setting to discuss timing/need for surgical intervention. He will also need coronary angiography at some point.     5.  Coronary artery disease: Documentation indicates the patient has a history of coronary artery disease but no specific test results or workup to indicate extent of findings or what diagnostic testing was performed.  Previously followed in the office for what was documented to be nonischemic cardiomyopathy. CT Images reviewed by Dr. Noel reveal coronary artery calcification. Defer angiogram at  this time as he is optimized for CHF and management of AF.     6.  Hypertension: has had low BP with diuretics and med changes earlier this admission. Ideally, Entresto would be added in future for CHF management. We have held this as well as aldactone to allow for more aggressive diuretic and rate control management.     7.  Hyperlipidemia: on statin therapy  8.  Obstructive sleep apnea  9.  Anemia: stable   10.  Obesity BMI 32.83      Continue Diuretics, Bumex 1 mg IV BID  Given one dose of metolazone today.   Continue K supplement  Rate/rhythm control per EP  Anticoagulation with Lovenox.   Daily monitoring of renal function  Strict intake and output  Daily weights  Low Sodium diet       In regards to his coronary angiography, this can be deferred at this time. He is having symptoms of heart failure currently, no suspicion for angina. Coronary anatomy can be evaluated prior to surgery but is not something that has to be done during this hospitalization, and can be considered outpatient. Hopefully with better management of his heart failure and restoring SR he will improve symptomatically be optimized medically, thus have improvement of his symptoms felt to be related to functional MR, moderate LV systolic dysfunction, and AF RVR.         Electronically signed by VIRGILIO Herrera, 07/29/24, 11:07 AM CDT.

## 2024-07-29 NOTE — PROGRESS NOTES
"EP Problems:  1.  Persistent atrial fibrillation  2.  Severe biatrial enlargement     Cardiology Problems:  1.  Severe MR  2.  Nonischemic cardiomyopathy  3.  Chronic systolic heart failure  4.  Hypertension  5.  Hyperlipidemia     Medical Problems:  1.  Obesity  2.  Obstructive sleep apnea  3.  Chronic normocytic anemia    Patient ID:  Ziyad Wilson is a 59 y.o. male with problem list as above as above who EP is following for persistent atrial fibrillation.    Subjective:  Went for direct cardioversion this morning however had very rapid recurrence of atrial fibrillation seen thereafter.    Objective:  /78 (BP Location: Left arm, Patient Position: Lying)   Pulse 96   Temp 98.4 °F (36.9 °C) (Oral)   Resp 16   Ht 170.2 cm (67\")   Wt 95.1 kg (209 lb 9.6 oz)   SpO2 94%   BMI 32.83 kg/m²     Chronically ill-appearing, obese, wearing oxygen, mildly increased work of breathing  Bibasilar crackles  Tachycardic, irregular, apical murmur  Trace lower extremity edema      Labs today:  Lab Results   Component Value Date    GLUCOSE 94 07/29/2024    CALCIUM 8.9 07/29/2024     07/29/2024    K 3.7 07/29/2024    CO2 35.0 (H) 07/29/2024    BUN 16 07/29/2024    CREATININE 1.30 (H) 07/29/2024    EGFR 62.9 07/29/2024     Lab Results   Component Value Date    WBC 8.99 07/29/2024    HGB 11.0 (L) 07/29/2024    HCT 39.1 07/29/2024     07/29/2024     Telemetry directly visualized independently reviewed: Remains in atrial fibrillation, with RVR, heart rates predominantly ~130    Assessment:  Persistent atrial fibrillation with acute exacerbation  Chronic systolic heart failure  Severe mitral regurgitation  Hypertension    Plan:  -Increase metoprolol for additional rate control  -Start amiodarone 400 mg twice daily for 10 days (8/8/2024) then 200 mg daily thereafter  -Plan for repeat cardioversion later this week if unable to achieve adequate rate control  -Continue to work on systolic heart failure regimen given " volume overload which may be at least partially driving the atrial fibrillation  -Transition to apixaban for anticoagulation  -Will check mag to make sure no significant hypomagnesemia  -Long-term decision regarding ablation versus Maze procedure depending upon mitral regurgitation    Part of this note may be an electronic transcription/translation of spoken language to printed text using the Dragon Dictation System.

## 2024-07-29 NOTE — PROGRESS NOTES
[facility] Medicine Services  INPATIENT PROGRESS NOTE    Patient Name: Ziyad Wilson  Date of Admission: 7/22/2024  Today's Date: 07/29/24  Length of Stay: 7  Primary Care Physician: Ashish Tuhrman DO    Subjective   Chief Complaint: Respiratory failure/atrial fibs/CHF/hypotension  HPI   Blood pressures normal and stable.  Afebrile.  Status post cardiac conversion, patient is still in atrial fibs.  Patient is on 3 L of oxygen.  Lung nodules discussed with patient, recommendation of CT scan in 6 months with his primary care.  Gallstone discussed with patient, patient is asymptomatic.  Creatinine stable.  Hemoglobin stable.    Review of Systems   Constitutional:  Positive for activity change, appetite change and fatigue. Negative for chills and fever.   HENT:  Negative for hearing loss, nosebleeds, tinnitus and trouble swallowing.    Eyes:  Negative for visual disturbance.   Respiratory:  Positive for shortness of breath. Negative for cough, chest tightness and wheezing.    Cardiovascular:  Negative for chest pain, palpitations and leg swelling.   Gastrointestinal:  Negative for abdominal distention, abdominal pain, blood in stool, constipation, diarrhea, nausea and vomiting.   Endocrine: Negative for cold intolerance, heat intolerance, polydipsia, polyphagia and polyuria.   Genitourinary:  Negative for decreased urine volume, difficulty urinating, dysuria, flank pain, frequency and hematuria.   Musculoskeletal:  Negative for arthralgias, joint swelling and myalgias.   Skin:  Negative for rash.   Allergic/Immunologic: Negative for immunocompromised state.   Neurological:  Positive for weakness. Negative for dizziness, syncope, light-headedness and headaches.   Hematological:  Negative for adenopathy. Does not bruise/bleed easily.   Psychiatric/Behavioral:  Negative for confusion and sleep disturbance. The patient is not nervous/anxious.         All pertinent negatives and  positives are as above. All other systems have been reviewed and are negative unless otherwise stated.     Objective    Temp:  [98.3 °F (36.8 °C)-98.7 °F (37.1 °C)] 98.4 °F (36.9 °C)  Heart Rate:  [] 74  Resp:  [0-28] 16  BP: ()/() 117/82  FiO2 (%):  [53 %-76 %] 66 %  Physical Exam  Vitals and nursing note reviewed.   HENT:      Head: Normocephalic and atraumatic.   Eyes:      Conjunctiva/sclera: Conjunctivae normal.      Pupils: Pupils are equal, round, and reactive to light.   Cardiovascular:      Rate and Rhythm: Normal rate. Rhythm irregular.      Heart sounds: Normal heart sounds.   Pulmonary:      Effort: No respiratory distress.      Comments: Diminished breath sound, clear, on 3 L.  Abdominal:      General: Bowel sounds are normal. There is no distension.      Palpations: Abdomen is soft.      Tenderness: There is no abdominal tenderness.      Comments: Obese .   Musculoskeletal:         General: No swelling.      Cervical back: Neck supple.   Skin:     General: Skin is warm and dry.      Capillary Refill: Capillary refill takes 2 to 3 seconds.      Findings: No rash.   Neurological:      General: No focal deficit present.      Mental Status: He is alert and oriented to person, place, and time.      Motor: Weakness present.   Psychiatric:         Mood and Affect: Mood normal.         Behavior: Behavior normal.         Thought Content: Thought content normal.           Results Review:  I have reviewed the labs, radiology results, and diagnostic studies.    Laboratory Data:   Results from last 7 days   Lab Units 07/29/24  0319 07/28/24  0330 07/27/24  0156   WBC 10*3/mm3 8.99 8.45 9.07   HEMOGLOBIN g/dL 11.0* 11.0* 11.3*   HEMATOCRIT % 39.1 39.8 40.9   PLATELETS 10*3/mm3 236 267 235        Results from last 7 days   Lab Units 07/29/24  0319 07/28/24  0330 07/27/24  1410 07/27/24  0156 07/24/24  0453 07/23/24  1334 07/22/24  1704 07/22/24  1632   SODIUM mmol/L 137 139  --  138   < > 138  --   141   SODIUM, ARTERIAL   --   --   --   --   --   --    < >  --    POTASSIUM mmol/L 3.7 4.0 4.0 3.6   < > 3.5  --  3.6   CHLORIDE mmol/L 97* 99  --  99   < > 100  --  104   CO2 mmol/L 35.0* 33.0*  --  32.0*   < > 25.0  --  26.0   BUN mg/dL 16 17  --  17   < > 19  --  21   CREATININE mg/dL 1.30* 1.29*  --  1.24   < > 1.38*  --  1.50*   CALCIUM mg/dL 8.9 8.6  --  8.1*   < > 8.8  --  8.6   BILIRUBIN mg/dL  --   --   --   --   --  0.4  --  0.3   ALK PHOS U/L  --   --   --   --   --  64  --  65   ALT (SGPT) U/L  --   --   --   --   --  28  --  28   AST (SGOT) U/L  --   --   --   --   --  29  --  25   GLUCOSE mg/dL 94 84  --  102*   < > 117*  --  112*    < > = values in this interval not displayed.       Culture Data:   Blood Culture   Date Value Ref Range Status   07/22/2024 No growth at 5 days  Final   07/22/2024 No growth at 5 days  Final       Radiology Data:   Imaging Results (Last 24 Hours)       ** No results found for the last 24 hours. **            I have reviewed the patient's current medications.     Assessment/Plan   Assessment  Active Hospital Problems    Diagnosis     **Atrial fibrillation with RVR     Severe mitral regurgitation     Acute systolic CHF (congestive heart failure)     Iron deficiency anemia     NICM (nonischemic cardiomyopathy), viral etiology     Tetrahydrocannabinol (THC) dependence     Acute pulmonary edema     CKD (chronic kidney disease) stage 2, GFR 60-89 ml/min     Class 1 obesity due to excess calories with serious comorbidity and body mass index (BMI) of 30.0 to 30.9 in adult        Treatment Plan    Cardiomyopathy/CHF/CAD/hyperlipidemia/atrial fibs/hypotensio/severe mitral valve regurgitation.  EP consulted.  Digoxin.  DC Coreg due to hypotension.  Lopressor . Lovenox, plan for Eliquis at discharge.  Bumex .  Dr. Mathew initiated Entresto 7/25/2020, due to persistent hypotension continues to be held.  Reinitiated digoxin and change beta-blocker to Lopressor, continue to monitor for  tolerance.    Entresto and Aldactone on hold due to hypotension and decreasing kidney function.  ReDs vest evaluation.   Amiodarone.  Lipitor.  Bumex . Lopressor.  Hold Entresto due.  Hold Aldactone.  Nitro as needed.  Echocardiogram7/3/2024-ejection fraction 36 to 40%, left ventricle mildly dilated, left atrium severely dilated, Normal right ventricular cavity size and systolic function noted, Severe mitral valve regurgitation is present, tricuspid regurgitation.  LAUREN- Left ventricular systolic function is moderately decreased, left ventricular cavity is mild to moderately dilated, left atrial cavity is severely dilated, right atrial cavity is dilated, Severe mitral valve regurgitation is present,  Pulmonary vein flow reversal is present.   CT surgery continues to follow however plans remain to be worked up for outpatient MVR/MAZE procedure.  Cardiology continuing to follow- Eliquis remains on hold until definitive plan for outpatient angiography is established.   Plan for cardioversion today.    Anemia.  Iron deficiency . iron infusion ferric gluconate completed x 3 days.  Hemoglobin stable.  No sign of acute bleed.    COPD/pulm edema/obstructive sleep apnea.  Symbicort.  DuoNebs.  Tessalon Perles as needed.  Robitussin as needed.  CTA of the chest-No evidence of pulmonary thromboembolic disease- thoracic aorta and great vessels are normal in caliber, right-sided pleural effusion with right basilar atelectasis-diffuse bronchial wall thickening with mixed groundglass and airspace consolidation (right greater than left), increased reticular opacities within the periphery of the lungs-favor that this represents changes of pulmonary venous  hypertension/volume overload with pulmonary edema, Right middle lobe and left lower lobe nodule warranting follow-up- These are slightly more conspicuous than on previous remote CT of 2016  but we also utilizing much thinner slice thickness- Follow-up CT imaging  without contrast  in 6 months could be obtained to assure stability,  Cholelithiasis.  On 3 L of oxygen.    CKD stage II-creatinine clearance 61.1, baseline creatinine around 1.5, creatinine continues to trend around 1.1-1.4.  Creatinine today is 1.3.    Right middle lobe and left lower lobe lung nodule, slightly more  conspicuous than previous CT scan 2016.  Recommended CT follow-up in 6 months, discussed with patient.  Follow-up with PCP outpatient.    Depression/anxiety.  Elavil.  Wellbutrin. Paxil.  Seroquel nightly.  Restoril nightly as needed.     Allergic rhinitis.  Flomax.      Hypothyroidism .  Synthroid.  Synthroid held by cardiology.High TSH.  Normal free T4.    Cholelithiasis.  Discussed with patient, denies any abdomen pain.  Asymptomatic.    THC dependence-patient was educated on the possibility of nausea due to withdrawals. Patient continues to have no complaints of withdrawal symptoms, Zofran as needed for nausea.   UDS-positive for benzo and tricyclic.    Hypokalemia.  Potassium is normal.  P.o. potassium.    Pain/headache.  Fioricet as needed.  Carotid duplex- less than 50% stenosis of the right internal carotid artery, less than 50% stenosis of the left internal carotid artery, Antegrade flow is demonstrated in bilateral vertebral arteries.    Reflux . Protonix.  Tums as needed.    Obesity.  BMI is 32.      Nutrition . multivitamins.    Nutrition . NPO.    Deconditioning.  PT consult.    Blood culture-no growth in 5 days.    Medical Decision Making  Atrial fibrillation, acute, moderate complexity, improving  Iron deficiency anemia, acute on chronic, moderate complexity, unchanged  Nonischemic cardiomyopathy, acute on chronic, moderate complexity, unchanged  THC dependence, chronic, low complexity, stable  CKD stage II, chronic, moderate complexity, stable  Obesity class I, chronic, moderate complexity, unchanged  Severe mitral valve regurgitation, acute, high complexity, unchanged  Acute systolic congestive heart  failure, acute, high complexity, improving  Acute pulmonary edema, acute, high complexity, improving  Number and Complexity of problems: 9  Differential Diagnosis: None     Conditions and Status        Condition is unchanged.     [facility] Data  External documents reviewed: None  Cardiac tracing (EKG, telemetry) interpretation: Overnight telemetry AFL    Radiology interpretation: See above  Labs reviewed: See above  Any tests that were considered but not ordered: None     Decision rules/scores evaluated (example [id number], Wells, etc): [id number] score high     Discussed with: Patient  Care Planning  Shared decision making: Patient  code status and discussions: Full code per patient  Surrogate Decision Maker his brother Guy Wilson  Disposition  Social Determinants of Health that impact treatment or disposition: None at this time  I expect the patient to be discharged to home in 1-3 days.     Electronically signed by Adriaon Rapp MD, 07/29/24, 10:32 CDT.

## 2024-07-30 LAB
ALBUMIN SERPL-MCNC: 3.5 G/DL (ref 3.5–5.2)
ALBUMIN/GLOB SERPL: 1.1 G/DL
ALP SERPL-CCNC: 65 U/L (ref 39–117)
ALT SERPL W P-5'-P-CCNC: 40 U/L (ref 1–41)
ANION GAP SERPL CALCULATED.3IONS-SCNC: 7 MMOL/L (ref 5–15)
AST SERPL-CCNC: 27 U/L (ref 1–40)
BASOPHILS # BLD AUTO: 0.05 10*3/MM3 (ref 0–0.2)
BASOPHILS NFR BLD AUTO: 0.6 % (ref 0–1.5)
BILIRUB SERPL-MCNC: 0.4 MG/DL (ref 0–1.2)
BUN SERPL-MCNC: 18 MG/DL (ref 8–23)
BUN/CREAT SERPL: 13.8 (ref 7–25)
CALCIUM SPEC-SCNC: 9.2 MG/DL (ref 8.6–10.5)
CHLORIDE SERPL-SCNC: 94 MMOL/L (ref 98–107)
CHOLEST SERPL-MCNC: 80 MG/DL (ref 0–200)
CO2 SERPL-SCNC: 36 MMOL/L (ref 22–29)
CREAT SERPL-MCNC: 1.3 MG/DL (ref 0.76–1.27)
DEPRECATED RDW RBC AUTO: 50.8 FL (ref 37–54)
EGFRCR SERPLBLD CKD-EPI 2021: 62.9 ML/MIN/1.73
EOSINOPHIL # BLD AUTO: 0.27 10*3/MM3 (ref 0–0.4)
EOSINOPHIL NFR BLD AUTO: 3.2 % (ref 0.3–6.2)
ERYTHROCYTE [DISTWIDTH] IN BLOOD BY AUTOMATED COUNT: 21.2 % (ref 12.3–15.4)
GLOBULIN UR ELPH-MCNC: 3.1 GM/DL
GLUCOSE SERPL-MCNC: 112 MG/DL (ref 65–99)
HCT VFR BLD AUTO: 41.7 % (ref 37.5–51)
HDLC SERPL-MCNC: 29 MG/DL (ref 40–60)
HGB BLD-MCNC: 11.9 G/DL (ref 13–17.7)
IMM GRANULOCYTES # BLD AUTO: 0.07 10*3/MM3 (ref 0–0.05)
IMM GRANULOCYTES NFR BLD AUTO: 0.8 % (ref 0–0.5)
LDLC SERPL CALC-MCNC: 31 MG/DL (ref 0–100)
LDLC/HDLC SERPL: 1.05 {RATIO}
LYMPHOCYTES # BLD AUTO: 1.57 10*3/MM3 (ref 0.7–3.1)
LYMPHOCYTES NFR BLD AUTO: 18.7 % (ref 19.6–45.3)
MAGNESIUM SERPL-MCNC: 2.2 MG/DL (ref 1.6–2.4)
MCH RBC QN AUTO: 23.1 PG (ref 26.6–33)
MCHC RBC AUTO-ENTMCNC: 28.5 G/DL (ref 31.5–35.7)
MCV RBC AUTO: 81 FL (ref 79–97)
MONOCYTES # BLD AUTO: 0.87 10*3/MM3 (ref 0.1–0.9)
MONOCYTES NFR BLD AUTO: 10.4 % (ref 5–12)
NEUTROPHILS NFR BLD AUTO: 5.56 10*3/MM3 (ref 1.7–7)
NEUTROPHILS NFR BLD AUTO: 66.3 % (ref 42.7–76)
NRBC BLD AUTO-RTO: 1.2 /100 WBC (ref 0–0.2)
PLATELET # BLD AUTO: 252 10*3/MM3 (ref 140–450)
PMV BLD AUTO: 10.7 FL (ref 6–12)
POTASSIUM SERPL-SCNC: 3.5 MMOL/L (ref 3.5–5.2)
POTASSIUM SERPL-SCNC: 3.9 MMOL/L (ref 3.5–5.2)
PROT SERPL-MCNC: 6.6 G/DL (ref 6–8.5)
QT INTERVAL: 348 MS
QT INTERVAL: 380 MS
QTC INTERVAL: 418 MS
QTC INTERVAL: 425 MS
RBC # BLD AUTO: 5.15 10*6/MM3 (ref 4.14–5.8)
SODIUM SERPL-SCNC: 137 MMOL/L (ref 136–145)
TRIGL SERPL-MCNC: 103 MG/DL (ref 0–150)
VLDLC SERPL-MCNC: 20 MG/DL (ref 5–40)
WBC NRBC COR # BLD AUTO: 8.39 10*3/MM3 (ref 3.4–10.8)

## 2024-07-30 PROCEDURE — 94761 N-INVAS EAR/PLS OXIMETRY MLT: CPT

## 2024-07-30 PROCEDURE — 99232 SBSQ HOSP IP/OBS MODERATE 35: CPT | Performed by: NURSE PRACTITIONER

## 2024-07-30 PROCEDURE — 25010000002 ENOXAPARIN PER 10 MG: Performed by: STUDENT IN AN ORGANIZED HEALTH CARE EDUCATION/TRAINING PROGRAM

## 2024-07-30 PROCEDURE — 94799 UNLISTED PULMONARY SVC/PX: CPT

## 2024-07-30 PROCEDURE — 25010000002 MAGNESIUM SULFATE 2 GM/50ML SOLUTION: Performed by: STUDENT IN AN ORGANIZED HEALTH CARE EDUCATION/TRAINING PROGRAM

## 2024-07-30 PROCEDURE — 83735 ASSAY OF MAGNESIUM: CPT | Performed by: STUDENT IN AN ORGANIZED HEALTH CARE EDUCATION/TRAINING PROGRAM

## 2024-07-30 PROCEDURE — 80061 LIPID PANEL: CPT | Performed by: FAMILY MEDICINE

## 2024-07-30 PROCEDURE — 97116 GAIT TRAINING THERAPY: CPT

## 2024-07-30 PROCEDURE — 80053 COMPREHEN METABOLIC PANEL: CPT | Performed by: FAMILY MEDICINE

## 2024-07-30 PROCEDURE — 85025 COMPLETE CBC W/AUTO DIFF WBC: CPT | Performed by: FAMILY MEDICINE

## 2024-07-30 PROCEDURE — 25010000002 BUMETANIDE PER 0.5 MG: Performed by: STUDENT IN AN ORGANIZED HEALTH CARE EDUCATION/TRAINING PROGRAM

## 2024-07-30 PROCEDURE — 84132 ASSAY OF SERUM POTASSIUM: CPT | Performed by: FAMILY MEDICINE

## 2024-07-30 PROCEDURE — 25010000002 ONDANSETRON PER 1 MG: Performed by: STUDENT IN AN ORGANIZED HEALTH CARE EDUCATION/TRAINING PROGRAM

## 2024-07-30 PROCEDURE — 99232 SBSQ HOSP IP/OBS MODERATE 35: CPT | Performed by: STUDENT IN AN ORGANIZED HEALTH CARE EDUCATION/TRAINING PROGRAM

## 2024-07-30 PROCEDURE — 94664 DEMO&/EVAL PT USE INHALER: CPT

## 2024-07-30 RX ORDER — POTASSIUM CHLORIDE 750 MG/1
40 CAPSULE, EXTENDED RELEASE ORAL EVERY 4 HOURS
Status: COMPLETED | OUTPATIENT
Start: 2024-07-30 | End: 2024-07-30

## 2024-07-30 RX ORDER — SPIRONOLACTONE 25 MG/1
25 TABLET ORAL DAILY
Status: DISCONTINUED | OUTPATIENT
Start: 2024-07-30 | End: 2024-08-02 | Stop reason: HOSPADM

## 2024-07-30 RX ORDER — MAGNESIUM SULFATE HEPTAHYDRATE 40 MG/ML
2 INJECTION, SOLUTION INTRAVENOUS ONCE
Status: COMPLETED | OUTPATIENT
Start: 2024-07-30 | End: 2024-07-30

## 2024-07-30 RX ORDER — MAGNESIUM SULFATE 1 G/100ML
2 INJECTION INTRAVENOUS ONCE
Status: DISCONTINUED | OUTPATIENT
Start: 2024-07-30 | End: 2024-07-30 | Stop reason: SDUPTHER

## 2024-07-30 RX ORDER — METOPROLOL SUCCINATE 100 MG/1
200 TABLET, EXTENDED RELEASE ORAL EVERY 12 HOURS SCHEDULED
Status: DISCONTINUED | OUTPATIENT
Start: 2024-07-30 | End: 2024-08-01

## 2024-07-30 RX ORDER — BUMETANIDE 0.25 MG/ML
1 INJECTION INTRAMUSCULAR; INTRAVENOUS DAILY
Status: DISCONTINUED | OUTPATIENT
Start: 2024-07-31 | End: 2024-08-01

## 2024-07-30 RX ADMIN — IPRATROPIUM BROMIDE AND ALBUTEROL SULFATE 3 ML: .5; 3 SOLUTION RESPIRATORY (INHALATION) at 06:31

## 2024-07-30 RX ADMIN — PAROXETINE 20 MG: 20 TABLET, FILM COATED ORAL at 09:34

## 2024-07-30 RX ADMIN — ATORVASTATIN CALCIUM 40 MG: 40 TABLET ORAL at 21:01

## 2024-07-30 RX ADMIN — ACETAMINOPHEN 650 MG: 325 TABLET, FILM COATED ORAL at 11:02

## 2024-07-30 RX ADMIN — BUPROPION HYDROCHLORIDE 300 MG: 150 TABLET, EXTENDED RELEASE ORAL at 09:35

## 2024-07-30 RX ADMIN — POTASSIUM CHLORIDE 40 MEQ: 750 CAPSULE, EXTENDED RELEASE ORAL at 09:34

## 2024-07-30 RX ADMIN — Medication 10 ML: at 09:35

## 2024-07-30 RX ADMIN — METOPROLOL TARTRATE 100 MG: 100 TABLET, FILM COATED ORAL at 01:08

## 2024-07-30 RX ADMIN — BUDESONIDE AND FORMOTEROL FUMARATE DIHYDRATE 1 PUFF: 160; 4.5 AEROSOL RESPIRATORY (INHALATION) at 06:31

## 2024-07-30 RX ADMIN — QUETIAPINE FUMARATE 100 MG: 100 TABLET ORAL at 21:01

## 2024-07-30 RX ADMIN — Medication 10 ML: at 21:02

## 2024-07-30 RX ADMIN — IPRATROPIUM BROMIDE AND ALBUTEROL SULFATE 3 ML: .5; 3 SOLUTION RESPIRATORY (INHALATION) at 14:33

## 2024-07-30 RX ADMIN — BUDESONIDE AND FORMOTEROL FUMARATE DIHYDRATE 1 PUFF: 160; 4.5 AEROSOL RESPIRATORY (INHALATION) at 18:57

## 2024-07-30 RX ADMIN — METOPROLOL SUCCINATE 200 MG: 100 TABLET, FILM COATED, EXTENDED RELEASE ORAL at 21:01

## 2024-07-30 RX ADMIN — ENOXAPARIN SODIUM 90 MG: 100 INJECTION SUBCUTANEOUS at 01:08

## 2024-07-30 RX ADMIN — AMIODARONE HYDROCHLORIDE 400 MG: 200 TABLET ORAL at 21:01

## 2024-07-30 RX ADMIN — AMITRIPTYLINE HYDROCHLORIDE 25 MG: 25 TABLET, FILM COATED ORAL at 21:00

## 2024-07-30 RX ADMIN — IPRATROPIUM BROMIDE AND ALBUTEROL SULFATE 3 ML: .5; 3 SOLUTION RESPIRATORY (INHALATION) at 18:57

## 2024-07-30 RX ADMIN — POTASSIUM CHLORIDE 40 MEQ: 750 CAPSULE, EXTENDED RELEASE ORAL at 13:13

## 2024-07-30 RX ADMIN — METOPROLOL TARTRATE 100 MG: 100 TABLET, FILM COATED ORAL at 05:50

## 2024-07-30 RX ADMIN — Medication 1 TABLET: at 09:35

## 2024-07-30 RX ADMIN — MAGNESIUM SULFATE HEPTAHYDRATE 2 G: 2 INJECTION, SOLUTION INTRAVENOUS at 14:21

## 2024-07-30 RX ADMIN — BUMETANIDE 1 MG: 0.25 INJECTION INTRAMUSCULAR; INTRAVENOUS at 05:51

## 2024-07-30 RX ADMIN — APIXABAN 5 MG: 5 TABLET, FILM COATED ORAL at 17:48

## 2024-07-30 RX ADMIN — FLUTICASONE PROPIONATE 2 SPRAY: 50 SPRAY, METERED NASAL at 09:35

## 2024-07-30 RX ADMIN — SPIRONOLACTONE 25 MG: 25 TABLET ORAL at 09:40

## 2024-07-30 RX ADMIN — AMIODARONE HYDROCHLORIDE 400 MG: 200 TABLET ORAL at 09:34

## 2024-07-30 RX ADMIN — ONDANSETRON 4 MG: 2 INJECTION INTRAMUSCULAR; INTRAVENOUS at 11:02

## 2024-07-30 RX ADMIN — PANTOPRAZOLE SODIUM 40 MG: 40 TABLET, DELAYED RELEASE ORAL at 05:50

## 2024-07-30 RX ADMIN — LEVOTHYROXINE SODIUM 100 MCG: 100 TABLET ORAL at 05:51

## 2024-07-30 NOTE — PLAN OF CARE
Goal Outcome Evaluation:  Plan of Care Reviewed With: patient        Progress: no change  Outcome Evaluation: Patient oriented x4 with VSS. C/o headache, nausea, and cough (see MAR.) Patient voiding per urinal. Rest has been decent this shift. HR AFib  per monitor tech

## 2024-07-30 NOTE — PROGRESS NOTES
RT Nebulizer Protocol    Assessment tool to be used for patients with existing breathing treatments ordered by hospitalists                                                                  0  1  2  3  4      Respiratory History   No Smoking      Smoking History      1 Pack/Day      Pulmonary Disease   x   Exacerbation        Respiratory Rate   Normal   x   20-25      Dyspneic      Accessory Muscles      Severe Dyspnea        Breath Sounds   Clear      Crackles   x   Crackles/ Rhonchi      Wheezing      Absent/ Severe Wheezing        Chest   X-ray   Clear      1 Lobe Infiltration/ Consolidation/ PE      2 Lobe Same Lung Infiltration/ Consolidation/ PE    x   2 Lobe Infiltration/ Both Lungs/ Consolidation/ PE      Both Lungs/ More Than 1 Lobe/ Atelectasis/ Consolidation/  PE        Cough   Strong Non- Productive   x   Excessive Secretions/ Strong Cough      Excessive Secretions/ Weak Cough      Thick Bronchial Secretions/ Weak Cough      Thick Bronchial Secretions/ No Cough        Total Patient Score =  6  0-4=Q4 PRN  5-9=TID and Q4 PRN  10-14=QID and Q3 PRN  15-19=Q4 and Q2 PRN  20=Q3 and Q2 PRN    Bronchopulmonary Hygiene (CPT)   Q4 ATC Copious secretions, dyspnea, unable to sleep, mucus plug    QID & Q4 PRN Moderate secretions    TID Small amounts of secretions w/ poor cough and history of secretions    BID Unable to deep breathe and cough spontaneously       Lung Expansion Therapy (PEP)   Q4 & PRN at night Severe atelectasis, poor oxygenation    QID  High risk for persistent atelectasis, existence of same    TID At risk for developing atelectasis    BID Unable to deep breathe and cough spontaneously     Instruct, 1 follow up Patients able to perform well on their own

## 2024-07-30 NOTE — CASE MANAGEMENT/SOCIAL WORK
Continued Stay Note  JUDITH Blackwell     Patient Name: Ziyad Wilson  MRN: [id number]  Today's Date: 7/30/2024    Admit Date: 7/22/2024    Plan: Home   Discharge Plan       Row Name 07/30/24 1520       Plan    Plan Home    Patient/Family in Agreement with Plan yes    Plan Comments Pt lives at home and plans to return home at d/c. Cab voucher in chart.                   Discharge Codes    No documentation.                 Expected Discharge Date and Time       Expected Discharge Date Expected Discharge Time    Jul 31, 2024               IRAJ Tena

## 2024-07-30 NOTE — PROGRESS NOTES
[facility] -  Progress Note     LOS: 8 days   Patient Care Team:  Ashish Thurman DO as PCP - General (Internal Medicine)  Douglas Ruano MD as Consulting Physician (Pain Medicine)  Uri Lgaos MD (Inactive) as Cardiologist (Cardiology)    Chief Complaint: CHF/AF follow up    Subjective     Interval History: Feels better today in terms of breathing, but still short of breath, on oxygen. He has no chest pain or palpitations. Heart rates are improved. Fatigue with exertion. He has a non productive cough. Renal function stable. Weights did drop. Tolerated metolazone yesterday.       Tele: AF rates 80-90    Review of Systems:     Review of Systems   Constitutional:  Positive for activity change and fatigue. Negative for fever.   Respiratory:  Positive for cough and shortness of breath. Negative for chest tightness and wheezing.    Cardiovascular:  Negative for chest pain, palpitations and leg swelling.   Gastrointestinal:  Negative for abdominal pain, nausea and vomiting.   Genitourinary:  Negative for decreased urine volume and difficulty urinating.   Neurological:  Negative for dizziness, tremors, syncope, weakness and headaches.   Psychiatric/Behavioral:  Negative for agitation and confusion. The patient is not nervous/anxious.      Objective     Vital Sign Min/Max for last 24 hours  Temp  Min: 97.8 °F (36.6 °C)  Max: 99 °F (37.2 °C)   BP  Min: 100/76  Max: 135/72   Pulse  Min: 74  Max: 112   Resp  Min: 16  Max: 21   SpO2  Min: 91 %  Max: 99 %   No data recorded   Weight  Min: 92.9 kg (204 lb 12.8 oz)  Max: 92.9 kg (204 lb 12.8 oz)         07/30/24  0522   Weight: 92.9 kg (204 lb 12.8 oz)       Physical Exam:    Vitals reviewed.   Constitutional:       General: Awake.      Appearance: Well-developed, well-groomed and not in distress. Obese. Ill-appearing and acutely ill-appearing.      Interventions: Nasal cannula in place.   HENT:      Head: Normocephalic and atraumatic.    Pulmonary:      Effort: Pulmonary effort is normal.      Breath sounds: Decreased breath sounds present. Rales present.   Cardiovascular:      Normal rate. Irregularly irregular rhythm.      Murmurs: There is a systolic murmur.   Edema:     Peripheral edema absent.   Musculoskeletal:      Cervical back: Normal range of motion and neck supple. Skin:     General: Skin is warm and dry.   Neurological:      Mental Status: Alert, oriented to person, place, and time and oriented to person, place and time.   Psychiatric:         Attention and Perception: Attention normal.         Mood and Affect: Mood normal.         Speech: Speech normal.         Behavior: Behavior normal. Behavior is cooperative.         Thought Content: Thought content normal.         Cognition and Memory: Cognition and memory normal.         Judgment: Judgment normal.       Results Review:   Lab Results   Component Value Date    WBC 8.39 07/30/2024    HGB 11.9 (L) 07/30/2024    HCT 41.7 07/30/2024    MCV 81.0 07/30/2024     07/30/2024     Lab Results   Component Value Date    GLUCOSE 112 (H) 07/30/2024    CALCIUM 9.2 07/30/2024     07/30/2024    K 3.5 07/30/2024    CO2 36.0 (H) 07/30/2024    CL 94 (L) 07/30/2024    BUN 18 07/30/2024    CREATININE 1.30 (H) 07/30/2024    EGFR 62.9 07/30/2024    BCR 13.8 07/30/2024    ANIONGAP 7.0 07/30/2024       Medication Review: yes  Current Facility-Administered Medications   Medication Dose Route Frequency Provider Last Rate Last Admin    acetaminophen (TYLENOL) tablet 650 mg  650 mg Oral Q6H PRN Jem Begum MD   650 mg at 07/29/24 2037    albuterol (PROVENTIL) nebulizer solution 0.083% 2.5 mg/3mL  2.5 mg Nebulization Q6H PRN Jem Begum MD        amiodarone (PACERONE) tablet 400 mg  400 mg Oral Q12H Jem Begum MD   400 mg at 07/29/24 2037    amitriptyline (ELAVIL) tablet 25 mg  25 mg Oral Nightly Jem Begum MD   25 mg at 07/29/24 2037    atorvastatin (LIPITOR) tablet 40 mg  40 mg Oral  Nightly Jem Begum MD   40 mg at 07/29/24 2037    benzonatate (TESSALON) capsule 100 mg  100 mg Oral TID PRN Jem Begum MD   100 mg at 07/28/24 2212    sennosides-docusate (PERICOLACE) 8.6-50 MG per tablet 2 tablet  2 tablet Oral BID PRN Jem Begum MD   2 tablet at 07/28/24 2003    And    polyethylene glycol (MIRALAX) packet 17 g  17 g Oral Daily PRN Jem Begum MD   17 g at 07/28/24 0922    And    bisacodyl (DULCOLAX) EC tablet 5 mg  5 mg Oral Daily PRN Jem Begum MD   5 mg at 07/28/24 0614    And    bisacodyl (DULCOLAX) suppository 10 mg  10 mg Rectal Daily PRN Jem Begum MD        budesonide-formoterol (SYMBICORT) 160-4.5 MCG/ACT inhaler 1 puff  1 puff Inhalation BID - RT Jem Begum MD   1 puff at 07/30/24 0631    bumetanide (BUMEX) injection 1 mg  1 mg Intravenous Q12H Jem Begum MD   1 mg at 07/30/24 0551    buPROPion XL (WELLBUTRIN XL) 24 hr tablet 300 mg  300 mg Oral Daily Jem Begum MD   300 mg at 07/29/24 1042    butalbital-acetaminophen-caffeine (FIORICET, ESGIC) -40 MG per tablet 1 tablet  1 tablet Oral Daily PRN Jem Begum MD   1 tablet at 07/28/24 0641    calcium carbonate (TUMS) chewable tablet 500 mg (200 mg elemental)  1 tablet Oral TID PRN Jem Begum MD        Calcium Replacement - Follow Nurse / BPA Driven Protocol   Does not apply PRN Jem Begum MD        cyclobenzaprine (FLEXERIL) tablet 5 mg  5 mg Oral TID PRN Jem Begum MD        Enoxaparin Sodium (LOVENOX) syringe 90 mg  1 mg/kg Subcutaneous Q12H Jem Begum MD   90 mg at 07/30/24 0108    fluticasone (FLONASE) 50 MCG/ACT nasal spray 2 spray  2 spray Nasal Daily Jem Begum MD   2 spray at 07/29/24 1041    guaifenesin (ROBITUSSIN) 100 MG/5ML liquid 200 mg  200 mg Oral Q4H PRN Jem Begum MD   200 mg at 07/29/24 2122    ipratropium-albuterol (DUO-NEB) nebulizer solution 3 mL  3 mL Nebulization TID - RT Jem Begum MD   3 mL at 07/30/24 0631    levothyroxine (SYNTHROID, LEVOTHROID) tablet 100  mcg  100 mcg Oral Daily Adriano Rapp MD   100 mcg at 07/30/24 0551    Magnesium Standard Dose Replacement - Follow Nurse / BPA Driven Protocol   Does not apply PRN Jem Begum MD        metoprolol tartrate (LOPRESSOR) tablet 100 mg  100 mg Oral Q6H Jem Begum MD   100 mg at 07/30/24 0550    multivitamin with minerals 1 tablet  1 tablet Oral Daily Jem Begum MD   1 tablet at 07/29/24 1042    nitroglycerin (NITROSTAT) SL tablet 0.4 mg  0.4 mg Sublingual Q5 Min PRN Jem Begum MD   0.4 mg at 07/23/24 0343    ondansetron (ZOFRAN) injection 4 mg  4 mg Intravenous Q6H PRN Jem Begum MD   4 mg at 07/29/24 2037    pantoprazole (PROTONIX) EC tablet 40 mg  40 mg Oral Q AM Jem Begum MD   40 mg at 07/30/24 0550    PARoxetine (PAXIL) tablet 20 mg  20 mg Oral Daily Jem Begum MD   20 mg at 07/29/24 1041    Phosphorus Replacement - Follow Nurse / BPA Driven Protocol   Does not apply PRN Jem Begum MD        potassium chloride (MICRO-K/KLOR-CON) CR capsule  10 mEq Oral Daily Jem Begum MD   10 mEq at 07/29/24 1041    potassium chloride (MICRO-K/KLOR-CON) CR capsule  40 mEq Oral Q4H Adriano Rapp MD        Potassium Replacement - Follow Nurse / BPA Driven Protocol   Does not apply PRN Jem Begum MD        QUEtiapine (SEROquel) tablet 100 mg  100 mg Oral Nightly Jem Begum MD   100 mg at 07/29/24 2037    [Held by provider] sacubitril-valsartan (ENTRESTO) 24-26 MG tablet 1 tablet  1 tablet Oral Q12H Mihaela Otero APRN   1 tablet at 07/25/24 2206    sodium chloride 0.9 % flush 10 mL  10 mL Intravenous PRN Jem Begum MD        sodium chloride 0.9 % flush 10 mL  10 mL Intravenous Q12H Jem Begum MD   10 mL at 07/29/24 2040    sodium chloride 0.9 % infusion 40 mL  40 mL Intravenous PRN Jem Begum MD        [Held by provider] spironolactone (ALDACTONE) tablet 25 mg  25 mg Oral Daily Remi Mathew MD   25 mg at 07/25/24 1209    temazepam (RESTORIL) capsule 15 mg  15 mg Oral Nightly PRN  Jem Begum MD         Results for orders placed during the hospital encounter of 07/22/24    Adult Transesophageal Echo (LAUREN) W/ Cont if Necessary Per Protocol    Interpretation Summary    Left ventricular systolic function is moderately decreased.    The left ventricular cavity is mild to moderately dilated.    The left atrial cavity is severely dilated.    The right atrial cavity is dilated.    Severe mitral valve regurgitation is present. Pulmonary vein flow reversal is present.      Interpretation Summary  Transthoracic echocardiogram 7/23/2024 (Dr. Mathew)       Left ventricular systolic function is moderately decreased. Left ventricular ejection fraction appears to be 36 - 40%.    The left ventricular cavity is mildly dilated.    The left atrial cavity is severely dilated.    Normal right ventricular cavity size and systolic function noted.    Severe mitral valve regurgitation is present.    Estimated right ventricular systolic pressure from tricuspid regurgitation is moderately elevated (45-55 mmHg).    Assessment & Plan     1.  Acute on chronic combined systolic and diastolic congestive heart failure: Has been managed for atrial fibrillation with rapid ventricular response this admission, left ventricular ejection fraction found to be 36 to 40% as well as component of valvular heart disease with severe MR.  History of cardiomyopathy.  Old notes indicate nonischemic cardiomyopathy.  Initially managed on bumetanide continuous drip.  Intermittent dosing of IV Bumex was initiated however discontinued. Had been on a high dose of carvedilol (50 mg BID) however transitioned to Lopressor for due to hypotension.  Also had been on Entresto and spironolactone however those are currently on hold.  Standing weights had been unchanged of recent. He has diuresed with a net negative fluid balance based off of documentation. Remains on supplemental oxygen. Chest x ray 7/25/24 did not show improvement of his volume status.   After holding diuretics renal function has improved however the patient became significantly short of breath and IV diuretics were resumed.  Weights had been consistent without much improvement despite ongoing diuresis.  He did have a 5 pound weight loss overnight.  He did receive 1 dose of metolazone yesterday.  He has been on bumetanide 1 mg twice daily IV.  Continue IV Bumex 1 mg but reduce to daily.  Potassium 10 mEq daily.  Add spironolactone today. Monitor renal function and electrolytes.    2.  Atrial fibrillation with rapid ventricular response: Initially was being managed on Cardizem.  It was recommended to wean Cardizem for other rate controlling medications given findings of depressed left ventricular ejection fraction.  Given hypotension the patient has received digoxin.  Now on Lopressor to titrate as tolerated with plans to transition to guideline directed medical therapy in future.  Currently anticoagulated with Lovenox. EP has been consulted.  Adjustments were made to beta-blocker therapy.  Cardioversion 7/29. Has since had recurrent AF. He has been started on antiarrhythmic therapy with amiodarone.  Rates are improved.     3.  Acute kidney injury: has improved during hospital stay - Continue to monitor renal function closely due to need for IV diuretics. Stable Cr today.    4.  Severe MR: Functional. Noted initially on echo, has also had LAUREN. CTS following. Recommend optimization medically with diuresis. CTS plans to follow up in the outpatient setting to discuss timing/need for surgical intervention. He will also need coronary angiography at some point.     5.  Coronary artery disease: Documentation indicates the patient has a history of coronary artery disease but no specific test results or workup to indicate extent of findings or what diagnostic testing was performed.  Previously followed in the office for what was documented to be nonischemic cardiomyopathy. CT Images reviewed by Dr. Noel  reveal coronary artery calcification. Defer angiogram at this time as he is optimized for CHF and management of AF.     6.  Hypertension: has had low BP with diuretics and med changes earlier this admission. Ideally, Entresto would be added in future for CHF management. We have held this as well as aldactone to allow for more aggressive diuretic and rate control management.     7.  Hyperlipidemia: on statin therapy  8.  Obstructive sleep apnea  9.  Anemia: stable   10.  Obesity BMI 32.83      Continue Diuretics, reduce IV Bumex to daily  Add spironolactone 25 mg daily.   CXR in am  Continue K supplement  Rate/rhythm control per EP  Anticoagulation with Lovenox.   Daily monitoring of renal function  Strict intake and output  Daily weights  Low Sodium diet       In regards to his coronary angiography, this can be deferred at this time. He is having symptoms of heart failure currently, no suspicion for angina. Coronary anatomy can be evaluated prior to surgery but is not something that has to be done during this hospitalization, and can be considered outpatient. Hopefully with better management of his heart failure and restoring SR he will improve symptomatically be optimized medically, thus have improvement of his symptoms felt to be related to functional MR, moderate LV systolic dysfunction, and AF RVR.         Electronically signed by VIRGILIO Herrera, 07/30/24, 9:00 AM CDT.

## 2024-07-30 NOTE — PROGRESS NOTES
[facility] Medicine Services  INPATIENT PROGRESS NOTE    Patient Name: Ziyad Wilson  Date of Admission: 7/22/2024  Today's Date: 07/30/24  Length of Stay: 8  Primary Care Physician: Ashish Thurman DO    Subjective   Chief Complaint: Respiratory failure/atrial fibs/CHF/hypotension   HPI   Blood pressure stable, afebrile.  Heart rates in the 90s.  Creatinine stable.  Hemoglobin stable.  Patient is on 2 L oxygen    Review of Systems   Constitutional:  Positive for activity change, appetite change and fatigue. Negative for chills and fever.   HENT:  Negative for hearing loss, nosebleeds, tinnitus and trouble swallowing.    Eyes:  Negative for visual disturbance.   Respiratory:  Positive for shortness of breath. Negative for cough, chest tightness and wheezing.    Cardiovascular:  Negative for chest pain, palpitations and leg swelling.   Gastrointestinal:  Negative for abdominal distention, abdominal pain, blood in stool, constipation, diarrhea, nausea and vomiting.   Endocrine: Negative for cold intolerance, heat intolerance, polydipsia, polyphagia and polyuria.   Genitourinary:  Negative for decreased urine volume, difficulty urinating, dysuria, flank pain, frequency and hematuria.   Musculoskeletal:  Negative for arthralgias, joint swelling and myalgias.   Skin:  Negative for rash.   Allergic/Immunologic: Negative for immunocompromised state.   Neurological:  Positive for weakness. Negative for dizziness, syncope, light-headedness and headaches.   Hematological:  Negative for adenopathy. Does not bruise/bleed easily.   Psychiatric/Behavioral:  Negative for confusion and sleep disturbance. The patient is not nervous/anxious.    All pertinent negatives and positives are as above. All other systems have been reviewed and are negative unless otherwise stated.     Objective    Temp:  [97.8 °F (36.6 °C)-99 °F (37.2 °C)] 99 °F (37.2 °C)  Heart Rate:  [] 95  Resp:  [16-18]  18  BP: (100-135)/(72-87) 102/82  Physical Exam  Vitals and nursing note reviewed.   HENT:      Head: Normocephalic and atraumatic.   Eyes:      Conjunctiva/sclera: Conjunctivae normal.      Pupils: Pupils are equal, round, and reactive to light.   Cardiovascular:      Rate and Rhythm: Normal rate. Rhythm irregular.      Heart sounds: Normal heart sounds.   Pulmonary:      Effort: No respiratory distress.      Comments: Diminished breath sound, clear, on 2 L.  Abdominal:      General: Bowel sounds are normal. There is no distension.      Palpations: Abdomen is soft.      Tenderness: There is no abdominal tenderness.      Comments: Obese .   Musculoskeletal:         General: No swelling.      Cervical back: Neck supple.   Skin:     General: Skin is warm and dry.      Capillary Refill: Capillary refill takes 2 to 3 seconds.      Findings: No rash.   Neurological:      General: No focal deficit present.      Mental Status: He is alert and oriented to person, place, and time.      Motor: Weakness present.   Psychiatric:         Mood and Affect: Mood normal.         Behavior: Behavior normal.         Thought Content: Thought content normal.          Results Review:  I have reviewed the labs, radiology results, and diagnostic studies.    Laboratory Data:   Results from last 7 days   Lab Units 07/30/24  0349 07/29/24  0319 07/28/24  0330   WBC 10*3/mm3 8.39 8.99 8.45   HEMOGLOBIN g/dL 11.9* 11.0* 11.0*   HEMATOCRIT % 41.7 39.1 39.8   PLATELETS 10*3/mm3 252 236 267        Results from last 7 days   Lab Units 07/30/24  0349 07/29/24  0319 07/28/24  0330 07/24/24  0453 07/23/24  1334   SODIUM mmol/L 137 137 139   < > 138   POTASSIUM mmol/L 3.5 3.7 4.0   < > 3.5   CHLORIDE mmol/L 94* 97* 99   < > 100   CO2 mmol/L 36.0* 35.0* 33.0*   < > 25.0   BUN mg/dL 18 16 17   < > 19   CREATININE mg/dL 1.30* 1.30* 1.29*   < > 1.38*   CALCIUM mg/dL 9.2 8.9 8.6   < > 8.8   BILIRUBIN mg/dL 0.4  --   --   --  0.4   ALK PHOS U/L 65  --   --    "--  64   ALT (SGPT) U/L 40  --   --   --  28   AST (SGOT) U/L 27  --   --   --  29   GLUCOSE mg/dL 112* 94 84   < > 117*    < > = values in this interval not displayed.       Culture Data:   No results found for: \"BLOODCX\", \"URINECX\", \"WOUNDCX\", \"MRSACX\", \"RESPCX\", \"STOOLCX\"    Radiology Data:   Imaging Results (Last 24 Hours)       ** No results found for the last 24 hours. **            I have reviewed the patient's current medications.     Assessment/Plan   Assessment  Active Hospital Problems    Diagnosis     **Atrial fibrillation with RVR     Severe mitral regurgitation     Acute systolic CHF (congestive heart failure)     Iron deficiency anemia     NICM (nonischemic cardiomyopathy), viral etiology     Tetrahydrocannabinol (THC) dependence     Acute pulmonary edema     CKD (chronic kidney disease) stage 2, GFR 60-89 ml/min     Class 1 obesity due to excess calories with serious comorbidity and body mass index (BMI) of 30.0 to 30.9 in adult        Treatment Plan  Cardiomyopathy/CHF/CAD/hyperlipidemia/atrial fibs/hypotensio/severe mitral valve regurgitation.  EP consulted.  Cardiology consulted.  Amiodarone.  Lipitor.  Bumex . Lopressor.  Hold Entresto due.  Restart Aldactone.  Nitro as needed.  Eliquis.  Toprol.  Echocardiogram7/3/2024-ejection fraction 36 to 40%, left ventricle mildly dilated, left atrium severely dilated, Normal right ventricular cavity size and systolic function noted, Severe mitral valve regurgitation is present, tricuspid regurgitation.  LAUREN- Left ventricular systolic function is moderately decreased, left ventricular cavity is mild to moderately dilated, left atrial cavity is severely dilated, right atrial cavity is dilated, Severe mitral valve regurgitation is present,  Pulmonary vein flow reversal is present.   CT surgery continues to follow however plans remain to be worked up for outpatient MVR/MAZE procedure.  Cardiology continuing to follow- Eliquis remains on hold until definitive " plan for outpatient angiography is established.    Status post cardioversion.  Rate is better.    EP recommendation.  Start Eliquis.  Transition to metoprolol succinate 200 mg twice a day.  Amiodarone 400 mg twice a day for 10 days then 2 mg daily, daily potassium and magnesium.  Long-term decision to ablation versus Maze procedure.  Cardiology recommendation-add spironolactone .     Anemia.  Iron deficiency . iron infusion ferric gluconate completed x 3 days.  Hemoglobin stable.  No sign of acute bleed.     COPD/pulm edema/obstructive sleep apnea.  Symbicort.  DuoNebs.  Tessalon Perles as needed.  Robitussin as needed.  CTA of the chest-No evidence of pulmonary thromboembolic disease- thoracic aorta and great vessels are normal in caliber, right-sided pleural effusion with right basilar atelectasis-diffuse bronchial wall thickening with mixed groundglass and airspace consolidation (right greater than left), increased reticular opacities within the periphery of the lungs-favor that this represents changes of pulmonary venous  hypertension/volume overload with pulmonary edema, Right middle lobe and left lower lobe nodule warranting follow-up- These are slightly more conspicuous than on previous remote CT of 2016  but we also utilizing much thinner slice thickness- Follow-up CT imaging  without contrast in 6 months could be obtained to assure stability,  Cholelithiasis.  On 2L of oxygen.     CKD stage II-creatinine clearance 61.1, baseline creatinine around 1.5, creatinine continues to trend around 1.1-1.4.  Creatinine today is 1.3.     Right middle lobe and left lower lobe lung nodule, slightly more  conspicuous than previous CT scan 2016.  Recommended CT follow-up in 6 months, discussed with patient.  Follow-up with PCP outpatient.     Depression/anxiety.  Elavil.  Wellbutrin. Paxil.  Seroquel nightly.  Restoril nightly as needed.      Allergic rhinitis.  Flomax.       Hypothyroidism .  Synthroid.  Synthroid held by  cardiology.High TSH.  Normal free T4.     Cholelithiasis.  Discussed with patient, denies any abdomen pain.  Asymptomatic.     THC dependence-patient was educated on the possibility of nausea due to withdrawals. Patient continues to have no complaints of withdrawal symptoms, Zofran as needed for nausea.   UDS-positive for benzo and tricyclic.     Hypokalemia.  Potassium is normal.  P.o. potassium.     Pain/headache.  Fioricet as needed.  Carotid duplex- less than 50% stenosis of the right internal carotid artery, less than 50% stenosis of the left internal carotid artery, Antegrade flow is demonstrated in bilateral vertebral arteries.     Reflux . Protonix.  Tums as needed.     Obesity.  BMI is 32.       Nutrition . multivitamins.     Nutrition . NPO.     Deconditioning.  PT consult.     Blood culture-no growth in 5 days.     Medical Decision Making  Atrial fibrillation, acute, moderate complexity, improving  Iron deficiency anemia, acute on chronic, moderate complexity, unchanged  Nonischemic cardiomyopathy, acute on chronic, moderate complexity, unchanged  THC dependence, chronic, low complexity, stable  CKD stage II, chronic, moderate complexity, stable  Obesity class I, chronic, moderate complexity, unchanged  Severe mitral valve regurgitation, acute, high complexity, unchanged  Acute systolic congestive heart failure, acute, high complexity, improving  Acute pulmonary edema, acute, high complexity, improving  Number and Complexity of problems: 9  Differential Diagnosis: None     Conditions and Status        Condition is unchanged.     [facility] Data  External documents reviewed: None  Cardiac tracing (EKG, telemetry) interpretation: Overnight telemetry AFL    Radiology interpretation: See above  Labs reviewed: See above  Any tests that were considered but not ordered: None     Decision rules/scores evaluated (example [id number], Wells, etc): [id number] score high     Discussed with: Patient  Care  Planning  Shared decision making: Patient  code status and discussions: Full code per patient  Surrogate Decision Maker his brother Guy Wilson  Disposition  Social Determinants of Health that impact treatment or disposition: None at this time  I expect the patient to be discharged to home in 1-3 days.        Electronically signed by Adriano Rapp MD, 07/30/24, 13:31 CDT.

## 2024-07-30 NOTE — PROGRESS NOTES
Inpatient Nutrition Services  Patient Name:  Ziyad Wilson  YOB: 1965  MRN: [id number]  Admit Date:  7/22/2024  Assessment Date:  7/30/2024       Reason for Assessment       Row Name 07/30/24 0934          Reason for Assessment    Reason For Assessment per organizational policy  LOS     Diagnosis cardiac disease                    Nutrition/Diet History       Row Name 07/30/24 0934          Nutrition/Diet History    Typical Intake (Food/Fluid/EN/PN) Receiving daily menu selections. NKFA. Weight up 4.8lb during LOS. BM yesterday. Continue current care plan.     Food Intolerance(s) NKFA                    Labs/Tests/Procedures/Meds       Row Name 07/30/24 0935          Labs/Procedures/Meds    Lab Results Reviewed reviewed     Lab Results Comments Cr, A1C pre-DM        Diagnostic Tests/Procedures    Diagnostic Test/Procedure Reviewed reviewed        Medications    Pertinent Medications Reviewed reviewed     Pertinent Medications Comments See MAR                    Physical Findings       Row Name 07/30/24 0935          Physical Findings    Overall Physical Appearance NC, BM 7/29, César Score 21                    Estimated/Assessed Needs - Anthropometrics       Row Name 07/30/24 0935 07/30/24 0522       Anthropometrics    Weight -- 92.9 kg (204 lb 12.8 oz)    Weight for Calculation 90.7 kg (200 lb) --       Estimated/Assessed Needs    Additional Documentation Fluid Requirements (Group);KCAL/KG (Group);Protein Requirements (Group) --       KCAL/KG    KCAL/KG 20 Kcal/Kg (kcal) --    20 Kcal/Kg (kcal) 1814.38 --       Protein Requirements    Weight Used For Protein Calculations 67.1 kg (148 lb)  IBW --    Est Protein Requirement Amount (gms/kg) 1.0 gm protein --    Estimated Protein Requirements (gms/day) 67.13 --       Fluid Requirements    Fluid Requirements (mL/day) 1814 --    RDA Method (mL) 1814 --                   Nutrition Prescription Ordered       Row Name 07/30/24 0935          Nutrition  Prescription PO    Common Modifiers Cardiac                    Evaluation of Received Nutrient/Fluid Intake       Row Name 07/30/24 0935          Nutrient/Fluid Evaluation    Number of Days Evaluated 3 days        Fluid Intake Evaluation    Oral Fluid (mL) 1573        PO Evaluation    Number of Days PO Intake Evaluated 3 days     Number of Meals 8     % PO Intake 75; snack x 1 @ 100                       Problem/Interventions:   Problem 1       Row Name 07/30/24 0936          Nutrition Diagnoses Problem 1    Problem 1 Nutrition Appropriate for Condition at this Time                          Intervention Goal       Row Name 07/30/24 0936          Intervention Goal    General Disease management/therapy     PO Meet estimated needs     Weight Appropriate weight loss                    Nutrition Intervention       Row Name 07/30/24 0936          Nutrition Intervention    RD/Tech Action Care plan reviewd                      Education/Evaluation       Row Name 07/30/24 0936          Education    Education No discharge needs identified at this time        Monitor/Evaluation    Monitor Per protocol                     Electronically signed by:  Deboarh Anderson RDN, YOVANI  07/30/24 09:36 CDT

## 2024-07-30 NOTE — PLAN OF CARE
Goal Outcome Evaluation:           Progress: improving  Outcome Evaluation: VSS AOX4 Medicated for c/o HA with stated relief. Ambulated with therapy today no falls or injuries. Remains AFl  pvc's hr up 140 with ambulation. Med changes per MD received Magnesium run today and po K+ replacement. Started Eliquis.

## 2024-07-30 NOTE — PROGRESS NOTES
"EP Problems:  1.  Persistent atrial fibrillation  2.  Severe biatrial enlargement     Cardiology Problems:  1.  Severe MR  2.  Nonischemic cardiomyopathy  3.  Chronic systolic heart failure  4.  Hypertension  5.  Hyperlipidemia     Medical Problems:  1.  Obesity  2.  Obstructive sleep apnea  3.  Chronic normocytic anemia    Patient ID:  Ziyad Wilson is a 59 y.o. male with problem list as above as above who EP is following for persistent atrial fibrillation.    Subjective:  Had early recurrence of atrial fibrillation yesterday after cardioversion.  His rates are better today on the combination of metoprolol and amiodarone.    Objective:  /82 (BP Location: Left arm, Patient Position: Lying)   Pulse 95   Temp 99 °F (37.2 °C) (Oral)   Resp 18   Ht 170.2 cm (67\")   Wt 92.9 kg (204 lb 12.8 oz)   SpO2 95%   BMI 32.08 kg/m²     Chronically ill-appearing, obese, wearing oxygen  Normal work of breathing  Normal rate, irregular, apical murmur  No significant lower extremity edema      Labs today:  Lab Results   Component Value Date    GLUCOSE 112 (H) 07/30/2024    CALCIUM 9.2 07/30/2024     07/30/2024    K 3.5 07/30/2024    CO2 36.0 (H) 07/30/2024    BUN 18 07/30/2024    CREATININE 1.30 (H) 07/30/2024    EGFR 62.9 07/30/2024     Lab Results   Component Value Date    WBC 8.39 07/30/2024    HGB 11.9 (L) 07/30/2024    HCT 41.7 07/30/2024     07/30/2024     Lab Results   Component Value Date    MG 1.7 07/29/2024       Telemetry directly visualized independently reviewed: Remains in atrial fibrillation,  heart rates predominantly ~    Assessment:  Persistent atrial fibrillation with acute exacerbation  Chronic systolic heart failure  Severe mitral regurgitation  Hypertension    Plan:  -Rates are under better control; tentative plans for discharge home with outpatient cardioversion unless rates become uncontrolled again  -Stop enoxaparin and start apixaban  -Transition to metoprolol succinate 200 mg " twice daily  -Continue amiodarone 400 mg twice daily for 10 days (8/8/2024) then 200 mg daily thereafter  -Daily K and mag  -Long-term decision regarding ablation versus Maze procedure depending upon mitral regurgitation    Part of this note may be an electronic transcription/translation of spoken language to printed text using the Dragon Dictation System.

## 2024-07-30 NOTE — PLAN OF CARE
Goal Outcome Evaluation:  Plan of Care Reviewed With: patient        Progress: improving  Outcome Evaluation: Pt is independent with bed mobility and transfers.  Amb 300' with supervision on 3L O2.  O2 remained in the 90's throughout tx, on 2L at rest .

## 2024-07-30 NOTE — THERAPY TREATMENT NOTE
Acute Care - Physical Therapy Treatment Note  [facility]     Patient Name: Ziyad Wilson  : 1965  MRN: [id number]  Today's Date: 2024      Visit Dx:     ICD-10-CM ICD-9-CM   1. Atrial fibrillation with RVR  I48.91 427.31   2. Acute systolic CHF (congestive heart failure)  I50.21 428.21     428.0   3. Severe mitral regurgitation  I34.0 424.0   4. Impaired mobility [Z74.09]  Z74.09 799.89     Patient Active Problem List   Diagnosis    Essential hypertension    Coronary artery disease    Acquired hypothyroidism    Chronic daily headache    Class 1 obesity due to excess calories with serious comorbidity and body mass index (BMI) of 30.0 to 30.9 in adult    Mild cognitive impairment    Asthma    GERD (gastroesophageal reflux disease)    Sleep apnea    Mixed hyperlipidemia    Moderate episode of recurrent major depressive disorder    Primary insomnia    Cervical facet joint syndrome    Impaired glucose tolerance    CKD (chronic kidney disease) stage 2, GFR 60-89 ml/min    Arthritis of right shoulder region    Chest pain, atypical    Atrial fibrillation with RVR    Iron deficiency anemia    NICM (nonischemic cardiomyopathy), viral etiology    Tetrahydrocannabinol (THC) dependence    Acute pulmonary edema    Severe mitral regurgitation    Acute systolic CHF (congestive heart failure)     Past Medical History:   Diagnosis Date    Asthma     Cardiomyopathy     non-ischemic    CHF (congestive heart failure)     Coronary artery disease     Foot pain, bilateral     GERD (gastroesophageal reflux disease)     Headache     Hyperlipemia, mixed     Hypertension     benign    Hypothyroidism     Obesity     Sleep apnea     SOB (shortness of breath)      Past Surgical History:   Procedure Laterality Date    APPENDECTOMY      BLADDER REPAIR      CARDIAC CATHETERIZATION  2003    COLONOSCOPY N/A 2018    Procedure: COLONOSCOPY WITH ANESTHESIA;  Surgeon: Dick Espinosa DO;  Location: [facility] ENDOSCOPY;  Service:      COLOSTOMY      with colostomy reversal    KNEE SURGERY Right      PT Assessment (Last 12 Hours)       PT Evaluation and Treatment       Row Name 07/30/24 1023          Physical Therapy Time and Intention    Subjective Information no complaints  -CARLOS     Document Type therapy note (daily note)  -CARLOS     Mode of Treatment physical therapy  -CARLOS       Row Name 07/30/24 1023          General Information    Existing Precautions/Restrictions fall;oxygen therapy device and L/min  -CARLOS       Row Name 07/30/24 1023          Pain    Pretreatment Pain Rating 0/10 - no pain  -CARLOS     Posttreatment Pain Rating 0/10 - no pain  -CARLOS       Row Name 07/30/24 1023          Bed Mobility    Supine-Sit [place] (Bed Mobility) independent  -CARLOS     Sit-Supine [place] (Bed Mobility) independent  -CARLOS       Row Name 07/30/24 1023          Sit-Stand Transfer    Sit-Stand [place] (Transfers) independent  -CARLOS       Row Name 07/30/24 1023          Gait/Stairs (Locomotion)    [place] Level (Gait) supervision  -CARLOS     Distance in Feet (Gait) 300  -CARLOS       Row Name 07/30/24 1023          Positioning and Restraints    Pre-Treatment Position in bed  -CARLOS     Post Treatment Position bed  -CARLOS     In Bed fowlers;call light within reach;encouraged to call for assist;side rails up x2  -CARLOS               User Key  (r) = Recorded By, (t) = Taken By, (c) = Cosigned By      Initials Name Provider Type    CARLOS Olegario Murphy, PTA Physical Therapist Assistant                    Physical Therapy Education       Title: PT OT SLP Therapies (In Progress)       Topic: Physical Therapy (In Progress)       Point: Mobility training (Done)       Learning Progress Summary             Patient Acceptance, E,TB, VU,DU by CN at 7/29/2024 0813    Comment: Educated on role of PT in pt care.                         Point: Home exercise program (Not Started)       Learner Progress:  Not documented in this visit.              Point: Body mechanics (Not Started)        Learner Progress:  Not documented in this visit.              Point: Precautions (Not Started)       Learner Progress:  Not documented in this visit.                              User Key       Initials Effective Dates Name Provider Type Discipline    CN 06/24/24 -  Nuha Gutierrez, PT Student PT Student PT                  PT Recommendation and Plan     Plan of Care Reviewed With: patient  Progress: improving  Outcome Evaluation: Pt is independent with bed mobility and transfers.  Amb 300' with supervision on 3L O2.  O2 remained in the 90's throughout tx, on 2L at rest .   Outcome Measures       Row Name 07/30/24 1023             How much help from another person do you currently need...    Turning from your back to your side while in flat bed without using bedrails? 4  -CARLOS      Moving from lying on back to sitting on the side of a flat bed without bedrails? 4  -CARLOS      Moving to and from a bed to a chair (including a wheelchair)? 4  -CARLOS      Standing up from a chair using your arms (e.g., wheelchair, bedside chair)? 4  -CARLOS      Climbing 3-5 steps with a railing? 3  -CARLOS      To walk in hospital room? 3  -CARLOS      AM-PAC 6 Clicks Score (PT) 22  -CARLOS      Highest Level of Mobility Goal 7 --> Walk 25 feet or more  -CARLOS         Functional Assessment    Outcome Measure Options AM-PAC 6 Clicks Basic Mobility (PT)  -CARLOS                User Key  (r) = Recorded By, (t) = Taken By, (c) = Cosigned By      Initials Name Provider Type    CARLOS Olegario Murphy, PTA Physical Therapist Assistant                     Time Calculation:    PT Charges       Row Name 07/30/24 1023             Time Calculation    Start Time 1023  -CARLOS      Stop Time 1035  -CARLOS      Time Calculation (min) 12 min  -CARLOS      PT Received On 07/30/24  -CARLOS         Time Calculation- PT    Total Timed Code Minutes- PT 12 minute(s)  -CARLOS         Timed Charges    80100 - Gait Training Minutes  12  -CARLOS         Total Minutes    Timed Charges Total Minutes 12  -CARLOS        Total Minutes 12  -CARLOS                User Key  (r) = Recorded By, (t) = Taken By, (c) = Cosigned By      Initials Name Provider Type    Olegario Montano PTA Physical Therapist Assistant                  Therapy Charges for Today       Code Description Service Date Service Provider Modifiers Qty    [id number] HC GAIT TRAINING EA 15 MIN 7/30/2024 Olegario Murphy PTA GP 1            PT G-Codes  Outcome Measure Options: AM-PAC 6 Clicks Basic Mobility (PT)  AM-PAC 6 Clicks Score (PT): 22    Olegario Murphy PTA  7/30/2024

## 2024-07-31 ENCOUNTER — APPOINTMENT (OUTPATIENT)
Dept: GENERAL RADIOLOGY | Facility: HOSPITAL | Age: 59
End: 2024-07-31
Payer: MEDICARE

## 2024-07-31 DIAGNOSIS — R91.8 PULMONARY NODULES: Primary | ICD-10-CM

## 2024-07-31 LAB
ANION GAP SERPL CALCULATED.3IONS-SCNC: 7 MMOL/L (ref 5–15)
BUN SERPL-MCNC: 19 MG/DL (ref 8–23)
BUN/CREAT SERPL: 14.4 (ref 7–25)
CALCIUM SPEC-SCNC: 9 MG/DL (ref 8.6–10.5)
CHLORIDE SERPL-SCNC: 92 MMOL/L (ref 98–107)
CO2 SERPL-SCNC: 35 MMOL/L (ref 22–29)
CREAT SERPL-MCNC: 1.32 MG/DL (ref 0.76–1.27)
DEPRECATED RDW RBC AUTO: 50.8 FL (ref 37–54)
EGFRCR SERPLBLD CKD-EPI 2021: 61.7 ML/MIN/1.73
ERYTHROCYTE [DISTWIDTH] IN BLOOD BY AUTOMATED COUNT: 21.1 % (ref 12.3–15.4)
GLUCOSE SERPL-MCNC: 102 MG/DL (ref 65–99)
HCT VFR BLD AUTO: 42.3 % (ref 37.5–51)
HGB BLD-MCNC: 12 G/DL (ref 13–17.7)
MAGNESIUM SERPL-MCNC: 1.9 MG/DL (ref 1.6–2.4)
MCH RBC QN AUTO: 22.8 PG (ref 26.6–33)
MCHC RBC AUTO-ENTMCNC: 28.4 G/DL (ref 31.5–35.7)
MCV RBC AUTO: 80.3 FL (ref 79–97)
PLATELET # BLD AUTO: 231 10*3/MM3 (ref 140–450)
PMV BLD AUTO: 10.2 FL (ref 6–12)
POTASSIUM SERPL-SCNC: 3.6 MMOL/L (ref 3.5–5.2)
POTASSIUM SERPL-SCNC: 3.7 MMOL/L (ref 3.5–5.2)
RBC # BLD AUTO: 5.27 10*6/MM3 (ref 4.14–5.8)
SODIUM SERPL-SCNC: 134 MMOL/L (ref 136–145)
WBC NRBC COR # BLD AUTO: 8.65 10*3/MM3 (ref 3.4–10.8)

## 2024-07-31 PROCEDURE — 36415 COLL VENOUS BLD VENIPUNCTURE: CPT | Performed by: STUDENT IN AN ORGANIZED HEALTH CARE EDUCATION/TRAINING PROGRAM

## 2024-07-31 PROCEDURE — 94664 DEMO&/EVAL PT USE INHALER: CPT

## 2024-07-31 PROCEDURE — 85027 COMPLETE CBC AUTOMATED: CPT | Performed by: STUDENT IN AN ORGANIZED HEALTH CARE EDUCATION/TRAINING PROGRAM

## 2024-07-31 PROCEDURE — 99232 SBSQ HOSP IP/OBS MODERATE 35: CPT | Performed by: NURSE PRACTITIONER

## 2024-07-31 PROCEDURE — 94761 N-INVAS EAR/PLS OXIMETRY MLT: CPT

## 2024-07-31 PROCEDURE — 83735 ASSAY OF MAGNESIUM: CPT | Performed by: STUDENT IN AN ORGANIZED HEALTH CARE EDUCATION/TRAINING PROGRAM

## 2024-07-31 PROCEDURE — 97116 GAIT TRAINING THERAPY: CPT

## 2024-07-31 PROCEDURE — 94799 UNLISTED PULMONARY SVC/PX: CPT

## 2024-07-31 PROCEDURE — 25010000002 BUMETANIDE PER 0.5 MG: Performed by: NURSE PRACTITIONER

## 2024-07-31 PROCEDURE — 71046 X-RAY EXAM CHEST 2 VIEWS: CPT

## 2024-07-31 PROCEDURE — 80048 BASIC METABOLIC PNL TOTAL CA: CPT | Performed by: STUDENT IN AN ORGANIZED HEALTH CARE EDUCATION/TRAINING PROGRAM

## 2024-07-31 PROCEDURE — 84132 ASSAY OF SERUM POTASSIUM: CPT | Performed by: FAMILY MEDICINE

## 2024-07-31 PROCEDURE — 99232 SBSQ HOSP IP/OBS MODERATE 35: CPT | Performed by: STUDENT IN AN ORGANIZED HEALTH CARE EDUCATION/TRAINING PROGRAM

## 2024-07-31 RX ORDER — POTASSIUM CHLORIDE 750 MG/1
40 CAPSULE, EXTENDED RELEASE ORAL EVERY 4 HOURS
Status: COMPLETED | OUTPATIENT
Start: 2024-07-31 | End: 2024-07-31

## 2024-07-31 RX ADMIN — SACUBITRIL AND VALSARTAN 0.5 TABLET: 24; 26 TABLET, FILM COATED ORAL at 22:04

## 2024-07-31 RX ADMIN — BUMETANIDE 1 MG: 0.25 INJECTION INTRAMUSCULAR; INTRAVENOUS at 08:10

## 2024-07-31 RX ADMIN — SACUBITRIL AND VALSARTAN 0.5 TABLET: 24; 26 TABLET, FILM COATED ORAL at 10:32

## 2024-07-31 RX ADMIN — POTASSIUM CHLORIDE 40 MEQ: 750 CAPSULE, EXTENDED RELEASE ORAL at 06:00

## 2024-07-31 RX ADMIN — SPIRONOLACTONE 25 MG: 25 TABLET ORAL at 08:10

## 2024-07-31 RX ADMIN — LEVOTHYROXINE SODIUM 100 MCG: 100 TABLET ORAL at 05:29

## 2024-07-31 RX ADMIN — IPRATROPIUM BROMIDE AND ALBUTEROL SULFATE 3 ML: .5; 3 SOLUTION RESPIRATORY (INHALATION) at 19:27

## 2024-07-31 RX ADMIN — METOPROLOL SUCCINATE 200 MG: 100 TABLET, FILM COATED, EXTENDED RELEASE ORAL at 22:04

## 2024-07-31 RX ADMIN — BUDESONIDE AND FORMOTEROL FUMARATE DIHYDRATE 1 PUFF: 160; 4.5 AEROSOL RESPIRATORY (INHALATION) at 19:27

## 2024-07-31 RX ADMIN — Medication 10 ML: at 08:11

## 2024-07-31 RX ADMIN — APIXABAN 5 MG: 5 TABLET, FILM COATED ORAL at 05:29

## 2024-07-31 RX ADMIN — AMIODARONE HYDROCHLORIDE 400 MG: 200 TABLET ORAL at 08:10

## 2024-07-31 RX ADMIN — Medication 10 ML: at 22:05

## 2024-07-31 RX ADMIN — BUTALBITAL, ACETAMINOPHEN AND CAFFEINE 1 TABLET: 325; 50; 40 TABLET ORAL at 07:16

## 2024-07-31 RX ADMIN — IPRATROPIUM BROMIDE AND ALBUTEROL SULFATE 3 ML: .5; 3 SOLUTION RESPIRATORY (INHALATION) at 06:00

## 2024-07-31 RX ADMIN — IPRATROPIUM BROMIDE AND ALBUTEROL SULFATE 3 ML: .5; 3 SOLUTION RESPIRATORY (INHALATION) at 14:21

## 2024-07-31 RX ADMIN — POTASSIUM CHLORIDE 40 MEQ: 750 CAPSULE, EXTENDED RELEASE ORAL at 09:42

## 2024-07-31 RX ADMIN — Medication 1 TABLET: at 08:10

## 2024-07-31 RX ADMIN — APIXABAN 5 MG: 5 TABLET, FILM COATED ORAL at 17:23

## 2024-07-31 RX ADMIN — ATORVASTATIN CALCIUM 40 MG: 40 TABLET ORAL at 22:04

## 2024-07-31 RX ADMIN — PAROXETINE 20 MG: 20 TABLET, FILM COATED ORAL at 08:10

## 2024-07-31 RX ADMIN — BUDESONIDE AND FORMOTEROL FUMARATE DIHYDRATE 1 PUFF: 160; 4.5 AEROSOL RESPIRATORY (INHALATION) at 06:00

## 2024-07-31 RX ADMIN — AMIODARONE HYDROCHLORIDE 400 MG: 200 TABLET ORAL at 22:04

## 2024-07-31 RX ADMIN — QUETIAPINE FUMARATE 100 MG: 100 TABLET ORAL at 22:04

## 2024-07-31 RX ADMIN — POTASSIUM CHLORIDE 10 MEQ: 750 CAPSULE, EXTENDED RELEASE ORAL at 08:10

## 2024-07-31 RX ADMIN — AMITRIPTYLINE HYDROCHLORIDE 25 MG: 25 TABLET, FILM COATED ORAL at 22:04

## 2024-07-31 RX ADMIN — METOPROLOL SUCCINATE 200 MG: 100 TABLET, FILM COATED, EXTENDED RELEASE ORAL at 08:10

## 2024-07-31 RX ADMIN — PANTOPRAZOLE SODIUM 40 MG: 40 TABLET, DELAYED RELEASE ORAL at 07:16

## 2024-07-31 RX ADMIN — FLUTICASONE PROPIONATE 2 SPRAY: 50 SPRAY, METERED NASAL at 08:10

## 2024-07-31 RX ADMIN — BUPROPION HYDROCHLORIDE 300 MG: 150 TABLET, EXTENDED RELEASE ORAL at 08:10

## 2024-07-31 NOTE — PLAN OF CARE
Goal Outcome Evaluation:  Plan of Care Reviewed With: patient        Progress: improving  Outcome Evaluation: Patient doing well. Receiving IV Bumex, good urine output. Added entresto. Aflutter  per tele. Receiving Amio and Metoprolol. Plans for repeat cardioversion on Friday.

## 2024-07-31 NOTE — PLAN OF CARE
Goal Outcome Evaluation:  Plan of Care Reviewed With: patient        Progress: improving  Outcome Evaluation: VSS.  AFL , HR up to 147, per tele.  No complaints of pain.  Pt slept well throughout the night.  Potassium was low and pt was given a dose of potassium this morning.

## 2024-07-31 NOTE — PROGRESS NOTES
[facility] -  Progress Note     LOS: 9 days   Patient Care Team:  Ashish Thurman DO as PCP - General (Internal Medicine)  Douglas Ruano MD as Consulting Physician (Pain Medicine)  Uri Lagos MD (Inactive) as Cardiologist (Cardiology)    Chief Complaint: CHF/AF follow up    Subjective     Interval History: He feels well today. Breathing is improving, reports productive cough. On room air this am sats 88-89% on exam. CXR still notes persistent infiltrates. Renal function remains stable. Heart rates are stable less than 100. He is ambulatory in the room without complaints.       Tele: AF rates     Review of Systems:     Review of Systems   Constitutional:  Negative for chills, diaphoresis and fever.   Respiratory:  Positive for cough and shortness of breath. Negative for chest tightness and wheezing.    Cardiovascular:  Negative for chest pain, palpitations and leg swelling.   Gastrointestinal:  Negative for abdominal pain, nausea and vomiting.   Genitourinary:  Negative for decreased urine volume and difficulty urinating.   Neurological:  Negative for dizziness, tremors, syncope, weakness and headaches.   Psychiatric/Behavioral:  Negative for agitation and confusion. The patient is not nervous/anxious.      Objective     Vital Sign Min/Max for last 24 hours  Temp  Min: 97.9 °F (36.6 °C)  Max: 99 °F (37.2 °C)   BP  Min: 102/69  Max: 128/78   Pulse  Min: 77  Max: 106   Resp  Min: 18  Max: 18   SpO2  Min: 91 %  Max: 99 %   No data recorded   Weight  Min: 92.2 kg (203 lb 3.2 oz)  Max: 92.2 kg (203 lb 3.2 oz)         07/31/24  0419   Weight: 92.2 kg (203 lb 3.2 oz)       Physical Exam:    Vitals reviewed.   Constitutional:       General: Awake.      Appearance: Well-developed, well-groomed and not in distress. Obese. Acutely ill-appearing.   HENT:      Head: Normocephalic and atraumatic.   Pulmonary:      Effort: Pulmonary effort is normal.      Breath sounds: Decreased  breath sounds present. Rales present.   Cardiovascular:      Normal rate. Irregularly irregular rhythm.      Murmurs: There is a systolic murmur.   Edema:     Peripheral edema absent.   Musculoskeletal:      Cervical back: Normal range of motion and neck supple. Skin:     General: Skin is warm and dry.   Neurological:      Mental Status: Alert, oriented to person, place, and time and oriented to person, place and time.   Psychiatric:         Attention and Perception: Attention normal.         Mood and Affect: Mood normal.         Speech: Speech normal.         Behavior: Behavior normal. Behavior is cooperative.         Thought Content: Thought content normal.         Cognition and Memory: Cognition and memory normal.         Judgment: Judgment normal.       Results Review:   Lab Results   Component Value Date    WBC 8.65 07/31/2024    HGB 12.0 (L) 07/31/2024    HCT 42.3 07/31/2024    MCV 80.3 07/31/2024     07/31/2024     Lab Results   Component Value Date    GLUCOSE 102 (H) 07/31/2024    CALCIUM 9.0 07/31/2024     (L) 07/31/2024    K 3.6 07/31/2024    CO2 35.0 (H) 07/31/2024    CL 92 (L) 07/31/2024    BUN 19 07/31/2024    CREATININE 1.32 (H) 07/31/2024    EGFR 61.7 07/31/2024    BCR 14.4 07/31/2024    ANIONGAP 7.0 07/31/2024       Medication Review: yes  Current Facility-Administered Medications   Medication Dose Route Frequency Provider Last Rate Last Admin    acetaminophen (TYLENOL) tablet 650 mg  650 mg Oral Q6H PRN Jem Begum MD   650 mg at 07/30/24 1102    albuterol (PROVENTIL) nebulizer solution 0.083% 2.5 mg/3mL  2.5 mg Nebulization Q6H PRN Jem Begum MD        amiodarone (PACERONE) tablet 400 mg  400 mg Oral Q12H Jem Begum MD   400 mg at 07/31/24 0810    amitriptyline (ELAVIL) tablet 25 mg  25 mg Oral Nightly Jem Begum MD   25 mg at 07/30/24 2100    apixaban (ELIQUIS) tablet 5 mg  5 mg Oral Q12H Jem Begum MD   5 mg at 07/31/24 0529    atorvastatin (LIPITOR) tablet 40 mg  40 mg  Oral Nightly Jem Begum MD   40 mg at 07/30/24 2101    benzonatate (TESSALON) capsule 100 mg  100 mg Oral TID PRN Jem Begum MD   100 mg at 07/28/24 2212    sennosides-docusate (PERICOLACE) 8.6-50 MG per tablet 2 tablet  2 tablet Oral BID PRN Jem Begum MD   2 tablet at 07/28/24 2003    And    polyethylene glycol (MIRALAX) packet 17 g  17 g Oral Daily PRN Jem Begum MD   17 g at 07/28/24 0922    And    bisacodyl (DULCOLAX) EC tablet 5 mg  5 mg Oral Daily PRN Jem Begum MD   5 mg at 07/28/24 0614    And    bisacodyl (DULCOLAX) suppository 10 mg  10 mg Rectal Daily PRN Jem Begum MD        budesonide-formoterol (SYMBICORT) 160-4.5 MCG/ACT inhaler 1 puff  1 puff Inhalation BID - RT Jem Begum MD   1 puff at 07/31/24 0600    bumetanide (BUMEX) injection 1 mg  1 mg Intravenous Daily Barbara Pereira, VIRGILIO   1 mg at 07/31/24 0810    buPROPion XL (WELLBUTRIN XL) 24 hr tablet 300 mg  300 mg Oral Daily Jem Begum MD   300 mg at 07/31/24 0810    butalbital-acetaminophen-caffeine (FIORICET, ESGIC) -40 MG per tablet 1 tablet  1 tablet Oral Daily PRN Jem Begum MD   1 tablet at 07/31/24 0716    calcium carbonate (TUMS) chewable tablet 500 mg (200 mg elemental)  1 tablet Oral TID PRN Jem Begum MD        Calcium Replacement - Follow Nurse / BPA Driven Protocol   Does not apply PRN Jem Begum MD        cyclobenzaprine (FLEXERIL) tablet 5 mg  5 mg Oral TID PRN Jem Begum MD        fluticasone (FLONASE) 50 MCG/ACT nasal spray 2 spray  2 spray Nasal Daily Jem Begum MD   2 spray at 07/31/24 0810    guaifenesin (ROBITUSSIN) 100 MG/5ML liquid 200 mg  200 mg Oral Q4H PRN Jem Begum MD   200 mg at 07/29/24 2122    ipratropium-albuterol (DUO-NEB) nebulizer solution 3 mL  3 mL Nebulization TID - RT Jem Begum MD   3 mL at 07/31/24 0600    levothyroxine (SYNTHROID, LEVOTHROID) tablet 100 mcg  100 mcg Oral Daily Adriano Rapp MD   100 mcg at 07/31/24 0529    Magnesium Standard Dose  Replacement - Follow Nurse / BPA Driven Protocol   Does not apply PRN Jem Begum MD        metoprolol succinate XL (TOPROL-XL) 24 hr tablet 200 mg  200 mg Oral Q12H Jem Begum MD   200 mg at 07/31/24 0810    multivitamin with minerals 1 tablet  1 tablet Oral Daily Jem Begum MD   1 tablet at 07/31/24 0810    nitroglycerin (NITROSTAT) SL tablet 0.4 mg  0.4 mg Sublingual Q5 Min PRN Jem Begum MD   0.4 mg at 07/23/24 0343    ondansetron (ZOFRAN) injection 4 mg  4 mg Intravenous Q6H PRN Jem Begum MD   4 mg at 07/30/24 1102    pantoprazole (PROTONIX) EC tablet 40 mg  40 mg Oral Q AM Jem Begum MD   40 mg at 07/31/24 0716    PARoxetine (PAXIL) tablet 20 mg  20 mg Oral Daily Jem Begum MD   20 mg at 07/31/24 0810    Phosphorus Replacement - Follow Nurse / BPA Driven Protocol   Does not apply PRN Jem Begum MD        potassium chloride (MICRO-K/KLOR-CON) CR capsule  10 mEq Oral Daily Jem Begum MD   10 mEq at 07/31/24 0810    potassium chloride (MICRO-K/KLOR-CON) CR capsule  40 mEq Oral Q4H Adriano Rapp MD   40 mEq at 07/31/24 0600    Potassium Replacement - Follow Nurse / BPA Driven Protocol   Does not apply PRN Jem Begum MD        QUEtiapine (SEROquel) tablet 100 mg  100 mg Oral Nightly Jem Begum MD   100 mg at 07/30/24 2101    [Held by provider] sacubitril-valsartan (ENTRESTO) 24-26 MG tablet 1 tablet  1 tablet Oral Q12H Mihaela Otero APRN   1 tablet at 07/25/24 2206    sodium chloride 0.9 % flush 10 mL  10 mL Intravenous PRN Jem Begum MD        sodium chloride 0.9 % flush 10 mL  10 mL Intravenous Q12H Jem Begum MD   10 mL at 07/31/24 0811    sodium chloride 0.9 % infusion 40 mL  40 mL Intravenous PRN Jem Begum MD        spironolactone (ALDACTONE) tablet 25 mg  25 mg Oral Daily Barbara Pereira APRN   25 mg at 07/31/24 0810    temazepam (RESTORIL) capsule 15 mg  15 mg Oral Nightly PRN Jem Begum MD         Results for orders placed during the hospital encounter of  07/22/24    Adult Transesophageal Echo (LAUREN) W/ Cont if Necessary Per Protocol    Interpretation Summary    Left ventricular systolic function is moderately decreased.    The left ventricular cavity is mild to moderately dilated.    The left atrial cavity is severely dilated.    The right atrial cavity is dilated.    Severe mitral valve regurgitation is present. Pulmonary vein flow reversal is present.      Interpretation Summary  Transthoracic echocardiogram 7/23/2024 (Dr. Mathew)       Left ventricular systolic function is moderately decreased. Left ventricular ejection fraction appears to be 36 - 40%.    The left ventricular cavity is mildly dilated.    The left atrial cavity is severely dilated.    Normal right ventricular cavity size and systolic function noted.    Severe mitral valve regurgitation is present.    Estimated right ventricular systolic pressure from tricuspid regurgitation is moderately elevated (45-55 mmHg).    Assessment & Plan     1.  Acute on chronic combined systolic and diastolic congestive heart failure: Has been managed for atrial fibrillation with rapid ventricular response this admission, left ventricular ejection fraction found to be 36 to 40% as well as component of valvular heart disease with severe MR.  History of cardiomyopathy.  Old notes indicate nonischemic cardiomyopathy.  Initially managed on bumetanide continuous drip.  Intermittent dosing of IV Bumex was initiated however discontinued. Had been on a high dose of carvedilol (50 mg BID) however transitioned to Lopressor for due to hypotension.  Also had been on Entresto and spironolactone however those are currently on hold.  Standing weights had been unchanged of recent. He has diuresed with a net negative fluid balance based off of documentation. Remains on supplemental oxygen. Chest x ray 7/25/24 did not show improvement of his volume status.  After holding diuretics renal function has improved however the patient became  significantly short of breath and IV diuretics were resumed.  Weights had been consistent without much improvement despite ongoing diuresis, now with weight loss again, down 203 pounds. On room air today, but o2 is 88-89%.  Still with volume on CXR, renal function stable. Beta blocker dose increased by EP for rate control. Monitor BP. Add Entresto 1/2 tablet low dose BID. Continue spironolactone. Hopefully transition to oral Bumex soon.       2.  Atrial fibrillation with rapid ventricular response: Initially was being managed on Cardizem.  It was recommended to wean Cardizem for other rate controlling medications given findings of depressed left ventricular ejection fraction.  Given hypotension the patient has received digoxin. This has been since stopped.   Currently anticoagulated with Eliquis. EP is following. Cardioversion 7/29. Has since had recurrent AF. He has been started on antiarrhythmic therapy with oral amiodarone taper.  Rates are improved.  He is on high dose of Toprol XL. Plans to follow with EP after discharge and discuss repeat cardioversion at that time.     3.  Acute kidney injury: has improved during hospital stay - Continue to monitor renal function closely due to need for IV diuretics. Stable Cr today.    4.  Severe MR: Functional. Noted initially on echo, has also had LAUREN. CTS following. Recommend optimization medically with diuresis. CTS plans to follow up in the outpatient setting to discuss timing/need for surgical intervention. He will also need coronary angiography at some point.     5.  Coronary artery disease: Documentation indicates the patient has a history of coronary artery disease but no specific test results or workup to indicate extent of findings or what diagnostic testing was performed.  Previously followed in the office for what was documented to be nonischemic cardiomyopathy. CT Images reviewed by Dr. Noel reveal coronary artery calcification. Defer angiogram at this time as  he is optimized for CHF and management of AF.     6.  Hypertension: has had low BP with diuretics and med changes earlier this admission. Aldactone has been started. Resume Entresto, monitor BP, start with 1/2 tablet twice a day (low dose).    7.  Hyperlipidemia: on statin therapy  8.  Obstructive sleep apnea  9.  Anemia: stable   10.  Obesity BMI 31.83      Continue IV Diuretics due to CXR and ongoing rales on exam  Monitor O2 sat, he is on room air with O2 88-89%  Continue K supplement - may be able to dc with spironolactone and Entresto resumed.   Rate/rhythm control per EP  Eliquis for anticoagulation.  Daily monitoring of renal function  Strict intake and output  Daily weights  Low Sodium diet   Labs in am      In regards to his coronary angiography, this can be deferred at this time. He is having symptoms of heart failure currently, no suspicion for angina. Coronary anatomy can be evaluated prior to surgery but is not something that has to be done during this hospitalization, and can be considered outpatient. Hopefully with better management of his heart failure and restoring SR he will improve symptomatically be optimized medically, thus have improvement of his symptoms felt to be related to functional MR, moderate LV systolic dysfunction, and AF RVR.         Electronically signed by VIRGILIO Herrera, 07/31/24, 9:39 AM [facility].

## 2024-07-31 NOTE — PROGRESS NOTES
"EP Problems:  1.  Persistent atrial fibrillation  2.  Severe biatrial enlargement     Cardiology Problems:  1.  Severe MR  2.  Nonischemic cardiomyopathy  3.  Chronic systolic heart failure  4.  Hypertension  5.  Hyperlipidemia     Medical Problems:  1.  Obesity  2.  Obstructive sleep apnea  3.  Chronic normocytic anemia    Patient ID:  Ziyad Wilson is a 59 y.o. male with problem list as above as above who EP is following for persistent atrial fibrillation.    Subjective:  Rates remain reasonably controlled.  Still having coughing.    Objective:  /77 (BP Location: Left arm, Patient Position: Lying)   Pulse 77   Temp 97.9 °F (36.6 °C) (Oral)   Resp 18   Ht 170.2 cm (67\")   Wt 92.2 kg (203 lb 3.2 oz)   SpO2 91%   BMI 31.83 kg/m²     Chronically ill-appearing, obese, wearing oxygen  Normal work of breathing  Normal rate, irregular, apical murmur  No significant lower extremity edema      Labs today:  Lab Results   Component Value Date    GLUCOSE 102 (H) 07/31/2024    CALCIUM 9.0 07/31/2024     (L) 07/31/2024    K 3.6 07/31/2024    CO2 35.0 (H) 07/31/2024    BUN 19 07/31/2024    CREATININE 1.32 (H) 07/31/2024    EGFR 61.7 07/31/2024     Lab Results   Component Value Date    WBC 8.65 07/31/2024    HGB 12.0 (L) 07/31/2024    HCT 42.3 07/31/2024     07/31/2024     Lab Results   Component Value Date    MG 1.9 07/31/2024       Telemetry directly visualized independently reviewed: Remains in atrial fibrillation,  heart rates predominantly ~    Assessment:  Persistent atrial fibrillation with acute exacerbation  Chronic systolic heart failure  Severe mitral regurgitation  Hypertension    Plan:  -Rates are under good control on current regimen; continue metoprolol succinate 200 mg twice daily  -If still here on Friday, plan for repeat direct-current cardioversion at that point  -Continue apixaban for anticoagulation  -Continue amiodarone 400 mg twice daily for 10 days (8/8/2024) then 200 mg " daily thereafter  -Daily K and mag  -Long-term decision regarding ablation versus Maze procedure depending upon mitral regurgitation    Part of this note may be an electronic transcription/translation of spoken language to printed text using the Dragon Dictation System.

## 2024-07-31 NOTE — PROGRESS NOTES
[facility] Medicine Services  INPATIENT PROGRESS NOTE    Patient Name: Ziyad Wilson  Date of Admission: 7/22/2024  Today's Date: 07/31/24  Length of Stay: 9  Primary Care Physician: Ashish Thurman DO    Subjective   Chief Complaint: Respiratory failure/atrial fibs/CHF  HPI   Blood pressure stable, slightly tacky.  Patient is on room air now.  Sodium slightly low.  Creatinine stable.  Hemoglobin stable.  Urine output -1300.    Review of Systems   Constitutional:  Positive for activity change, appetite change and fatigue. Negative for chills and fever.   HENT:  Negative for hearing loss, nosebleeds, tinnitus and trouble swallowing.    Eyes:  Negative for visual disturbance.   Respiratory: Shortness of breath resolved.. Negative for cough, chest tightness and wheezing.    Cardiovascular:  Negative for chest pain, palpitations and leg swelling.   Gastrointestinal:  Negative for abdominal distention, abdominal pain, blood in stool, constipation, diarrhea, nausea and vomiting.   Endocrine: Negative for cold intolerance, heat intolerance, polydipsia, polyphagia and polyuria.   Genitourinary:  Negative for decreased urine volume, difficulty urinating, dysuria, flank pain, frequency and hematuria.   Musculoskeletal:  Negative for arthralgias, joint swelling and myalgias.   Skin:  Negative for rash.   Allergic/Immunologic: Negative for immunocompromised state.   Neurological:  Positive for weakness. Negative for dizziness, syncope, light-headedness and headaches.   Hematological:  Negative for adenopathy. Does not bruise/bleed easily.   Psychiatric/Behavioral:  Negative for confusion and sleep disturbance. The patient is not nervous/anxious.  All pertinent negatives and positives are as above. All other systems have been reviewed and are negative unless otherwise stated.     Objective    Temp:  [97.8 °F (36.6 °C)-98.8 °F (37.1 °C)] 97.8 °F (36.6 °C)  Heart Rate:  []  102  Resp:  [18] 18  BP: (102-128)/(69-90) 117/90      Intake/Output Summary (Last 24 hours) at 7/31/2024 1526  Last data filed at 7/31/2024 1200  Gross per 24 hour   Intake 360 ml   Output 1750 ml   Net -1390 ml      Physical Exam  Vitals and nursing note reviewed.   HENT:      Head: Normocephalic and atraumatic.   Eyes:      Conjunctiva/sclera: Conjunctivae normal.      Pupils: Pupils are equal, round, and reactive to light.   Cardiovascular:      Rate and Rhythm: Normal rate. Rhythm irregular.      Heart sounds: Normal heart sounds.   Pulmonary:      Effort: No respiratory distress.      Comments: Diminished breath sound, clear, on room air.  Abdominal:      General: Bowel sounds are normal. There is no distension.      Palpations: Abdomen is soft.      Tenderness: There is no abdominal tenderness.      Comments: Obese .   Musculoskeletal:         General: No swelling.      Cervical back: Neck supple.   Skin:     General: Skin is warm and dry.      Capillary Refill: Capillary refill takes 2 to 3 seconds.      Findings: No rash.   Neurological:      General: No focal deficit present.      Mental Status: He is alert and oriented to person, place, and time.      Motor: Weakness present.   Psychiatric:         Mood and Affect: Mood normal.         Behavior: Behavior normal.         Thought Content: Thought content normal.          Results Review:  I have reviewed the labs, radiology results, and diagnostic studies.    Laboratory Data:   Results from last 7 days   Lab Units 07/31/24  0338 07/30/24  0349 07/29/24  0319   WBC 10*3/mm3 8.65 8.39 8.99   HEMOGLOBIN g/dL 12.0* 11.9* 11.0*   HEMATOCRIT % 42.3 41.7 39.1   PLATELETS 10*3/mm3 231 252 236        Results from last 7 days   Lab Units 07/31/24  0338 07/30/24  1707 07/30/24  0349 07/29/24  0319   SODIUM mmol/L 134*  --  137 137   POTASSIUM mmol/L 3.6 3.9 3.5 3.7   CHLORIDE mmol/L 92*  --  94* 97*   CO2 mmol/L 35.0*  --  36.0* 35.0*   BUN mg/dL 19  --  18 16  "  CREATININE mg/dL 1.32*  --  1.30* 1.30*   CALCIUM mg/dL 9.0  --  9.2 8.9   BILIRUBIN mg/dL  --   --  0.4  --    ALK PHOS U/L  --   --  65  --    ALT (SGPT) U/L  --   --  40  --    AST (SGOT) U/L  --   --  27  --    GLUCOSE mg/dL 102*  --  112* 94       Culture Data:   No results found for: \"BLOODCX\", \"URINECX\", \"WOUNDCX\", \"MRSACX\", \"RESPCX\", \"STOOLCX\"    Radiology Data:   Imaging Results (Last 24 Hours)       Procedure Component Value Units Date/Time    XR Chest PA & Lateral [[id number]] Collected: 07/31/24 0642     Updated: 07/31/24 0647    Narrative:      EXAMINATION: XR CHEST PA AND LATERAL-     7/31/2024 3:39 AM     HISTORY: CHF; I48.91-Unspecified atrial fibrillation; I50.21-Acute  systolic (congestive) heart failure; I34.0-Nonrheumatic mitral (valve)  insufficiency; Z74.09-Other reduced mobility     The fractured endometrium of the chest are compared with the previous  study dated 7/25/2024.     Bilateral lung infiltrate persists, right more than the left.     There is no pleural effusion. No pneumothorax.     There is moderate cardiomegaly.     No acute bony abnormality.       Impression:      1. Persistent bilateral lung infiltrate, right more than the left.        This report was signed and finalized on 7/31/2024 6:44 AM by Dr. Ruben Drake MD.               I have reviewed the patient's current medications.     Assessment/Plan   Assessment  Active Hospital Problems    Diagnosis     **Atrial fibrillation with RVR     Severe mitral regurgitation     Acute systolic CHF (congestive heart failure)     Iron deficiency anemia     NICM (nonischemic cardiomyopathy), viral etiology     Tetrahydrocannabinol (THC) dependence     Acute pulmonary edema     CKD (chronic kidney disease) stage 2, GFR 60-89 ml/min     Class 1 obesity due to excess calories with serious comorbidity and body mass index (BMI) of 30.0 to 30.9 in adult        Treatment Plan  Cardiomyopathy/CHF/CAD/hyperlipidemia/atrial " fibs/hypotensio/severe mitral valve regurgitation.  EP consulted.  Cardiology consulted.  Amiodarone.  Lipitor.  Bumex . Lopressor.  Entresto and Aldactone started back by cardiology.   Nitro as needed.  Eliquis.  Toprol.  Echocardiogram7/3/2024-ejection fraction 36 to 40%, left ventricle mildly dilated, left atrium severely dilated, Normal right ventricular cavity size and systolic function noted, Severe mitral valve regurgitation is present, tricuspid regurgitation.  LAUREN- Left ventricular systolic function is moderately decreased, left ventricular cavity is mild to moderately dilated, left atrial cavity is severely dilated, right atrial cavity is dilated, Severe mitral valve regurgitation is present,  Pulmonary vein flow reversal is present.   CT surgery continues to follow however plans remain to be worked up for outpatient MVR/MAZE procedure.  Cardiology continuing to follow- Eliquis remains on hold until definitive plan for outpatient angiography is established.    Status post cardioversion.  Rate is better.    EP recommendation- Metoprolol 200 mg twice a day, possible repeat direct-current cardioversion Friday, Eliquis, amiodarone 400 mg twice a day for 10 days then 200 mg daily, long-term decision ablation versus maze procedure for mitral valve regurgitation.  Cardiology recommendation-continue IV Bumex due to Rales.       Anemia.  Iron deficiency . iron infusion ferric gluconate completed x 3 days.  Hemoglobin stable.  No sign of acute bleed.     COPD/pulm edema/obstructive sleep apnea.  Symbicort.  DuoNebs.  Tessalon Perles as needed.  Robitussin as needed.  CTA of the chest-No evidence of pulmonary thromboembolic disease- thoracic aorta and great vessels are normal in caliber, right-sided pleural effusion with right basilar atelectasis-diffuse bronchial wall thickening with mixed groundglass and airspace consolidation (right greater than left), increased reticular opacities within the periphery of the  lungs-favor that this represents changes of pulmonary venous  hypertension/volume overload with pulmonary edema, Right middle lobe and left lower lobe nodule warranting follow-up- These are slightly more conspicuous than on previous remote CT of 2016  but we also utilizing much thinner slice thickness- Follow-up CT imaging  without contrast in 6 months could be obtained to assure stability,  Cholelithiasis.  Chest x-ray-Persistent bilateral lung infiltrate, right more than the left.   Patient is on room air.     CKD stage II-creatinine clearance 61.1, baseline creatinine around 1.5, creatinine continues to trend around 1.1-1.4.  Creatinine today is 1.3.    Hyponatremia.  Will follow.     Right middle lobe and left lower lobe lung nodule, slightly more  conspicuous than previous CT scan 2016.  Recommended CT follow-up in 6 months, discussed with patient.  Follow-up with PCP outpatient.     Depression/anxiety/insomnia.  Elavil.  Wellbutrin. Paxil.  Seroquel nightly.  Restoril nightly as needed.        Allergic rhinitis.  Flomax.       Hypothyroidism .  Synthroid.  Synthroid held by cardiology.High TSH.  Normal free T4.     Cholelithiasis.  Discussed with patient, denies any abdomen pain.  Asymptomatic.     THC dependence-patient was educated on the possibility of nausea due to withdrawals. Patient continues to have no complaints of withdrawal symptoms, Zofran as needed for nausea.   UDS-positive for benzo and tricyclic.     Hypokalemia.  Potassium is normal.  P.o. potassium.     Pain/headache.  Fioricet as needed.  Carotid duplex- less than 50% stenosis of the right internal carotid artery, less than 50% stenosis of the left internal carotid artery, Antegrade flow is demonstrated in bilateral vertebral arteries.     Reflux . Protonix.  Tums as needed.     Obesity.  BMI is 32.       Nutrition . multivitamins.     Nutrition . NPO.     Deconditioning.  PT consult.     Blood culture-no growth in 5 days.     Medical  Decision Making  Atrial fibrillation, acute, moderate complexity, improving  Iron deficiency anemia, acute on chronic, moderate complexity, unchanged  Nonischemic cardiomyopathy, acute on chronic, moderate complexity, unchanged  THC dependence, chronic, low complexity, stable  CKD stage II, chronic, moderate complexity, stable  Obesity class I, chronic, moderate complexity, unchanged  Severe mitral valve regurgitation, acute, high complexity, unchanged  Acute systolic congestive heart failure, acute, high complexity, improving  Acute pulmonary edema, acute, high complexity, improving  Number and Complexity of problems: 9  Differential Diagnosis: None     Conditions and Status        Condition is unchanged.     [facility] Data  External documents reviewed: None  Cardiac tracing (EKG, telemetry) interpretation: Overnight telemetry AFL    Radiology interpretation: See above  Labs reviewed: See above  Any tests that were considered but not ordered: None     Decision rules/scores evaluated (example [id number], Wells, etc): [id number] score high     Discussed with: Patient  Care Planning  Shared decision making: Patient  code status and discussions: Full code per patient  Surrogate Decision Maker his brother Guy Wilson  Disposition  Social Determinants of Health that impact treatment or disposition: None at this time  I expect the patient to be discharged to home in 1-3 days.     Electronically signed by Adriano Rapp MD, 07/31/24, 15:14 CDT.

## 2024-07-31 NOTE — PLAN OF CARE
Problem: Adult Inpatient Plan of Care  Goal: Plan of Care Review  7/31/2024 1652 by Breanna Bae, RN  Outcome: Ongoing, Progressing  Flowsheets  Taken 7/31/2024 1652  Progress: improving  Taken 7/31/2024 1631  Outcome Evaluation: Patient doing well. Receiving IV Bumex, good urine output. Added entresto. Aflutter  per tele. Receiving Amio and Metoprolol. Plans for repeat cardioversion on Friday.

## 2024-07-31 NOTE — PLAN OF CARE
Goal Outcome Evaluation:  Plan of Care Reviewed With: patient        Progress: improving  Outcome Evaluation: Pt was in bed, on RA at 92%.  Performed bed mobility and transfers independently.  Amb 200' with supervision.  O2 decreased to 86% with amb, sitting resting, increase back to the 90's within a minute.  Pt left on RA at 91%

## 2024-07-31 NOTE — THERAPY TREATMENT NOTE
Acute Care - Physical Therapy Treatment Note  [facility]     Patient Name: Ziyad Wilson  : 1965  MRN: [id number]  Today's Date: 2024      Visit Dx:     ICD-10-CM ICD-9-CM   1. Atrial fibrillation with RVR  I48.91 427.31   2. Acute systolic CHF (congestive heart failure)  I50.21 428.21     428.0   3. Severe mitral regurgitation  I34.0 424.0   4. Impaired mobility [Z74.09]  Z74.09 799.89     Patient Active Problem List   Diagnosis    Essential hypertension    Coronary artery disease    Acquired hypothyroidism    Chronic daily headache    Class 1 obesity due to excess calories with serious comorbidity and body mass index (BMI) of 30.0 to 30.9 in adult    Mild cognitive impairment    Asthma    GERD (gastroesophageal reflux disease)    Sleep apnea    Mixed hyperlipidemia    Moderate episode of recurrent major depressive disorder    Primary insomnia    Cervical facet joint syndrome    Impaired glucose tolerance    CKD (chronic kidney disease) stage 2, GFR 60-89 ml/min    Arthritis of right shoulder region    Chest pain, atypical    Atrial fibrillation with RVR    Iron deficiency anemia    NICM (nonischemic cardiomyopathy), viral etiology    Tetrahydrocannabinol (THC) dependence    Acute pulmonary edema    Severe mitral regurgitation    Acute systolic CHF (congestive heart failure)     Past Medical History:   Diagnosis Date    Asthma     Cardiomyopathy     non-ischemic    CHF (congestive heart failure)     Coronary artery disease     Foot pain, bilateral     GERD (gastroesophageal reflux disease)     Headache     Hyperlipemia, mixed     Hypertension     benign    Hypothyroidism     Obesity     Sleep apnea     SOB (shortness of breath)      Past Surgical History:   Procedure Laterality Date    APPENDECTOMY      BLADDER REPAIR      CARDIAC CATHETERIZATION  2003    COLONOSCOPY N/A 2018    Procedure: COLONOSCOPY WITH ANESTHESIA;  Surgeon: Dick Espinosa DO;  Location: [facility] ENDOSCOPY;  Service:      COLOSTOMY      with colostomy reversal    KNEE SURGERY Right      PT Assessment (Last 12 Hours)       PT Evaluation and Treatment       Row Name 07/31/24 1022          Physical Therapy Time and Intention    Subjective Information no complaints  -CARLOS     Document Type therapy note (daily note)  -     Mode of Treatment physical therapy  -CARLOS       Row Name 07/31/24 1022          General Information    Existing Precautions/Restrictions fall;oxygen therapy device and L/min  -CARLOS       Row Name 07/31/24 1022          Pain    Pretreatment Pain Rating 0/10 - no pain  -CARLOS     Posttreatment Pain Rating 0/10 - no pain  -CARLOS       Row Name 07/31/24 1022          Bed Mobility    Supine-Sit Gove (Bed Mobility) independent  -CARLOS     Sit-Supine Gove (Bed Mobility) independent  -CARLOS       Row Name 07/31/24 1022          Sit-Stand Transfer    Sit-Stand Gove (Transfers) independent  -CARLOS       Row Name 07/31/24 1022          Gait/Stairs (Locomotion)    Gove Level (Gait) supervision  -CARLOS     Distance in Feet (Gait) 200  x 2 with sitting rest to check O2  -CARLOS       Row Name 07/31/24 1022          Vital Signs    Pre SpO2 (%) 92  -CARLOS     O2 Delivery Pre Treatment room air  -CARLOS     Intra SpO2 (%) 86  -CARLOS     O2 Delivery Intra Treatment room air  -CARLOS     Post SpO2 (%) 90  -CARLOS     O2 Delivery Post Treatment room air  -CARLOS     Pre Patient Position Sitting  -CARLOS     Intra Patient Position Standing  -CARLOS     Post Patient Position Sitting  -CARLOS     Recovery Time 1 minute  -[facility] Name 07/31/24 1022          Positioning and Restraints    Pre-Treatment Position in bed  -CARLOS     Post Treatment Position bed  -CARLOS     In Bed sitting EOB;call light within reach;encouraged to call for assist;side rails up x2  -CARLOS               User Key  (r) = Recorded By, (t) = Taken By, (c) = Cosigned By      Initials Name Provider Type    Olegario Montano PTA Physical Therapist Assistant                    Physical Therapy Education        Title: PT OT SLP Therapies (In Progress)       Topic: Physical Therapy (In Progress)       Point: Mobility training (Done)       Learning Progress Summary             Patient Acceptance, E,TB, VU,DU by CN at 7/29/2024 7210    Comment: Educated on role of PT in pt care.                         Point: Home exercise program (Not Started)       Learner Progress:  Not documented in this visit.              Point: Body mechanics (Not Started)       Learner Progress:  Not documented in this visit.              Point: Precautions (Not Started)       Learner Progress:  Not documented in this visit.                              User Key       Initials Effective Dates Name Provider Type Discipline    MAYLIN 06/24/24 -  Nuha Gutierrez, PT Student PT Student PT                  PT Recommendation and Plan     Plan of Care Reviewed With: patient  Progress: improving  Outcome Evaluation: Pt was in bed, on RA at 92%.  Performed bed mobility and transfers independently.  Amb 200' with supervision.  O2 decreased to 86% with amb, sitting resting, increase back to the 90's within a minute.  Pt left on RA at 91%   Outcome Measures       Row Name 07/31/24 1022 07/30/24 1023          How much help from another person do you currently need...    Turning from your back to your side while in flat bed without using bedrails? 4  -CARLOS 4  -CARLOS     Moving from lying on back to sitting on the side of a flat bed without bedrails? 4  -CARLOS 4  -CARLOS     Moving to and from a bed to a chair (including a wheelchair)? 4  -CARLOS 4  -CARLOS     Standing up from a chair using your arms (e.g., wheelchair, bedside chair)? 4  -CARLOS 4  -CARLOS     Climbing 3-5 steps with a railing? 3  -CARLOS 3  -CARLOS     To walk in hospital room? 3  -CARLOS 3  -CARLOS     AM-PAC 6 Clicks Score (PT) 22  -CARLOS 22  -CARLOS     Highest Level of Mobility Goal 7 --> Walk 25 feet or more  -CARLOS 7 --> Walk 25 feet or more  -CARLOS        Functional Assessment    Outcome Measure Options AM-PAC 6 Clicks Basic Mobility (PT)   -CARLOS AM-PAC 6 Clicks Basic Mobility (PT)  -CARLOS               User Key  (r) = Recorded By, (t) = Taken By, (c) = Cosigned By      Initials Name Provider Type    Olegario Montano PTA Physical Therapist Assistant                     Time Calculation:    PT Charges       Row Name 07/31/24 1022             Time Calculation    Start Time 1022  -CARLOS      Stop Time 1046  -CARLOS      Time Calculation (min) 24 min  -CARLOS      PT Received On 07/31/24  -CARLOS         Time Calculation- PT    Total Timed Code Minutes- PT 24 minute(s)  -CARLOS         Timed Charges    24574 - Gait Training Minutes  24  -CARLOS         Total Minutes    Timed Charges Total Minutes 24  -CARLOS       Total Minutes 24  -CARLOS                User Key  (r) = Recorded By, (t) = Taken By, (c) = Cosigned By      Initials Name Provider Type    Olegario Montano PTA Physical Therapist Assistant                  Therapy Charges for Today       Code Description Service Date Service Provider Modifiers Qty    70354749687 HC GAIT TRAINING EA 15 MIN 7/30/2024 Olegario Murphy, LYNETTE GP 1    32955992020 HC GAIT TRAINING EA 15 MIN 7/31/2024 Olegario Murphy PTA GP 2            PT G-Codes  Outcome Measure Options: AM-PAC 6 Clicks Basic Mobility (PT)  AM-PAC 6 Clicks Score (PT): 22    Olegario Murphy PTA  7/31/2024

## 2024-08-01 LAB
ANION GAP SERPL CALCULATED.3IONS-SCNC: 8 MMOL/L (ref 5–15)
BUN SERPL-MCNC: 18 MG/DL (ref 8–23)
BUN/CREAT SERPL: 13.6 (ref 7–25)
CALCIUM SPEC-SCNC: 9.4 MG/DL (ref 8.6–10.5)
CHLORIDE SERPL-SCNC: 94 MMOL/L (ref 98–107)
CO2 SERPL-SCNC: 36 MMOL/L (ref 22–29)
CREAT SERPL-MCNC: 1.32 MG/DL (ref 0.76–1.27)
DEPRECATED RDW RBC AUTO: 53.7 FL (ref 37–54)
EGFRCR SERPLBLD CKD-EPI 2021: 61.7 ML/MIN/1.73
ERYTHROCYTE [DISTWIDTH] IN BLOOD BY AUTOMATED COUNT: 21.8 % (ref 12.3–15.4)
GLUCOSE SERPL-MCNC: 102 MG/DL (ref 65–99)
HCT VFR BLD AUTO: 45.4 % (ref 37.5–51)
HGB BLD-MCNC: 13 G/DL (ref 13–17.7)
MAGNESIUM SERPL-MCNC: 1.9 MG/DL (ref 1.6–2.4)
MCH RBC QN AUTO: 23.3 PG (ref 26.6–33)
MCHC RBC AUTO-ENTMCNC: 28.6 G/DL (ref 31.5–35.7)
MCV RBC AUTO: 81.2 FL (ref 79–97)
PLATELET # BLD AUTO: 249 10*3/MM3 (ref 140–450)
PMV BLD AUTO: 10.8 FL (ref 6–12)
POTASSIUM SERPL-SCNC: 4.1 MMOL/L (ref 3.5–5.2)
RBC # BLD AUTO: 5.59 10*6/MM3 (ref 4.14–5.8)
SODIUM SERPL-SCNC: 138 MMOL/L (ref 136–145)
WBC NRBC COR # BLD AUTO: 10.85 10*3/MM3 (ref 3.4–10.8)

## 2024-08-01 PROCEDURE — 36415 COLL VENOUS BLD VENIPUNCTURE: CPT | Performed by: STUDENT IN AN ORGANIZED HEALTH CARE EDUCATION/TRAINING PROGRAM

## 2024-08-01 PROCEDURE — 25010000002 BUMETANIDE PER 0.5 MG: Performed by: NURSE PRACTITIONER

## 2024-08-01 PROCEDURE — 94799 UNLISTED PULMONARY SVC/PX: CPT

## 2024-08-01 PROCEDURE — 85027 COMPLETE CBC AUTOMATED: CPT | Performed by: STUDENT IN AN ORGANIZED HEALTH CARE EDUCATION/TRAINING PROGRAM

## 2024-08-01 PROCEDURE — 99232 SBSQ HOSP IP/OBS MODERATE 35: CPT | Performed by: STUDENT IN AN ORGANIZED HEALTH CARE EDUCATION/TRAINING PROGRAM

## 2024-08-01 PROCEDURE — 83735 ASSAY OF MAGNESIUM: CPT | Performed by: STUDENT IN AN ORGANIZED HEALTH CARE EDUCATION/TRAINING PROGRAM

## 2024-08-01 PROCEDURE — 94761 N-INVAS EAR/PLS OXIMETRY MLT: CPT

## 2024-08-01 PROCEDURE — 93010 ELECTROCARDIOGRAM REPORT: CPT | Performed by: EMERGENCY MEDICINE

## 2024-08-01 PROCEDURE — 97116 GAIT TRAINING THERAPY: CPT

## 2024-08-01 PROCEDURE — 99232 SBSQ HOSP IP/OBS MODERATE 35: CPT | Performed by: NURSE PRACTITIONER

## 2024-08-01 PROCEDURE — 80048 BASIC METABOLIC PNL TOTAL CA: CPT | Performed by: STUDENT IN AN ORGANIZED HEALTH CARE EDUCATION/TRAINING PROGRAM

## 2024-08-01 PROCEDURE — 94664 DEMO&/EVAL PT USE INHALER: CPT

## 2024-08-01 PROCEDURE — 93005 ELECTROCARDIOGRAM TRACING: CPT | Performed by: STUDENT IN AN ORGANIZED HEALTH CARE EDUCATION/TRAINING PROGRAM

## 2024-08-01 RX ORDER — METOPROLOL SUCCINATE 100 MG/1
200 TABLET, EXTENDED RELEASE ORAL
Status: DISCONTINUED | OUTPATIENT
Start: 2024-08-02 | End: 2024-08-02 | Stop reason: HOSPADM

## 2024-08-01 RX ORDER — BUMETANIDE 1 MG/1
1 TABLET ORAL DAILY
Status: DISCONTINUED | OUTPATIENT
Start: 2024-08-02 | End: 2024-08-02 | Stop reason: HOSPADM

## 2024-08-01 RX ORDER — METOPROLOL SUCCINATE 100 MG/1
100 TABLET, EXTENDED RELEASE ORAL EVERY 12 HOURS SCHEDULED
Status: DISCONTINUED | OUTPATIENT
Start: 2024-08-01 | End: 2024-08-01

## 2024-08-01 RX ADMIN — SACUBITRIL AND VALSARTAN 0.5 TABLET: 24; 26 TABLET, FILM COATED ORAL at 08:26

## 2024-08-01 RX ADMIN — ACETAMINOPHEN 650 MG: 325 TABLET, FILM COATED ORAL at 06:45

## 2024-08-01 RX ADMIN — ACETAMINOPHEN 650 MG: 325 TABLET, FILM COATED ORAL at 17:30

## 2024-08-01 RX ADMIN — APIXABAN 5 MG: 5 TABLET, FILM COATED ORAL at 06:24

## 2024-08-01 RX ADMIN — APIXABAN 5 MG: 5 TABLET, FILM COATED ORAL at 17:07

## 2024-08-01 RX ADMIN — AMIODARONE HYDROCHLORIDE 400 MG: 200 TABLET ORAL at 20:35

## 2024-08-01 RX ADMIN — AMITRIPTYLINE HYDROCHLORIDE 25 MG: 25 TABLET, FILM COATED ORAL at 20:35

## 2024-08-01 RX ADMIN — ATORVASTATIN CALCIUM 40 MG: 40 TABLET ORAL at 20:35

## 2024-08-01 RX ADMIN — IPRATROPIUM BROMIDE AND ALBUTEROL SULFATE 3 ML: .5; 3 SOLUTION RESPIRATORY (INHALATION) at 07:18

## 2024-08-01 RX ADMIN — BUDESONIDE AND FORMOTEROL FUMARATE DIHYDRATE 1 PUFF: 160; 4.5 AEROSOL RESPIRATORY (INHALATION) at 07:24

## 2024-08-01 RX ADMIN — IPRATROPIUM BROMIDE AND ALBUTEROL SULFATE 3 ML: .5; 3 SOLUTION RESPIRATORY (INHALATION) at 14:04

## 2024-08-01 RX ADMIN — BUMETANIDE 1 MG: 0.25 INJECTION INTRAMUSCULAR; INTRAVENOUS at 08:26

## 2024-08-01 RX ADMIN — QUETIAPINE FUMARATE 100 MG: 100 TABLET ORAL at 20:35

## 2024-08-01 RX ADMIN — AMIODARONE HYDROCHLORIDE 400 MG: 200 TABLET ORAL at 08:26

## 2024-08-01 RX ADMIN — BUDESONIDE AND FORMOTEROL FUMARATE DIHYDRATE 1 PUFF: 160; 4.5 AEROSOL RESPIRATORY (INHALATION) at 20:18

## 2024-08-01 RX ADMIN — SACUBITRIL AND VALSARTAN 1 TABLET: 24; 26 TABLET, FILM COATED ORAL at 20:35

## 2024-08-01 RX ADMIN — BUPROPION HYDROCHLORIDE 300 MG: 150 TABLET, EXTENDED RELEASE ORAL at 08:26

## 2024-08-01 RX ADMIN — Medication 10 ML: at 08:26

## 2024-08-01 RX ADMIN — METOPROLOL SUCCINATE 200 MG: 100 TABLET, FILM COATED, EXTENDED RELEASE ORAL at 08:26

## 2024-08-01 RX ADMIN — PAROXETINE 20 MG: 20 TABLET, FILM COATED ORAL at 08:26

## 2024-08-01 RX ADMIN — POTASSIUM CHLORIDE 10 MEQ: 750 CAPSULE, EXTENDED RELEASE ORAL at 08:26

## 2024-08-01 RX ADMIN — LEVOTHYROXINE SODIUM 100 MCG: 100 TABLET ORAL at 06:24

## 2024-08-01 RX ADMIN — Medication 1 TABLET: at 08:26

## 2024-08-01 RX ADMIN — PANTOPRAZOLE SODIUM 40 MG: 40 TABLET, DELAYED RELEASE ORAL at 06:24

## 2024-08-01 RX ADMIN — SPIRONOLACTONE 25 MG: 25 TABLET ORAL at 10:04

## 2024-08-01 RX ADMIN — FLUTICASONE PROPIONATE 2 SPRAY: 50 SPRAY, METERED NASAL at 08:26

## 2024-08-01 RX ADMIN — IPRATROPIUM BROMIDE AND ALBUTEROL SULFATE 3 ML: .5; 3 SOLUTION RESPIRATORY (INHALATION) at 20:18

## 2024-08-01 RX ADMIN — Medication 10 ML: at 20:35

## 2024-08-01 NOTE — PROGRESS NOTES
"EP Problems:  1.  Persistent atrial fibrillation  2.  Severe biatrial enlargement     Cardiology Problems:  1.  Severe MR  2.  Nonischemic cardiomyopathy  3.  Chronic systolic heart failure  4.  Hypertension  5.  Hyperlipidemia     Medical Problems:  1.  Obesity  2.  Obstructive sleep apnea  3.  Chronic normocytic anemia    Patient ID:  Ziyad Wilson is a 59 y.o. male with problem list as above as above who EP is following for persistent atrial fibrillation.    Subjective:  Had pharmacological return of sinus rhythm.    Objective:  /75 (BP Location: Left arm, Patient Position: Lying)   Pulse 67   Temp 98.3 °F (36.8 °C) (Oral)   Resp 18   Ht 170.2 cm (67\")   Wt 91.7 kg (202 lb 3.2 oz)   SpO2 90%   BMI 31.67 kg/m²     Chronically ill-appearing, obese, wearing oxygen  Normal work of breathing  Regular rate and rhythm  No significant lower extremity edema      Labs today:  Lab Results   Component Value Date    GLUCOSE 102 (H) 08/01/2024    CALCIUM 9.4 08/01/2024     08/01/2024    K 4.1 08/01/2024    CO2 36.0 (H) 08/01/2024    BUN 18 08/01/2024    CREATININE 1.32 (H) 08/01/2024    EGFR 61.7 08/01/2024     Lab Results   Component Value Date    WBC 10.85 (H) 08/01/2024    HGB 13.0 08/01/2024    HCT 45.4 08/01/2024     08/01/2024     Lab Results   Component Value Date    MG 1.9 08/01/2024       Telemetry directly visualized independently reviewed: Back in sinus today    Assessment:  Persistent atrial fibrillation with acute exacerbation  Chronic systolic heart failure  Severe mitral regurgitation  Hypertension    Plan:  -Decrease metoprolol to 200 mg daily  -Continue amiodarone 400 mg twice daily for 10 days (8/8/2024) then 200 mg daily thereafter  -Continue apixaban for anticoagulation  -Long-term decision regarding ablation versus Maze procedure depending upon mitral regurgitation  -EP to sign off, will arrange outpatient follow-up    Part of this note may be an electronic " transcription/translation of spoken language to printed text using the Dragon Dictation System.

## 2024-08-01 NOTE — CASE MANAGEMENT/SOCIAL WORK
Continued Stay Note   [facility]     Patient Name: Ziyad Wilson  MRN: [id number]  Today's Date: 8/1/2024    Admit Date: 7/22/2024    Plan: Home   Discharge Plan       Row Name 08/01/24 1142       Plan    Plan Home    Patient/Family in Agreement with Plan yes    Plan Comments Pt has no ride home.  has provided a cab voucher in pt's hard chart.                   Discharge Codes    No documentation.                 Expected Discharge Date and Time       Expected Discharge Date Expected Discharge Time    Jul 31, 2024               ARTUR Oliveira

## 2024-08-01 NOTE — PLAN OF CARE
Goal Outcome Evaluation:  Plan of Care Reviewed With: patient        Progress: improving  Outcome Evaluation: Pt was up in the room on RA, O2 at 91%, amb 300' with supervision.  O2 decreased to 89%, with sitting rest, recovered back to 93% within a minute.

## 2024-08-01 NOTE — PROGRESS NOTES
[facility] -  Progress Note     LOS: 10 days   Patient Care Team:  Ashish Thurman DO as PCP - General (Internal Medicine)  Douglas Ruano MD as Consulting Physician (Pain Medicine)  Uri Lagos MD (Inactive) as Cardiologist (Cardiology)    Chief Complaint: CHF/AF follow up    Subjective     Interval History: He is awake, sitting up in bed. He is on room air. Still has mild shortness of breath and cough. He converted to sinus rhythm. Rates lower with obvious change at time of rhythm change. He denies chest pain or pressure.     Tele: SR with frequent PACs 70    Review of Systems:     Review of Systems   Constitutional:  Negative for chills, diaphoresis and fever.   Respiratory:  Positive for cough and shortness of breath. Negative for chest tightness and wheezing.    Cardiovascular:  Negative for chest pain, palpitations and leg swelling.   Gastrointestinal:  Negative for abdominal pain, nausea and vomiting.   Genitourinary:  Negative for decreased urine volume and difficulty urinating.   Neurological:  Negative for dizziness, tremors, syncope, weakness and headaches.   Psychiatric/Behavioral:  Negative for agitation and confusion. The patient is not nervous/anxious.      Objective     Vital Sign Min/Max for last 24 hours  Temp  Min: 97.6 °F (36.4 °C)  Max: 98.8 °F (37.1 °C)   BP  Min: 95/57  Max: 136/87   Pulse  Min: 65  Max: 104   Resp  Min: 18  Max: 18   SpO2  Min: 90 %  Max: 96 %   No data recorded   Weight  Min: 91.7 kg (202 lb 3.2 oz)  Max: 91.7 kg (202 lb 3.2 oz)         08/01/24  0414   Weight: 91.7 kg (202 lb 3.2 oz)       Physical Exam:    Vitals reviewed.   Constitutional:       General: Awake.      Appearance: Well-developed, well-groomed and not in distress. Obese. Acutely ill-appearing.   HENT:      Head: Normocephalic and atraumatic.   Pulmonary:      Effort: Pulmonary effort is normal.      Breath sounds: Decreased breath sounds present. Rales present.    Cardiovascular:      Normal rate. Irregularly irregular rhythm.      Murmurs: There is a systolic murmur.   Edema:     Peripheral edema absent.   Musculoskeletal:      Cervical back: Normal range of motion and neck supple. Skin:     General: Skin is warm and dry.   Neurological:      Mental Status: Alert, oriented to person, place, and time and oriented to person, place and time.   Psychiatric:         Attention and Perception: Attention normal.         Mood and Affect: Mood normal.         Speech: Speech normal.         Behavior: Behavior normal. Behavior is cooperative.         Thought Content: Thought content normal.         Cognition and Memory: Cognition and memory normal.         Judgment: Judgment normal.       Results Review:   Lab Results   Component Value Date    WBC 10.85 (H) 08/01/2024    HGB 13.0 08/01/2024    HCT 45.4 08/01/2024    MCV 81.2 08/01/2024     08/01/2024     Lab Results   Component Value Date    GLUCOSE 102 (H) 08/01/2024    CALCIUM 9.4 08/01/2024     08/01/2024    K 4.1 08/01/2024    CO2 36.0 (H) 08/01/2024    CL 94 (L) 08/01/2024    BUN 18 08/01/2024    CREATININE 1.32 (H) 08/01/2024    EGFR 61.7 08/01/2024    BCR 13.6 08/01/2024    ANIONGAP 8.0 08/01/2024       Medication Review: yes  Current Facility-Administered Medications   Medication Dose Route Frequency Provider Last Rate Last Admin    acetaminophen (TYLENOL) tablet 650 mg  650 mg Oral Q6H PRN Jem Begum MD   650 mg at 08/01/24 0645    albuterol (PROVENTIL) nebulizer solution 0.083% 2.5 mg/3mL  2.5 mg Nebulization Q6H PRN Jem Begum MD        amiodarone (PACERONE) tablet 400 mg  400 mg Oral Q12H Jem Begum MD   400 mg at 08/01/24 0826    amitriptyline (ELAVIL) tablet 25 mg  25 mg Oral Nightly Jem Begum MD   25 mg at 07/31/24 2204    apixaban (ELIQUIS) tablet 5 mg  5 mg Oral Q12H Jem Begum MD   5 mg at 08/01/24 0624    atorvastatin (LIPITOR) tablet 40 mg  40 mg Oral Nightly Jem Begum MD   40 mg at  07/31/24 2204    benzonatate (TESSALON) capsule 100 mg  100 mg Oral TID PRN Jem Begum MD   100 mg at 07/28/24 2212    sennosides-docusate (PERICOLACE) 8.6-50 MG per tablet 2 tablet  2 tablet Oral BID PRN Jem Begum MD   2 tablet at 07/28/24 2003    And    polyethylene glycol (MIRALAX) packet 17 g  17 g Oral Daily PRN Jem Begum MD   17 g at 07/28/24 0922    And    bisacodyl (DULCOLAX) EC tablet 5 mg  5 mg Oral Daily PRN Jem Begum MD   5 mg at 07/28/24 0614    And    bisacodyl (DULCOLAX) suppository 10 mg  10 mg Rectal Daily PRN Jem Begum MD        budesonide-formoterol (SYMBICORT) 160-4.5 MCG/ACT inhaler 1 puff  1 puff Inhalation BID - RT Jem Begum MD   1 puff at 08/01/24 0724    bumetanide (BUMEX) injection 1 mg  1 mg Intravenous Daily Barbara Pereira, APRN   1 mg at 08/01/24 0826    buPROPion XL (WELLBUTRIN XL) 24 hr tablet 300 mg  300 mg Oral Daily Jem Begum MD   300 mg at 08/01/24 0826    butalbital-acetaminophen-caffeine (FIORICET, ESGIC) -40 MG per tablet 1 tablet  1 tablet Oral Daily PRN Jem Begum MD   1 tablet at 07/31/24 0716    calcium carbonate (TUMS) chewable tablet 500 mg (200 mg elemental)  1 tablet Oral TID PRN Jem Begum MD        Calcium Replacement - Follow Nurse / BPA Driven Protocol   Does not apply PRN Jem Begum MD        cyclobenzaprine (FLEXERIL) tablet 5 mg  5 mg Oral TID PRN Jem Begum MD        fluticasone (FLONASE) 50 MCG/ACT nasal spray 2 spray  2 spray Nasal Daily Jem Begum MD   2 spray at 08/01/24 0826    guaifenesin (ROBITUSSIN) 100 MG/5ML liquid 200 mg  200 mg Oral Q4H PRN Jem Begum MD   200 mg at 07/29/24 2122    ipratropium-albuterol (DUO-NEB) nebulizer solution 3 mL  3 mL Nebulization TID - RT Jem Begum MD   3 mL at 08/01/24 0718    levothyroxine (SYNTHROID, LEVOTHROID) tablet 100 mcg  100 mcg Oral Daily Adriano Rapp MD   100 mcg at 08/01/24 0624    Magnesium Standard Dose Replacement - Follow Nurse / BPA Driven Protocol    Does not apply PRN Jem Begum MD        metoprolol succinate XL (TOPROL-XL) 24 hr tablet 200 mg  200 mg Oral Q12H Jem Begum MD   200 mg at 08/01/24 0826    multivitamin with minerals 1 tablet  1 tablet Oral Daily Jem Begum MD   1 tablet at 08/01/24 0826    nitroglycerin (NITROSTAT) SL tablet 0.4 mg  0.4 mg Sublingual Q5 Min PRN Jem Begum MD   0.4 mg at 07/23/24 0343    ondansetron (ZOFRAN) injection 4 mg  4 mg Intravenous Q6H PRN Jem Begum MD   4 mg at 07/30/24 1102    pantoprazole (PROTONIX) EC tablet 40 mg  40 mg Oral Q AM Jem Begum MD   40 mg at 08/01/24 0624    PARoxetine (PAXIL) tablet 20 mg  20 mg Oral Daily Jem Begum MD   20 mg at 08/01/24 0826    Phosphorus Replacement - Follow Nurse / BPA Driven Protocol   Does not apply PRN Jem Begum MD        potassium chloride (MICRO-K/KLOR-CON) CR capsule  10 mEq Oral Daily Jem Begum MD   10 mEq at 08/01/24 0826    Potassium Replacement - Follow Nurse / BPA Driven Protocol   Does not apply PRN Jem Begum MD        QUEtiapine (SEROquel) tablet 100 mg  100 mg Oral Nightly Jem Begum MD   100 mg at 07/31/24 2204    sacubitril-valsartan (ENTRESTO) 24-26 MG tablet 1 tablet  1 tablet Oral Q12H Barbara Pereira APRN        sodium chloride 0.9 % flush 10 mL  10 mL Intravenous PRN Jem Begum MD        sodium chloride 0.9 % flush 10 mL  10 mL Intravenous Q12H Jem Begum MD   10 mL at 08/01/24 0826    sodium chloride 0.9 % infusion 40 mL  40 mL Intravenous PRN Jem Begum MD        spironolactone (ALDACTONE) tablet 25 mg  25 mg Oral Daily Barbara Pereira APRN   25 mg at 07/31/24 0810    temazepam (RESTORIL) capsule 15 mg  15 mg Oral Nightly PRN Adriano Rapp MD         Results for orders placed during the hospital encounter of 07/22/24    Adult Transesophageal Echo (LAUREN) W/ Cont if Necessary Per Protocol    Interpretation Summary    Left ventricular systolic function is moderately decreased.    The left ventricular cavity is  mild to moderately dilated.    The left atrial cavity is severely dilated.    The right atrial cavity is dilated.    Severe mitral valve regurgitation is present. Pulmonary vein flow reversal is present.      Interpretation Summary  Transthoracic echocardiogram 7/23/2024 (Dr. Mathew)       Left ventricular systolic function is moderately decreased. Left ventricular ejection fraction appears to be 36 - 40%.    The left ventricular cavity is mildly dilated.    The left atrial cavity is severely dilated.    Normal right ventricular cavity size and systolic function noted.    Severe mitral valve regurgitation is present.    Estimated right ventricular systolic pressure from tricuspid regurgitation is moderately elevated (45-55 mmHg).    Assessment & Plan     1.  Acute on chronic combined systolic and diastolic congestive heart failure: Has been managed for atrial fibrillation with rapid ventricular response this admission, left ventricular ejection fraction found to be 36 to 40% as well as component of valvular heart disease with severe MR.  History of cardiomyopathy.  Old notes indicate nonischemic cardiomyopathy.  Initially managed on bumetanide continuous drip.  Intermittent dosing of IV Bumex was initiated however discontinued. Had been on a high dose of carvedilol (50 mg BID) however transitioned to Lopressor for due to hypotension.  Also had been on Entresto and spironolactone however those are currently on hold.  Standing weights had been unchanged of recent. He has diuresed with a net negative fluid balance based off of documentation.  Chest x ray 7/25/24 did not show improvement of his volume status.  After holding diuretics renal function has improved however the patient became significantly short of breath and IV diuretics were resumed.  Weights had been consistent without much improvement despite ongoing diuresis, now with weight loss again, down 202 pounds. On room air 90-93%.  renal function stable. Beta  blocker dose increased by EP for rate control. Monitor BP. Increase Entresto to 1 full tablet BID. Continue spironolactone. Hopefully transition to oral Bumex tomorrow. Monitor K.       2.  Atrial fibrillation with rapid ventricular response: Initially was being managed on Cardizem.  It was recommended to wean Cardizem for other rate controlling medications given findings of depressed left ventricular ejection fraction.  Given hypotension the patient has received digoxin. This has been since stopped.   Currently anticoagulated with Eliquis. EP is following. Cardioversion 7/29. Has since had recurrent AF. He has been started on antiarrhythmic therapy with oral amiodarone taper.  Rates are improved.  He is on high dose of Toprol XL. Plans to follow with EP after discharge. Converted to SR yesterday.    3.  Acute kidney injury: has improved during hospital stay - Continue to monitor renal function closely due to need for IV diuretics. Stable Cr today.    4.  Severe MR: Functional. Noted initially on echo, has also had LAUREN. CTS following. Recommend optimization medically with diuresis. CTS plans to follow up in the outpatient setting to discuss timing/need for surgical intervention. He will also need coronary angiography at some point.     5.  Coronary artery disease: Documentation indicates the patient has a history of coronary artery disease but no specific test results or workup to indicate extent of findings or what diagnostic testing was performed.  Previously followed in the office for what was documented to be nonischemic cardiomyopathy. CT Images reviewed by Dr. Noel reveal coronary artery calcification. Defer angiogram at this time as he is optimized for CHF and management of AF.     6.  Hypertension: has had low BP with diuretics and med changes earlier this admission. Aldactone has been started. Resume Entresto, monitor BP, start with 1/2 tablet twice a day (low dose).    7.  Hyperlipidemia: on statin  therapy  8.  Obstructive sleep apnea  9.  Anemia: stable   10.  Obesity BMI 31.83      Increase entresto to 1 full tablet twice a day.   Monitor BP, O2 sats   Transition to oral bumex tomorrow.   Continue K supplement for now- may be able to dc with spironolactone and Entresto resumed.   Rate/rhythm control per EP  Eliquis for anticoagulation.  Daily monitoring of renal function  Strict intake and output  Daily weights  Low Sodium diet   Labs in am  Hopefully home tomorrow. He will need 1 week follow up with CHF clinic and then follow up with Dr. Mathew as scheduled.   Follow up with EP per their recommendations.     In regards to his coronary angiography, this can be deferred at this time. He is having symptoms of heart failure currently, no suspicion for angina. Coronary anatomy can be evaluated prior to surgery but is not something that has to be done during this hospitalization, and can be considered outpatient. Hopefully with better management of his heart failure and restoring SR he will improve symptomatically be optimized medically, thus have improvement of his symptoms felt to be related to functional MR, moderate LV systolic dysfunction, and AF RVR.         Electronically signed by VIRGILIO Herrera, 08/01/24, 8:43 AM CDT.

## 2024-08-01 NOTE — THERAPY TREATMENT NOTE
Acute Care - Physical Therapy Treatment Note  [facility]     Patient Name: Ziyad Wilson  : 1965  MRN: [id number]  Today's Date: 2024      Visit Dx:     ICD-10-CM ICD-9-CM   1. Atrial fibrillation with RVR  I48.91 427.31   2. Acute systolic CHF (congestive heart failure)  I50.21 428.21     428.0   3. Severe mitral regurgitation  I34.0 424.0   4. Impaired mobility [Z74.09]  Z74.09 799.89     Patient Active Problem List   Diagnosis    Essential hypertension    Coronary artery disease    Acquired hypothyroidism    Chronic daily headache    Class 1 obesity due to excess calories with serious comorbidity and body mass index (BMI) of 30.0 to 30.9 in adult    Mild cognitive impairment    Asthma    GERD (gastroesophageal reflux disease)    Sleep apnea    Mixed hyperlipidemia    Moderate episode of recurrent major depressive disorder    Primary insomnia    Cervical facet joint syndrome    Impaired glucose tolerance    CKD (chronic kidney disease) stage 2, GFR 60-89 ml/min    Arthritis of right shoulder region    Chest pain, atypical    Atrial fibrillation with RVR    Iron deficiency anemia    NICM (nonischemic cardiomyopathy), viral etiology    Tetrahydrocannabinol (THC) dependence    Acute pulmonary edema    Severe mitral regurgitation    Acute systolic CHF (congestive heart failure)     Past Medical History:   Diagnosis Date    Asthma     Cardiomyopathy     non-ischemic    CHF (congestive heart failure)     Coronary artery disease     Foot pain, bilateral     GERD (gastroesophageal reflux disease)     Headache     Hyperlipemia, mixed     Hypertension     benign    Hypothyroidism     Obesity     Sleep apnea     SOB (shortness of breath)      Past Surgical History:   Procedure Laterality Date    APPENDECTOMY      BLADDER REPAIR      CARDIAC CATHETERIZATION  2003    COLONOSCOPY N/A 2018    Procedure: COLONOSCOPY WITH ANESTHESIA;  Surgeon: Dick Espinosa DO;  Location: [facility] ENDOSCOPY;  Service:      COLOSTOMY      with colostomy reversal    KNEE SURGERY Right      PT Assessment (Last 12 Hours)       PT Evaluation and Treatment       Row Name 08/01/24 0957          Physical Therapy Time and Intention    Subjective Information no complaints  -CARLOS     Document Type therapy note (daily note)  -CARLOS     Mode of Treatment physical therapy  -CARLOS       Row Name 08/01/24 0957          General Information    Existing Precautions/Restrictions fall  -CARLOS       Row Name 08/01/24 0957          Pain    Pretreatment Pain Rating 0/10 - no pain  -CARLOS     Posttreatment Pain Rating 0/10 - no pain  -CARLOS       Row Name 08/01/24 0957          Bed Mobility    Supine-Sit [place] (Bed Mobility) independent  -CARLOS     Sit-Supine [place] (Bed Mobility) independent  -CARLOS       Row Name 08/01/24 0957          Sit-Stand Transfer    Sit-Stand [place] (Transfers) independent  -CARLOS       Row Name 08/01/24 0957          Gait/Stairs (Locomotion)    [place] Level (Gait) supervision  -CARLOS     Distance in Feet (Gait) 300  -CARLOS       Row Name 08/01/24 0957          Vital Signs    Pre SpO2 (%) 93  -CARLOS     O2 Delivery Pre Treatment room air  -CARLOS     Intra SpO2 (%) 89  -CARLOS     O2 Delivery Intra Treatment room air  -CARLOS     Post SpO2 (%) 94  -CARLOS     O2 Delivery Post Treatment room air  -CARLOS     Pre Patient Position Sitting  -CARLOS     Intra Patient Position Standing  -CARLOS     Post Patient Position Sitting  -CARLOS       Row Name 08/01/24 0957          Positioning and Restraints    Pre-Treatment Position in bed  -CARLOS     Post Treatment Position bed  -CARLOS     In Bed fowlers;call light within reach;encouraged to call for assist;side rails up x2  -CARLOS               User Key  (r) = Recorded By, (t) = Taken By, (c) = Cosigned By      Initials Name Provider Type    Olegario Montano PTA Physical Therapist Assistant                    Physical Therapy Education       Title: PT OT SLP Therapies (In Progress)       Topic: Physical Therapy (In Progress)       Point:  Mobility training (Done)       Learning Progress Summary             Patient Acceptance, E,TB, VU,DU by CN at 7/29/2024 2902    Comment: Educated on role of PT in pt care.                         Point: Home exercise program (Not Started)       Learner Progress:  Not documented in this visit.              Point: Body mechanics (Not Started)       Learner Progress:  Not documented in this visit.              Point: Precautions (Not Started)       Learner Progress:  Not documented in this visit.                              User Key       Initials Effective Dates Name Provider Type Discipline    MAYLIN 06/24/24 -  Nuha Gutierrez, PT Student PT Student PT                  PT Recommendation and Plan     Plan of Care Reviewed With: patient  Progress: improving  Outcome Evaluation: Pt was up in the room on RA, O2 at 91%, amb 300' with supervision.  O2 decreased to 89%, with sitting rest, recovered back to 93% within a minute.   Outcome Measures       Row Name 08/01/24 0957 07/31/24 1022 07/30/24 1023       How much help from another person do you currently need...    Turning from your back to your side while in flat bed without using bedrails? 4  -CARLOS 4  -CARLOS 4  -CARLOS    Moving from lying on back to sitting on the side of a flat bed without bedrails? 4  -CARLOS 4  -CARLOS 4  -CARLOS    Moving to and from a bed to a chair (including a wheelchair)? 4  -CARLOS 4  -CARLOS 4  -CARLOS    Standing up from a chair using your arms (e.g., wheelchair, bedside chair)? 4  -CARLOS 4  -CARLOS 4  -CARLOS    Climbing 3-5 steps with a railing? 3  -CARLOS 3  -CARLOS 3  -CARLOS    To walk in hospital room? 4  -CARLOS 3  -CARLOS 3  -CARLOS    AM-PAC 6 Clicks Score (PT) 23  -CARLOS 22  -CARLOS 22  -CARLOS    Highest Level of Mobility Goal 7 --> Walk 25 feet or more  -CARLOS 7 --> Walk 25 feet or more  -CARLOS 7 --> Walk 25 feet or more  -CARLOS       Functional Assessment    Outcome Measure Options AM-PAC 6 Clicks Basic Mobility (PT)  -CARLOS AM-PAC 6 Clicks Basic Mobility (PT)  -CARLOS AM-PAC 6 Clicks Basic Mobility (PT)  -CARLOS               User Key  (r) = Recorded By, (t) = Taken By, (c) = Cosigned By      Initials Name Provider Type    Olegario Montano PTA Physical Therapist Assistant                     Time Calculation:    PT Charges       Row Name 08/01/24 0957             Time Calculation    Start Time 0957  -CARLOS      Stop Time 1020  -CARLOS      Time Calculation (min) 23 min  -CARLOS      PT Received On 08/01/24  -CARLOS         Time Calculation- PT    Total Timed Code Minutes- PT 23 minute(s)  -CARLOS         Timed Charges    16086 - Gait Training Minutes  23  -CARLOS         Total Minutes    Timed Charges Total Minutes 23  -CARLOS       Total Minutes 23  -CARLOS                User Key  (r) = Recorded By, (t) = Taken By, (c) = Cosigned By      Initials Name Provider Type    Olegario Montano PTA Physical Therapist Assistant                  Therapy Charges for Today       Code Description Service Date Service Provider Modifiers Qty    45458833726 HC GAIT TRAINING EA 15 MIN 7/31/2024 Olegario Murphy, LYNETTE GP 2    88262336351 HC GAIT TRAINING EA 15 MIN 8/1/2024 Olegario Murphy, LYNETTE GP 2            PT G-Codes  Outcome Measure Options: AM-PAC 6 Clicks Basic Mobility (PT)  AM-PAC 6 Clicks Score (PT): 23    Olegario Murphy PTA  8/1/2024

## 2024-08-01 NOTE — PLAN OF CARE
Goal Outcome Evaluation:  Plan of Care Reviewed With: patient        Progress: improving  Outcome Evaluation: Sinus HR: 77-98 with fpac on tele. Pt was on room air at the start of the shift. O2 sat 88-89% on room air, encouraged patient to deep breathe, O2 sat came up to the 90s but then dropped back down to 87-88%. 1L of O2 applied. O2 sats stable on 1L of O2.  in place. Pt c/o frequent cough. Denies pain, resting throughout the shift. Patient refused to have IVs taken out due to possible discharge today per pt report. Voiding per urinal. Safety maintained. Possible discharge home today. Pt may need home O2 at discharge.

## 2024-08-01 NOTE — PROGRESS NOTES
Assessment tool to be used for patients with existing breathing treatments ordered by hospitalist                               Respiratory Therapist Driven Protocol - RT to Assess and Treat Algorithm    Item 0 Points 1 Point 2 Points 3 Points 4 Points Subtotal   Mental Status Alert, orientated, cooperative Lethargic, follows commands Confused, not following commands Obtunded or Somnolent Comatose 0   Respiratory Pattern Regular RR  8-16 breaths/minute Increased RR  18-25 breaths/minute Dyspnea on exertion, irregular RR  26-30/minute Shortness of breath,  RR 31-35 breaths/minute Accessory muscle use, severe SOB  RR > 35 breath/minute 0   Breath Sounds Clear Decreased unilaterally Decreased bilaterally Basilar crackles Wheezing and/or rhonchi 2   Cough Strong, spontaneous non-productive Strong productive Weak, non-productive Weak productive or weak with rhonchi Absent or may require suctioning 0   Pulmonary Status Nonsmoker or no previous history > 1 year quit < 1 PPD  < 1 year quit >  or = 1 PPD Diagnosed pulmonary disease (severe or chronic) Severe or chronic pulmonary disease with exacerbation    Surgical Status None General surgery (non-abdominal or non-thoracic) Lower abdominal Thoracic or upper abdominal Thoracic with pulmonary disease 0   Chest X-ray Clear Chronic changes Infiltrates, atelectasis or pleural effusion Infiltrates > 1 lobe Diffuse infiltrates and atelectasis and/or effusions 3   Activity level Ambulatory Ambulatory with assistance Non-ambulatory Paraplegic Quadriplegic             0         Total Score 5   Score    Drug Therapy Frequency  20 or >    Q4 Duoneb & Q3 Albuterol PRN 15 - 19     Q6 Duoneb & Q4 Albuterol PRN 10 - 14    QID Duoneb & Q4 Albuterol PRN 5 - 9    TID Duoneb & Q6 Albuterol PRN 0 - 4    Q4 PRN Duoneb or Q4 PRN Albuterol    Incentive Spirometry - Initial RT instruct    Lung Expansion Therapy (PEP) Bronchopulmonary Hygiene (CPT)   Q4 & PRN - Severe atelectasis,  poor oxygenation Q4 - copious secretions, dyspnea, unable to sleep, mucus plugging   QID - High risk for persistent atelectasis, existence of atelectasis QID & Q4 PRN - Moderate secretion production   TID - At risk for developing atelectasis TID - small amounts of secretions with poor cough   BID - prevention of atelectasis BID - unable to breathe deeply and cough spontaneously   *RT Protocol patients will be re-assessed/re-evaluated every 48 hours.    *Patients who are home nebulizer treatments will be protocoled to no less than their home regimen and will remain     on their home regimen with re-evaluations as needed with changes in patient condition.    RT Comments/Recommendations: CHART REVIEWED AND CONTINUE WITH CURRENT REGIMEN OF BREATHING TREATMENTS

## 2024-08-01 NOTE — PROGRESS NOTES
[facility]  INPATIENT PROGRESS NOTE    Patient Name: Ziyad Wilson  Date of Admission: 7/22/2024  Today's Date: 08/01/24  Length of Stay: 10  Primary Care Physician: Ashish Thurman DO    Subjective   Chief Complaint: Respiratory failure/atrial fibs/CHF     HPI   Net urine output -600 .Patient is currently on room air.  Creatinine stable.  Hyponatremia resolved.  Leukocytosis noted.    Review of Systems   Constitutional:  Positive for activity change, appetite change and fatigue. Negative for chills and fever.   HENT:  Negative for hearing loss, nosebleeds, tinnitus and trouble swallowing.    Eyes:  Negative for visual disturbance.   Respiratory: Shortness of breath resolved, on room air.. Negative for cough, chest tightness and wheezing.    Cardiovascular:  Negative for chest pain, palpitations and leg swelling.   Gastrointestinal:  Negative for abdominal distention, abdominal pain, blood in stool, constipation, diarrhea, nausea and vomiting.   Endocrine: Negative for cold intolerance, heat intolerance, polydipsia, polyphagia and polyuria.   Genitourinary:  Negative for decreased urine volume, difficulty urinating, dysuria, flank pain, frequency and hematuria.   Musculoskeletal:  Negative for arthralgias, joint swelling and myalgias.   Skin:  Negative for rash.   Allergic/Immunologic: Negative for immunocompromised state.   Neurological:  Positive for weakness. Negative for dizziness, syncope, light-headedness and headaches.   Hematological:  Negative for adenopathy. Does not bruise/bleed easily.   Psychiatric/Behavioral:  Negative for confusion and sleep disturbance. The patient is not nervous/anxious.  All pertinent negatives and positives are as above. All other systems have been reviewed and are negative unless otherwise stated.     Objective    Temp:  [97.6 °F (36.4 °C)-98.8 °F (37.1 °C)] 98.3 °F (36.8 °C)  Heart Rate:  [] 65  Resp:  [18]  18  BP: ()/(57-87) 117/82      Intake/Output Summary (Last 24 hours) at 8/1/2024 1204  Last data filed at 8/1/2024 0414  Gross per 24 hour   Intake 400 ml   Output 1000 ml   Net -600 ml      Physical Exam  Vitals and nursing note reviewed.   HENT:      Head: Normocephalic and atraumatic.   Eyes:      Conjunctiva/sclera: Conjunctivae normal.      Pupils: Pupils are equal, round, and reactive to light.   Cardiovascular:      Rate and Rhythm: Normal rate.  Sinus rhythm     Heart sounds: Normal heart sounds.   Pulmonary:      Effort: No respiratory distress.      Comments: Diminished breath sound, clear, on room air.  Abdominal:      General: Bowel sounds are normal. There is no distension.      Palpations: Abdomen is soft.      Tenderness: There is no abdominal tenderness.      Comments: Obese .   Musculoskeletal:         General: No swelling.      Cervical back: Neck supple.   Skin:     General: Skin is warm and dry.      Capillary Refill: Capillary refill takes 2 to 3 seconds.      Findings: No rash.   Neurological:      General: No focal deficit present.      Mental Status: He is alert and oriented to person, place, and time.      Motor: Weakness present.   Psychiatric:         Mood and Affect: Mood normal.         Behavior: Behavior normal.         Thought Content: Thought content normal.       Results Review:  I have reviewed the labs, radiology results, and diagnostic studies.    Laboratory Data:   Results from last 7 days   Lab Units 08/01/24 0257 07/31/24 0338 07/30/24  0349   WBC 10*3/mm3 10.85* 8.65 8.39   HEMOGLOBIN g/dL 13.0 12.0* 11.9*   HEMATOCRIT % 45.4 42.3 41.7   PLATELETS 10*3/mm3 249 231 252        Results from last 7 days   Lab Units 08/01/24  0257 07/31/24  1527 07/31/24  0338 07/30/24  1707 07/30/24  0349   SODIUM mmol/L 138  --  134*  --  137   POTASSIUM mmol/L 4.1 3.7 3.6   < > 3.5   CHLORIDE mmol/L 94*  --  92*  --  94*   CO2 mmol/L 36.0*  --  35.0*  --  36.0*   BUN mg/dL 18  --  19  --  " 18   CREATININE mg/dL 1.32*  --  1.32*  --  1.30*   CALCIUM mg/dL 9.4  --  9.0  --  9.2   BILIRUBIN mg/dL  --   --   --   --  0.4   ALK PHOS U/L  --   --   --   --  65   ALT (SGPT) U/L  --   --   --   --  40   AST (SGOT) U/L  --   --   --   --  27   GLUCOSE mg/dL 102*  --  102*  --  112*    < > = values in this interval not displayed.       Culture Data:   No results found for: \"BLOODCX\", \"URINECX\", \"WOUNDCX\", \"MRSACX\", \"RESPCX\", \"STOOLCX\"    Radiology Data:   Imaging Results (Last 24 Hours)       ** No results found for the last 24 hours. **            I have reviewed the patient's current medications.     Assessment/Plan   Assessment  Active Hospital Problems    Diagnosis     **Atrial fibrillation with RVR     Severe mitral regurgitation     Acute systolic CHF (congestive heart failure)     Iron deficiency anemia     NICM (nonischemic cardiomyopathy), viral etiology     Tetrahydrocannabinol (THC) dependence     Acute pulmonary edema     CKD (chronic kidney disease) stage 2, GFR 60-89 ml/min     Class 1 obesity due to excess calories with serious comorbidity and body mass index (BMI) of 30.0 to 30.9 in adult        Treatment Plan  Cardiomyopathy/CHF/CAD/hyperlipidemia/atrial fibs/hypotensio/severe mitral valve regurgitation.  EP consulted.  Cardiology consulted.  Amiodarone.  Lipitor.  Bumex . Lopressor.  Entresto and Aldactone.  Nitro as needed.  Eliquis.  Toprol.  Echocardiogram7/3/2024-ejection fraction 36 to 40%, left ventricle mildly dilated, left atrium severely dilated, Normal right ventricular cavity size and systolic function noted, Severe mitral valve regurgitation is present, tricuspid regurgitation.  LAUREN- Left ventricular systolic function is moderately decreased, left ventricular cavity is mild to moderately dilated, left atrial cavity is severely dilated, right atrial cavity is dilated, Severe mitral valve regurgitation is present,  Pulmonary vein flow reversal is present.   CT surgery continues to " follow however plans remain to be worked up for outpatient MVR/MAZE procedure.  Cardiology continuing to follow- Eliquis remains on hold until definitive plan for outpatient angiography is established.   Status post cardioversion.    EP recommendation- Metoprolol 200 mg twice a day, Eliquis, amiodarone 400 mg twice a day for 10 days then 200 mg daily.     Anemia.  Iron deficiency . iron infusion ferric gluconate completed x 3 days.  Hemoglobin increased.  No sign of acute bleed.     COPD/pulm edema/obstructive sleep apnea.  Symbicort.  DuoNebs.  Tessalon Perles as needed.  Robitussin as needed.  CTA of the chest-No evidence of pulmonary thromboembolic disease- thoracic aorta and great vessels are normal in caliber, right-sided pleural effusion with right basilar atelectasis-diffuse bronchial wall thickening with mixed groundglass and airspace consolidation (right greater than left), increased reticular opacities within the periphery of the lungs-favor that this represents changes of pulmonary venous  hypertension/volume overload with pulmonary edema, Right middle lobe and left lower lobe nodule warranting follow-up- These are slightly more conspicuous than on previous remote CT of 2016  but we also utilizing much thinner slice thickness- Follow-up CT imaging  without contrast in 6 months could be obtained to assure stability,  Cholelithiasis.  Chest x-ray-Persistent bilateral lung infiltrate, right more than the left.   Patient is on room air.     CKD stage II-creatinine clearance 61.1, baseline creatinine around 1.5, creatinine continues to trend around 1.1-1.4.  Creatinine stable.     Hyponatremia.  Resolved.     Right middle lobe and left lower lobe lung nodule, slightly more  conspicuous than previous CT scan 2016.  Recommended CT follow-up in 6 months, discussed with patient.  Follow-up with PCP outpatient.     Depression/anxiety/insomnia.  Elavil.  Wellbutrin. Paxil.  Seroquel nightly.  Restoril nightly as  needed.        Allergic rhinitis.  Flomax.       Hypothyroidism .  Synthroid.  Synthroid held by cardiology.High TSH.  Normal free T4.     Cholelithiasis.  Discussed with patient, denies any abdomen pain.  Asymptomatic.     THC dependence-patient was educated on the possibility of nausea due to withdrawals. Patient continues to have no complaints of withdrawal symptoms, Zofran as needed for nausea.   UDS-positive for benzo and tricyclic.     Hypokalemia.  Potassium is normal.  P.o. potassium.     Pain/headache.  Fioricet as needed.  Carotid duplex- less than 50% stenosis of the right internal carotid artery, less than 50% stenosis of the left internal carotid artery, Antegrade flow is demonstrated in bilateral vertebral arteries.     Reflux . Protonix.  Tums as needed.     Obesity.  BMI is 32.       Nutrition . multivitamins.     Nutrition . NPO.     Deconditioning.  PT consult.     Blood culture-no growth in 5 days.     Medical Decision Making  Atrial fibrillation, acute, moderate complexity, improving  Iron deficiency anemia, acute on chronic, moderate complexity, unchanged  Nonischemic cardiomyopathy, acute on chronic, moderate complexity, unchanged  THC dependence, chronic, low complexity, stable  CKD stage II, chronic, moderate complexity, stable  Obesity class I, chronic, moderate complexity, unchanged  Severe mitral valve regurgitation, acute, high complexity, unchanged  Acute systolic congestive heart failure, acute, high complexity, improving  Acute pulmonary edema, acute, high complexity, improving  Number and Complexity of problems: 9  Differential Diagnosis: None     Conditions and Status        Condition is unchanged.     [facility] Data  External documents reviewed: None  Cardiac tracing (EKG, telemetry) interpretation: Overnight telemetry AFL    Radiology interpretation: See above  Labs reviewed: See above  Any tests that were considered but not ordered: None     Decision rules/scores evaluated  (example [id number], Wells, etc): [id number] score high     Discussed with: Patient  Care Planning  Shared decision making: Patient  code status and discussions: Full code per patient  Surrogate Decision Maker his brother Guy Wilson  Disposition  Social Determinants of Health that impact treatment or disposition: None at this time  I expect the patient to be discharged to home in 1-3 days.     Electronically signed by Adriano Rapp MD, 08/01/24, 12:03 CDT.

## 2024-08-02 ENCOUNTER — READMISSION MANAGEMENT (OUTPATIENT)
Dept: CALL CENTER | Facility: HOSPITAL | Age: 59
End: 2024-08-02
Payer: MEDICARE

## 2024-08-02 ENCOUNTER — TELEPHONE (OUTPATIENT)
Dept: INTERNAL MEDICINE | Facility: CLINIC | Age: 59
End: 2024-08-02
Payer: MEDICARE

## 2024-08-02 VITALS
OXYGEN SATURATION: 95 % | TEMPERATURE: 98.9 F | RESPIRATION RATE: 18 BRPM | HEIGHT: 67 IN | HEART RATE: 76 BPM | SYSTOLIC BLOOD PRESSURE: 125 MMHG | DIASTOLIC BLOOD PRESSURE: 80 MMHG | BODY MASS INDEX: 31.08 KG/M2 | WEIGHT: 198 LBS

## 2024-08-02 PROBLEM — I50.22 CHRONIC HFREF (HEART FAILURE WITH REDUCED EJECTION FRACTION): Status: ACTIVE | Noted: 2024-08-02

## 2024-08-02 LAB
ANION GAP SERPL CALCULATED.3IONS-SCNC: 6 MMOL/L (ref 5–15)
BUN SERPL-MCNC: 18 MG/DL (ref 8–23)
BUN/CREAT SERPL: 13.7 (ref 7–25)
CALCIUM SPEC-SCNC: 9.4 MG/DL (ref 8.6–10.5)
CHLORIDE SERPL-SCNC: 95 MMOL/L (ref 98–107)
CO2 SERPL-SCNC: 36 MMOL/L (ref 22–29)
CREAT SERPL-MCNC: 1.31 MG/DL (ref 0.76–1.27)
DEPRECATED RDW RBC AUTO: 57.5 FL (ref 37–54)
EGFRCR SERPLBLD CKD-EPI 2021: 62.3 ML/MIN/1.73
ERYTHROCYTE [DISTWIDTH] IN BLOOD BY AUTOMATED COUNT: 22.3 % (ref 12.3–15.4)
GLUCOSE SERPL-MCNC: 99 MG/DL (ref 65–99)
HCT VFR BLD AUTO: 49.1 % (ref 37.5–51)
HGB BLD-MCNC: 14.1 G/DL (ref 13–17.7)
MAGNESIUM SERPL-MCNC: 1.9 MG/DL (ref 1.6–2.4)
MCH RBC QN AUTO: 23.4 PG (ref 26.6–33)
MCHC RBC AUTO-ENTMCNC: 28.7 G/DL (ref 31.5–35.7)
MCV RBC AUTO: 81.4 FL (ref 79–97)
PLATELET # BLD AUTO: 194 10*3/MM3 (ref 140–450)
PMV BLD AUTO: 10 FL (ref 6–12)
POTASSIUM SERPL-SCNC: 4 MMOL/L (ref 3.5–5.2)
QT INTERVAL: 428 MS
QTC INTERVAL: 481 MS
RBC # BLD AUTO: 6.03 10*6/MM3 (ref 4.14–5.8)
SODIUM SERPL-SCNC: 137 MMOL/L (ref 136–145)
WBC NRBC COR # BLD AUTO: 10.32 10*3/MM3 (ref 3.4–10.8)

## 2024-08-02 PROCEDURE — 94664 DEMO&/EVAL PT USE INHALER: CPT

## 2024-08-02 PROCEDURE — 94799 UNLISTED PULMONARY SVC/PX: CPT

## 2024-08-02 PROCEDURE — 94618 PULMONARY STRESS TESTING: CPT

## 2024-08-02 PROCEDURE — 83735 ASSAY OF MAGNESIUM: CPT | Performed by: STUDENT IN AN ORGANIZED HEALTH CARE EDUCATION/TRAINING PROGRAM

## 2024-08-02 PROCEDURE — 97116 GAIT TRAINING THERAPY: CPT

## 2024-08-02 PROCEDURE — 85027 COMPLETE CBC AUTOMATED: CPT | Performed by: STUDENT IN AN ORGANIZED HEALTH CARE EDUCATION/TRAINING PROGRAM

## 2024-08-02 PROCEDURE — 36415 COLL VENOUS BLD VENIPUNCTURE: CPT | Performed by: STUDENT IN AN ORGANIZED HEALTH CARE EDUCATION/TRAINING PROGRAM

## 2024-08-02 PROCEDURE — 80048 BASIC METABOLIC PNL TOTAL CA: CPT | Performed by: STUDENT IN AN ORGANIZED HEALTH CARE EDUCATION/TRAINING PROGRAM

## 2024-08-02 RX ORDER — PAROXETINE HYDROCHLORIDE 20 MG/1
20 TABLET, FILM COATED ORAL DAILY
Qty: 90 TABLET | Refills: 0 | Status: SHIPPED | OUTPATIENT
Start: 2024-08-03

## 2024-08-02 RX ORDER — AMIODARONE HYDROCHLORIDE 200 MG/1
200 TABLET ORAL DAILY
Qty: 90 TABLET | Refills: 0 | Status: SHIPPED | OUTPATIENT
Start: 2024-08-08 | End: 2024-08-02

## 2024-08-02 RX ORDER — POTASSIUM CHLORIDE 750 MG/1
10 CAPSULE, EXTENDED RELEASE ORAL DAILY
Qty: 90 CAPSULE | Refills: 0 | Status: SHIPPED | OUTPATIENT
Start: 2024-08-03

## 2024-08-02 RX ORDER — BUMETANIDE 1 MG/1
1 TABLET ORAL DAILY
Qty: 90 TABLET | Refills: 0 | Status: SHIPPED | OUTPATIENT
Start: 2024-08-03

## 2024-08-02 RX ORDER — SPIRONOLACTONE 25 MG/1
25 TABLET ORAL DAILY
Qty: 90 TABLET | Refills: 0 | Status: SHIPPED | OUTPATIENT
Start: 2024-08-03

## 2024-08-02 RX ORDER — AMIODARONE HYDROCHLORIDE 400 MG/1
400 TABLET ORAL EVERY 12 HOURS SCHEDULED
Qty: 11 TABLET | Refills: 0 | Status: SHIPPED | OUTPATIENT
Start: 2024-08-02 | End: 2024-08-02

## 2024-08-02 RX ORDER — AMIODARONE HYDROCHLORIDE 200 MG/1
200 TABLET ORAL DAILY
Qty: 90 TABLET | Refills: 0 | Status: SHIPPED | OUTPATIENT
Start: 2024-08-08

## 2024-08-02 RX ORDER — BUMETANIDE 1 MG/1
1 TABLET ORAL DAILY
Qty: 90 TABLET | Refills: 0 | Status: SHIPPED | OUTPATIENT
Start: 2024-08-03 | End: 2024-08-02

## 2024-08-02 RX ORDER — PAROXETINE HYDROCHLORIDE 20 MG/1
20 TABLET, FILM COATED ORAL DAILY
Qty: 90 TABLET | Refills: 0 | Status: SHIPPED | OUTPATIENT
Start: 2024-08-03 | End: 2024-08-02

## 2024-08-02 RX ORDER — METOPROLOL SUCCINATE 200 MG/1
200 TABLET, EXTENDED RELEASE ORAL
Qty: 90 TABLET | Refills: 0 | Status: SHIPPED | OUTPATIENT
Start: 2024-08-03 | End: 2024-08-02

## 2024-08-02 RX ORDER — METOPROLOL SUCCINATE 200 MG/1
200 TABLET, EXTENDED RELEASE ORAL
Qty: 90 TABLET | Refills: 0 | Status: SHIPPED | OUTPATIENT
Start: 2024-08-03

## 2024-08-02 RX ORDER — POTASSIUM CHLORIDE 750 MG/1
10 CAPSULE, EXTENDED RELEASE ORAL DAILY
Qty: 90 CAPSULE | Refills: 0 | Status: SHIPPED | OUTPATIENT
Start: 2024-08-03 | End: 2024-08-02

## 2024-08-02 RX ORDER — AMIODARONE HYDROCHLORIDE 400 MG/1
400 TABLET ORAL EVERY 12 HOURS SCHEDULED
Qty: 11 TABLET | Refills: 0 | Status: SHIPPED | OUTPATIENT
Start: 2024-08-02 | End: 2024-08-08

## 2024-08-02 RX ORDER — SPIRONOLACTONE 25 MG/1
25 TABLET ORAL DAILY
Qty: 90 TABLET | Refills: 0 | Status: SHIPPED | OUTPATIENT
Start: 2024-08-03 | End: 2024-08-02

## 2024-08-02 RX ORDER — NITROGLYCERIN 0.4 MG/1
0.4 TABLET SUBLINGUAL
Qty: 12 TABLET | Refills: 12 | Status: SHIPPED | OUTPATIENT
Start: 2024-08-02

## 2024-08-02 RX ORDER — NITROGLYCERIN 0.4 MG/1
0.4 TABLET SUBLINGUAL
Qty: 12 TABLET | Refills: 12 | Status: SHIPPED | OUTPATIENT
Start: 2024-08-02 | End: 2024-08-02

## 2024-08-02 RX ADMIN — BUDESONIDE AND FORMOTEROL FUMARATE DIHYDRATE 1 PUFF: 160; 4.5 AEROSOL RESPIRATORY (INHALATION) at 07:15

## 2024-08-02 RX ADMIN — SPIRONOLACTONE 25 MG: 25 TABLET ORAL at 08:27

## 2024-08-02 RX ADMIN — BUMETANIDE 1 MG: 1 TABLET ORAL at 08:26

## 2024-08-02 RX ADMIN — PAROXETINE 20 MG: 20 TABLET, FILM COATED ORAL at 08:27

## 2024-08-02 RX ADMIN — PANTOPRAZOLE SODIUM 40 MG: 40 TABLET, DELAYED RELEASE ORAL at 08:25

## 2024-08-02 RX ADMIN — SACUBITRIL AND VALSARTAN 1 TABLET: 24; 26 TABLET, FILM COATED ORAL at 08:26

## 2024-08-02 RX ADMIN — METOPROLOL SUCCINATE 200 MG: 100 TABLET, FILM COATED, EXTENDED RELEASE ORAL at 08:26

## 2024-08-02 RX ADMIN — FLUTICASONE PROPIONATE 2 SPRAY: 50 SPRAY, METERED NASAL at 08:28

## 2024-08-02 RX ADMIN — AMIODARONE HYDROCHLORIDE 400 MG: 200 TABLET ORAL at 08:25

## 2024-08-02 RX ADMIN — LEVOTHYROXINE SODIUM 100 MCG: 100 TABLET ORAL at 05:31

## 2024-08-02 RX ADMIN — POTASSIUM CHLORIDE 10 MEQ: 750 CAPSULE, EXTENDED RELEASE ORAL at 08:26

## 2024-08-02 RX ADMIN — APIXABAN 5 MG: 5 TABLET, FILM COATED ORAL at 05:31

## 2024-08-02 RX ADMIN — IPRATROPIUM BROMIDE AND ALBUTEROL SULFATE 3 ML: .5; 3 SOLUTION RESPIRATORY (INHALATION) at 07:14

## 2024-08-02 RX ADMIN — Medication 1 TABLET: at 08:26

## 2024-08-02 RX ADMIN — Medication 10 ML: at 08:28

## 2024-08-02 RX ADMIN — BUPROPION HYDROCHLORIDE 300 MG: 150 TABLET, EXTENDED RELEASE ORAL at 08:25

## 2024-08-02 NOTE — PROCEDURES
Exercise Oximetry    Patient Name:Ziyad Wilson   MRN: [id number]   Date: 08/02/24             ROOM AIR BASELINE   SpO2% 92   Heart Rate 74     Blood Pressure      EXERCISE ON ROOM AIR SpO2% EXERCISE ON O2 @  LPM SpO2%   1 MINUTE  1 MINUTE    2 MINUTES  2 MINUTES    3 MINUTES 91 3 MINUTES    4 MINUTES  4 MINUTES    5 MINUTES 90 5 MINUTES    6 MINUTES 89 6 MINUTES               Distance Walked  ambulated x 6 minutes Distance Walked   Dyspnea ([place]io Scale)   Dyspnea ([place]io Scale)   Fatigue ([place]io Scale)   Fatigue ([place]io Scale)   SpO2% Post Exercise  89 SpO2% Post Exercise   HR Post Exercise  82 HR Post Exercise   Time to Recovery   Time to Recovery     Comments: NO NEEDS AT THIS TIME

## 2024-08-02 NOTE — OUTREACH NOTE
Prep Survey      Flowsheet Row Responses   [facility] patient discharged from? [facility]   Is LACE score < 7 ? No   Eligibility Readm Mgmt   Discharge diagnosis *Atrial fibrillation with RVR   Does the patient have one of the following disease processes/diagnoses(primary or secondary)? Other   Does the patient have Home health ordered? No   Is there a DME ordered? No   Prep survey completed? Yes            VALORIE JEFFERY - Registered Nurse

## 2024-08-02 NOTE — TELEPHONE ENCOUNTER
----- Message from Desiree Martin sent at 7/31/2024 10:32 AM CDT -----  Call and let him know the recent CT of the chest showed two different lung nodules the radiologist wants to follow up on in 6 months. I am going to go ahead and order the CT for six months. Someone from scheduling will contact him. For now we will monitor. Let me know if he has any questions.  ----- Message -----  From: Romy Mclean  Sent: 7/31/2024   9:55 AM CDT  To: Ashish Thurman, DO    Please see incidental lung findings for which the radiologist has recommended a follow up. Thanks, Romy Mclean-Lung Navigator

## 2024-08-02 NOTE — TELEPHONE ENCOUNTER
Patient informed of CT results. Patient verbalized understanding and had no further questions at this time.

## 2024-08-02 NOTE — DISCHARGE SUMMARY
[facility] Medicine Services  DISCHARGE SUMMARY       Date of Admission: 7/22/2024  Date of Discharge:  8/2/2024  Primary Care Physician: Ashish Thurman DO    Presenting Problem/History of Present Illness:      Final Discharge Diagnoses:  Active Hospital Problems    Diagnosis     **Atrial fibrillation with RVR     Chronic HFrEF (heart failure with reduced ejection fraction)     Severe mitral regurgitation     Acute systolic CHF (congestive heart failure)     Iron deficiency anemia     NICM (nonischemic cardiomyopathy), viral etiology     Tetrahydrocannabinol (THC) dependence     Acute pulmonary edema     CKD (chronic kidney disease) stage 2, GFR 60-89 ml/min     Class 1 obesity due to excess calories with serious comorbidity and body mass index (BMI) of 30.0 to 30.9 in adult        Consults: Cardiology .    Pertinent Test Results:   Results for orders placed during the hospital encounter of 07/22/24    Adult Transesophageal Echo (LAUREN) W/ Cont if Necessary Per Protocol    Interpretation Summary    Left ventricular systolic function is moderately decreased.    The left ventricular cavity is mild to moderately dilated.    The left atrial cavity is severely dilated.    The right atrial cavity is dilated.    Severe mitral valve regurgitation is present. Pulmonary vein flow reversal is present.      Imaging Results (All)       Procedure Component Value Units Date/Time    XR Chest PA & Lateral [[id number]] Collected: 07/31/24 0642     Updated: 07/31/24 0647    Narrative:      EXAMINATION: XR CHEST PA AND LATERAL-     7/31/2024 3:39 AM     HISTORY: CHF; I48.91-Unspecified atrial fibrillation; I50.21-Acute  systolic (congestive) heart failure; I34.0-Nonrheumatic mitral (valve)  insufficiency; Z74.09-Other reduced mobility     The fractured endometrium of the chest are compared with the previous  study dated 7/25/2024.     Bilateral lung infiltrate persists, right more than the  left.     There is no pleural effusion. No pneumothorax.     There is moderate cardiomegaly.     No acute bony abnormality.       Impression:      1. Persistent bilateral lung infiltrate, right more than the left.        This report was signed and finalized on 7/31/2024 6:44 AM by Dr. Ruben Drake MD.       XR Chest PA & Lateral [[id number]] Collected: 07/25/24 0904     Updated: 07/25/24 0909    Narrative:      EXAM/TECHNIQUE: XR CHEST PA AND LATERAL-     INDICATION: reevaluate effusion; I48.91-Unspecified atrial fibrillation;  I50.21-Acute systolic (congestive) heart failure; I34.0-Nonrheumatic  mitral (valve) insufficiency     COMPARISON: Chest CT 7/22/2024, chest radiograph 7/22/2024     FINDINGS:     Cardiac silhouette is prominent but stable.     Slight increase in bilateral perihilar consolidation. No change in trace  right-sided pleural effusion. No definite left-sided effusion. No  visible pneumothorax.     No acute osseous finding.       Impression:         1. Slight increase in bilateral perihilar consolidation.  2. No change in trace right-sided pleural effusion.     This report was signed and finalized on 7/25/2024 9:06 AM by Dr. Stefan Lagos MD.       US Carotid Bilateral [[id number]] Collected: 07/24/24 1825     Updated: 07/24/24 1828    Narrative:      History: Carotid occlusive disease       Impression:      Impression:  1. There is less than 50% stenosis of the right internal carotid artery.  2. There is less than 50% stenosis of the left internal carotid artery.  3. Antegrade flow is demonstrated in bilateral vertebral arteries.     Comments: Bilateral carotid vertebral arterial duplex scan was  performed.     Grayscale imaging shows intimal thickening and calcified elements at the  carotid bifurcation. The right internal carotid artery peak systolic  velocity is 77.5 cm/sec. The end-diastolic velocity is 29.9 cm/sec. The  right ICA/CCA ratio is approximately 0.9. These findings correlate  with  less than 50% stenosis of the right internal carotid artery.     Grayscale imaging shows intimal thickening and calcified elements at the  carotid bifurcation. The left internal carotid artery peak systolic  velocity is 72 cm/sec. The end-diastolic velocity is 34.4 cm/sec. The  left ICA/CCA ratio is approximately 1.3. These findings correlate with  less than 50% stenosis of the left internal carotid artery.     Antegrade flow is demonstrated in bilateral vertebral arteries.        This report was signed and finalized on 7/24/2024 6:25 PM by Dr. Oscar Espinosa MD.       CT Angiogram Chest [[id number]] Collected: 07/22/24 1842     Updated: 07/22/24 1859    Narrative:      Exam: CT angiogram of the of the chest with intravenous contrast.  7/22/2024     CLINICAL HISTORY: New-onset atrial fibrillation. Shortness of air.     DOSE:  913.86 mGy.cm . All CT scans are performed using dose  optimization techniques as appropriate to the performed exam and  including at least one of the following: Automated exposure control,  adjustment of the mA and/or kV according to size, and the use of the  iterative reconstruction technique.     TECHNIQUE: Contiguous axial images were obtained through the thorax  following intravenous contrast administration with reformatted images  obtained in the sagittal and coronal projections from the original data  set. Three-dimensional reconstructions are also obtained.     COMPARISON: 2/16/2016     FINDINGS:     Pulmonary arteries:  There is normal enhancement of the pulmonary  arteries with no evidence of pulmonary thromboembolic disease.     Aorta:  The thoracic aorta and proximal great vessels are normal in  appearance. There is no evidence of aortic dissection or aneurysm.     LUNGS: There are patchy areas of mixed groundglass and airspace  consolidation within both lungs (right greater than left). A small right  effusion is present with right basilar atelectasis. There is  diffuse  bronchial wall thickening. There is a 5 mm subpleural nodule in the  lateral left lower lobe image 112 series 11 slightly more conspicuous  than on the previous exam but with thinner slice thickness performed on  today's exam. There is a 7 mm nodule within the right middle lobe  abutting the minor fissure. This is more conspicuous than on the  previous exam but again this is a thinner slice thickness. Follow-up CT  imaging in 6 months recommended. Given the distribution of findings I  would rather favor that this represents pulmonary venous  hypertension/volume overload with pulmonary edema with a bilateral  pneumonia a less likely consideration.     Pleural spaces: Small right effusion. No left effusion.     HEART: No evidence of cardiac enlargement. Coronary artery  calcifications are noted in the LAD and circumflex distribution.  Elevated right heart pressure is suspected with reflux of contrast into  the intrahepatic IVC. There is no pericardial effusion.     Bones: There is advanced arthritic changes of both shoulders. No acute  or suspicious bony abnormalities demonstrated.     CHEST WALL: No chest wall abnormalities are demonstrated.     Lymph nodes: No enlarged mediastinal or axillary lymphadenopathy is  present.     Upper abdomen: The adrenals are unremarkable. Cholelithiasis is present.  No pericholecystic inflammatory change.       Impression:      1. No evidence of pulmonary thromboembolic disease. The thoracic aorta  and great vessels are normal in caliber.  2. There is a right-sided pleural effusion with right basilar  atelectasis. There is diffuse bronchial wall thickening with mixed  groundglass and airspace consolidation (right greater than left). There  are also increased reticular opacities within the periphery of the  lungs. I would favor that this represents changes of pulmonary venous  hypertension/volume overload with pulmonary edema. A mixed interstitial  and alveolar bilateral  pneumonia would also be in the differential but  felt less likely.  3. Right middle lobe and left lower lobe nodule warranting follow-up.  These are slightly more conspicuous than on previous remote CT of 2016  but we also utilizing much thinner slice thickness. Follow-up CT imaging  without contrast in 6 months could be obtained to assure stability.  4. Cholelithiasis.     This report was signed and finalized on 7/22/2024 6:56 PM by Dr. Mars Blackwell MD.       XR Chest 1 View [[id number]] Collected: 07/22/24 1703     Updated: 07/22/24 1707    Narrative:      EXAM/TECHNIQUE: XR CHEST 1 VW-     INDICATION: palpitations     COMPARISON: 12/29/2016     FINDINGS:     Cardiac silhouette is enlarged. Bilateral perihilar consolidation,  greatest on the right. No pleural effusion or visible pneumothorax.  Advanced degenerative change in the right glenohumeral joint. No acute  osseous finding.       Impression:         1.  Bilateral perihilar consolidation, greatest on the right.  Differential includes pulmonary edema and infectious process/pneumonia.  2.  Cardiac silhouette is enlarged.     This report was signed and finalized on 7/22/2024 5:04 PM by Dr. Stefan Lagos MD.             LAB RESULTS:      Lab 08/02/24 0341 08/01/24 0257 07/31/24 0338 07/30/24 0349 07/29/24 0319   WBC 10.32 10.85* 8.65 8.39 8.99   HEMOGLOBIN 14.1 13.0 12.0* 11.9* 11.0*   HEMATOCRIT 49.1 45.4 42.3 41.7 39.1   PLATELETS 194 249 231 252 236   NEUTROS ABS  --   --   --  5.56  --    IMMATURE GRANS (ABS)  --   --   --  0.07*  --    LYMPHS ABS  --   --   --  1.57  --    MONOS ABS  --   --   --  0.87  --    EOS ABS  --   --   --  0.27  --    MCV 81.4 81.2 80.3 81.0 82.5         Lab 08/02/24  0341 08/01/24  0257 07/31/24  1527 07/31/24  0338 07/30/24  1707 07/30/24 0349 07/29/24 0319   SODIUM 137 138  --  134*  --  137 137   POTASSIUM 4.0 4.1 3.7 3.6 3.9 3.5 3.7   CHLORIDE 95* 94*  --  92*  --  94* 97*   CO2 36.0* 36.0*  --  35.0*  --  36.0*  35.0*   ANION GAP 6.0 8.0  --  7.0  --  7.0 5.0   BUN 18 18  --  19  --  18 16   CREATININE 1.31* 1.32*  --  1.32*  --  1.30* 1.30*   EGFR 62.3 61.7  --  61.7  --  62.9 62.9   GLUCOSE 99 102*  --  102*  --  112* 94   CALCIUM 9.4 9.4  --  9.0  --  9.2 8.9   MAGNESIUM 1.9 1.9  --  1.9 2.2  --  1.7         Lab 07/30/24  0349   TOTAL PROTEIN 6.6   ALBUMIN 3.5   GLOBULIN 3.1   ALT (SGPT) 40   AST (SGOT) 27   BILIRUBIN 0.4   ALK PHOS 65             Lab 07/30/24  0349   CHOLESTEROL 80   LDL CHOL 31   HDL CHOL 29*   TRIGLYCERIDES 103             Brief Urine Lab Results       None          Microbiology Results (last 10 days)       ** No results found for the last 240 hours. **        HPI.  This is a 59-year-old gentleman with past medical history significant for viral cardiomyopathy, CHF, coronary artery disease, GERD, hyperlipidemia, hypothyroidism and obstructive sleep apnea who presented to the emergency department earlier today with 2 days history of worsening shortness of breath on both exertion and at rest, his dyspnea is associated with dry cough and lightheadedness. He has been taking furosemide p.o. over the past 3 days to help with fluid as he felt this was the cause of his shortness of breath. Today he felt extremely dyspneic so he took 3 tablets of p.o. Lasix 40 mg he went to his primary care physician's office where an EKG was obtained and showed that he was in atrial fibrillation with rapid ventricular response hence he was sent to the emergency department for further evaluation and management. Now in the emergency department he was found to be in atrial fibrillation with RVR hence was started on Cardizem drip after receiving bolus. Also did receive 1 dose of Rocephin IV and Lasix IV. Denied any chest pain at any time.     Hospital Course:   Cardiomyopathy/CHF/CAD/hyperlipidemia/atrial fibs/hypotensio/severe mitral valve regurgitation.  EP consulted.  Cardiology consulted.  Amiodarone.  Lipitor.  Bumex .  Lopressor.  Entresto and Aldactone.  Nitro as needed.  Eliquis.  Toprol.  Echocardiogram7/3/2024-ejection fraction 36 to 40%, left ventricle mildly dilated, left atrium severely dilated, Normal right ventricular cavity size and systolic function noted, Severe mitral valve regurgitation is present, tricuspid regurgitation.  LAUREN- Left ventricular systolic function is moderately decreased, left ventricular cavity is mild to moderately dilated, left atrial cavity is severely dilated, right atrial cavity is dilated, Severe mitral valve regurgitation is present,  Pulmonary vein flow reversal is present.   CT surgery continues to follow however plans remain to be worked up for outpatient MVR/MAZE procedure.  Cardiology continuing to follow- Eliquis remains on hold until definitive plan for outpatient angiography is established.   Status post cardioversion.    EP recommendation- Metoprolol 200 mg twice a day, Eliquis, amiodarone 400 mg twice a day for 10 days then 200 mg daily.     Anemia.  Iron deficiency . iron infusion ferric gluconate completed x 3 days.  Hemoglobin increased.  No sign of acute bleed.     COPD/pulm edema/obstructive sleep apnea.  Symbicort.  DuoNebs.  Tessalon Perles as needed.  Robitussin as needed.  Leukocytosis resolved.  CTA of the chest-No evidence of pulmonary thromboembolic disease- thoracic aorta and great vessels are normal in caliber, right-sided pleural effusion with right basilar atelectasis-diffuse bronchial wall thickening with mixed groundglass and airspace consolidation (right greater than left), increased reticular opacities within the periphery of the lungs-favor that this represents changes of pulmonary venous  hypertension/volume overload with pulmonary edema, Right middle lobe and left lower lobe nodule warranting follow-up- These are slightly more conspicuous than on previous remote CT of 2016  but we also utilizing much thinner slice thickness- Follow-up CT imaging  without  "contrast in 6 months could be obtained to assure stability,  Cholelithiasis.  Chest x-ray-Persistent bilateral lung infiltrate, right more than the left.   Patient is on room air.     CKD stage II-creatinine clearance 61.1, baseline creatinine around 1.5, creatinine continues to trend around 1.1-1.4.  Creatinine stable.     Hyponatremia.  Resolved.     Right middle lobe and left lower lobe lung nodule, slightly more  conspicuous than previous CT scan 2016.  Recommended CT follow-up in 6 months, discussed with patient.  Follow-up with PCP outpatient.     Depression/anxiety/insomnia.  Elavil.  Wellbutrin. Paxil.  Seroquel nightly.  Restoril nightly as needed.        Allergic rhinitis.  Flomax.       Hypothyroidism .  Synthroid.  Synthroid held by cardiology.High TSH.  Normal free T4.     Cholelithiasis.  Discussed with patient, denies any abdomen pain.  Asymptomatic.     THC dependence-patient was educated on the possibility of nausea due to withdrawals. Patient continues to have no complaints of withdrawal symptoms, Zofran as needed for nausea.   UDS-positive for benzo and tricyclic.     Hypokalemia.  Potassium is normal.  P.o. potassium.     Pain/headache.  Fioricet as needed.  Carotid duplex- less than 50% stenosis of the right internal carotid artery, less than 50% stenosis of the left internal carotid artery, Antegrade flow is demonstrated in bilateral vertebral arteries.     Reflux . Protonix.  Tums as needed.     Obesity.  BMI is 32.       Nutrition . multivitamins.     Nutrition .  Cardiac diet.     Deconditioning.  PT consult.     Blood culture-no growth in 5 days.    Vital signs stable, afebrile.  Plan to discharge patient home today.  Follow with PCP 1 week.  Follow with cardiology outpatient.  Follow-up with heart failure clinic outpatient.    Physical Exam on Discharge:  /80 (BP Location: Left arm, Patient Position: Lying)   Pulse 76   Temp 98.9 °F (37.2 °C) (Oral)   Resp 18   Ht 170.2 cm (67\")  "  Wt 89.8 kg (198 lb)   SpO2 95%   BMI 31.01 kg/m²   Physical Exam  Vitals and nursing note reviewed.   HENT:      Head: Normocephalic and atraumatic.   Eyes:      Conjunctiva/sclera: Conjunctivae normal.      Pupils: Pupils are equal, round, and reactive to light.   Cardiovascular:      Rate and Rhythm: Normal rate.  Sinus rhythm     Heart sounds: Normal heart sounds.   Pulmonary:      Effort: No respiratory distress.      Comments: Diminished breath sound, clear, on room air.  Abdominal:      General: Bowel sounds are normal. There is no distension.      Palpations: Abdomen is soft.      Tenderness: There is no abdominal tenderness.      Comments: Obese .   Musculoskeletal:         General: No swelling.      Cervical back: Neck supple.   Skin:     General: Skin is warm and dry.      Capillary Refill: Capillary refill takes 2 to 3 seconds.      Findings: No rash.   Neurological:      General: No focal deficit present.      Mental Status: He is alert and oriented to person, place, and time.      Motor: Weakness present.   Psychiatric:         Mood and Affect: Mood normal.         Behavior: Behavior normal.         Thought Content: Thought content normal.        Condition on Discharge: Stable.    Discharge Disposition: Discharged home with family.  Home or Self Care    Discharge Medications:     Discharge Medications        New Medications        Instructions Start Date   amiodarone 400 MG tablet  Commonly known as: PACERONE   400 mg, Oral, Every 12 Hours Scheduled      amiodarone 200 MG tablet  Commonly known as: Pacerone   200 mg, Oral, Daily   Start Date: August 8, 2024     apixaban 5 MG tablet tablet  Commonly known as: ELIQUIS   5 mg, Oral, Every 12 Hours Scheduled      bumetanide 1 MG tablet  Commonly known as: BUMEX   1 mg, Oral, Daily   Start Date: August 3, 2024     metoprolol succinate  MG 24 hr tablet  Commonly known as: TOPROL-XL   200 mg, Oral, Every 24 Hours Scheduled   Start Date: August 3,  2024     nitroglycerin 0.4 MG SL tablet  Commonly known as: NITROSTAT   0.4 mg, Sublingual, Every 5 Minutes PRN, Take no more than 3 doses in 15 minutes.      PARoxetine 20 MG tablet  Commonly known as: PAXIL   20 mg, Oral, Daily   Start Date: August 3, 2024     potassium chloride 10 MEQ CR capsule  Commonly known as: MICRO-K   10 mEq, Oral, Daily   Start Date: August 3, 2024     sacubitril-valsartan 24-26 MG tablet  Commonly known as: ENTRESTO   1 tablet, Oral, Every 12 Hours Scheduled             Changes to Medications        Instructions Start Date   spironolactone 25 MG tablet  Commonly known as: ALDACTONE  What changed:   how to take this  when to take this   25 mg, Oral, Daily   Start Date: August 3, 2024            Continue These Medications        Instructions Start Date   albuterol sulfate  (90 Base) MCG/ACT inhaler  Commonly known as: PROVENTIL HFA;VENTOLIN HFA;PROAIR HFA   2 puffs, Inhalation, Every 4 Hours PRN      amitriptyline 75 MG tablet  Commonly known as: ELAVIL   75 mg, Oral, Nightly      atorvastatin 40 MG tablet  Commonly known as: LIPITOR   40 mg, Oral, Daily      butalbital-acetaminophen-caffeine -40 MG per tablet  Commonly known as: FIORICET, ESGIC   1 tablet, Oral, Daily PRN      diclofenac 1 % gel gel  Commonly known as: VOLTAREN   4 g, Topical, 3 Times Daily      fluticasone 50 MCG/ACT nasal spray  Commonly known as: Flonase   2 sprays, Nasal, Daily, Administer 2 sprays in each nostril for each dose.       fluticasone-salmeterol 100-50 MCG/DOSE DISKUS  Commonly known as: ADVAIR   1 puff, Inhalation, 2 Times Daily - RT      levothyroxine 112 MCG tablet  Commonly known as: SYNTHROID, LEVOTHROID   112 mcg, Oral, Daily      multivitamin with minerals tablet tablet   1 tablet, Oral, Daily      omeprazole 20 MG capsule  Commonly known as: priLOSEC   20 mg, Oral, Daily      QUEtiapine 100 MG tablet  Commonly known as: SEROquel   100 mg, Oral, Every Night at Bedtime             Stop  These Medications      amLODIPine-valsartan 5-160 MG per tablet  Commonly known as: EXFORGE     aspirin 325 MG tablet     busPIRone 5 MG tablet  Commonly known as: BUSPAR     carvedilol 25 MG tablet  Commonly known as: COREG     escitalopram 20 MG tablet  Commonly known as: LEXAPRO     furosemide 40 MG tablet  Commonly known as: LASIX     hydrOXYzine 25 MG tablet  Commonly known as: ATARAX     meloxicam 15 MG tablet  Commonly known as: MOBIC     Testosterone Cypionate 200 MG/ML solution              This patient has current or prior documentation of an left ventricular ejection fraction (LVEF) of less than or equal to 40%.    Entresto . Aldactone.  Toprol .    Discharge Diet:   Diet Instructions       Advance Diet As Tolerated -Target Diet: Cardiac diet.      Target Diet: Cardiac diet.            Activity at Discharge:   Activity Instructions       Activity as Tolerated              Follow-up Appointments:   Future Appointments   Date Time Provider Department Center   8/26/2024 10:00 AM Remi Mathew MD [facility]   9/5/2024 11:00 AM Alexandra Liao PA [facility]     Follow with PCP 1 week.  Follow-up with  cardiology outpatient.  CHF clinic outpatient.    Electronically signed by Adriano Rapp MD, 08/02/24, 12:28 CDT.    Time: Greater than 30 minutes.

## 2024-08-02 NOTE — PLAN OF CARE
Goal Outcome Evaluation:  Plan of Care Reviewed With: patient        Progress: improving  Outcome Evaluation: VSS. S 62-78 PAC on tele. No c/o pain this shift. O2 sats WNL on RA. Pt has rested well this shift. Planning for d/c today. Call light within reach. Safety maintained.

## 2024-08-02 NOTE — THERAPY DISCHARGE NOTE
Acute Care - Physical Therapy Discharge Summary  [facility]       Patient Name: Ziyad Wilson  : 1965  MRN: [id number]    Today's Date: 2024                 Admit Date: 2024      PT Recommendation and Plan    Visit Dx:    ICD-10-CM ICD-9-CM   1. Atrial fibrillation with RVR  I48.91 427.31   2. Acute systolic CHF (congestive heart failure)  I50.21 428.21     428.0   3. Severe mitral regurgitation  I34.0 424.0   4. Impaired mobility [Z74.09]  Z74.09 799.89   5. Acute pulmonary edema  J81.0 518.4   6. Tetrahydrocannabinol (THC) dependence  F12.20 304.30   7. NICM (nonischemic cardiomyopathy), viral etiology  I42.8 425.4   8. Iron deficiency anemia, unspecified iron deficiency anemia type  D50.9 280.9   9. Chest pain, atypical  R07.89 786.59   10. Arthritis of right shoulder region  M19.011 716.91   11. CKD (chronic kidney disease) stage 2, GFR 60-89 ml/min  N18.2 585.2   12. Impaired glucose tolerance  R73.02 790.22   13. Cervical facet joint syndrome  M47.812 723.8   14. Primary insomnia  F51.01 307.42   15. Moderate episode of recurrent major depressive disorder  F33.1 296.32   16. Mixed hyperlipidemia  E78.2 272.2   17. Sleep apnea, unspecified type  G47.30 780.57   18. Gastroesophageal reflux disease, unspecified whether esophagitis present  K21.9 530.81   19. Mild intermittent asthma with status asthmaticus  J45.22 493.91   20. Mild cognitive impairment  G31.84 331.83   21. Class 1 obesity due to excess calories with serious comorbidity and body mass index (BMI) of 30.0 to 30.9 in adult  E66.09 278.00    Z68.30 V85.30   22. Chronic daily headache  R51.9 784.0   23. Acquired hypothyroidism  E03.9 244.9   24. Coronary artery disease involving native coronary artery of native heart with angina pectoris  I25.119 414.01     413.9   25. Essential hypertension  I10 401.9   26. Chronic systolic heart failure  I50.22 428.22        Outcome Measures       Row Name 24 1140 24 0957 24 1022        How much help from another person do you currently need...    Turning from your back to your side while in flat bed without using bedrails? 4  -CARLOS 4  -CARLOS 4  -CARLOS    Moving from lying on back to sitting on the side of a flat bed without bedrails? 4  -CARLOS 4  -CARLOS 4  -CARLOS    Moving to and from a bed to a chair (including a wheelchair)? 4  -CARLOS 4  -CARLOS 4  -CARLOS    Standing up from a chair using your arms (e.g., wheelchair, bedside chair)? 4  -CARLOS 4  -CARLOS 4  -CARLOS    Climbing 3-5 steps with a railing? 4  -CARLOS 3  -CARLOS 3  -CARLOS    To walk in hospital room? 4  -CARLOS 4  -CARLOS 3  -CARLOS    AM-PAC 6 Clicks Score (PT) 24  -CARLOS 23  -CARLOS 22  -CARLOS    Highest Level of Mobility Goal 8 --> Walked 250 feet or more  -CARLOS 7 --> Walk 25 feet or more  -CARLOS 7 --> Walk 25 feet or more  -CARLOS       Functional Assessment    Outcome Measure Options AM-PAC 6 Clicks Basic Mobility (PT)  -CARLOS AM-PAC 6 Clicks Basic Mobility (PT)  -CARLOS AM-PAC 6 Clicks Basic Mobility (PT)  -CARLOS              User Key  (r) = Recorded By, (t) = Taken By, (c) = Cosigned By      Initials Name Provider Type    Olegario Montano PTA Physical Therapist Assistant                     PT Charges       Row Name 08/02/24 1140             Time Calculation    Start Time 1140  -CARLOS      Stop Time 1154  -CARLOS      Time Calculation (min) 14 min  -CARLOS      PT Received On 08/02/24  -CARLOS         Time Calculation- PT    Total Timed Code Minutes- PT 14 minute(s)  -CARLOS         Timed Charges    21472 - Gait Training Minutes  14  -CARLOS         Total Minutes    Timed Charges Total Minutes 14  -CARLOS       Total Minutes 14  -CARLOS                User Key  (r) = Recorded By, (t) = Taken By, (c) = Cosigned By      Initials Name Provider Type    Olegario Montano PTA Physical Therapist Assistant                     PT Rehab Goals       Row Name 08/02/24 0602             Gait Training Goal 1 (PT)    Activity/Assistive Device (Gait Training Goal 1, PT) gait (walking locomotion);decrease fall risk;improve balance and speed;increase  endurance/gait distance;increase energy conservation  -LY      [place] Level (Gait Training Goal 1, PT) independent  -LY      Distance (Gait Training Goal 1, PT) 300  -LY      Time Frame (Gait Training Goal 1, PT) long term goal (LTG);10 days  -LY      Progress/Outcome (Gait Training Goal 1, PT) goal met  -LY         Stairs Goal 1 (PT)    Activity/Assistive Device (Stairs Goal 1, PT) ascending stairs;descending stairs;using handrail, left;using handrail, right;decrease fall risk;improve balance and speed  -LY      [place] Level/Cues Needed (Stairs Goal 1, PT) independent  -LY      Number of Stairs (Stairs Goal 1, PT) 5  -LY      Time Frame (Stairs Goal 1, PT) long term goal (LTG);10 days  -LY      Progress/Outcome (Stairs Goal 1, PT) goal met  -LY                User Key  (r) = Recorded By, (t) = Taken By, (c) = Cosigned By      Initials Name Provider Type Discipline    Yesenia Caldwell PTA Physical Therapist Assistant PT                        PT Discharge Summary  Anticipated Discharge Disposition (PT): home with assist  Reason for Discharge: Discharge from facility  Outcomes Achieved: Refer to plan of care for updates on goals achieved  Discharge Destination: Home with assist      Yesenia Guerrier PTA   8/2/2024

## 2024-08-02 NOTE — THERAPY TREATMENT NOTE
Acute Care - Physical Therapy Treatment Note  [facility]     Patient Name: Ziyad Wilson  : 1965  MRN: [id number]  Today's Date: 2024      Visit Dx:     ICD-10-CM ICD-9-CM   1. Atrial fibrillation with RVR  I48.91 427.31   2. Acute systolic CHF (congestive heart failure)  I50.21 428.21     428.0   3. Severe mitral regurgitation  I34.0 424.0   4. Impaired mobility [Z74.09]  Z74.09 799.89   5. Acute pulmonary edema  J81.0 518.4   6. Tetrahydrocannabinol (THC) dependence  F12.20 304.30   7. NICM (nonischemic cardiomyopathy), viral etiology  I42.8 425.4   8. Iron deficiency anemia, unspecified iron deficiency anemia type  D50.9 280.9   9. Chest pain, atypical  R07.89 786.59   10. Arthritis of right shoulder region  M19.011 716.91   11. CKD (chronic kidney disease) stage 2, GFR 60-89 ml/min  N18.2 585.2   12. Impaired glucose tolerance  R73.02 790.22   13. Cervical facet joint syndrome  M47.812 723.8   14. Primary insomnia  F51.01 307.42   15. Moderate episode of recurrent major depressive disorder  F33.1 296.32   16. Mixed hyperlipidemia  E78.2 272.2   17. Sleep apnea, unspecified type  G47.30 780.57   18. Gastroesophageal reflux disease, unspecified whether esophagitis present  K21.9 530.81   19. Mild intermittent asthma with status asthmaticus  J45.22 493.91   20. Mild cognitive impairment  G31.84 331.83   21. Class 1 obesity due to excess calories with serious comorbidity and body mass index (BMI) of 30.0 to 30.9 in adult  E66.09 278.00    Z68.30 V85.30   22. Chronic daily headache  R51.9 784.0   23. Acquired hypothyroidism  E03.9 244.9   24. Coronary artery disease involving native coronary artery of native heart with angina pectoris  I25.119 414.01     413.9   25. Essential hypertension  I10 401.9   26. Chronic systolic heart failure  I50.22 428.22     Patient Active Problem List   Diagnosis    Essential hypertension    Coronary artery disease    Acquired hypothyroidism    Chronic daily headache     Class 1 obesity due to excess calories with serious comorbidity and body mass index (BMI) of 30.0 to 30.9 in adult    Mild cognitive impairment    Asthma    GERD (gastroesophageal reflux disease)    Sleep apnea    Mixed hyperlipidemia    Moderate episode of recurrent major depressive disorder    Primary insomnia    Cervical facet joint syndrome    Impaired glucose tolerance    CKD (chronic kidney disease) stage 2, GFR 60-89 ml/min    Arthritis of right shoulder region    Chest pain, atypical    Atrial fibrillation with RVR    Iron deficiency anemia    NICM (nonischemic cardiomyopathy), viral etiology    Tetrahydrocannabinol (THC) dependence    Acute pulmonary edema    Severe mitral regurgitation    Acute systolic CHF (congestive heart failure)    Chronic HFrEF (heart failure with reduced ejection fraction)     Past Medical History:   Diagnosis Date    Asthma     Cardiomyopathy     non-ischemic    CHF (congestive heart failure)     Coronary artery disease     Foot pain, bilateral     GERD (gastroesophageal reflux disease)     Headache     Hyperlipemia, mixed     Hypertension     benign    Hypothyroidism     Obesity     Sleep apnea     SOB (shortness of breath)      Past Surgical History:   Procedure Laterality Date    APPENDECTOMY      BLADDER REPAIR      CARDIAC CATHETERIZATION  09/2003    COLONOSCOPY N/A 1/9/2018    Procedure: COLONOSCOPY WITH ANESTHESIA;  Surgeon: Dick Espinosa DO;  Location: [facility] ENDOSCOPY;  Service:     COLOSTOMY      with colostomy reversal    KNEE SURGERY Right      PT Assessment (Last 12 Hours)       PT Evaluation and Treatment       Row Name 08/02/24 1140          Physical Therapy Time and Intention    Subjective Information no complaints  -CARLOS     Document Type therapy note (daily note)  -CARLOS     Mode of Treatment physical therapy  -CARLOS     Comment pt is suppose to d/c home today,  If not will d/c from PT due to being independent.   -CARLOS       Row Name 08/02/24 1148          General  Information    Existing Precautions/Restrictions fall  -CARLOS       Row Name 08/02/24 1140          Pain    Pretreatment Pain Rating 0/10 - no pain  -CARLOS     Posttreatment Pain Rating 0/10 - no pain  -CARLOS       Row Name 08/02/24 1140          Bed Mobility    Supine-Sit [place] (Bed Mobility) independent  -CARLOS     Sit-Supine [place] (Bed Mobility) independent  -CARLOS       Row Name 08/02/24 1140          Sit-Stand Transfer    Sit-Stand [place] (Transfers) independent  -CARLOS       Row Name 08/02/24 1140          Gait/Stairs (Locomotion)    [place] Level (Gait) independent  -CARLOS     Distance in Feet (Gait) 400  -CARLOS       Row Name 08/02/24 1140          Positioning and Restraints    Pre-Treatment Position in bed  -CARLOS     Post Treatment Position bed  -CARLOS     In Bed fowlers;call light within reach;encouraged to call for assist;side rails up x2  -CARLOS               User Key  (r) = Recorded By, (t) = Taken By, (c) = Cosigned By      Initials Name Provider Type    CARLOS Olegario Murphy, PTA Physical Therapist Assistant                    Physical Therapy Education       Title: PT OT SLP Therapies (In Progress)       Topic: Physical Therapy (In Progress)       Point: Mobility training (Done)       Learning Progress Summary             Patient Acceptance, E,TB, VU,DU by MAYLIN at 7/29/2024 3069    Comment: Educated on role of PT in pt care.                         Point: Home exercise program (Not Started)       Learner Progress:  Not documented in this visit.              Point: Body mechanics (Not Started)       Learner Progress:  Not documented in this visit.              Point: Precautions (Not Started)       Learner Progress:  Not documented in this visit.                              User Key       Initials Effective Dates Name Provider Type Discipline    MAYLIN 06/24/24 -  Nuha Gutierrez, PT Student PT Student PT                  PT Recommendation and Plan     Plan of Care Reviewed With: patient  Progress:  improving  Outcome Evaluation: Pt was up in the room on RA, O2 at 91%, amb 300' with supervision.  O2 decreased to 89%, with sitting rest, recovered back to 93% within a minute.   Outcome Measures       Row Name 08/02/24 1140 08/01/24 0957 07/31/24 1022       How much help from another person do you currently need...    Turning from your back to your side while in flat bed without using bedrails? 4  -CARLOS 4  -CARLOS 4  -CARLOS    Moving from lying on back to sitting on the side of a flat bed without bedrails? 4  -CARLOS 4  -CARLOS 4  -CARLOS    Moving to and from a bed to a chair (including a wheelchair)? 4  -CARLOS 4  -CARLOS 4  -CARLOS    Standing up from a chair using your arms (e.g., wheelchair, bedside chair)? 4  -CARLOS 4  -CARLOS 4  -CARLOS    Climbing 3-5 steps with a railing? 4  -CARLOS 3  -CARLOS 3  -CARLOS    To walk in hospital room? 4  -CARLOS 4  -CARLOS 3  -CARLOS    AM-PAC 6 Clicks Score (PT) 24  -CARLOS 23  -CARLOS 22  -CARLOS    Highest Level of Mobility Goal 8 --> Walked 250 feet or more  -CARLOS 7 --> Walk 25 feet or more  -CARLOS 7 --> Walk 25 feet or more  -CARLOS       Functional Assessment    Outcome Measure Options AM-PAC 6 Clicks Basic Mobility (PT)  -CARLOS AM-PAC 6 Clicks Basic Mobility (PT)  -CARLOS AM-PAC 6 Clicks Basic Mobility (PT)  -CARLOS              User Key  (r) = Recorded By, (t) = Taken By, (c) = Cosigned By      Initials Name Provider Type    Olegario Montano PTA Physical Therapist Assistant                     Time Calculation:    PT Charges       Row Name 08/02/24 1140             Time Calculation    Start Time 1140  -CARLOS      Stop Time 1154  -CARLOS      Time Calculation (min) 14 min  -CARLOS      PT Received On 08/02/24  -CRALOS         Time Calculation- PT    Total Timed Code Minutes- PT 14 minute(s)  -CARLOS         Timed Charges    90965 - Gait Training Minutes  14  -CARLOS         Total Minutes    Timed Charges Total Minutes 14  -CARLOS       Total Minutes 14  -CARLOS                User Key  (r) = Recorded By, (t) = Taken By, (c) = Cosigned By      Initials Name Provider Type    CARLOS Murphy  Olegario JUAN PTA Physical Therapist Assistant                  Therapy Charges for Today       Code Description Service Date Service Provider Modifiers Qty    90072641175 HC GAIT TRAINING EA 15 MIN 8/1/2024 Olegario Murphy, LYNETTE GP 2    91802676796 HC GAIT TRAINING EA 15 MIN 8/2/2024 Olegario Murphy, LYNETTE GP 1            PT G-Codes  Outcome Measure Options: AM-PAC 6 Clicks Basic Mobility (PT)  AM-PAC 6 Clicks Score (PT): 24    Olegario Murphy PTA  8/2/2024

## 2024-08-05 ENCOUNTER — LAB (OUTPATIENT)
Dept: LAB | Facility: HOSPITAL | Age: 59
End: 2024-08-05
Payer: MEDICARE

## 2024-08-05 ENCOUNTER — OFFICE VISIT (OUTPATIENT)
Dept: INTERNAL MEDICINE | Facility: CLINIC | Age: 59
End: 2024-08-05
Payer: MEDICARE

## 2024-08-05 VITALS
WEIGHT: 198 LBS | OXYGEN SATURATION: 98 % | DIASTOLIC BLOOD PRESSURE: 68 MMHG | HEIGHT: 67 IN | RESPIRATION RATE: 16 BRPM | SYSTOLIC BLOOD PRESSURE: 112 MMHG | HEART RATE: 74 BPM | BODY MASS INDEX: 31.08 KG/M2

## 2024-08-05 DIAGNOSIS — I50.22 CHRONIC HFREF (HEART FAILURE WITH REDUCED EJECTION FRACTION): ICD-10-CM

## 2024-08-05 DIAGNOSIS — Z09 HOSPITAL DISCHARGE FOLLOW-UP: Primary | ICD-10-CM

## 2024-08-05 DIAGNOSIS — I34.0 SEVERE MITRAL REGURGITATION: ICD-10-CM

## 2024-08-05 DIAGNOSIS — R91.8 PULMONARY NODULES/LESIONS, MULTIPLE: ICD-10-CM

## 2024-08-05 LAB
ANION GAP SERPL CALCULATED.3IONS-SCNC: 14 MMOL/L (ref 5–15)
BUN SERPL-MCNC: 30 MG/DL (ref 8–23)
BUN/CREAT SERPL: 18.1 (ref 7–25)
CALCIUM SPEC-SCNC: 9.6 MG/DL (ref 8.6–10.5)
CHLORIDE SERPL-SCNC: 99 MMOL/L (ref 98–107)
CO2 SERPL-SCNC: 27 MMOL/L (ref 22–29)
CREAT SERPL-MCNC: 1.66 MG/DL (ref 0.76–1.27)
EGFRCR SERPLBLD CKD-EPI 2021: 46.9 ML/MIN/1.73
GLUCOSE SERPL-MCNC: 88 MG/DL (ref 65–99)
POTASSIUM SERPL-SCNC: 4.1 MMOL/L (ref 3.5–5.2)
SODIUM SERPL-SCNC: 140 MMOL/L (ref 136–145)

## 2024-08-05 PROCEDURE — 99495 TRANSJ CARE MGMT MOD F2F 14D: CPT | Performed by: NURSE PRACTITIONER

## 2024-08-05 PROCEDURE — 80048 BASIC METABOLIC PNL TOTAL CA: CPT

## 2024-08-05 PROCEDURE — 3078F DIAST BP <80 MM HG: CPT | Performed by: NURSE PRACTITIONER

## 2024-08-05 PROCEDURE — 1160F RVW MEDS BY RX/DR IN RCRD: CPT | Performed by: NURSE PRACTITIONER

## 2024-08-05 PROCEDURE — 1159F MED LIST DOCD IN RCRD: CPT | Performed by: NURSE PRACTITIONER

## 2024-08-05 PROCEDURE — 36415 COLL VENOUS BLD VENIPUNCTURE: CPT

## 2024-08-05 PROCEDURE — 1111F DSCHRG MED/CURRENT MED MERGE: CPT | Performed by: NURSE PRACTITIONER

## 2024-08-05 PROCEDURE — 3074F SYST BP LT 130 MM HG: CPT | Performed by: NURSE PRACTITIONER

## 2024-08-05 NOTE — PROGRESS NOTES
Transitional Care Follow Up Visit  Subjective     Ziyad Wilson is a 59 y.o. male who presents for a transitional care management visit.    Within 48 business hours after discharge our office contacted him via telephone to coordinate his care and needs.      I reviewed and discussed the details of that call along with the discharge summary, hospital problems, inpatient lab results, inpatient diagnostic studies, and consultation reports with Ziyad.     Current outpatient and discharge medications have been reconciled for the patient.  Reviewed by: VIRGILIO Torres           No data to display              Risk for Readmission (LACE) Score: 14 (8/2/2024  5:00 AM)      History of Present Illness   Ziyad says overall he is feeling better since discharge.  He says he still short of breath with activity but this has significantly improved as compared to when he was admitted.  Says he is only urinating about once per day.  Says he drinks 5-6 beverages per day, sometimes water, sometimes soda.  Is checking blood pressure regularly at home and denies any abnormal readings.  He denies chest pain.  Verified meds he is taking.  Had multiple medication changes at discharge.  Denies any adverse side effects of these medications and voices compliance.  He has many upcoming appointments with cardiology.      Course During Hospital Stay:    Presented to the ER for evaluation of worsening shortness of breath for 2 days.  Had increase Lasix on his own because he thought maybe he was short of breath due to edema.  He presented to primary care office and EKG showed he was in A-fib with RVR so he was sent to the ER for further evaluation in the ER was started on a Cardizem drip and bolus.  Echo showed ejection fraction of 36 to 40% on 7-3-2024 and was also noted to have severe mitral valve regurgitation.  Had a LAUREN which showed left ventricular systolic function moderately decreased, left ventricular cavity is mild to moderately  "dilated, left atrial cavity is severely dilated, right atrial cavity is dilated with severe mitral valve regurgitation present.  He has been referred to CT surgery and electrophysiology.   CTA of the chest showed no evidence of pulmonary thrombus but did show right-sided pleural effusion with basilar atelectasis, diffuse bronchial wall thickening with mixed groundglass and airspace consolidation right greater than left, increased reticular opacities within the periphery of the lungs which probably represents changes in the pulmonary venous hypertension/overload with pulmonary edema.  Right middle lobe and left lower lobe nodule warranting follow-up which are slightly more conspicuous than on previous CT obtained in 2016.  Recommend repeat follow-up in 6 months per radiology report.      The following portions of the patient's history were reviewed and updated as appropriate: allergies, current medications, past family history, past medical history, past social history, past surgical history, and problem list.    Review of Systems   Constitutional:  Positive for fatigue. Negative for activity change, appetite change, chills and fever.   HENT: Negative.     Respiratory:  Positive for shortness of breath (no change or new symptoms). Negative for cough, wheezing and stridor.    Cardiovascular:  Negative for chest pain, palpitations and leg swelling.   Gastrointestinal: Negative.    Genitourinary:  Positive for decreased urine volume. Negative for difficulty urinating, dysuria and frequency.   Musculoskeletal: Negative.    Skin: Negative.    Neurological: Negative.  Negative for dizziness, light-headedness and headaches.   Hematological: Negative.    Psychiatric/Behavioral: Negative.         Objective   /68 (BP Location: Left arm, Patient Position: Sitting, Cuff Size: Other (Comment))   Pulse 74   Resp 16   Ht 170.2 cm (67\")   Wt 89.8 kg (198 lb)   SpO2 98%   BMI 31.01 kg/m²   Physical Exam  Vitals and " nursing note reviewed.   Constitutional:       Appearance: Normal appearance. He is normal weight.   HENT:      Mouth/Throat:      Mouth: Mucous membranes are moist.   Cardiovascular:      Rate and Rhythm: Normal rate and regular rhythm.      Pulses: Normal pulses.      Heart sounds: S1 normal and S2 normal. Murmur heard.      Systolic murmur is present.      No friction rub. No gallop.   Pulmonary:      Effort: Pulmonary effort is normal. No respiratory distress.      Breath sounds: Normal breath sounds. No wheezing.   Musculoskeletal:         General: Normal range of motion.      Cervical back: Normal range of motion and neck supple.      Right lower leg: No edema.      Left lower leg: No edema.   Skin:     General: Skin is warm and dry.      Capillary Refill: Capillary refill takes less than 2 seconds.   Neurological:      General: No focal deficit present.      Mental Status: He is alert and oriented to person, place, and time.   Psychiatric:         Mood and Affect: Mood normal.         Assessment & Plan   Diagnoses and all orders for this visit:    1. Hospital discharge follow-up (Primary)    2. Chronic HFrEF (heart failure with reduced ejection fraction)  -     Basic metabolic panel; Future    3. Severe mitral regurgitation    4. Pulmonary nodules/lesions, multiple  -     CT Chest Low Dose Follow Up Without Contrast; Future        Will reassess renal function today.  Avoid soda and drink only water.  Follow-up with heart failure clinic as scheduled this week.  Since there is significant change in nodules I will repeat a CT scan in 3 months as compared to 6 months.  Follow-up in 6 weeks with Dr. Thurman or sooner as needed.  Ziyad verbalizes understanding and agreement with this plan of care.

## 2024-08-05 NOTE — PROGRESS NOTES
Call and let him know that his kidney function has declined quite a bit since he has been out of the hospital.  He needs to make sure that he is not drinking any soda or sugary juice whatsoever and only water.  He needs to drink 5-6 bottles at minimum per day to flush.  Low-sodium diet.  He has an appointment with cardiology later this week so anticipate they will recheck this lab.  Let me know if he has any questions.

## 2024-08-06 ENCOUNTER — READMISSION MANAGEMENT (OUTPATIENT)
Dept: CALL CENTER | Facility: HOSPITAL | Age: 59
End: 2024-08-06
Payer: MEDICARE

## 2024-08-06 NOTE — OUTREACH NOTE
Medical Week 1 Survey      Flowsheet Row Responses   [facility] patient discharged from? [facility]   Does the patient have one of the following disease processes/diagnoses(primary or secondary)? Other   Week 1 attempt successful? Yes   Call start time 1658   Call end time 1705   Discharge diagnosis *Atrial fibrillation with RVR   Person spoke with today (if not patient) and relationship Pt   Meds reviewed with patient/caregiver? Yes   Is the patient having any side effects they believe may be caused by any medication additions or changes? No   Does the patient have all medications ordered at discharge? Yes   Prescription comments Pt has some Bumex that will last till 8/8/24, and he see HF Clinic on 8/8/24   Is the patient taking all medications as directed (includes completed medication regime)? Yes   Does the patient have a primary care provider?  Yes   Does the patient have an appointment with their PCP within 7 days of discharge? Yes   Has the patient kept scheduled appointments due by today? Yes   Psychosocial issues? No   Did the patient receive a copy of their discharge instructions? Yes   Nursing interventions Reviewed instructions with patient   What is the patient's perception of their health status since discharge? Improving   Is the patient/caregiver able to teach back signs and symptoms related to disease process for when to call PCP? Yes   Is the patient/caregiver able to teach back signs and symptoms related to disease process for when to call 911? Yes   Is the patient/caregiver able to teach back the hierarchy of who to call/visit for symptoms/problems? PCP, Specialist, Home health nurse, Urgent Care, ED, 911 Yes   If the patient is a current smoker, are they able to teach back resources for cessation? Not a smoker   Week 1 call completed? Yes   Would this patient benefit from a Referral to Amb Social Work? No   Is the patient interested in additional calls from an ambulatory ? No    Wrap up additional comments Pt denies any irregular heartbeats/SOB. Reviewed AVS/meds wit pt,  pt has enough Bumex till 8/8/24 and will discuss cost of Bumex with HF Clinic on 8/8/24 as it will cost pt $400. Pt had PCP fu appt, and verified HF Clinic, Cardiothoracic Surgeon, [facility] fu appts.   Call end time 1705            Candace BETH - Registered Nurse

## 2024-08-08 ENCOUNTER — HOSPITAL ENCOUNTER (OUTPATIENT)
Dept: CARDIOLOGY | Facility: HOSPITAL | Age: 59
Discharge: HOME | End: 2024-08-08
Payer: MEDICARE

## 2024-08-08 VITALS
OXYGEN SATURATION: 93 % | DIASTOLIC BLOOD PRESSURE: 58 MMHG | BODY MASS INDEX: 30.61 KG/M2 | HEIGHT: 67 IN | WEIGHT: 195 LBS | HEART RATE: 73 BPM | SYSTOLIC BLOOD PRESSURE: 89 MMHG

## 2024-08-08 DIAGNOSIS — N17.9 AKI (ACUTE KIDNEY INJURY): ICD-10-CM

## 2024-08-08 DIAGNOSIS — R55 NEAR SYNCOPE: ICD-10-CM

## 2024-08-08 DIAGNOSIS — I50.22 CHRONIC HFREF (HEART FAILURE WITH REDUCED EJECTION FRACTION): Primary | ICD-10-CM

## 2024-08-08 DIAGNOSIS — I48.19 ATRIAL FIBRILLATION, PERSISTENT: ICD-10-CM

## 2024-08-08 DIAGNOSIS — I34.0 SEVERE MITRAL REGURGITATION: ICD-10-CM

## 2024-08-08 LAB
ABSOLUTE LUNG FLUID CONTENT: 32 % (ref 20–35)
ANION GAP SERPL CALCULATED.3IONS-SCNC: 14 MMOL/L (ref 5–15)
BUN SERPL-MCNC: 25 MG/DL (ref 8–23)
BUN/CREAT SERPL: 11.5 (ref 7–25)
CALCIUM SPEC-SCNC: 9 MG/DL (ref 8.6–10.5)
CHLORIDE SERPL-SCNC: 102 MMOL/L (ref 98–107)
CO2 SERPL-SCNC: 22 MMOL/L (ref 22–29)
CREAT SERPL-MCNC: 2.18 MG/DL (ref 0.76–1.27)
EGFRCR SERPLBLD CKD-EPI 2021: 33.8 ML/MIN/1.73
GLUCOSE SERPL-MCNC: 105 MG/DL (ref 65–99)
MAGNESIUM SERPL-MCNC: 1.7 MG/DL (ref 1.6–2.4)
POTASSIUM SERPL-SCNC: 4.5 MMOL/L (ref 3.5–5.2)
QT INTERVAL: 380 MS
QTC INTERVAL: 435 MS
SODIUM SERPL-SCNC: 138 MMOL/L (ref 136–145)

## 2024-08-08 PROCEDURE — 80048 BASIC METABOLIC PNL TOTAL CA: CPT | Performed by: HOSPITALIST

## 2024-08-08 PROCEDURE — 93005 ELECTROCARDIOGRAM TRACING: CPT | Performed by: HOSPITALIST

## 2024-08-08 PROCEDURE — 83735 ASSAY OF MAGNESIUM: CPT | Performed by: HOSPITALIST

## 2024-08-08 PROCEDURE — 94726 PLETHYSMOGRAPHY LUNG VOLUMES: CPT | Performed by: HOSPITALIST

## 2024-08-08 NOTE — PROGRESS NOTES
Heart Failure Clinic    Date:  08/08/24     Vitals:    08/08/24 0947   BP: (!) 89/58   Pulse:    SpO2:         Indication:  Heart Failure     Procedure:  ReDS device sensor unit applied to right side of chest and right side of back.  Appropriate positioning confirmed based off of the unit's calculation.  Chest measurement obtained with the chest size ruler.  Measurement session performed over 45 seconds.      Method of arrival:  Ambulatory    Weighing self daily:  Yes    Taking medications as prescribed:  Yes    Edema:  No    Shortness of Air:  Yes, when walking    Number of pillows used at night:  >3, patient sleeps on 5 pillows    Results:  ReDS Value=        32                  Interpretation:  25-35% - normal/ideal lung fluid content    Mallory L Haase, RN 08/08/24 09:47 CDT

## 2024-08-08 NOTE — PROGRESS NOTES
Reason For Visit:  CHF    Subjective        Ziyad Wilson is a 59 y.o. male with the below pertinent PMH who is referred for CHF.     The patient was admitted 7/22-8/2/24 for decompensated CHF with worsened LVEF in the context of atrial fibrillation with RVR and severe MR on echo.  He was initially rate controlled and diuresed but ultimately started on amiodarone and cardioverted. He was discharged on Toprol  mg daily, Entresto 24/26 mg BID, spironolactone 25 mg daily, and bumex 1 mg daily for CHF. There was discussion about possible intervention for MR, but this was deferred until optimization of CHF and reevaluation. Plan for coronary angiography deferred to outpatient after 1 month of uninterrupted AC after cardioversion.    Today, the patient reports that he has felt better since being discharged from the hospital.  He reports minimal dyspnea on exertion that has not been worsening and no significant leg swelling.  He reports that his weight has been somewhat steady at home.  His only concern has been 2 presyncopal episodes 1 of which involved him falling down but he does not believe that he had full loss of consciousness.  He reports that the lightheadedness came on somewhat suddenly and not immediately after orthostatic changes although he does mention that he had only been standing up for 20 to 40 seconds when he became lightheaded.  He denies chest pain, palpitations, or orthopnea.  His prior cough that he had before going into the hospital has resolved.    ROS: Pertinent findings are included above.    Cardiac Studies  Echo 12/3/2012: low normal LV systolic function with EF 50-55%, normal RV size and function, mild to moderate LA dilation, mild MR, diastolic function suggesting increased LA pressure  Holter monitor 4/24/2007: frequent PVCs, rare PACs, no significant pauses, symptoms of shortness of breath associated with sinus tachycardia  Echo 5/7/2014: EF 40 to 45%, diastolic function consistent  with elevated mean LA pressure, mild LVH, global LV hypokinesis, mild LA enlargement, normal RV size and function, no significant valve dysfunction  Stress Echo 2/25/20: Low risk for ischemia. LVEF 51-55%.  Echo 7/23/24: LVEF 36-40%, LV mildly dilated, LA severely dilated, normal RV size/function, severe MR, mild AI, mild TR, RVSP 54  LAUREN 7/25/24: LV systolic function moderately decreased, LV mild-mod dilated, LA severely dilated, RA dilated, severe MR with pulmonary vein flow reversal present.    Pertinent PMH  HFrEF due to NICM  Severe MR  Persistent atrial fibrillation s/p cardioversion 7/29/24  Hypertension  Hyperlipidemia  Coronary Artery Calcification on CT  [facility]    Pertinent past medical, surgical, family, and social history were reviewed and updated in the EMR.      Current Outpatient Medications:     albuterol sulfate  (90 Base) MCG/ACT inhaler, Inhale 2 puffs Every 4 (Four) Hours As Needed for Wheezing or Shortness of Air., Disp: 18 g, Rfl: 11    amiodarone (Pacerone) 200 MG tablet, Take 1 tablet by mouth Daily. (Patient not taking: Reported on 8/5/2024), Disp: 90 tablet, Rfl: 0    amiodarone (PACERONE) 400 MG tablet, Take 1 tablet by mouth Every 12 (Twelve) Hours for 11 doses., Disp: 11 tablet, Rfl: 0    amitriptyline (ELAVIL) 75 MG tablet, Take 1 tablet by mouth Every Night., Disp: , Rfl:     apixaban (ELIQUIS) 5 MG tablet tablet, Take 1 tablet by mouth Every 12 (Twelve) Hours. Indications: Atrial Fibrillation, Disp: 180 tablet, Rfl: 0    atorvastatin (LIPITOR) 40 MG tablet, Take 1 tablet by mouth Daily., Disp: 90 tablet, Rfl: 3    bumetanide (BUMEX) 1 MG tablet, Take 1 tablet by mouth Daily., Disp: 90 tablet, Rfl: 0    diclofenac (VOLTAREN) 1 % gel gel, Apply 4 g topically to the appropriate area as directed 3 (Three) Times a Day., Disp: 100 g, Rfl: 3    fluticasone (Flonase) 50 MCG/ACT nasal spray, 2 sprays into the nostril(s) as directed by provider Daily. Administer 2 sprays in each nostril for  "each dose., Disp: 1 bottle, Rfl: 11    fluticasone-salmeterol (ADVAIR) 100-50 MCG/DOSE DISKUS, Inhale 1 puff 2 (Two) Times a Day., Disp: 60 each, Rfl: 3    levothyroxine (SYNTHROID, LEVOTHROID) 112 MCG tablet, Take 1 tablet by mouth Daily., Disp: , Rfl:     metoprolol succinate XL (TOPROL-XL) 200 MG 24 hr tablet, Take 1 tablet by mouth Daily., Disp: 90 tablet, Rfl: 0    Multiple Vitamins-Minerals (MULTIVITAMIN WITH MINERALS) tablet tablet, Take 1 tablet by mouth Daily., Disp: , Rfl:     nitroglycerin (NITROSTAT) 0.4 MG SL tablet, Place 1 tablet under the tongue Every 5 (Five) Minutes As Needed for Chest Pain (Only if SBP Greater Than 100). Take no more than 3 doses in 15 minutes., Disp: 12 tablet, Rfl: 12    omeprazole (priLOSEC) 20 MG capsule, Take 1 capsule by mouth Daily., Disp: 90 capsule, Rfl: 3    PARoxetine (PAXIL) 20 MG tablet, Take 1 tablet by mouth Daily., Disp: 90 tablet, Rfl: 0    potassium chloride (MICRO-K) 10 MEQ CR capsule, Take 1 capsule by mouth Daily., Disp: 90 capsule, Rfl: 0    QUEtiapine (SEROquel) 100 MG tablet, Take 1 tablet by mouth every night at bedtime., Disp: 90 tablet, Rfl: 1    sacubitril-valsartan (ENTRESTO) 24-26 MG tablet, Take 1 tablet by mouth Every 12 (Twelve) Hours., Disp: 60 tablet, Rfl: 2    spironolactone (ALDACTONE) 25 MG tablet, Take 1 tablet by mouth Daily., Disp: 90 tablet, Rfl: 0     Objective   Vital Signs:  BP (!) 89/58 (BP Location: Left arm)   Pulse 73   Ht 170.2 cm (67\")   Wt 88.5 kg (195 lb)   SpO2 93%   BMI 30.54 kg/m²   Estimated body mass index is 30.54 kg/m² as calculated from the following:    Height as of this encounter: 170.2 cm (67\").    Weight as of this encounter: 88.5 kg (195 lb).      Constitutional:       Appearance: Healthy appearance. Not in distress.   Neck:      Vascular: JVD normal.   Pulmonary:      Effort: Pulmonary effort is normal.      Breath sounds: Normal breath sounds.   Cardiovascular:      Normal rate. Regular rhythm.      Murmurs: " There is a grade 1/6 systolic murmur at the apex.      No gallop.  No click. No rub.   Edema:     Peripheral edema absent.   Abdominal:      General: There is no distension.      Palpations: Abdomen is soft.      Tenderness: There is no abdominal tenderness.   Skin:     General: Skin is warm and dry.   Neurological:      Mental Status: Alert and oriented to person, place and time.        Result Review :  The following data was reviewed by: Shaw Wright MD on 08/08/2024:  BMP   BMP          8/2/2024    03:41 8/5/2024    10:30 8/8/2024    10:16   BMP   BUN 18  30  25    Creatinine 1.31  1.66  2.18    Sodium 137  140  138    Potassium 4.0  4.1  4.5    Chloride 95  99  102    CO2 36.0  27.0  22.0    Calcium 9.4  9.6  9.0      Magnesium   Magnesium   Date/Time Value Ref Range Status   08/08/2024 1016 1.7 1.6 - 2.4 mg/dL Final     ReDS   Lab Results   Component Value Date    ABSOLUTELUNG 32 08/08/2024    ABSOLUTELUNG 55 (A) 07/26/2024     ECG today shows sinus rhythm with PACs and nonspecific T wave abnormalities       Assessment and Plan   Diagnoses and all orders for this visit:    1. Chronic HFrEF (heart failure with reduced ejection fraction) (Primary)  -     ReDs Vest  -     ECG 12 Lead Rhythm Change; Standing  -     ECG 12 Lead Rhythm Change  -     Magnesium; Future  -     Basic Metabolic Panel; Future  -     Magnesium; Standing  -     Magnesium  -     Basic Metabolic Panel; Standing  -     Basic Metabolic Panel    2. Near syncope  -     Holter Monitor - 72 Hour Up To 15 Days; Future    3. Severe mitral regurgitation    4. Atrial fibrillation, persistent    5. MARCIANO (acute kidney injury)    Other orders  -     empagliflozin (Jardiance) 10 MG tablet tablet; Take 1 tablet by mouth Daily.  Dispense: 30 tablet; Refill: 11      - Overall appears to be doing better now does have low BP and an MARCIANO that I suspect is related to volume depletion.  He also has had 2 presyncopal episodes; I suspect that this may have been  related to orthostatic hypotension, but given his LV dysfunction I am going to have him wear a cardiac monitor.  - Stop Bumex for now with plan for eventual PRN dosing for weight gain or swelling  - Tentatively plan to start Jardiance 10 mg daily on Sunday  - Diuresis clinic follow-up on Monday with repeat labs to check for improvement in renal function  - Continue Toprol- mg daily, amiodarone (transitioning to 200 mg daily today), Entresto 24/26 mg twice daily, spironolactone 25 mg daily, KCl 10 mEq daily, Eliquis 5 mg twice daily  - I do not hear a significant MR murmur on exam today, and I do think that it may be worthwhile getting a repeat echo to assess for improvement in his LV dysfunction after cardioversion and some GDMT as well as to assess for some improvement in his MR.  If persistent severe MR or persistent LV dysfunction he definitely will warrant coronary angiography.  He has follow-up with Dr. Mathew later this month, and I will defer these testing considerations to him.    Follow Up   Return in about 9 days (around 8/17/2024).  Patient was given instructions and counseling regarding his condition or for health maintenance advice. Please see specific information pulled into the AVS if appropriate.     I spent 50 minutes caring for Ziyad on this date of service. This time includes time spent by me in the following activities:preparing for the visit, reviewing tests, performing a medically appropriate examination and/or evaluation , counseling and educating the patient/family/caregiver, ordering medications, tests, or procedures, documenting information in the medical record, and care coordination  Time spent on the separately reported service of ReDS and ECG interpretation/documentation is not included in the time used to support the E/M service also reported today.    EMR Dragon/Transcription disclaimer: Much of this encounter note is an electronic transcription/translation of spoken language to  printed text. The electronic translation of spoken language may permit erroneous, or at times, nonsensical words or phrases to be inadvertently transcribed; although I have reviewed the note for such errors, some may still exist.

## 2024-08-12 ENCOUNTER — HOSPITAL ENCOUNTER (OUTPATIENT)
Dept: CARDIOLOGY | Facility: HOSPITAL | Age: 59
Discharge: HOME | End: 2024-08-12
Payer: MEDICARE

## 2024-08-16 ENCOUNTER — READMISSION MANAGEMENT (OUTPATIENT)
Dept: CALL CENTER | Facility: HOSPITAL | Age: 59
End: 2024-08-16
Payer: MEDICARE

## 2024-08-16 NOTE — OUTREACH NOTE
Medical Week 2 Survey      Flowsheet Row Responses   [facility] facility patient discharged from? [place]   Does the patient have one of the following disease processes/diagnoses(primary or secondary)? Other   Week 2 attempt successful? No   Unsuccessful attempts Attempt 1            KEN ENCINAS - Registered Nurse

## 2024-08-19 ENCOUNTER — HOSPITAL ENCOUNTER (OUTPATIENT)
Dept: CARDIOLOGY | Facility: HOSPITAL | Age: 59
Discharge: HOME | End: 2024-08-19
Admitting: HOSPITALIST
Payer: MEDICARE

## 2024-08-19 VITALS
OXYGEN SATURATION: 97 % | SYSTOLIC BLOOD PRESSURE: 104 MMHG | RESPIRATION RATE: 12 BRPM | DIASTOLIC BLOOD PRESSURE: 68 MMHG | WEIGHT: 197.4 LBS | HEART RATE: 71 BPM | BODY MASS INDEX: 30.92 KG/M2

## 2024-08-19 DIAGNOSIS — I48.19 ATRIAL FIBRILLATION, PERSISTENT: ICD-10-CM

## 2024-08-19 DIAGNOSIS — I34.0 SEVERE MITRAL REGURGITATION: ICD-10-CM

## 2024-08-19 DIAGNOSIS — I50.22 CHRONIC HFREF (HEART FAILURE WITH REDUCED EJECTION FRACTION): Primary | ICD-10-CM

## 2024-08-19 LAB
ABSOLUTE LUNG FLUID CONTENT: 39 % (ref 20–35)
ANION GAP SERPL CALCULATED.3IONS-SCNC: 10 MMOL/L (ref 5–15)
BUN SERPL-MCNC: 15 MG/DL (ref 8–23)
BUN/CREAT SERPL: 10.6 (ref 7–25)
CALCIUM SPEC-SCNC: 9.2 MG/DL (ref 8.6–10.5)
CHLORIDE SERPL-SCNC: 102 MMOL/L (ref 98–107)
CO2 SERPL-SCNC: 26 MMOL/L (ref 22–29)
CREAT SERPL-MCNC: 1.42 MG/DL (ref 0.76–1.27)
EGFRCR SERPLBLD CKD-EPI 2021: 56.6 ML/MIN/1.73
GLUCOSE SERPL-MCNC: 121 MG/DL (ref 65–99)
POTASSIUM SERPL-SCNC: 4.2 MMOL/L (ref 3.5–5.2)
SODIUM SERPL-SCNC: 138 MMOL/L (ref 136–145)

## 2024-08-19 PROCEDURE — 99214 OFFICE O/P EST MOD 30 MIN: CPT | Performed by: HOSPITALIST

## 2024-08-19 PROCEDURE — 80048 BASIC METABOLIC PNL TOTAL CA: CPT | Performed by: HOSPITALIST

## 2024-08-19 PROCEDURE — 94726 PLETHYSMOGRAPHY LUNG VOLUMES: CPT | Performed by: HOSPITALIST

## 2024-08-19 NOTE — PROGRESS NOTES
Heart Failure Clinic    Date:  08/19/24     Vitals:    08/19/24 1053   BP: 104/68   Pulse: 71   Resp: 12   SpO2: 97%        Indication:  Heart Failure     Procedure:  ReDS device sensor unit applied to right side of chest and right side of back.  Appropriate positioning confirmed based off of the unit's calculation.  Chest measurement obtained with the chest size ruler.  Measurement session performed over 45 seconds.      Method of arrival:  Ambulatory    Weighing self daily:  Yes    Taking medications as prescribed:  Yes    Edema:  No    Shortness of Air:  No    Number of pillows used at night:  <2    Results:  ReDS Value=            39              Interpretation:  39-46% ELEVATED LUNG FLUID CONTENT    Francie Desouza RN 08/19/24 10:54 CDT

## 2024-08-19 NOTE — PROGRESS NOTES
Reason For Visit:  CHF    Subjective        Ziyad Wilson is a 59 y.o. male with the below pertinent PMH who presents for follow-up of CHF.    Ziyad Wilson was most recently seen by me in clinic for initial evaluation 8/8/2024 after hospital discharge on 8/2/2024 (see that note for more prior history details).  At his visit he did report feeling better since discharge with minimal dyspnea on exertion that was stable.  He had experienced 2 presyncopal episodes.  BP was low associated with an MARCIANO, and there was suspicion for volume depletion.  Plan was for him to transition Bumex to PRN and wait until Sunday to start Jardiance 10 mg daily with diuresis clinic follow-up on Monday to repeat labs.  A cardiac monitor was ordered.    The patient did miss his follow-up visits as he was unaware of when they were scheduled and he did not get his cardiac monitor.  He did state that his orthostatic lightheadedness resolved and he has not had any further near syncope.  His energy level has improved and he has noticed better activity tolerance with ability to walk much longer distances when he goes out of his house.  He still has some mild dyspnea on exertion.  He denies chest pain.  He has not had any significant palpitations.  He reports that his weight has been stable at home and he has not noticed any peripheral edema.    ROS: Pertinent findings are included above.    Cardiac Studies  Echo 12/3/2012: low normal LV systolic function with EF 50-55%, normal RV size and function, mild to moderate LA dilation, mild MR, diastolic function suggesting increased LA pressure  Holter monitor 4/24/2007: frequent PVCs, rare PACs, no significant pauses, symptoms of shortness of breath associated with sinus tachycardia  Echo 5/7/2014: EF 40 to 45%, diastolic function consistent with elevated mean LA pressure, mild LVH, global LV hypokinesis, mild LA enlargement, normal RV size and function, no significant valve dysfunction  Stress Echo  2/25/20: Low risk for ischemia. LVEF 51-55%.  Echo 7/23/24: LVEF 36-40%, LV mildly dilated, LA severely dilated, normal RV size/function, severe MR, mild AI, mild TR, RVSP 54  LAUREN 7/25/24: LV systolic function moderately decreased, LV mild-mod dilated, LA severely dilated, RA dilated, severe MR with pulmonary vein flow reversal present.     Pertinent PMH  HFrEF due to NICM  Severe MR  Persistent atrial fibrillation s/p cardioversion 7/29/24  Hypertension  Hyperlipidemia  Coronary Artery Calcification on CT  [facility]    Pertinent past medical, surgical, family, and social history were reviewed.      Current Outpatient Medications:     albuterol sulfate  (90 Base) MCG/ACT inhaler, Inhale 2 puffs Every 4 (Four) Hours As Needed for Wheezing or Shortness of Air., Disp: 18 g, Rfl: 11    amiodarone (Pacerone) 200 MG tablet, Take 1 tablet by mouth Daily., Disp: 90 tablet, Rfl: 0    amitriptyline (ELAVIL) 75 MG tablet, Take 1 tablet by mouth Every Night., Disp: , Rfl:     apixaban (ELIQUIS) 5 MG tablet tablet, Take 1 tablet by mouth Every 12 (Twelve) Hours. Indications: Atrial Fibrillation, Disp: 180 tablet, Rfl: 0    atorvastatin (LIPITOR) 40 MG tablet, Take 1 tablet by mouth Daily., Disp: 90 tablet, Rfl: 3    diclofenac (VOLTAREN) 1 % gel gel, Apply 4 g topically to the appropriate area as directed 3 (Three) Times a Day., Disp: 100 g, Rfl: 3    empagliflozin (Jardiance) 10 MG tablet tablet, Take 1 tablet by mouth Daily., Disp: 30 tablet, Rfl: 11    fluticasone (Flonase) 50 MCG/ACT nasal spray, 2 sprays into the nostril(s) as directed by provider Daily. Administer 2 sprays in each nostril for each dose., Disp: 1 bottle, Rfl: 11    fluticasone-salmeterol (ADVAIR) 100-50 MCG/DOSE DISKUS, Inhale 1 puff 2 (Two) Times a Day., Disp: 60 each, Rfl: 3    levothyroxine (SYNTHROID, LEVOTHROID) 112 MCG tablet, Take 1 tablet by mouth Daily., Disp: , Rfl:     metoprolol succinate XL (TOPROL-XL) 200 MG 24 hr tablet, Take 1 tablet by  "mouth Daily., Disp: 90 tablet, Rfl: 0    Multiple Vitamins-Minerals (MULTIVITAMIN WITH MINERALS) tablet tablet, Take 1 tablet by mouth Daily., Disp: , Rfl:     nitroglycerin (NITROSTAT) 0.4 MG SL tablet, Place 1 tablet under the tongue Every 5 (Five) Minutes As Needed for Chest Pain (Only if SBP Greater Than 100). Take no more than 3 doses in 15 minutes., Disp: 12 tablet, Rfl: 12    omeprazole (priLOSEC) 20 MG capsule, Take 1 capsule by mouth Daily., Disp: 90 capsule, Rfl: 3    PARoxetine (PAXIL) 20 MG tablet, Take 1 tablet by mouth Daily., Disp: 90 tablet, Rfl: 0    potassium chloride (MICRO-K) 10 MEQ CR capsule, Take 1 capsule by mouth Daily., Disp: 90 capsule, Rfl: 0    QUEtiapine (SEROquel) 100 MG tablet, Take 1 tablet by mouth every night at bedtime., Disp: 90 tablet, Rfl: 1    sacubitril-valsartan (ENTRESTO) 24-26 MG tablet, Take 1 tablet by mouth Every 12 (Twelve) Hours., Disp: 60 tablet, Rfl: 2    spironolactone (ALDACTONE) 25 MG tablet, Take 1 tablet by mouth Daily., Disp: 90 tablet, Rfl: 0     Objective   Vital Signs:  /68 (BP Location: Right arm)   Pulse 71   Resp 12   Wt 89.5 kg (197 lb 6.4 oz)   SpO2 97%   BMI 30.92 kg/m²   Estimated body mass index is 30.92 kg/m² as calculated from the following:    Height as of 8/8/24: 170.2 cm (67\").    Weight as of this encounter: 89.5 kg (197 lb 6.4 oz).      Constitutional:       Appearance: Healthy appearance. Not in distress.   Neck:      Vascular: JVD normal.   Pulmonary:      Effort: Pulmonary effort is normal.      Breath sounds: Normal breath sounds.   Cardiovascular:      Normal rate. Regular rhythm.      Murmurs: There is a grade 1/6 systolic murmur, radiating to the apex.      No gallop.  No click. No rub.   Edema:     Peripheral edema absent.   Abdominal:      General: There is no distension.      Palpations: Abdomen is soft.      Tenderness: There is no abdominal tenderness.   Skin:     General: Skin is warm and dry.   Neurological:      " Mental Status: Alert and oriented to person, place and time.        Result Review :  The following data was reviewed by: Shaw Wright MD on 08/19/2024:  BMP   BMP          8/5/2024    10:30 8/8/2024    10:16 8/19/2024    10:47   BMP   BUN 30  25  15    Creatinine 1.66  2.18  1.42    Sodium 140  138  138    Potassium 4.1  4.5  4.2    Chloride 99  102  102    CO2 27.0  22.0  26.0    Calcium 9.6  9.0  9.2      ReDS   Lab Results   Component Value Date    ABSOLUTELUNG 39 (A) 08/19/2024    ABSOLUTELUNG 32 08/08/2024    ABSOLUTELUNG 55 (A) 07/26/2024        Assessment and Plan   Diagnoses and all orders for this visit:    1. Chronic HFrEF (heart failure with reduced ejection fraction) (Primary)  2. Severe mitral regurgitation  3. Atrial fibrillation, persistent  - Now appears to be doing much better.  No further orthostatic lightheadedness after stopping Bumex, and BP is improved.  Still without significant BP room and likely too soon for GDMT titration, which can be reconsidered at follow-up.  ReDS reading is slightly elevated although weight is overall stable and he does not appear to have any hypervolemia on exam.  He does still have a faint murmur of MR on exam although it is possible that MR may be some better after better optimization of his CHF and some volume removal.  He has follow-up later this week with Dr. Moe and next week with Dr. Mathew regarding his big picture plans including potential cath and mitral valve procedures.  - Continue Toprol-XL 30 mg daily, Entresto 24/26 mg twice daily, spironolactone 25 mg daily, Jardiance 10 mg daily, Bumex PRN  - Continue amiodarone 200 mg daily and Eliquis 5 mg twice daily; consider maze if undergoing mitral valve surgery versus an ablation if not pursuing surgery.  - Will hold off on his cardiac monitor given that his prior near syncope seems orthostatic and has not recurred since stopping his diuretics.      Follow Up   Return in about 5 weeks (around  9/23/2024).  Patient was given instructions and counseling regarding his condition or for health maintenance advice. Please see specific information pulled into the AVS if appropriate.       EMR Dragon/Transcription disclaimer: Much of this encounter note is an electronic transcription/translation of spoken language to printed text. The electronic translation of spoken language may permit erroneous, or at times, nonsensical words or phrases to be inadvertently transcribed; although I have reviewed the note for such errors, some may still exist.

## 2024-08-20 ENCOUNTER — READMISSION MANAGEMENT (OUTPATIENT)
Dept: CALL CENTER | Facility: HOSPITAL | Age: 59
End: 2024-08-20
Payer: MEDICARE

## 2024-08-20 NOTE — OUTREACH NOTE
Medical Week 2 Survey      Flowsheet Row Responses   [facility] patient discharged from? [place]   Does the patient have one of the following disease processes/diagnoses(primary or secondary)? Other   Week 2 attempt successful? Yes   Call start time 1526   Discharge diagnosis *Atrial fibrillation with RVR   Call end time 1529   Person spoke with today (if not patient) and relationship pt   Meds reviewed with patient/caregiver? Yes   Is the patient having any side effects they believe may be caused by any medication additions or changes? No   Does the patient have all medications ordered at discharge? Yes   Is the patient taking all medications as directed (includes completed medication regime)? Yes   Does the patient have a primary care provider?  Yes   Does the patient have an appointment with their PCP within 7 days of discharge? Yes   Has the patient kept scheduled appointments due by today? N/A   Psychosocial issues? No   What is the patient's perception of their health status since discharge? Same   Is the patient/caregiver able to teach back signs and symptoms related to disease process for when to call PCP? Yes   Is the patient/caregiver able to teach back signs and symptoms related to disease process for when to call 911? Yes   Is the patient/caregiver able to teach back the hierarchy of who to call/visit for symptoms/problems? PCP, Specialist, Home health nurse, Urgent Care, ED, 911 Yes   Week 2 Call Completed? Yes   Is the patient interested in additional calls from an ambulatory ? No   Would this patient benefit from a Referral to Amb Social Work? No   Wrap up additional comments Pt denies any irregular heartbeats, SOB, chest pain. Pt is anticipating heart surgery r/t  leaky valve repair. No medication issues.   Call end time 1529            Candace BETH - Registered Nurse

## 2024-08-22 ENCOUNTER — PREP FOR SURGERY (OUTPATIENT)
Dept: OTHER | Facility: HOSPITAL | Age: 59
End: 2024-08-22
Payer: MEDICARE

## 2024-08-22 ENCOUNTER — TELEPHONE (OUTPATIENT)
Dept: CARDIAC SURGERY | Facility: CLINIC | Age: 59
End: 2024-08-22
Payer: MEDICARE

## 2024-08-22 ENCOUNTER — OFFICE VISIT (OUTPATIENT)
Dept: CARDIAC SURGERY | Facility: CLINIC | Age: 59
End: 2024-08-22
Payer: MEDICARE

## 2024-08-22 VITALS
OXYGEN SATURATION: 94 % | HEART RATE: 74 BPM | BODY MASS INDEX: 30.76 KG/M2 | SYSTOLIC BLOOD PRESSURE: 120 MMHG | DIASTOLIC BLOOD PRESSURE: 98 MMHG | HEIGHT: 67 IN | WEIGHT: 196 LBS

## 2024-08-22 DIAGNOSIS — I50.22 CHRONIC HFREF (HEART FAILURE WITH REDUCED EJECTION FRACTION): ICD-10-CM

## 2024-08-22 DIAGNOSIS — I34.0 SEVERE MITRAL REGURGITATION: Primary | ICD-10-CM

## 2024-08-22 DIAGNOSIS — R06.02 SOB (SHORTNESS OF BREATH): ICD-10-CM

## 2024-08-22 PROCEDURE — 3080F DIAST BP >= 90 MM HG: CPT | Performed by: SURGERY

## 2024-08-22 PROCEDURE — 99214 OFFICE O/P EST MOD 30 MIN: CPT | Performed by: SURGERY

## 2024-08-22 PROCEDURE — 3074F SYST BP LT 130 MM HG: CPT | Performed by: SURGERY

## 2024-08-22 RX ORDER — SODIUM CHLORIDE 0.9 % (FLUSH) 0.9 %
10 SYRINGE (ML) INJECTION EVERY 12 HOURS SCHEDULED
OUTPATIENT
Start: 2024-08-22

## 2024-08-22 RX ORDER — SODIUM CHLORIDE 9 MG/ML
40 INJECTION, SOLUTION INTRAVENOUS AS NEEDED
OUTPATIENT
Start: 2024-08-22

## 2024-08-22 RX ORDER — SODIUM CHLORIDE 9 MG/ML
30 INJECTION, SOLUTION INTRAVENOUS CONTINUOUS PRN
OUTPATIENT
Start: 2024-08-22

## 2024-08-22 RX ORDER — SODIUM CHLORIDE 0.9 % (FLUSH) 0.9 %
10 SYRINGE (ML) INJECTION AS NEEDED
OUTPATIENT
Start: 2024-08-22

## 2024-08-22 RX ORDER — IBUPROFEN 600 MG/1
1 TABLET ORAL
OUTPATIENT
Start: 2024-08-22

## 2024-08-22 RX ORDER — NICOTINE POLACRILEX 4 MG
15 LOZENGE BUCCAL
OUTPATIENT
Start: 2024-08-22

## 2024-08-22 RX ORDER — ACETAMINOPHEN 500 MG
1000 TABLET ORAL ONCE
OUTPATIENT
Start: 2024-09-18

## 2024-08-22 RX ORDER — DEXTROSE MONOHYDRATE 25 G/50ML
10-50 INJECTION, SOLUTION INTRAVENOUS
OUTPATIENT
Start: 2024-08-22

## 2024-08-22 RX ORDER — SODIUM CHLORIDE 0.9 % (FLUSH) 0.9 %
30 SYRINGE (ML) INJECTION ONCE AS NEEDED
OUTPATIENT
Start: 2024-08-22

## 2024-08-22 NOTE — PROGRESS NOTES
"    [facility] Cardiothoracic Surgery  PROGRESS NOTE   CC: Severe MR with A-fib and decreased LVEF    Subjective:   History of Present Illness  The patient presents for evaluation of mitral valve regurgitation.    He reports an improvement in his breathing and denies experiencing any chest pain. He has been under the care of the heart failure clinic, which has been managing his medications. He is a non-smoker and does not consume alcohol excessively, although he admits to occasional marijuana use. He expresses a desire to undergo surgery as soon as possible and is curious about the potential scarring from the procedure. He also inquires about the possibility of resuming exercise post-surgery. He has a full set of dentures.      ROS: Cardiovascular ROS: no chest pain on exertion      Objective:      /98 (BP Location: Right arm, Patient Position: Sitting, Cuff Size: Adult)   Pulse 74   Ht 170.2 cm (67\")   Wt 88.9 kg (196 lb)   SpO2 94%   BMI 30.70 kg/m²       Physical Exam  PE:  Vitals:    08/22/24 0812   BP: 120/98   Pulse: 74   SpO2: 94%     GENERAL: NAD, resting comfortably, normal palor  CARDIOVASCULAR: regular, regular rate, sinus, no murmur appreciated  PULMONARY: Normal bilateral breath sounds, no labored breathing  ABDOMEN: soft, nt/nd  EXTREMITIES: mild peripheral edema, normal pulses, normal ROM            Lab Results (last 72 hours)       ** No results found for the last 72 hours. **              Results  I personally reviewed LAUREN performed on 7/25/2024 the following is my interpretation:  Moderately reduced LVEF, and A-fib at time of LAUREN, severe MR with central and posteriorly directed jet with pulmonary vein reversal with a severely dilated left atrium, on TTE on 7/23 EF around 40% and left atrium measuring 5.7 cm        Assessment & Plan     Assessment & Plan    Mr. Wilson is a 59-year-old male who recently was admitted to hospital with heart failure exacerbation and atrial " fibrillation.  He is found to have severe mitral regurgitation with a posteriorly directed jet pulmonary vein reversal and a dilated left atrium.  Left atrium was less than 6 cm at 5.7.  His EF was around 40% but this was while he was in A-fib.  He is otherwise a pretty healthy fredrick and very active.    Discussed with Mr. Wilson today the natural history of mitral regurgitation in the setting of A-fib and LV dysfunction.  Given symptomatic nature and decreased LVEF he meets to class I indications for mitral valve repair versus replacement as well as maze procedure.  I discussed preoperative operative postoperative expectations of this as well as risk and benefits. We discussed the risk of surgery including but not limited to bleeding, infection, injury to major organs or vessels, chronic heart failure, arrhythmias, need for pacemaker, prolonged ventilation, renal failure, stroke, risk of anesthesia, risk of sternal wound or bone healing problems, reoperation, and/or death.   He understands and agrees to proceed.    I would like to repeat his echocardiogram since he is now in sinus rhythm and he will also need a heart cath prior to surgery.  Discussed this with Dr. Mathew who will get him in.  He is also been on GDMT and I am hopeful that his LV function is improved.  After this we will plan on surgery soon.  Thank you for trusting me to care of Mr. Wilson.  Please do not hesitate to call questions or concerns.        Hermes Moe MD   Cardiothoracic Surgeon    Patient or patient representative verbalized consent for the use of Ambient Listening during the visit with  Hermes Moe MD for chart documentation. 9/4/2024  13:05 CDT

## 2024-08-22 NOTE — TELEPHONE ENCOUNTER
Surgery orders are in for 9/19/2024.  Last dose of Eliquis should be taken on 9/12/2024.  Repeat echo has been ordered.  Please call patient with prework information.  Confirm he has a full set of dentures.

## 2024-08-23 NOTE — TELEPHONE ENCOUNTER
Spoke with patient. He requested detail info be mailed to him. Mailed all prework/OR info to pt today. Pt did confirm he has a full set of dentures.

## 2024-08-26 ENCOUNTER — OFFICE VISIT (OUTPATIENT)
Dept: CARDIOLOGY | Facility: CLINIC | Age: 59
End: 2024-08-26
Payer: MEDICARE

## 2024-08-26 VITALS
HEIGHT: 67 IN | HEART RATE: 73 BPM | BODY MASS INDEX: 30.45 KG/M2 | WEIGHT: 194 LBS | OXYGEN SATURATION: 98 % | DIASTOLIC BLOOD PRESSURE: 80 MMHG | SYSTOLIC BLOOD PRESSURE: 110 MMHG

## 2024-08-26 DIAGNOSIS — I25.10 CORONARY ARTERY DISEASE INVOLVING NATIVE CORONARY ARTERY OF NATIVE HEART WITHOUT ANGINA PECTORIS: ICD-10-CM

## 2024-08-26 DIAGNOSIS — I48.0 PAROXYSMAL ATRIAL FIBRILLATION: ICD-10-CM

## 2024-08-26 DIAGNOSIS — I50.22 CHRONIC HFREF (HEART FAILURE WITH REDUCED EJECTION FRACTION): Primary | ICD-10-CM

## 2024-08-26 DIAGNOSIS — I10 ESSENTIAL HYPERTENSION: ICD-10-CM

## 2024-08-26 DIAGNOSIS — I34.0 SEVERE MITRAL REGURGITATION: ICD-10-CM

## 2024-08-26 DIAGNOSIS — E78.2 MIXED HYPERLIPIDEMIA: ICD-10-CM

## 2024-08-26 PROBLEM — I50.21 ACUTE SYSTOLIC CHF (CONGESTIVE HEART FAILURE): Status: RESOLVED | Noted: 2024-07-24 | Resolved: 2024-08-26

## 2024-08-26 PROCEDURE — 3079F DIAST BP 80-89 MM HG: CPT | Performed by: INTERNAL MEDICINE

## 2024-08-26 PROCEDURE — 93000 ELECTROCARDIOGRAM COMPLETE: CPT | Performed by: INTERNAL MEDICINE

## 2024-08-26 PROCEDURE — 3074F SYST BP LT 130 MM HG: CPT | Performed by: INTERNAL MEDICINE

## 2024-08-26 PROCEDURE — 99214 OFFICE O/P EST MOD 30 MIN: CPT | Performed by: INTERNAL MEDICINE

## 2024-08-26 RX ORDER — AMLODIPINE AND VALSARTAN 5; 160 MG/1; MG/1
1 TABLET ORAL DAILY
COMMUNITY
End: 2024-08-26

## 2024-08-26 RX ORDER — SACUBITRIL AND VALSARTAN 49; 51 MG/1; MG/1
1 TABLET, FILM COATED ORAL 2 TIMES DAILY
Qty: 60 TABLET | Refills: 11 | Status: SHIPPED | OUTPATIENT
Start: 2024-08-26

## 2024-08-26 NOTE — PROGRESS NOTES
Reason for Visit: cardiovascular follow up.    HPI:  Ziyad Wilson is a 59 y.o. male is here today for follow-up.  He was recently admitted in July with atrial fibrillation with RVR and acute on chronic exacerbation of congestive heart failure.  Underwent cardioversion on 7/29/2024 with Dr Herndon  And subsequently had a pharmacologic return to sinus rhythm after recurrence.  He was seen in CT surgery follow-up by Dr. Moe who is planning surgery in the near future.  He is being set up for heart cath that is pending.    He reports doing well today.  He is breathing well and denies any chest pain, palpitations, dizziness, syncope, PND, or orthopnea.  He is doing a lot of walking at home but has not been lifting weights like he use to.      Previous Cardiac Testing and Procedures:  -Stress echo (3/27/2006) negative for ischemia  -Echo (12/3/2012) low normal LV systolic function with EF 50 to 55%, normal RV size and function, mild to moderate LA dilation, mild MR, diastolic function suggesting increased LA pressure  -Echo (5/7/2014) EF 40 to 45%, diastolic function consistent with elevated mean LA pressure, mild LVH, global LV hypokinesis, mild LA enlargement, normal RV size and function, no significant valve dysfunction  -Holter monitor (4/24/2007) frequent PVCs, rare PACs, no significant pauses, symptoms of shortness of breath associated with sinus tachycardia  -Stress echo (2/25/2020) low risk for ischemia, LV function is low normal  -Chest x-ray (7/22/2024) bilateral perihilar consolidation greater on the right, differential includes pulmonary edema or infectious process, enlarged cardiac silhouette  -CTA chest (7/22/2024) no evidence of pulmonary thromboembolic disease, right-sided pleural effusion with right basilar atelectasis evidence of pulmonary venous hypertension/volume overload with pulmonary edema  -Echo (7/23/2024) EF 36-40%, mild LV dilation, severe LA dilation, normal RV size and function, severe MR,  RVSP 45-55 mmHg   -LAUREN (7/25/2024) moderately reduced LV systolic function, mild to moderate LV enlargement, severe LA enlargement, RA dilation, severe MR  -Cardioversion (7/29/2024) successful cardioversion from atrial fibrillation to sinus rhythm    Patient Active Problem List   Diagnosis    Essential hypertension    Coronary artery disease involving native coronary artery of native heart without angina pectoris    Acquired hypothyroidism    Chronic daily headache    Class 1 obesity due to excess calories with serious comorbidity and body mass index (BMI) of 30.0 to 30.9 in adult    Mild cognitive impairment    Asthma    GERD (gastroesophageal reflux disease)    Sleep apnea    Mixed hyperlipidemia    Moderate episode of recurrent major depressive disorder    Primary insomnia    Cervical facet joint syndrome    Impaired glucose tolerance    CKD (chronic kidney disease) stage 2, GFR 60-89 ml/min    Arthritis of right shoulder region    Chest pain, atypical    Paroxysmal atrial fibrillation    Iron deficiency anemia    NICM (nonischemic cardiomyopathy), viral etiology    Tetrahydrocannabinol (THC) dependence    Acute pulmonary edema    Severe mitral regurgitation    Chronic HFrEF (heart failure with reduced ejection fraction)    SOB (shortness of breath)       Social History     Tobacco Use    Smoking status: Never     Passive exposure: Past (Childhood)    Smokeless tobacco: Never   Vaping Use    Vaping status: Never Used   Substance Use Topics    Alcohol use: Yes     Comment: occasional    Drug use: Yes     Types: Marijuana       Family History   Problem Relation Age of Onset    Hypertension Mother     Stroke Mother     Heart disease Mother     Hypertension Father     Heart disease Father     Hypertension Sister     Hypertension Brother        The following portions of the patient's history were reviewed and updated as appropriate: allergies, current medications, past family history, past medical history, past  social history, past surgical history, and problem list.      Current Outpatient Medications:     albuterol sulfate  (90 Base) MCG/ACT inhaler, Inhale 2 puffs Every 4 (Four) Hours As Needed for Wheezing or Shortness of Air., Disp: 18 g, Rfl: 11    amiodarone (Pacerone) 200 MG tablet, Take 1 tablet by mouth Daily., Disp: 90 tablet, Rfl: 0    amitriptyline (ELAVIL) 75 MG tablet, Take 1 tablet by mouth Every Night., Disp: , Rfl:     apixaban (ELIQUIS) 5 MG tablet tablet, Take 1 tablet by mouth Every 12 (Twelve) Hours. Indications: Atrial Fibrillation, Disp: 180 tablet, Rfl: 0    atorvastatin (LIPITOR) 40 MG tablet, Take 1 tablet by mouth Daily., Disp: 90 tablet, Rfl: 3    diclofenac (VOLTAREN) 1 % gel gel, Apply 4 g topically to the appropriate area as directed 3 (Three) Times a Day., Disp: 100 g, Rfl: 3    empagliflozin (Jardiance) 10 MG tablet tablet, Take 1 tablet by mouth Daily., Disp: 30 tablet, Rfl: 11    fluticasone (Flonase) 50 MCG/ACT nasal spray, 2 sprays into the nostril(s) as directed by provider Daily. Administer 2 sprays in each nostril for each dose., Disp: 1 bottle, Rfl: 11    fluticasone-salmeterol (ADVAIR) 100-50 MCG/DOSE DISKUS, Inhale 1 puff 2 (Two) Times a Day., Disp: 60 each, Rfl: 3    levothyroxine (SYNTHROID, LEVOTHROID) 112 MCG tablet, Take 1 tablet by mouth Daily., Disp: , Rfl:     metoprolol succinate XL (TOPROL-XL) 200 MG 24 hr tablet, Take 1 tablet by mouth Daily., Disp: 90 tablet, Rfl: 0    Multiple Vitamins-Minerals (MULTIVITAMIN WITH MINERALS) tablet tablet, Take 1 tablet by mouth Daily., Disp: , Rfl:     [START ON 9/17/2024] mupirocin (BACTROBAN) 2 % nasal ointment, 1 Application into the nostril(s) as directed by provider 1 (One) Time for 1 dose. Apply pea-sized amount to a Q-tip and swab each nare x 1 dose on 9/17/2024 at 1700, Disp: 1 each, Rfl: 0    nitroglycerin (NITROSTAT) 0.4 MG SL tablet, Place 1 tablet under the tongue Every 5 (Five) Minutes As Needed for Chest Pain  "(Only if SBP Greater Than 100). Take no more than 3 doses in 15 minutes., Disp: 12 tablet, Rfl: 12    omeprazole (priLOSEC) 20 MG capsule, Take 1 capsule by mouth Daily., Disp: 90 capsule, Rfl: 3    PARoxetine (PAXIL) 20 MG tablet, Take 1 tablet by mouth Daily., Disp: 90 tablet, Rfl: 0    potassium chloride (MICRO-K) 10 MEQ CR capsule, Take 1 capsule by mouth Daily., Disp: 90 capsule, Rfl: 0    QUEtiapine (SEROquel) 100 MG tablet, Take 1 tablet by mouth every night at bedtime., Disp: 90 tablet, Rfl: 1    spironolactone (ALDACTONE) 25 MG tablet, Take 1 tablet by mouth Daily., Disp: 90 tablet, Rfl: 0    sacubitril-valsartan (Entresto) 49-51 MG tablet, Take 1 tablet by mouth 2 (Two) Times a Day., Disp: 60 tablet, Rfl: 11    Review of Systems   Constitutional: Negative for chills and fever.   Cardiovascular:  Negative for chest pain and paroxysmal nocturnal dyspnea.   Respiratory:  Negative for cough and shortness of breath.    Skin:  Negative for rash.   Gastrointestinal:  Negative for abdominal pain and heartburn.   Neurological:  Negative for dizziness and numbness.       Objective   /80 (BP Location: Left arm, Patient Position: Sitting, Cuff Size: Adult)   Pulse 73   Ht 170.2 cm (67.01\")   Wt 88 kg (194 lb)   SpO2 98%   BMI 30.38 kg/m²   Constitutional:       Appearance: Well-developed.   HENT:      Head: Normocephalic and atraumatic.   Pulmonary:      Effort: Pulmonary effort is normal.      Breath sounds: Normal breath sounds.   Cardiovascular:      Normal rate. Regular rhythm.   Edema:     Peripheral edema absent.   Skin:     General: Skin is warm and dry.   Neurological:      Mental Status: Alert and oriented to person, place, and time.         ECG 12 Lead    Date/Time: 8/26/2024 10:24 AM  Performed by: Remi Mathew MD    Authorized by: Remi Mathew MD  Comparison: compared with previous ECG from 8/8/2024  Comparison to previous ECG: PAC's are no longer present  Rhythm: sinus rhythm  Rate: " normal  Conduction: 1st degree AV block  ST Flattening: II, III, aVF, V6 and V5  QRS axis: right            ICD-10-CM ICD-9-CM   1. Chronic HFrEF (heart failure with reduced ejection fraction)  I50.22 428.22   2. Paroxysmal atrial fibrillation  I48.0 427.31   3. Severe mitral regurgitation  I34.0 424.0   4. Coronary artery disease involving native coronary artery of native heart without angina pectoris  I25.10 414.01   5. Essential hypertension  I10 401.9   6. Mixed hyperlipidemia  E78.2 272.2         Assessment/Plan:  1.  Chronic systolic CHF: EF 36 to 40% on echo from 7/23/2024 with mild LV dilation.  Appears euvolemic and stable on exam today.  NYHA class I functional capacity.  Continue Jardiance, metoprolol succinate, and spironolactone.  Titrate up Entresto to 49/51 mg.  Discontinue amlodipine/valsartan in order to titrate up guideline directed medical therapy.    2.  Paroxysmal atrial fibrillation: Currently maintaining sinus rhythm.  Continue amiodarone, metoprolol, and Eliquis.    3.  Severe mitral regurgitation: Planning on valve surgery in the near future with Dr. Moe.  [facility] ordered and pending.  A     4.  Coronary artery disease: Previously diagnosed by Dr. Lagos.  He continues to deny any chest pain symptoms.  [facility] [place] pending for coronary evaluation prior to mitral valve repair.    5.  Essential hypertension: Blood pressure is well-controlled today.  Discontinue amlodipine/valsartan as mentioned above in order to optimize guideline directed medical therapy and titrate up Entresto.    6.  Mixed hyperlipidemia: Continue atorvastatin and check lipid panel prior to [facility].

## 2024-08-26 NOTE — LETTER
August 26, 2024     VIRGILIO Lacy  [address]  [place] [address]    Patient: Ziyad Wilson   YOB: 1965   Date of Visit: 8/26/2024       Dear VIRGILIO Lacy    Ziyad Wilson was in my office today. Below is a copy of my note.    If you have questions, please do not hesitate to call me. I look forward to following Ziyad along with you.         Sincerely,        Remi Mathew MD        CC: No Recipients      Reason for Visit: cardiovascular follow up.    HPI:  Ziyad Wilson is a 59 y.o. male is here today for follow-up.  He was recently admitted in July with atrial fibrillation with RVR and acute on chronic exacerbation of congestive heart failure.  Underwent cardioversion on 7/29/2024 with Dr Herndon  And subsequently had a pharmacologic return to sinus rhythm after recurrence.  He was seen in CT surgery follow-up by Dr. Moe who is planning surgery in the near future.  He is being set up for heart cath that is pending.    He reports doing well today.  He is breathing well and denies any chest pain, palpitations, dizziness, syncope, PND, or orthopnea.  He is doing a lot of walking at home but has not been lifting weights like he use to.      Previous Cardiac Testing and Procedures:  -Stress echo (3/27/2006) negative for ischemia  -Echo (12/3/2012) low normal LV systolic function with EF 50 to 55%, normal RV size and function, mild to moderate LA dilation, mild MR, diastolic function suggesting increased LA pressure  -Echo (5/7/2014) EF 40 to 45%, diastolic function consistent with elevated mean LA pressure, mild LVH, global LV hypokinesis, mild LA enlargement, normal RV size and function, no significant valve dysfunction  -Holter monitor (4/24/2007) frequent PVCs, rare PACs, no significant pauses, symptoms of shortness of breath associated with sinus tachycardia  -Stress echo (2/25/2020) low risk for ischemia, LV function is low normal  -Chest x-ray (7/22/2024) bilateral perihilar  consolidation greater on the right, differential includes pulmonary edema or infectious process, enlarged cardiac silhouette  -CTA chest (7/22/2024) no evidence of pulmonary thromboembolic disease, right-sided pleural effusion with right basilar atelectasis evidence of pulmonary venous hypertension/volume overload with pulmonary edema  -Echo (7/23/2024) EF 36-40%, mild LV dilation, severe LA dilation, normal RV size and function, severe MR, RVSP 45-55 mmHg   -LAUREN (7/25/2024) moderately reduced LV systolic function, mild to moderate LV enlargement, severe LA enlargement, RA dilation, severe MR  -Cardioversion (7/29/2024) successful cardioversion from atrial fibrillation to sinus rhythm    Patient Active Problem List   Diagnosis   • Essential hypertension   • Coronary artery disease involving native coronary artery of native heart without angina pectoris   • Acquired hypothyroidism   • Chronic daily headache   • Class 1 obesity due to excess calories with serious comorbidity and body mass index (BMI) of 30.0 to 30.9 in adult   • Mild cognitive impairment   • Asthma   • GERD (gastroesophageal reflux disease)   • Sleep apnea   • Mixed hyperlipidemia   • Moderate episode of recurrent major depressive disorder   • Primary insomnia   • Cervical facet joint syndrome   • Impaired glucose tolerance   • CKD (chronic kidney disease) stage 2, GFR 60-89 ml/min   • Arthritis of right shoulder region   • Chest pain, atypical   • Paroxysmal atrial fibrillation   • Iron deficiency anemia   • NICM (nonischemic cardiomyopathy), viral etiology   • Tetrahydrocannabinol (THC) dependence   • Acute pulmonary edema   • Severe mitral regurgitation   • Chronic HFrEF (heart failure with reduced ejection fraction)   • SOB (shortness of breath)       Social History     Tobacco Use   • Smoking status: Never     Passive exposure: Past (Childhood)   • Smokeless tobacco: Never   Vaping Use   • Vaping status: Never Used   Substance Use Topics   •  Alcohol use: Yes     Comment: occasional   • Drug use: Yes     Types: Marijuana       Family History   Problem Relation Age of Onset   • Hypertension Mother    • Stroke Mother    • Heart disease Mother    • Hypertension Father    • Heart disease Father    • Hypertension Sister    • Hypertension Brother        The following portions of the patient's history were reviewed and updated as appropriate: allergies, current medications, past family history, past medical history, past social history, past surgical history, and problem list.      Current Outpatient Medications:   •  albuterol sulfate  (90 Base) MCG/ACT inhaler, Inhale 2 puffs Every 4 (Four) Hours As Needed for Wheezing or Shortness of Air., Disp: 18 g, Rfl: 11  •  amiodarone (Pacerone) 200 MG tablet, Take 1 tablet by mouth Daily., Disp: 90 tablet, Rfl: 0  •  amitriptyline (ELAVIL) 75 MG tablet, Take 1 tablet by mouth Every Night., Disp: , Rfl:   •  apixaban (ELIQUIS) 5 MG tablet tablet, Take 1 tablet by mouth Every 12 (Twelve) Hours. Indications: Atrial Fibrillation, Disp: 180 tablet, Rfl: 0  •  atorvastatin (LIPITOR) 40 MG tablet, Take 1 tablet by mouth Daily., Disp: 90 tablet, Rfl: 3  •  diclofenac (VOLTAREN) 1 % gel gel, Apply 4 g topically to the appropriate area as directed 3 (Three) Times a Day., Disp: 100 g, Rfl: 3  •  empagliflozin (Jardiance) 10 MG tablet tablet, Take 1 tablet by mouth Daily., Disp: 30 tablet, Rfl: 11  •  fluticasone (Flonase) 50 MCG/ACT nasal spray, 2 sprays into the nostril(s) as directed by provider Daily. Administer 2 sprays in each nostril for each dose., Disp: 1 bottle, Rfl: 11  •  fluticasone-salmeterol (ADVAIR) 100-50 MCG/DOSE DISKUS, Inhale 1 puff 2 (Two) Times a Day., Disp: 60 each, Rfl: 3  •  levothyroxine (SYNTHROID, LEVOTHROID) 112 MCG tablet, Take 1 tablet by mouth Daily., Disp: , Rfl:   •  metoprolol succinate XL (TOPROL-XL) 200 MG 24 hr tablet, Take 1 tablet by mouth Daily., Disp: 90 tablet, Rfl: 0  •  Multiple  "Vitamins-Minerals (MULTIVITAMIN WITH MINERALS) tablet tablet, Take 1 tablet by mouth Daily., Disp: , Rfl:   •  [START ON 9/17/2024] mupirocin (BACTROBAN) 2 % nasal ointment, 1 Application into the nostril(s) as directed by provider 1 (One) Time for 1 dose. Apply pea-sized amount to a Q-tip and swab each nare x 1 dose on 9/17/2024 at 1700, Disp: 1 each, Rfl: 0  •  nitroglycerin (NITROSTAT) 0.4 MG SL tablet, Place 1 tablet under the tongue Every 5 (Five) Minutes As Needed for Chest Pain (Only if SBP Greater Than 100). Take no more than 3 doses in 15 minutes., Disp: 12 tablet, Rfl: 12  •  omeprazole (priLOSEC) 20 MG capsule, Take 1 capsule by mouth Daily., Disp: 90 capsule, Rfl: 3  •  PARoxetine (PAXIL) 20 MG tablet, Take 1 tablet by mouth Daily., Disp: 90 tablet, Rfl: 0  •  potassium chloride (MICRO-K) 10 MEQ CR capsule, Take 1 capsule by mouth Daily., Disp: 90 capsule, Rfl: 0  •  QUEtiapine (SEROquel) 100 MG tablet, Take 1 tablet by mouth every night at bedtime., Disp: 90 tablet, Rfl: 1  •  spironolactone (ALDACTONE) 25 MG tablet, Take 1 tablet by mouth Daily., Disp: 90 tablet, Rfl: 0  •  sacubitril-valsartan (Entresto) 49-51 MG tablet, Take 1 tablet by mouth 2 (Two) Times a Day., Disp: 60 tablet, Rfl: 11    Review of Systems   Constitutional: Negative for chills and fever.   Cardiovascular:  Negative for chest pain and paroxysmal nocturnal dyspnea.   Respiratory:  Negative for cough and shortness of breath.    Skin:  Negative for rash.   Gastrointestinal:  Negative for abdominal pain and heartburn.   Neurological:  Negative for dizziness and numbness.       Objective  /80 (BP Location: Left arm, Patient Position: Sitting, Cuff Size: Adult)   Pulse 73   Ht 170.2 cm (67.01\")   Wt 88 kg (194 lb)   SpO2 98%   BMI 30.38 kg/m²   Constitutional:       Appearance: Well-developed.   HENT:      Head: Normocephalic and atraumatic.   Pulmonary:      Effort: Pulmonary effort is normal.      Breath sounds: Normal " breath sounds.   Cardiovascular:      Normal rate. Regular rhythm.   Edema:     Peripheral edema absent.   Skin:     General: Skin is warm and dry.   Neurological:      Mental Status: Alert and oriented to person, place, and time.         ECG 12 Lead    Date/Time: 8/26/2024 10:24 AM  Performed by: Remi Mathew MD    Authorized by: Remi Mathew MD  Comparison: compared with previous ECG from 8/8/2024  Comparison to previous ECG: PAC's are no longer present  Rhythm: sinus rhythm  Rate: normal  Conduction: 1st degree AV block  ST Flattening: II, III, aVF, V6 and V5  QRS axis: right            ICD-10-CM ICD-9-CM   1. Chronic HFrEF (heart failure with reduced ejection fraction)  I50.22 428.22   2. Paroxysmal atrial fibrillation  I48.0 427.31   3. Severe mitral regurgitation  I34.0 424.0   4. Coronary artery disease involving native coronary artery of native heart without angina pectoris  I25.10 414.01   5. Essential hypertension  I10 401.9   6. Mixed hyperlipidemia  E78.2 272.2         Assessment/Plan:  1.  Chronic systolic CHF: EF 36 to 40% on echo from 7/23/2024 with mild LV dilation.  Appears euvolemic and stable on exam today.  NYHA class I functional capacity.  Continue Jardiance, metoprolol succinate, and spironolactone.  Titrate up Entresto to 49/51 mg.  Discontinue amlodipine/valsartan in order to titrate up guideline directed medical therapy.    2.  Paroxysmal atrial fibrillation: Currently maintaining sinus rhythm.  Continue amiodarone, metoprolol, and Eliquis.    3.  Severe mitral regurgitation: Planning on valve surgery in the near future with Dr. Moe.  [facility] ordered and pending.  A     4.  Coronary artery disease: Previously diagnosed by Dr. Lagos.  He continues to deny any chest pain symptoms.  [facility] pending for coronary evaluation prior to mitral valve repair.    5.  Essential hypertension: Blood pressure is well-controlled today.  Discontinue amlodipine/valsartan as mentioned above in order to  optimize guideline directed medical therapy and titrate up Entresto.    6.  Mixed hyperlipidemia: Continue atorvastatin and check lipid panel prior to [facility].

## 2024-08-28 ENCOUNTER — READMISSION MANAGEMENT (OUTPATIENT)
Dept: CALL CENTER | Facility: HOSPITAL | Age: 59
End: 2024-08-28
Payer: MEDICARE

## 2024-08-28 NOTE — OUTREACH NOTE
Medical Week 3 Survey      Flowsheet Row Responses   [facility] patient discharged from? [facility]   Does the patient have one of the following disease processes/diagnoses(primary or secondary)? Other   Week 3 attempt successful? No  [attempted pt and siblings listed]   Unsuccessful attempts Attempt 1            DAYSI FRAUSTO - Registered Nurse

## 2024-09-05 ENCOUNTER — HOSPITAL ENCOUNTER (OUTPATIENT)
Dept: CARDIOLOGY | Facility: HOSPITAL | Age: 59
Discharge: HOME | End: 2024-09-05
Payer: MEDICARE

## 2024-09-05 ENCOUNTER — OFFICE VISIT (OUTPATIENT)
Dept: CARDIOLOGY | Facility: CLINIC | Age: 59
End: 2024-09-05
Payer: MEDICARE

## 2024-09-05 VITALS
HEART RATE: 79 BPM | WEIGHT: 193 LBS | HEIGHT: 67 IN | BODY MASS INDEX: 30.29 KG/M2 | SYSTOLIC BLOOD PRESSURE: 108 MMHG | DIASTOLIC BLOOD PRESSURE: 82 MMHG | OXYGEN SATURATION: 98 %

## 2024-09-05 DIAGNOSIS — I48.19 PERSISTENT ATRIAL FIBRILLATION: Primary | ICD-10-CM

## 2024-09-05 DIAGNOSIS — I34.0 SEVERE MITRAL REGURGITATION: ICD-10-CM

## 2024-09-05 DIAGNOSIS — I48.0 PAROXYSMAL ATRIAL FIBRILLATION: ICD-10-CM

## 2024-09-05 DIAGNOSIS — I50.22 CHRONIC HFREF (HEART FAILURE WITH REDUCED EJECTION FRACTION): ICD-10-CM

## 2024-09-05 NOTE — PROGRESS NOTES
"[facility] -  CLINIC FOLLOW UP     Patient Care Team:  Randi Patterson APRN as PCP - General (Emergency Medicine)  Douglas Ruano MD as Consulting Physician (Pain Medicine)  Uri Lagos MD (Inactive) as Cardiologist (Cardiology)    Chief Complaint: follow up for afib     Subjective   EP Problems:  1.  Persistent atrial fibrillation  -7/29/24: DCCV  2.  Severe biatrial enlargement     Cardiology Problems:  1.  Severe MR  2.  Nonischemic cardiomyopathy  3.  Chronic systolic heart failure  4.  Hypertension  5.  Hyperlipidemia     Medical Problems:  1.  Obesity  2.  Obstructive sleep apnea  3.  Chronic normocytic anemia    HPI: Today I had the pleasure of seeing Ziyad Wilson in the cardiology clinic for follow up. He is a 59 year old male with a history of NICM, KOLTON, and HTN, persistent afib and severe MR. He presented to the hospital in July with SOB and edema and found to be in afib with RVR and admitted for further work up. Echo showed severe MR with EF 36-40%. He underwent cardioversion successfully and has remained in regular rhythm since.  At the time of his admission, ablation vs MAZE was being considered and he is now scheduled to have surgical mitral valve repair vs replacement with Dr. Moe and likely MAZE with CAMERON clip.     From a heart failure standpoint, he is doing really well now with minimal symptoms and doesn't feel like he is sick enough to warrant his surgery. Before he could barely walk around [facility] without significant symptoms. Now he can do it twice and went to [facility] the same day. He is now on Amiodarone and maintaining sinus rhythm.     Objective     Visit Vitals  /82   Pulse 79   Ht 170.2 cm (67.01\")   Wt 87.5 kg (193 lb)   SpO2 98%   BMI 30.22 kg/m²           Vitals reviewed.   Constitutional:       Appearance: Not in distress. Obese.   Eyes:      Extraocular Movements: Extraocular movements intact.      Conjunctiva/sclera: Conjunctivae normal.      " Pupils: Pupils are equal, round, and reactive to light.   HENT:      Head: Normocephalic and atraumatic.      Nose: Nose normal.    Mouth/Throat:      Lips: Pink.      Mouth: Mucous membranes are moist.      Pharynx: Oropharynx is clear.   Neck:      Vascular: No carotid bruit or JVD. JVD normal.   Pulmonary:      Effort: Pulmonary effort is normal.      Breath sounds: Normal breath sounds.   Chest:      Chest wall: Not tender to palpatation.   Cardiovascular:      PMI at left midclavicular line. Normal rate. Regular rhythm. Normal S1. Normal S2.       Murmurs: There is no murmur.      No gallop.  No rub.   Pulses:     Radial: 2+ bilaterally.  Edema:     Peripheral edema absent.   Musculoskeletal: Normal range of motion.      Extremities: No clubbing present.     Cervical back: Normal range of motion. Skin:     General: Skin is warm and dry.   Neurological:      General: No focal deficit present.      Mental Status: Alert and oriented to person, place, and time.   Psychiatric:         Attention and Perception: Attention normal.         Mood and Affect: Affect normal.         Speech: Speech normal.         Behavior: Behavior normal.         Cognition and Memory: Cognition normal.             The following portions of the patient's history were reviewed and updated as appropriate: allergies, current medications, past medical history, past social history, past and problem list.     Review of Systems   Constitutional: Negative.    HENT: Negative.     Eyes: Negative.    Respiratory: Negative.     Cardiovascular: Negative.    Gastrointestinal: Negative.    Endocrine: Negative.    Genitourinary: Negative.    Musculoskeletal: Negative.    Skin: Negative.    Allergic/Immunologic: Negative.    Neurological: Negative.    Hematological: Negative.    Psychiatric/Behavioral: Negative.                ECG 12 Lead    Date/Time: 9/6/2024 4:03 PM  Performed by: Alexandra Liao PA    Authorized by: Alexandra Liao PA  Comparison:  compared with previous ECG from 7/22/2024  Rhythm: sinus rhythm  Rate: normal  Conduction: 1st degree AV block            Medication Review: yes    Current Outpatient Medications:     albuterol sulfate  (90 Base) MCG/ACT inhaler, Inhale 2 puffs Every 4 (Four) Hours As Needed for Wheezing or Shortness of Air., Disp: 18 g, Rfl: 11    amiodarone (Pacerone) 200 MG tablet, Take 1 tablet by mouth Daily., Disp: 90 tablet, Rfl: 0    amitriptyline (ELAVIL) 75 MG tablet, Take 1 tablet by mouth Every Night., Disp: , Rfl:     apixaban (ELIQUIS) 5 MG tablet tablet, Take 1 tablet by mouth Every 12 (Twelve) Hours. Indications: Atrial Fibrillation, Disp: 180 tablet, Rfl: 0    atorvastatin (LIPITOR) 40 MG tablet, Take 1 tablet by mouth Daily., Disp: 90 tablet, Rfl: 3    diclofenac (VOLTAREN) 1 % gel gel, Apply 4 g topically to the appropriate area as directed 3 (Three) Times a Day., Disp: 100 g, Rfl: 3    empagliflozin (Jardiance) 10 MG tablet tablet, Take 1 tablet by mouth Daily., Disp: 30 tablet, Rfl: 11    fluticasone (Flonase) 50 MCG/ACT nasal spray, 2 sprays into the nostril(s) as directed by provider Daily. Administer 2 sprays in each nostril for each dose., Disp: 1 bottle, Rfl: 11    fluticasone-salmeterol (ADVAIR) 100-50 MCG/DOSE DISKUS, Inhale 1 puff 2 (Two) Times a Day., Disp: 60 each, Rfl: 3    levothyroxine (SYNTHROID, LEVOTHROID) 112 MCG tablet, Take 1 tablet by mouth Daily., Disp: , Rfl:     metoprolol succinate XL (TOPROL-XL) 200 MG 24 hr tablet, Take 1 tablet by mouth Daily., Disp: 90 tablet, Rfl: 0    Multiple Vitamins-Minerals (MULTIVITAMIN WITH MINERALS) tablet tablet, Take 1 tablet by mouth Daily., Disp: , Rfl:     [START ON 9/17/2024] mupirocin (BACTROBAN) 2 % nasal ointment, 1 Application into the nostril(s) as directed by provider 1 (One) Time for 1 dose. Apply pea-sized amount to a Q-tip and swab each nare x 1 dose on 9/17/2024 at 1700, Disp: 1 each, Rfl: 0    nitroglycerin (NITROSTAT) 0.4 MG SL  tablet, Place 1 tablet under the tongue Every 5 (Five) Minutes As Needed for Chest Pain (Only if SBP Greater Than 100). Take no more than 3 doses in 15 minutes., Disp: 12 tablet, Rfl: 12    omeprazole (priLOSEC) 20 MG capsule, Take 1 capsule by mouth Daily., Disp: 90 capsule, Rfl: 3    PARoxetine (PAXIL) 20 MG tablet, Take 1 tablet by mouth Daily., Disp: 90 tablet, Rfl: 0    potassium chloride (MICRO-K) 10 MEQ CR capsule, Take 1 capsule by mouth Daily., Disp: 90 capsule, Rfl: 0    QUEtiapine (SEROquel) 100 MG tablet, Take 1 tablet by mouth every night at bedtime., Disp: 90 tablet, Rfl: 1    sacubitril-valsartan (Entresto) 49-51 MG tablet, Take 1 tablet by mouth 2 (Two) Times a Day., Disp: 60 tablet, Rfl: 11    spironolactone (ALDACTONE) 25 MG tablet, Take 1 tablet by mouth Daily., Disp: 90 tablet, Rfl: 0   No Known Allergies    I have reviewed       CBC:  Lab Results - Last 18 Months   Lab Units 08/02/24  0341 07/23/24  1334 07/23/24  0347   WBC 10*3/mm3 10.32   < >  --    HEMOGLOBIN g/dL 14.1   < >  --    HEMATOCRIT % 49.1   < >  --    PLATELETS 10*3/mm3 194   < >  --    IRON mcg/dL  --   --  32*    < > = values in this interval not displayed.      BMP/CMP:  Lab Results - Last 18 Months   Lab Units 08/19/24  1047   SODIUM mmol/L 138   POTASSIUM mmol/L 4.2   CHLORIDE mmol/L 102   CO2 mmol/L 26.0   GLUCOSE mg/dL 121*   BUN mg/dL 15   CREATININE mg/dL 1.42*   CALCIUM mg/dL 9.2     BNP:   Lab Results - Last 18 Months   Lab Units 07/22/24  1632   PROBNP pg/mL 602.9      THYROID:  Lab Results - Last 18 Months   Lab Units 07/22/24  1632   TSH uIU/mL 7.340*   FREE T4 ng/dL 1.01       Results for orders placed during the hospital encounter of 07/22/24    Adult Transesophageal Echo (LAUREN) W/ Cont if Necessary Per Protocol    Interpretation Summary    Left ventricular systolic function is moderately decreased.    The left ventricular cavity is mild to moderately dilated.    The left atrial cavity is severely dilated.    The  right atrial cavity is dilated.    Severe mitral valve regurgitation is present. Pulmonary vein flow reversal is present.     Assessment:   Diagnoses and all orders for this visit:    1. Persistent atrial fibrillation (Primary)  -     ECG 12 Lead; Future    2. Chronic HFrEF (heart failure with reduced ejection fraction)    3. Paroxysmal atrial fibrillation    4. Severe mitral regurgitation    Other orders  -     ECG 12 Lead    Persistent afib: S/p Cardioversion in July and doing well maintaining sinus rhythm. He is now scheduled for MAZE with his upcoming mitral valve surgery with Dr. Moe September 18th.   -Continue anticoagulation and amiodarone   -Continue toprol     HFrEF: Compliant with meds and has symptoms with NYHA Class I functional capacity.   -Continue entresto, jardiance, toprol, and angela  -LHC to be performed September 10th with Dr. Mathew     Severe MR: per CT surgery. Mr. Wilson would like to postpone his surgery due to family reasons and will contact the surgical coordinator for this.         I spent 30 minutes caring for Ziyad on this date of service. This time includes time spent by me in the following activities:preparing for the visit, reviewing tests, obtaining and/or reviewing a separately obtained history, performing a medically appropriate examination and/or evaluation , counseling and educating the patient/family/caregiver, ordering medications, tests, or procedures, referring and communicating with other health care professionals , documenting information in the medical record, and independently interpreting results and communicating that information with the patient/family/caregiver        Electronically signed by SOLOMON Urbina

## 2024-09-06 ENCOUNTER — TELEPHONE (OUTPATIENT)
Dept: CARDIAC SURGERY | Facility: CLINIC | Age: 59
End: 2024-09-06
Payer: MEDICARE

## 2024-09-06 NOTE — TELEPHONE ENCOUNTER
Please call patient and advise him that we have moved his surgery date to 10/2/2024.  His last dose of Eliquis should be taken on 9/26/2024.

## 2024-09-06 NOTE — TELEPHONE ENCOUNTER
Called pt and informed of all as directed.  Pt voiced understanding to all.  Pt informed we would contact him with new appt info for prework, etc but pt requested this to be mailed to him instead of called.  Informed pt I would send a message to request all appt info related to surgery will be mailed to him as he requested./antione

## 2024-09-06 NOTE — TELEPHONE ENCOUNTER
Pt calling re: letter he rec'd about surgery on 9-18-24.  Pt states his sister is his only support and she will be out of town from 9-20-24 to 9-27-24.  Pt calling to see if this surgery can be resched for after 9-27-24 instead.  Can reach him at #[phone]/antione

## 2024-09-12 ENCOUNTER — HOSPITAL ENCOUNTER (OUTPATIENT)
Dept: CARDIOLOGY | Facility: HOSPITAL | Age: 59
Discharge: HOME | End: 2024-09-12
Admitting: NURSE PRACTITIONER
Payer: MEDICARE

## 2024-09-12 VITALS
SYSTOLIC BLOOD PRESSURE: 108 MMHG | HEIGHT: 67 IN | WEIGHT: 193 LBS | BODY MASS INDEX: 30.29 KG/M2 | DIASTOLIC BLOOD PRESSURE: 82 MMHG

## 2024-09-12 DIAGNOSIS — I34.0 SEVERE MITRAL REGURGITATION: ICD-10-CM

## 2024-09-12 PROCEDURE — 93306 TTE W/DOPPLER COMPLETE: CPT

## 2024-09-12 PROCEDURE — 93356 MYOCRD STRAIN IMG SPCKL TRCK: CPT

## 2024-09-16 ENCOUNTER — TELEPHONE (OUTPATIENT)
Dept: CARDIOLOGY | Facility: CLINIC | Age: 59
End: 2024-09-16
Payer: MEDICARE

## 2024-09-16 ENCOUNTER — TELEPHONE (OUTPATIENT)
Dept: CARDIAC SURGERY | Facility: CLINIC | Age: 59
End: 2024-09-16
Payer: MEDICARE

## 2024-09-16 DIAGNOSIS — I34.0 NONRHEUMATIC MITRAL VALVE REGURGITATION: Primary | ICD-10-CM

## 2024-09-16 LAB
BH CV ECHO LEFT VENTRICLE GLOBAL LONGITUDINAL STRAIN: -9.7 %
BH CV ECHO MEAS - AO MAX PG: 5.1 MMHG
BH CV ECHO MEAS - AO MEAN PG: 3 MMHG
BH CV ECHO MEAS - AO ROOT DIAM: 3.5 CM
BH CV ECHO MEAS - AO V2 MAX: 113.2 CM/SEC
BH CV ECHO MEAS - AO V2 VTI: 20.5 CM
BH CV ECHO MEAS - AVA(I,D): 3.1 CM2
BH CV ECHO MEAS - EDV(CUBED): 96.5 ML
BH CV ECHO MEAS - EDV(MOD-SP2): 95.1 ML
BH CV ECHO MEAS - EDV(MOD-SP4): 120.6 ML
BH CV ECHO MEAS - EF(MOD-SP2): 39.5 %
BH CV ECHO MEAS - EF(MOD-SP4): 38.8 %
BH CV ECHO MEAS - ESV(CUBED): 35.1 ML
BH CV ECHO MEAS - ESV(MOD-SP2): 57.5 ML
BH CV ECHO MEAS - ESV(MOD-SP4): 73.8 ML
BH CV ECHO MEAS - FS: 28.6 %
BH CV ECHO MEAS - IVS/LVPW: 0.86 CM
BH CV ECHO MEAS - IVSD: 0.97 CM
BH CV ECHO MEAS - LAT PEAK E' VEL: 7 CM/SEC
BH CV ECHO MEAS - LV DIASTOLIC VOL/BSA (35-75): 60.5 CM2
BH CV ECHO MEAS - LV MASS(C)D: 168.4 GRAMS
BH CV ECHO MEAS - LV MAX PG: 3.6 MMHG
BH CV ECHO MEAS - LV MEAN PG: 2.18 MMHG
BH CV ECHO MEAS - LV SYSTOLIC VOL/BSA (12-30): 37.1 CM2
BH CV ECHO MEAS - LV V1 MAX: 94.7 CM/SEC
BH CV ECHO MEAS - LV V1 VTI: 16.8 CM
BH CV ECHO MEAS - LVIDD: 4.6 CM
BH CV ECHO MEAS - LVIDS: 3.3 CM
BH CV ECHO MEAS - LVOT AREA: 3.8 CM2
BH CV ECHO MEAS - LVOT DIAM: 2.21 CM
BH CV ECHO MEAS - LVPWD: 1.13 CM
BH CV ECHO MEAS - MED PEAK E' VEL: 6 CM/SEC
BH CV ECHO MEAS - MR MAX PG: 75.9 MMHG
BH CV ECHO MEAS - MR MAX VEL: 433.9 CM/SEC
BH CV ECHO MEAS - MR MEAN PG: 60.4 MMHG
BH CV ECHO MEAS - MR MEAN VEL: 366 CM/SEC
BH CV ECHO MEAS - MR VTI: 164.4 CM
BH CV ECHO MEAS - MV A MAX VEL: 66.1 CM/SEC
BH CV ECHO MEAS - MV DEC SLOPE: 145.6 CM/SEC2
BH CV ECHO MEAS - MV DEC TIME: 0.34 SEC
BH CV ECHO MEAS - MV E MAX VEL: 45.9 CM/SEC
BH CV ECHO MEAS - MV E/A: 0.69
BH CV ECHO MEAS - MV MAX PG: 1.84 MMHG
BH CV ECHO MEAS - MV MEAN PG: 0.81 MMHG
BH CV ECHO MEAS - MV V2 VTI: 22.8 CM
BH CV ECHO MEAS - MVA(VTI): 2.8 CM2
BH CV ECHO MEAS - PA V2 MAX: 75.3 CM/SEC
BH CV ECHO MEAS - SV(LVOT): 64.5 ML
BH CV ECHO MEAS - SV(MOD-SP2): 37.5 ML
BH CV ECHO MEAS - SV(MOD-SP4): 46.7 ML
BH CV ECHO MEAS - SVI(LVOT): 32.4 ML/M2
BH CV ECHO MEAS - SVI(MOD-SP2): 18.8 ML/M2
BH CV ECHO MEAS - SVI(MOD-SP4): 23.5 ML/M2
BH CV ECHO MEASUREMENTS AVERAGE E/E' RATIO: 7.06
LEFT ATRIUM VOLUME INDEX: 30 ML/M2
LEFT ATRIUM VOLUME: 60 ML

## 2024-09-17 ENCOUNTER — TELEPHONE (OUTPATIENT)
Dept: CARDIAC SURGERY | Facility: CLINIC | Age: 59
End: 2024-09-17
Payer: MEDICARE

## 2024-09-17 ENCOUNTER — PREP FOR SURGERY (OUTPATIENT)
Dept: OTHER | Facility: HOSPITAL | Age: 59
End: 2024-09-17
Payer: MEDICARE

## 2024-09-17 DIAGNOSIS — I25.10 CORONARY ARTERY DISEASE INVOLVING NATIVE CORONARY ARTERY OF NATIVE HEART WITHOUT ANGINA PECTORIS: ICD-10-CM

## 2024-09-17 DIAGNOSIS — I50.22 CHRONIC HFREF (HEART FAILURE WITH REDUCED EJECTION FRACTION): Primary | ICD-10-CM

## 2024-09-17 DIAGNOSIS — R06.02 SOB (SHORTNESS OF BREATH): ICD-10-CM

## 2024-09-17 RX ORDER — SODIUM CHLORIDE 0.9 % (FLUSH) 0.9 %
10 SYRINGE (ML) INJECTION AS NEEDED
OUTPATIENT
Start: 2024-09-17

## 2024-09-17 RX ORDER — SODIUM CHLORIDE 0.9 % (FLUSH) 0.9 %
10 SYRINGE (ML) INJECTION EVERY 12 HOURS SCHEDULED
OUTPATIENT
Start: 2024-09-17

## 2024-09-17 RX ORDER — SODIUM CHLORIDE 9 MG/ML
40 INJECTION, SOLUTION INTRAVENOUS AS NEEDED
OUTPATIENT
Start: 2024-09-17

## 2024-09-30 ENCOUNTER — APPOINTMENT (OUTPATIENT)
Dept: PREADMISSION TESTING | Facility: HOSPITAL | Age: 59
End: 2024-09-30
Payer: MEDICARE

## 2024-09-30 ENCOUNTER — HOSPITAL ENCOUNTER (OUTPATIENT)
Dept: CARDIOLOGY | Facility: HOSPITAL | Age: 59
Discharge: HOME | End: 2024-09-30
Admitting: INTERNAL MEDICINE
Payer: MEDICARE

## 2024-09-30 DIAGNOSIS — I25.10 CORONARY ARTERY DISEASE INVOLVING NATIVE CORONARY ARTERY OF NATIVE HEART WITHOUT ANGINA PECTORIS: ICD-10-CM

## 2024-09-30 DIAGNOSIS — R06.02 SOB (SHORTNESS OF BREATH): ICD-10-CM

## 2024-09-30 DIAGNOSIS — I50.22 CHRONIC HFREF (HEART FAILURE WITH REDUCED EJECTION FRACTION): ICD-10-CM

## 2024-09-30 LAB
ALBUMIN SERPL-MCNC: 4.4 G/DL (ref 3.5–5.2)
ALBUMIN/GLOB SERPL: 1.6 G/DL
ALP SERPL-CCNC: 103 U/L (ref 39–117)
ALT SERPL W P-5'-P-CCNC: 43 U/L (ref 1–41)
ANION GAP SERPL CALCULATED.3IONS-SCNC: 8 MMOL/L (ref 5–15)
AST SERPL-CCNC: 35 U/L (ref 1–40)
BASOPHILS # BLD AUTO: 0.07 10*3/MM3 (ref 0–0.2)
BASOPHILS NFR BLD AUTO: 1 % (ref 0–1.5)
BILIRUB SERPL-MCNC: 0.4 MG/DL (ref 0–1.2)
BUN SERPL-MCNC: 18 MG/DL (ref 8–23)
BUN/CREAT SERPL: 11 (ref 7–25)
CALCIUM SPEC-SCNC: 9 MG/DL (ref 8.6–10.5)
CHLORIDE SERPL-SCNC: 105 MMOL/L (ref 98–107)
CHOLEST SERPL-MCNC: 137 MG/DL (ref 0–200)
CO2 SERPL-SCNC: 26 MMOL/L (ref 22–29)
CREAT SERPL-MCNC: 1.64 MG/DL (ref 0.76–1.27)
DEPRECATED RDW RBC AUTO: 59.1 FL (ref 37–54)
EGFRCR SERPLBLD CKD-EPI 2021: 47.6 ML/MIN/1.73
EOSINOPHIL # BLD AUTO: 0.17 10*3/MM3 (ref 0–0.4)
EOSINOPHIL NFR BLD AUTO: 2.3 % (ref 0.3–6.2)
ERYTHROCYTE [DISTWIDTH] IN BLOOD BY AUTOMATED COUNT: 20.6 % (ref 12.3–15.4)
GLOBULIN UR ELPH-MCNC: 2.7 GM/DL
GLUCOSE SERPL-MCNC: 99 MG/DL (ref 65–99)
HCT VFR BLD AUTO: 53.7 % (ref 37.5–51)
HDLC SERPL-MCNC: 59 MG/DL (ref 40–60)
HGB BLD-MCNC: 16.5 G/DL (ref 13–17.7)
IMM GRANULOCYTES # BLD AUTO: 0.11 10*3/MM3 (ref 0–0.05)
IMM GRANULOCYTES NFR BLD AUTO: 1.5 % (ref 0–0.5)
LDLC SERPL CALC-MCNC: 54 MG/DL (ref 0–100)
LDLC/HDLC SERPL: 0.85 {RATIO}
LYMPHOCYTES # BLD AUTO: 1.25 10*3/MM3 (ref 0.7–3.1)
LYMPHOCYTES NFR BLD AUTO: 17.1 % (ref 19.6–45.3)
MCH RBC QN AUTO: 25.5 PG (ref 26.6–33)
MCHC RBC AUTO-ENTMCNC: 30.7 G/DL (ref 31.5–35.7)
MCV RBC AUTO: 82.9 FL (ref 79–97)
MONOCYTES # BLD AUTO: 0.86 10*3/MM3 (ref 0.1–0.9)
MONOCYTES NFR BLD AUTO: 11.7 % (ref 5–12)
NEUTROPHILS NFR BLD AUTO: 4.86 10*3/MM3 (ref 1.7–7)
NEUTROPHILS NFR BLD AUTO: 66.4 % (ref 42.7–76)
NRBC BLD AUTO-RTO: 0 /100 WBC (ref 0–0.2)
PLATELET # BLD AUTO: 165 10*3/MM3 (ref 140–450)
PMV BLD AUTO: 9.5 FL (ref 6–12)
POTASSIUM SERPL-SCNC: 4.4 MMOL/L (ref 3.5–5.2)
PROT SERPL-MCNC: 7.1 G/DL (ref 6–8.5)
RBC # BLD AUTO: 6.48 10*6/MM3 (ref 4.14–5.8)
SODIUM SERPL-SCNC: 139 MMOL/L (ref 136–145)
TRIGL SERPL-MCNC: 140 MG/DL (ref 0–150)
VLDLC SERPL-MCNC: 24 MG/DL (ref 5–40)
WBC NRBC COR # BLD AUTO: 7.32 10*3/MM3 (ref 3.4–10.8)

## 2024-09-30 PROCEDURE — 80053 COMPREHEN METABOLIC PANEL: CPT | Performed by: INTERNAL MEDICINE

## 2024-09-30 PROCEDURE — 80061 LIPID PANEL: CPT | Performed by: INTERNAL MEDICINE

## 2024-09-30 PROCEDURE — 85025 COMPLETE CBC W/AUTO DIFF WBC: CPT | Performed by: INTERNAL MEDICINE

## 2024-09-30 NOTE — NURSING NOTE
"Pt arrived to [facility] for pre work for heart cath scheduled with Dr. Mathew 10/1/24.   Pt was confused as to when he was \"having surgery\" as he was given a paper to be here 10/2/24. Explained to patient that he has surgery scheduled with Dr. Moe on 10/2 for a mitral valve replacement, however he has a heart cath to check his coronaries before surgery that has been re-scheduled a few times.   Informed patient of procedure time in the morning and provided education. Patient was unsure of home medications at this time, asked to please bring a medication list tomorrow morning to be reviewed pre-procedure.     Labs were drawn and sent. Pt d/c in no distress.       "

## 2024-10-01 ENCOUNTER — ANESTHESIA EVENT (OUTPATIENT)
Dept: CARDIOLOGY | Facility: HOSPITAL | Age: 59
End: 2024-10-01
Payer: MEDICARE

## 2024-10-01 ENCOUNTER — HOSPITAL ENCOUNTER (OUTPATIENT)
Dept: CARDIOLOGY | Facility: HOSPITAL | Age: 59
Discharge: HOME | End: 2024-10-01
Admitting: INTERNAL MEDICINE
Payer: MEDICARE

## 2024-10-01 ENCOUNTER — ANESTHESIA (OUTPATIENT)
Dept: CARDIOLOGY | Facility: HOSPITAL | Age: 59
End: 2024-10-01
Payer: MEDICARE

## 2024-10-01 ENCOUNTER — HOSPITAL ENCOUNTER (OUTPATIENT)
Facility: HOSPITAL | Age: 59
Setting detail: HOSPITAL OUTPATIENT SURGERY
Discharge: HOME | End: 2024-10-01
Attending: INTERNAL MEDICINE | Admitting: INTERNAL MEDICINE
Payer: MEDICARE

## 2024-10-01 VITALS
OXYGEN SATURATION: 92 % | DIASTOLIC BLOOD PRESSURE: 82 MMHG | BODY MASS INDEX: 29.8 KG/M2 | SYSTOLIC BLOOD PRESSURE: 120 MMHG | RESPIRATION RATE: 15 BRPM | TEMPERATURE: 97.5 F | WEIGHT: 196.6 LBS | HEIGHT: 68 IN | HEART RATE: 60 BPM

## 2024-10-01 VITALS
BODY MASS INDEX: 29.8 KG/M2 | HEIGHT: 68 IN | SYSTOLIC BLOOD PRESSURE: 114 MMHG | WEIGHT: 196.65 LBS | DIASTOLIC BLOOD PRESSURE: 81 MMHG

## 2024-10-01 DIAGNOSIS — I50.22 CHRONIC HFREF (HEART FAILURE WITH REDUCED EJECTION FRACTION): ICD-10-CM

## 2024-10-01 DIAGNOSIS — I25.10 CORONARY ARTERY DISEASE INVOLVING NATIVE CORONARY ARTERY OF NATIVE HEART WITHOUT ANGINA PECTORIS: ICD-10-CM

## 2024-10-01 DIAGNOSIS — R06.02 SOB (SHORTNESS OF BREATH): ICD-10-CM

## 2024-10-01 DIAGNOSIS — I34.0 NONRHEUMATIC MITRAL VALVE REGURGITATION: ICD-10-CM

## 2024-10-01 PROCEDURE — 25010000002 HEPARIN (PORCINE) 2000-0.9 UNIT/L-% SOLUTION: Performed by: INTERNAL MEDICINE

## 2024-10-01 PROCEDURE — 25010000002 MIDAZOLAM HCL (PF) 5 MG/5ML SOLUTION: Performed by: INTERNAL MEDICINE

## 2024-10-01 PROCEDURE — 93320 DOPPLER ECHO COMPLETE: CPT | Performed by: INTERNAL MEDICINE

## 2024-10-01 PROCEDURE — 25010000002 DIPHENHYDRAMINE PER 50 MG: Performed by: INTERNAL MEDICINE

## 2024-10-01 PROCEDURE — 93320 DOPPLER ECHO COMPLETE: CPT

## 2024-10-01 PROCEDURE — 25510000001 IOPAMIDOL 61 % SOLUTION: Performed by: INTERNAL MEDICINE

## 2024-10-01 PROCEDURE — 25010000002 HEPARIN (PORCINE) 1000-0.9 UT/500ML-% SOLUTION: Performed by: INTERNAL MEDICINE

## 2024-10-01 PROCEDURE — 93458 L HRT ARTERY/VENTRICLE ANGIO: CPT | Performed by: INTERNAL MEDICINE

## 2024-10-01 PROCEDURE — S0260 H&P FOR SURGERY: HCPCS | Performed by: INTERNAL MEDICINE

## 2024-10-01 PROCEDURE — 99152 MOD SED SAME PHYS/QHP 5/>YRS: CPT | Performed by: INTERNAL MEDICINE

## 2024-10-01 PROCEDURE — 25010000002 FENTANYL CITRATE (PF) 50 MCG/ML SOLUTION: Performed by: INTERNAL MEDICINE

## 2024-10-01 PROCEDURE — 25010000002 PROPOFOL 1000 MG/100ML EMULSION

## 2024-10-01 PROCEDURE — 93312 ECHO TRANSESOPHAGEAL: CPT

## 2024-10-01 PROCEDURE — C1894 INTRO/SHEATH, NON-LASER: HCPCS | Performed by: INTERNAL MEDICINE

## 2024-10-01 PROCEDURE — 93312 ECHO TRANSESOPHAGEAL: CPT | Performed by: INTERNAL MEDICINE

## 2024-10-01 PROCEDURE — 25010000002 LIDOCAINE PF 2% 2 % SOLUTION

## 2024-10-01 PROCEDURE — 93325 DOPPLER ECHO COLOR FLOW MAPG: CPT

## 2024-10-01 PROCEDURE — 25010000002 LIDOCAINE 2% SOLUTION: Performed by: INTERNAL MEDICINE

## 2024-10-01 PROCEDURE — 25810000003 SODIUM CHLORIDE 0.9 % SOLUTION

## 2024-10-01 PROCEDURE — 93325 DOPPLER ECHO COLOR FLOW MAPG: CPT | Performed by: INTERNAL MEDICINE

## 2024-10-01 PROCEDURE — 25010000002 HEPARIN (PORCINE) PER 1000 UNITS: Performed by: INTERNAL MEDICINE

## 2024-10-01 PROCEDURE — C1769 GUIDE WIRE: HCPCS | Performed by: INTERNAL MEDICINE

## 2024-10-01 RX ORDER — PROPOFOL 10 MG/ML
INJECTION, EMULSION INTRAVENOUS AS NEEDED
Status: DISCONTINUED | OUTPATIENT
Start: 2024-10-01 | End: 2024-10-01 | Stop reason: SURG

## 2024-10-01 RX ORDER — HEPARIN SODIUM 200 [USP'U]/100ML
INJECTION, SOLUTION INTRAVENOUS
Status: DISCONTINUED | OUTPATIENT
Start: 2024-10-01 | End: 2024-10-01 | Stop reason: HOSPADM

## 2024-10-01 RX ORDER — IOPAMIDOL 612 MG/ML
INJECTION, SOLUTION INTRAVASCULAR
Status: DISCONTINUED | OUTPATIENT
Start: 2024-10-01 | End: 2024-10-01 | Stop reason: HOSPADM

## 2024-10-01 RX ORDER — VERAPAMIL HYDROCHLORIDE 2.5 MG/ML
INJECTION, SOLUTION INTRAVENOUS
Status: DISCONTINUED | OUTPATIENT
Start: 2024-10-01 | End: 2024-10-01 | Stop reason: HOSPADM

## 2024-10-01 RX ORDER — FENTANYL CITRATE 50 UG/ML
INJECTION, SOLUTION INTRAMUSCULAR; INTRAVENOUS
Status: DISCONTINUED | OUTPATIENT
Start: 2024-10-01 | End: 2024-10-01 | Stop reason: HOSPADM

## 2024-10-01 RX ORDER — MIDAZOLAM HYDROCHLORIDE 5 MG/5ML
INJECTION, SOLUTION INTRAMUSCULAR; INTRAVENOUS
Status: DISCONTINUED | OUTPATIENT
Start: 2024-10-01 | End: 2024-10-01 | Stop reason: HOSPADM

## 2024-10-01 RX ORDER — LIDOCAINE HYDROCHLORIDE 20 MG/ML
INJECTION, SOLUTION EPIDURAL; INFILTRATION; INTRACAUDAL; PERINEURAL AS NEEDED
Status: DISCONTINUED | OUTPATIENT
Start: 2024-10-01 | End: 2024-10-01 | Stop reason: SURG

## 2024-10-01 RX ORDER — ACETAMINOPHEN 325 MG/1
650 TABLET ORAL EVERY 4 HOURS PRN
Status: CANCELLED | OUTPATIENT
Start: 2024-10-01

## 2024-10-01 RX ORDER — LIDOCAINE HYDROCHLORIDE 20 MG/ML
INJECTION, SOLUTION INFILTRATION; PERINEURAL
Status: DISCONTINUED | OUTPATIENT
Start: 2024-10-01 | End: 2024-10-01 | Stop reason: HOSPADM

## 2024-10-01 RX ORDER — SODIUM CHLORIDE 9 MG/ML
INJECTION, SOLUTION INTRAVENOUS CONTINUOUS PRN
Status: DISCONTINUED | OUTPATIENT
Start: 2024-10-01 | End: 2024-10-01 | Stop reason: SURG

## 2024-10-01 RX ORDER — HEPARIN SODIUM 1000 [USP'U]/ML
INJECTION, SOLUTION INTRAVENOUS; SUBCUTANEOUS
Status: DISCONTINUED | OUTPATIENT
Start: 2024-10-01 | End: 2024-10-01 | Stop reason: HOSPADM

## 2024-10-01 RX ORDER — DIPHENHYDRAMINE HYDROCHLORIDE 50 MG/ML
INJECTION INTRAMUSCULAR; INTRAVENOUS
Status: DISCONTINUED | OUTPATIENT
Start: 2024-10-01 | End: 2024-10-01 | Stop reason: HOSPADM

## 2024-10-01 RX ADMIN — SODIUM CHLORIDE: 9 INJECTION, SOLUTION INTRAVENOUS at 09:25

## 2024-10-01 RX ADMIN — LIDOCAINE HYDROCHLORIDE 100 MG: 20 INJECTION, SOLUTION EPIDURAL; INFILTRATION; INTRACAUDAL; PERINEURAL at 09:32

## 2024-10-01 RX ADMIN — PROPOFOL 200 MG: 10 INJECTION, EMULSION INTRAVENOUS at 09:32

## 2024-10-01 NOTE — H&P
Chief Complaint: Shortness of breath, CHF, mitral regurgitation    HPI: Mr. Wilson is a 59-year-old male with significant past medical history of chronic systolic CHF, paroxysmal atrial fibrillation, mitral regurgitation, coronary artery disease, hypertension, hyperlipidemia, CKD, hypothyroidism, asthma, GERD, sleep apnea, and arthritis.  Echo on 7/23/2024 showed EF of 36 to 40% with severe mitral regurgitation.  Echo was repeated on 9/12/2024 showing EF unchanged at 36 to 40% with improvement in mitral regurgitation down to mild to moderate.  He is here for heart catheterization today and LAUREN to further assess his mitral regurgitation.      Patient Active Problem List   Diagnosis    Essential hypertension    Coronary artery disease involving native coronary artery of native heart without angina pectoris    Acquired hypothyroidism    Chronic daily headache    Class 1 obesity due to excess calories with serious comorbidity and body mass index (BMI) of 30.0 to 30.9 in adult    Mild cognitive impairment    Asthma    GERD (gastroesophageal reflux disease)    Sleep apnea    Mixed hyperlipidemia    Moderate episode of recurrent major depressive disorder    Primary insomnia    Cervical facet joint syndrome    Impaired glucose tolerance    CKD (chronic kidney disease) stage 2, GFR 60-89 ml/min    Arthritis of right shoulder region    Chest pain, atypical    Paroxysmal atrial fibrillation    Iron deficiency anemia    NICM (nonischemic cardiomyopathy), viral etiology    Tetrahydrocannabinol (THC) dependence    Acute pulmonary edema    Severe mitral regurgitation    Chronic HFrEF (heart failure with reduced ejection fraction)    SOB (shortness of breath)       Past Medical History:   Diagnosis Date    Asthma     Cardiomyopathy     non-ischemic    CHF (congestive heart failure)     Coronary artery disease     Foot pain, bilateral     GERD (gastroesophageal reflux disease)     Headache     Hyperlipemia, mixed     Hypertension      "benign    Hypothyroidism     Obesity     Sleep apnea     SOB (shortness of breath)      Past Surgical History:   Procedure Laterality Date    APPENDECTOMY      BLADDER REPAIR      CARDIAC CATHETERIZATION  09/2003    COLONOSCOPY N/A 1/9/2018    Procedure: COLONOSCOPY WITH ANESTHESIA;  Surgeon: Dick Espinosa DO;  Location:  [facility];  Service:     COLOSTOMY      with colostomy reversal    KNEE SURGERY Right        The following portions of the patient's history were reviewed and updated as appropriate: allergies, current medications, past family history, past medical history, past social history, past surgical history, and problem list.    Social History     Socioeconomic History    Marital status:    Tobacco Use    Smoking status: Never     Passive exposure: Past (Childhood)    Smokeless tobacco: Never   Vaping Use    Vaping status: Never Used   Substance and Sexual Activity    Alcohol use: Yes     Comment: occasional    Drug use: Yes     Types: Marijuana    Sexual activity: Defer       Family History   Problem Relation Age of Onset    Hypertension Mother     Stroke Mother     Heart disease Mother     Hypertension Father     Heart disease Father     Hypertension Sister     Hypertension Brother        Review of Systems: A 12 system review of systems was completed and is negative except stated in HPI.     Objective   /81 (BP Location: Left arm, Patient Position: Lying)   Pulse 65   Temp 97.5 °F (36.4 °C) (Temporal)   Resp 22   Ht 172.7 cm (68\")   Wt 89.2 kg (196 lb 9.6 oz)   SpO2 95%   BMI 29.89 kg/m²     Physical Exam:  Constitutional:       Appearance: Well-developed.   HENT:      Head: Normocephalic and atraumatic.   Pulmonary:      Effort: Pulmonary effort is normal.      Breath sounds: Normal breath sounds.   Cardiovascular:      Normal rate. Regular rhythm.   Edema:     Peripheral edema absent.   Skin:     General: Skin is warm and dry.   Neurological:      Mental Status: Alert and " "oriented to person, place, and time.         Lab Results   Component Value Date    TROPONINT 25 (H) 07/23/2024     Lab Results   Component Value Date    GLUCOSE 99 09/30/2024    CALCIUM 9.0 09/30/2024     09/30/2024    K 4.4 09/30/2024    CO2 26.0 09/30/2024     09/30/2024    BUN 18 09/30/2024    CREATININE 1.64 (H) 09/30/2024    EGFRIFNONA 66 03/16/2021    BCR 11.0 09/30/2024    ANIONGAP 8.0 09/30/2024     Lab Results   Component Value Date    WBC 7.32 09/30/2024    HGB 16.5 09/30/2024    HCT 53.7 (H) 09/30/2024    MCV 82.9 09/30/2024     09/30/2024     Lab Results   Component Value Date    CHOL 137 09/30/2024    CHOL 80 07/30/2024    CHOL 125 03/16/2021     Lab Results   Component Value Date    TRIG 140 09/30/2024    TRIG 103 07/30/2024    TRIG 191 (H) 03/16/2021     Lab Results   Component Value Date    HDL 59 09/30/2024    HDL 29 (L) 07/30/2024    HDL 46 03/16/2021     No components found for: \"LDLCALC\"  Lab Results   Component Value Date    LDL 54 09/30/2024    LDL 31 07/30/2024    LDL 48 03/16/2021       Assessment:  1.  Chronic systolic CHF: EF 36 to 40% on echo on 9/12/2024.  2.  Mitral regurgitation: Previously was severe but improved to mild to moderate on repeat echo on 9/12/2024.  3.  Paroxysmal atrial fibrillation  4.  Coronary artery disease  5.  Essential hypertension  6.  Mixed hyperlipidemia    Plan:  -LAUREN and cardiac catheterization today to further assess mitral regurgitation and coronary artery disease.  Risk, benefits, and alternatives of the procedure were discussed with the patient.  He acknowledges understanding and agrees to proceed.  "

## 2024-10-01 NOTE — ANESTHESIA POSTPROCEDURE EVALUATION
Patient: Ziyad Wilson    Procedure Summary       Date: 10/01/24 Room / Location: [facility] CATH LAB; [facility] CARDIOLOGY    Anesthesia Start: 0924 Anesthesia Stop: 0959    Procedure: ADULT TRANSESOPHAGEAL ECHO (LAUREN) W/ CONT IF NECESSARY PER PROTOCOL Diagnosis:       Nonrheumatic mitral valve regurgitation      (Valvular Disease)      (Mitral Valve Assessment)    Scheduled Providers: Remi Mathew MD Provider: Fabio Alfonso CRNA    Anesthesia Type: MAC ASA Status: 3            Anesthesia Type: MAC    Vitals  No vitals data found for the desired time range.          Post Anesthesia Care and Evaluation    Patient location during evaluation: bedside  Patient participation: complete - patient participated  Level of consciousness: awake and alert  Pain score: 0  Pain management: adequate    Airway patency: patent  Anesthetic complications: No anesthetic complications  PONV Status: none  Cardiovascular status: acceptable and blood pressure returned to baseline  Respiratory status: acceptable  Hydration status: acceptable  No anesthesia care post op

## 2024-10-01 NOTE — ANESTHESIA PREPROCEDURE EVALUATION
Anesthesia Evaluation     Patient summary reviewed and Nursing notes reviewed   no history of anesthetic complications:   NPO Solid Status: > 8 hours  NPO Liquid Status: > 8 hours           Airway   Mallampati: II  TM distance: >3 FB  Neck ROM: full  no difficulty expected and No difficulty expected  Dental    (+) upper dentures and lower dentures    Pulmonary     breath sounds clear to auscultation  (+) asthma,shortness of breath, sleep apnea  Cardiovascular   Exercise tolerance: good (4-7 METS)    Patient on routine beta blocker    (+) hypertension, valvular problems/murmurs MR, CAD, CHF , hyperlipidemia    ROS comment: Dilated cardiomyopathy, 2014 echo EF 40-45%. Exercise tolerance mets>4    Neuro/Psych- negative ROS  (-) seizures, CVA  GI/Hepatic/Renal/Endo    (+) obesity, morbid obesity, GERD, hypothyroidism  (-) liver disease, no renal disease, diabetes    Musculoskeletal (-) negative ROS    Abdominal    Substance History - negative use     OB/GYN negative ob/gyn ROS         Other                      Anesthesia Plan    ASA 3     MAC     intravenous induction     Anesthetic plan, risks, benefits, and alternatives have been provided, discussed and informed consent has been obtained with: patient.  Pre-procedure education provided  Use of blood products discussed with  Consented to blood products.    Plan discussed with CRNA.

## 2024-10-14 ENCOUNTER — OFFICE VISIT (OUTPATIENT)
Dept: CARDIOLOGY | Facility: CLINIC | Age: 59
End: 2024-10-14
Payer: MEDICARE

## 2024-10-14 VITALS
OXYGEN SATURATION: 97 % | DIASTOLIC BLOOD PRESSURE: 72 MMHG | WEIGHT: 194 LBS | BODY MASS INDEX: 30.45 KG/M2 | HEART RATE: 80 BPM | SYSTOLIC BLOOD PRESSURE: 110 MMHG | HEIGHT: 67 IN

## 2024-10-14 DIAGNOSIS — E78.2 MIXED HYPERLIPIDEMIA: ICD-10-CM

## 2024-10-14 DIAGNOSIS — I10 ESSENTIAL HYPERTENSION: ICD-10-CM

## 2024-10-14 DIAGNOSIS — I48.0 PAROXYSMAL ATRIAL FIBRILLATION: ICD-10-CM

## 2024-10-14 DIAGNOSIS — I34.0 NONRHEUMATIC MITRAL VALVE REGURGITATION: ICD-10-CM

## 2024-10-14 DIAGNOSIS — I50.22 CHRONIC HFREF (HEART FAILURE WITH REDUCED EJECTION FRACTION): Primary | ICD-10-CM

## 2024-10-14 DIAGNOSIS — I25.10 CORONARY ARTERY DISEASE INVOLVING NATIVE CORONARY ARTERY OF NATIVE HEART WITHOUT ANGINA PECTORIS: ICD-10-CM

## 2024-10-14 PROCEDURE — 3074F SYST BP LT 130 MM HG: CPT

## 2024-10-14 PROCEDURE — 99214 OFFICE O/P EST MOD 30 MIN: CPT

## 2024-10-14 PROCEDURE — 3078F DIAST BP <80 MM HG: CPT

## 2024-10-14 RX ORDER — TRAZODONE HYDROCHLORIDE 50 MG/1
50 TABLET, FILM COATED ORAL NIGHTLY
COMMUNITY

## 2024-10-14 NOTE — PROGRESS NOTES
Reason For Visit:  Congestive Heart Failure     Subjective        Ziyad Wilson is a 59 y.o. male with the below pertinent PMH who presents for follow-up of CHF.    Patient recently underwent cardiac catheterization on 10/1/2024 which revealed mild obstructive coronary artery disease.  He also went underwent transesophageal echocardiogram which revealed mild to moderate mitral valve regurgitation (previously was thought to be severe).    Overall, from a heart failure standpoint, the patient is doing well.  He denies any significant exertional shortness of breath.  He is tolerating his medication regimen well without any significant dizziness or lightheadedness.  His weight has been stable, and he has been actually losing weight recently. Denies any lower extremity swelling.  Overall he feels that he is doing the best he is been in a while.    ROS: Pertinent findings as noted above    Pertinent PMH  HFrEF due to NICM  Mild to moderate MR  Persistent atrial fibrillation s/p cardioversion 7/29/24  Hypertension  Hyperlipidemia  Coronary Artery Calcification on CT  [facility]    Pertinent past medical, surgical, family, and social history were reviewed.      Current Outpatient Medications:     albuterol sulfate  (90 Base) MCG/ACT inhaler, Inhale 2 puffs Every 4 (Four) Hours As Needed for Wheezing or Shortness of Air., Disp: 18 g, Rfl: 11    amiodarone (Pacerone) 200 MG tablet, Take 1 tablet by mouth Daily., Disp: 90 tablet, Rfl: 0    amitriptyline (ELAVIL) 75 MG tablet, Take 1 tablet by mouth Every Night., Disp: , Rfl:     apixaban (ELIQUIS) 5 MG tablet tablet, Take 1 tablet by mouth Every 12 (Twelve) Hours. Indications: Atrial Fibrillation, Disp: 180 tablet, Rfl: 0    atorvastatin (LIPITOR) 40 MG tablet, Take 1 tablet by mouth Daily., Disp: 90 tablet, Rfl: 3    diclofenac (VOLTAREN) 1 % gel gel, Apply 4 g topically to the appropriate area as directed 3 (Three) Times a Day., Disp: 100 g, Rfl: 3    empagliflozin  "(Jardiance) 10 MG tablet tablet, Take 1 tablet by mouth Daily., Disp: 30 tablet, Rfl: 11    fluticasone (Flonase) 50 MCG/ACT nasal spray, 2 sprays into the nostril(s) as directed by provider Daily. Administer 2 sprays in each nostril for each dose., Disp: 1 bottle, Rfl: 11    fluticasone-salmeterol (ADVAIR) 100-50 MCG/DOSE DISKUS, Inhale 1 puff 2 (Two) Times a Day., Disp: 60 each, Rfl: 3    levothyroxine (SYNTHROID, LEVOTHROID) 112 MCG tablet, Take 75 mcg by mouth Daily., Disp: , Rfl:     metoprolol succinate XL (TOPROL-XL) 200 MG 24 hr tablet, Take 1 tablet by mouth Daily., Disp: 90 tablet, Rfl: 0    Multiple Vitamins-Minerals (MULTIVITAMIN WITH MINERALS) tablet tablet, Take 1 tablet by mouth Daily., Disp: , Rfl:     nitroglycerin (NITROSTAT) 0.4 MG SL tablet, Place 1 tablet under the tongue Every 5 (Five) Minutes As Needed for Chest Pain (Only if SBP Greater Than 100). Take no more than 3 doses in 15 minutes., Disp: 12 tablet, Rfl: 12    omeprazole (priLOSEC) 20 MG capsule, Take 1 capsule by mouth Daily., Disp: 90 capsule, Rfl: 3    PARoxetine (PAXIL) 20 MG tablet, Take 1 tablet by mouth Daily., Disp: 90 tablet, Rfl: 0    potassium chloride (MICRO-K) 10 MEQ CR capsule, Take 1 capsule by mouth Daily., Disp: 90 capsule, Rfl: 0    QUEtiapine (SEROquel) 100 MG tablet, Take 1 tablet by mouth every night at bedtime., Disp: 90 tablet, Rfl: 1    sacubitril-valsartan (Entresto) 49-51 MG tablet, Take 1 tablet by mouth 2 (Two) Times a Day., Disp: 60 tablet, Rfl: 11    spironolactone (ALDACTONE) 25 MG tablet, Take 1 tablet by mouth Daily., Disp: 90 tablet, Rfl: 0    traZODone (DESYREL) 50 MG tablet, Take 1 tablet by mouth Every Night., Disp: , Rfl:      Objective   Vital Signs:  /72   Pulse 80   Ht 170.2 cm (67\")   Wt 88 kg (194 lb)   SpO2 97%   BMI 30.38 kg/m²   Estimated body mass index is 30.38 kg/m² as calculated from the following:    Height as of this encounter: 170.2 cm (67\").    Weight as of this encounter: " 88 kg (194 lb).      Constitutional:       Appearance: Healthy appearance. Not in distress.   Neck:      Vascular: JVD normal.   Pulmonary:      Effort: Pulmonary effort is normal.      Breath sounds: Normal breath sounds. No wheezing. No rhonchi. No rales.   Cardiovascular:      PMI at left midclavicular line. Normal rate. Regular rhythm. Normal S1. Normal S2.       Murmurs: There is no murmur.      No gallop.  No click. No rub.   Edema:     Peripheral edema absent.   Musculoskeletal: Normal range of motion.      Cervical back: Normal range of motion. Skin:     General: Skin is warm and dry.   Neurological:      Mental Status: Alert and oriented to person, place and time.        Result Review :  The following data was reviewed by: VIRGILIO Cobb on 10/14/2024:  CMP   CMP          8/8/2024    10:16 8/19/2024    10:47 9/30/2024    08:04   CMP   Glucose 105  121  99    BUN 25  15  18    Creatinine 2.18  1.42  1.64    EGFR 33.8  56.6  47.6    Sodium 138  138  139    Potassium 4.5  4.2  4.4    Chloride 102  102  105    Calcium 9.0  9.2  9.0    Total Protein   7.1    Albumin   4.4    Globulin   2.7    Total Bilirubin   0.4    Alkaline Phosphatase   103    AST (SGOT)   35    ALT (SGPT)   43    Albumin/Globulin Ratio   1.6    BUN/Creatinine Ratio 11.5  10.6  11.0    Anion Gap 14.0  10.0  8.0      Lipid Panel   Lipid Panel          7/30/2024    03:49 9/30/2024    08:04   Lipid Panel   Total Cholesterol 80  137    Triglycerides 103  140    HDL Cholesterol 29  59    VLDL Cholesterol 20  24    LDL Cholesterol  31  54    LDL/HDL Ratio 1.05  0.85      BMP   BMP          8/8/2024    10:16 8/19/2024    10:47 9/30/2024    08:04   BMP   BUN 25  15  18    Creatinine 2.18  1.42  1.64    Sodium 138  138  139    Potassium 4.5  4.2  4.4    Chloride 102  102  105    CO2 22.0  26.0  26.0    Calcium 9.0  9.2  9.0      Data reviewed : Cardiology studies echo      Results for orders placed during the hospital encounter of  10/01/24    Adult Transesophageal Echo (LAUREN) W/ Cont if Necessary Per Protocol    Interpretation Summary    Left ventricular systolic function is moderately decreased.    The left ventricular cavity is mild to moderately dilated.    Left ventricular diastolic dysfunction is noted.    The left atrial cavity is dilated.    Mild to moderate mitral valve regurgitation is present.         Assessment and Plan   Diagnoses and all orders for this visit:    1. Chronic HFrEF (heart failure with reduced ejection fraction) (Primary)  -Patient appears euvolemic on exam today with no significant heart failure symptoms as of late  - Continue GDMT of Toprol- mg daily, Jardiance 10 mg daily, spironolactone 25 mg daily, and Entresto 49/51 mg twice daily    2. Coronary artery disease involving native coronary artery of native heart without angina pectoris  3. Essential hypertension  4. Mixed hyperlipidemia  -No anginal symptoms as of late  - On Eliquis and therefore no need for antiplatelet therapy at this time  - Normotensive in the clinic today, continue above regimen  - Lipid management per PCP, continue Lipitor 40 mg daily    5. Nonrheumatic mitral valve regurgitation  -Previously was noted to be severe, however noted to be mild to moderate on most recent LAUREN  - Will plan to monitor going forward    6. Paroxysmal atrial fibrillation  -Appears to be normal sinus rhythm on exam today  - Continue Eliquis 5 mg twice daily  - Continue Toprol- mg daily  - Patient currently on amiodarone 200 mg daily now that he is no longer planned for Maze procedure (given that he no longer requires mitral valve replacement) electrophysiology may update their plan and consider ablation.               Follow Up   Return in about 3 months (around 1/14/2025) for With Dr. Mathew .  Patient was given instructions and counseling regarding his condition or for health maintenance advice. Please see specific information pulled into the AVS if  appropriate.       Part of this note may be an electronic transcription/translation of spoken language to printed text using the Dragon Dictation System.

## 2024-11-04 ENCOUNTER — OFFICE VISIT (OUTPATIENT)
Dept: PULMONOLOGY | Facility: CLINIC | Age: 59
End: 2024-11-04
Payer: MEDICARE

## 2024-11-04 VITALS
HEIGHT: 67 IN | SYSTOLIC BLOOD PRESSURE: 130 MMHG | WEIGHT: 192 LBS | OXYGEN SATURATION: 96 % | HEART RATE: 89 BPM | DIASTOLIC BLOOD PRESSURE: 86 MMHG | BODY MASS INDEX: 30.13 KG/M2

## 2024-11-04 DIAGNOSIS — J45.20 MILD INTERMITTENT ASTHMA, UNSPECIFIED WHETHER COMPLICATED: Primary | ICD-10-CM

## 2024-11-04 DIAGNOSIS — J90 PLEURAL EFFUSION: ICD-10-CM

## 2024-11-04 DIAGNOSIS — K21.9 GASTROESOPHAGEAL REFLUX DISEASE, UNSPECIFIED WHETHER ESOPHAGITIS PRESENT: ICD-10-CM

## 2024-11-04 DIAGNOSIS — R91.1 PULMONARY NODULE: ICD-10-CM

## 2024-11-04 RX ORDER — MELOXICAM 15 MG/1
1 TABLET ORAL DAILY
COMMUNITY
Start: 2024-10-14

## 2024-11-04 RX ORDER — AMLODIPINE AND VALSARTAN 5; 160 MG/1; MG/1
1 TABLET ORAL DAILY
COMMUNITY
Start: 2024-10-16

## 2024-11-04 NOTE — LETTER
2024     VIRGILIO Lacy  [address]  [place] [address]    Patient: Ziyad Wilson   YOB: 1965   Date of Visit: 2024     Dear VIRGILIO Yee:    Thank you for referring Ziyad Wilson to me for evaluation. Below are the relevant portions of my assessment and plan of care.    If you have questions, please do not hesitate to call me. I look forward to following Ziyad along with you.         Sincerely,        Wandantchristina Caballero,         CC: No Recipients      Progress Notes:    Clinic Note - New Patient            NAME: Ziyad Wilson  MRN: [id number]  AGE: 59 y.o.            : 1965  PRIMARY CARE PROVIDER: Randi Patterson APRN               Reason for Visit:  Ziyad Wilson presents to clinic today as a new patient for the evaluation of pulmonary nodules.    History of Present Illness:  Mr. Wilson is a 59-year-old male who presents to clinic today as a new patient.  Past medical history includes asthma diagnosed in childhood, pulmonary nodules, daily marijuana use, CHF, paroxysmal A-fib, hypertension, hyperlipidemia.    Patient presents to clinic today as a new patient for evaluation of pulmonary nodules.  He had a CT of the chest in July of this year which noted multiple pulmonary nodules.  He has a left lower lobe 5 mm pulmonary nodule and a right middle lobe 7 mm pulmonary nodule.  When comparing to his CT from 8 years ago these pulmonary nodules appear larger, though the CT from 8 years ago had larger slices making comparison difficult.  The patient follows with cardiology for nonischemic cardiomyopathy and paroxysmal A-fib.  He recently had a LAUREN.  He is following with CT surgery for evaluation of valvulopathy.    He has a history of asthma diagnosed in childhood.  He currently uses albuterol as needed.  Only uses his albuterol maybe once per month.  He has no personally prescribed supplemental oxygen at home.  He is a lifetime non-smoker of cigarettes, he does  smoke marijuana daily.  He works as a writer.  Denies any personal or family history of lung cancer or VTE.  He lives at home which is clean and dry.  He does have pet cats to which she has no allergies.  No seasonal allergies.    Review of Systems:  A full 12-point review of systems was performed and was negative except as documented in the HPI.               Social History:  Smoking: Never cigarette smoker, daily marijuana smoker  Occupation: Writer  No history of exposure to industrial dusts, fumes, or asbestos.  Pets: Cats, no allergies  Recent travel: None recent  Previous exposure to persons with tuberculosis: None known            Physical Exam:  General: No acute distress, cooperative.  Head: Atraumatic, normocephalic, normal inspection.  Eyes: EOMI, normal inspection.  ENT: Mucous membranes moist, normal inspection. No thrush.  Heart: RRR, no murmur  Lungs: Clear to auscultation bilaterally, no wheezing  MSK: No edema or clubbing  GI/abdominal: Soft, nontender.  Neurologic: Alert, oriented.  Cranial nerves II through XII grossly intact.      Imaging Review:  I personally reviewed the CTA chest done July of this year:  CTA shows right sided pleural effusion with interseptal thickening and pulmonary edema.  Pulmonary nodule in the right middle lobe measures 7 mm and left lower lobe subpleural measures 5 mm.  Both nodules are solid.      Pulmonary Functions Testing Results:  None available for review            Problem List:  Problem List Items Addressed This Visit          Gastrointestinal Abdominal     GERD (gastroesophageal reflux disease)       Pulmonary and Pneumonias    Asthma - Primary    Relevant Orders    Complete PFT - Pre & Post Bronchodilator    Pulmonary nodule    Pleural effusion         Pulmonary Assessment:  Patient is a 59-year-old male who presents to clinic today as a new patient for evaluation of pulmonary nodules.  He is a lifetime non-smoker of cigarettes.  He does use daily marijuana.  2  pulmonary nodules as noted above.  It is difficult to compare his pulmonary nodules on the current CT to the 1 obtained 8 years ago due to size of the slices.  He is currently 4 months from his most recent CT.  Therefore we will obtain a CT chest without contrast now to follow-up with the pulmonary nodules.  PFTs pre and post prior to follow-up.      Plan:   -CT chest without contrast now, he has 1 ordered by his PCP.  I gave him the number to the radiology scheduling, he will call to set up the appointment.  Follow-up on the chest CT once complete  -Pre and post PFTs prior to follow-up  -Up-to-date on flu shot           Follow Up:  6 weeks         Thank you Randi Patterson APRN for this referral and allowing me to participate in this patient's care.           Past Medical History:   Past Medical History:   Diagnosis Date   • Asthma    • Cardiomyopathy     non-ischemic   • CHF (congestive heart failure)    • Coronary artery disease    • Foot pain, bilateral    • GERD (gastroesophageal reflux disease)    • Headache    • Hyperlipemia, mixed    • Hypertension     benign   • Hypothyroidism    • Obesity    • Sleep apnea    • SOB (shortness of breath)          Past Surgical History:   Past Surgical History:   Procedure Laterality Date   • APPENDECTOMY     • BLADDER REPAIR     • CARDIAC CATHETERIZATION  09/2003   • CARDIAC CATHETERIZATION N/A 10/1/2024    Procedure: Left Heart Cath;  Surgeon: Remi Mathew MD;  Location: [facility] CATH INVASIVE LOCATION;  Service: Cardiology;  Laterality: N/A;   • COLONOSCOPY N/A 1/9/2018    Procedure: COLONOSCOPY WITH ANESTHESIA;  Surgeon: Dick Espinosa DO;  Location: [facility] ENDOSCOPY;  Service:    • COLOSTOMY      with colostomy reversal   • KNEE SURGERY Right          Family History:   Family History   Problem Relation Age of Onset   • Hypertension Mother    • Stroke Mother    • Heart disease Mother    • Hypertension Father    • Heart disease Father    • Hypertension Sister    •  Hypertension Brother          Medications:   Prior to Admission medications    Medication Sig Start Date End Date Taking? Authorizing Provider   albuterol sulfate  (90 Base) MCG/ACT inhaler Inhale 2 puffs Every 4 (Four) Hours As Needed for Wheezing or Shortness of Air. 9/16/20  Yes Erika Dill APRN   amiodarone (Pacerone) 200 MG tablet Take 1 tablet by mouth Daily. 8/8/24  Yes Adriano Rapp MD   amitriptyline (ELAVIL) 75 MG tablet Take 1 tablet by mouth Every Night.   Yes ProviderYareli MD   amLODIPine-valsartan (EXFORGE) 5-160 MG per tablet Take 1 tablet by mouth Daily. 10/16/24  Yes Yareli Patterson MD   apixaban (ELIQUIS) 5 MG tablet tablet Take 1 tablet by mouth Every 12 (Twelve) Hours. Indications: Atrial Fibrillation 8/2/24  Yes Adriano Rapp MD   atorvastatin (LIPITOR) 40 MG tablet Take 1 tablet by mouth Daily. 2/17/22  Yes Erika Dill APRN   diclofenac (VOLTAREN) 1 % gel gel Apply 4 g topically to the appropriate area as directed 3 (Three) Times a Day. 9/16/20  Yes Erika Dill APRN   empagliflozin (Jardiance) 10 MG tablet tablet Take 1 tablet by mouth Daily. 8/8/24  Yes Shaw Wright MD   fluticasone (Flonase) 50 MCG/ACT nasal spray 2 sprays into the nostril(s) as directed by provider Daily. Administer 2 sprays in each nostril for each dose. 11/25/20  Yes Erika Dill APRN   fluticasone-salmeterol (ADVAIR) 100-50 MCG/DOSE DISKUS Inhale 1 puff 2 (Two) Times a Day. 12/1/20  Yes Ashish Thurman,    levothyroxine (SYNTHROID, LEVOTHROID) 112 MCG tablet Take 75 mcg by mouth Daily.   Yes ProviderYareli MD   meloxicam (MOBIC) 15 MG tablet Take 1 tablet by mouth Daily. 10/14/24  Yes Yareli Patterson MD   metoprolol succinate XL (TOPROL-XL) 200 MG 24 hr tablet Take 1 tablet by mouth Daily. 8/3/24  Yes Adriano Rapp MD   Multiple Vitamins-Minerals (MULTIVITAMIN WITH MINERALS) tablet tablet Take 1 tablet by mouth Daily.   Yes Provider, MD Yareli  "  nitroglycerin (NITROSTAT) 0.4 MG SL tablet Place 1 tablet under the tongue Every 5 (Five) Minutes As Needed for Chest Pain (Only if SBP Greater Than 100). Take no more than 3 doses in 15 minutes. 8/2/24  Yes Adriano Rapp MD   omeprazole (priLOSEC) 20 MG capsule Take 1 capsule by mouth Daily. 2/11/22  Yes Erika Dill APRN   PARoxetine (PAXIL) 20 MG tablet Take 1 tablet by mouth Daily. 8/3/24  Yes Adriano Rapp MD   potassium chloride (MICRO-K) 10 MEQ CR capsule Take 1 capsule by mouth Daily. 8/3/24  Yes Adriano Rapp MD   QUEtiapine (SEROquel) 100 MG tablet Take 1 tablet by mouth every night at bedtime. 2/17/22  Yes Erika Dill APRN   sacubitril-valsartan (Entresto) 49-51 MG tablet Take 1 tablet by mouth 2 (Two) Times a Day. 8/26/24  Yes Remi Mathew MD   spironolactone (ALDACTONE) 25 MG tablet Take 1 tablet by mouth Daily. 8/3/24  Yes Adriano Rapp MD   traZODone (DESYREL) 50 MG tablet Take 1 tablet by mouth Every Night.   Yes Provider, MD Yareli         Allergies:   Patient has no known allergies.      Results Review:             Visit Vitals  /86   Pulse 89   Ht 170.2 cm (67\")   Wt 87.1 kg (192 lb)   SpO2 96% Comment: VAZQUEZ   BMI 30.07 kg/m²                  Fernando Caballero DO  Pulmonary/Critical Care  11/4/2024  14:31 CST  "

## 2024-11-04 NOTE — PROGRESS NOTES
Clinic Note - New Patient            NAME: Ziyad Wilson  MRN: [id number]  AGE: 59 y.o.            : 1965  PRIMARY CARE PROVIDER: Randi Patterson APRN               Reason for Visit:  Ziyad Wilson presents to clinic today as a new patient for the evaluation of pulmonary nodules.    History of Present Illness:  Mr. Wilson is a 59-year-old male who presents to clinic today as a new patient.  Past medical history includes asthma diagnosed in childhood, pulmonary nodules, daily marijuana use, CHF, paroxysmal A-fib, hypertension, hyperlipidemia.    Patient presents to clinic today as a new patient for evaluation of pulmonary nodules.  He had a CT of the chest in July of this year which noted multiple pulmonary nodules.  He has a left lower lobe 5 mm pulmonary nodule and a right middle lobe 7 mm pulmonary nodule.  When comparing to his CT from 8 years ago these pulmonary nodules appear larger, though the CT from 8 years ago had larger slices making comparison difficult.  The patient follows with cardiology for nonischemic cardiomyopathy and paroxysmal A-fib.  He recently had a LAUREN.  He is following with CT surgery for evaluation of valvulopathy.    He has a history of asthma diagnosed in childhood.  He currently uses albuterol as needed.  Only uses his albuterol maybe once per month.  He has no personally prescribed supplemental oxygen at home.  He is a lifetime non-smoker of cigarettes, he does smoke marijuana daily.  He works as a writer.  Denies any personal or family history of lung cancer or VTE.  He lives at home which is clean and dry.  He does have pet cats to which she has no allergies.  No seasonal allergies.    Review of Systems:  A full 12-point review of systems was performed and was negative except as documented in the HPI.               Social History:  Smoking: Never cigarette smoker, daily marijuana smoker  Occupation: Writer  No history of exposure to industrial dusts, fumes, or  asbestos.  Pets: Cats, no allergies  Recent travel: None recent  Previous exposure to persons with tuberculosis: None known            Physical Exam:  General: No acute distress, cooperative.  Head: Atraumatic, normocephalic, normal inspection.  Eyes: EOMI, normal inspection.  ENT: Mucous membranes moist, normal inspection. No thrush.  Heart: RRR, no murmur  Lungs: Clear to auscultation bilaterally, no wheezing  MSK: No edema or clubbing  GI/abdominal: Soft, nontender.  Neurologic: Alert, oriented.  Cranial nerves II through XII grossly intact.      Imaging Review:  I personally reviewed the CTA chest done July of this year:  CTA shows right sided pleural effusion with interseptal thickening and pulmonary edema.  Pulmonary nodule in the right middle lobe measures 7 mm and left lower lobe subpleural measures 5 mm.  Both nodules are solid.      Pulmonary Functions Testing Results:  None available for review            Problem List:  Problem List Items Addressed This Visit          Gastrointestinal Abdominal     GERD (gastroesophageal reflux disease)       Pulmonary and Pneumonias    Asthma - Primary    Relevant Orders    Complete PFT - Pre & Post Bronchodilator    Pulmonary nodule    Pleural effusion         Pulmonary Assessment:  Patient is a 59-year-old male who presents to clinic today as a new patient for evaluation of pulmonary nodules.  He is a lifetime non-smoker of cigarettes.  He does use daily marijuana.  2 pulmonary nodules as noted above.  It is difficult to compare his pulmonary nodules on the current CT to the 1 obtained 8 years ago due to size of the slices.  He is currently 4 months from his most recent CT.  Therefore we will obtain a CT chest without contrast now to follow-up with the pulmonary nodules.  PFTs pre and post prior to follow-up.      Plan:   -CT chest without contrast now, he has 1 ordered by his PCP.  I gave him the number to the radiology scheduling, he will call to set up the  appointment.  Follow-up on the chest CT once complete  -Pre and post PFTs prior to follow-up  -Up-to-date on flu shot           Follow Up:  6 weeks         Thank you Randi Patterson APRN for this referral and allowing me to participate in this patient's care.           Past Medical History:   Past Medical History:   Diagnosis Date    Asthma     Cardiomyopathy     non-ischemic    CHF (congestive heart failure)     Coronary artery disease     Foot pain, bilateral     GERD (gastroesophageal reflux disease)     Headache     Hyperlipemia, mixed     Hypertension     benign    Hypothyroidism     Obesity     Sleep apnea     SOB (shortness of breath)          Past Surgical History:   Past Surgical History:   Procedure Laterality Date    APPENDECTOMY      BLADDER REPAIR      CARDIAC CATHETERIZATION  09/2003    CARDIAC CATHETERIZATION N/A 10/1/2024    Procedure: Left Heart Cath;  Surgeon: Remi Mathew MD;  Location:  [facility];  Service: Cardiology;  Laterality: N/A;    COLONOSCOPY N/A 1/9/2018    Procedure: COLONOSCOPY WITH ANESTHESIA;  Surgeon: Dick Espinosa DO;  Location:  [facility] ENDOSCOPY;  Service:     COLOSTOMY      with colostomy reversal    KNEE SURGERY Right          Family History:   Family History   Problem Relation Age of Onset    Hypertension Mother     Stroke Mother     Heart disease Mother     Hypertension Father     Heart disease Father     Hypertension Sister     Hypertension Brother          Medications:   Prior to Admission medications    Medication Sig Start Date End Date Taking? Authorizing Provider   albuterol sulfate  (90 Base) MCG/ACT inhaler Inhale 2 puffs Every 4 (Four) Hours As Needed for Wheezing or Shortness of Air. 9/16/20  Yes Erika Dill APRN   amiodarone (Pacerone) 200 MG tablet Take 1 tablet by mouth Daily. 8/8/24  Yes Adriano Rapp MD   amitriptyline (ELAVIL) 75 MG tablet Take 1 tablet by mouth Every Night.   Yes Provider, MD Yareli    amLODIPine-valsartan (EXFORGE) 5-160 MG per tablet Take 1 tablet by mouth Daily. 10/16/24  Yes Yareli Patterson MD   apixaban (ELIQUIS) 5 MG tablet tablet Take 1 tablet by mouth Every 12 (Twelve) Hours. Indications: Atrial Fibrillation 8/2/24  Yes Adriano Rapp MD   atorvastatin (LIPITOR) 40 MG tablet Take 1 tablet by mouth Daily. 2/17/22  Yes Erika Dill APRN   diclofenac (VOLTAREN) 1 % gel gel Apply 4 g topically to the appropriate area as directed 3 (Three) Times a Day. 9/16/20  Yes Erika Dill APRN   empagliflozin (Jardiance) 10 MG tablet tablet Take 1 tablet by mouth Daily. 8/8/24  Yes Shaw Wright MD   fluticasone (Flonase) 50 MCG/ACT nasal spray 2 sprays into the nostril(s) as directed by provider Daily. Administer 2 sprays in each nostril for each dose. 11/25/20  Yes Erika Dill APRN   fluticasone-salmeterol (ADVAIR) 100-50 MCG/DOSE DISKUS Inhale 1 puff 2 (Two) Times a Day. 12/1/20  Yes Ashish Thurman,    levothyroxine (SYNTHROID, LEVOTHROID) 112 MCG tablet Take 75 mcg by mouth Daily.   Yes ProviderYareli MD   meloxicam (MOBIC) 15 MG tablet Take 1 tablet by mouth Daily. 10/14/24  Yes Yareli Patterson MD   metoprolol succinate XL (TOPROL-XL) 200 MG 24 hr tablet Take 1 tablet by mouth Daily. 8/3/24  Yes Adriano Rapp MD   Multiple Vitamins-Minerals (MULTIVITAMIN WITH MINERALS) tablet tablet Take 1 tablet by mouth Daily.   Yes Yareli Patterson MD   nitroglycerin (NITROSTAT) 0.4 MG SL tablet Place 1 tablet under the tongue Every 5 (Five) Minutes As Needed for Chest Pain (Only if SBP Greater Than 100). Take no more than 3 doses in 15 minutes. 8/2/24  Yes Adriano Rapp MD   omeprazole (priLOSEC) 20 MG capsule Take 1 capsule by mouth Daily. 2/11/22  Yes Erika Dill APRN   PARoxetine (PAXIL) 20 MG tablet Take 1 tablet by mouth Daily. 8/3/24  Yes Adriano Rapp MD   potassium chloride (MICRO-K) 10 MEQ CR capsule Take 1 capsule by mouth Daily. 8/3/24  Yes  "Adriano Rapp MD   QUEtiapine (SEROquel) 100 MG tablet Take 1 tablet by mouth every night at bedtime. 2/17/22  Yes Erika Dill APRN   sacubitril-valsartan (Entresto) 49-51 MG tablet Take 1 tablet by mouth 2 (Two) Times a Day. 8/26/24  Yes Remi Mathew MD   spironolactone (ALDACTONE) 25 MG tablet Take 1 tablet by mouth Daily. 8/3/24  Yes Adriano Rapp MD   traZODone (DESYREL) 50 MG tablet Take 1 tablet by mouth Every Night.   Yes Provider, MD Yareli         Allergies:   Patient has no known allergies.      Results Review:             Visit Vitals  /86   Pulse 89   Ht 170.2 cm (67\")   Wt 87.1 kg (192 lb)   SpO2 96% Comment: RA   BMI 30.07 kg/m²                  Fernando Caballero DO  Pulmonary/Critical Care  11/4/2024  14:31 CST  "

## 2024-11-13 ENCOUNTER — PATIENT ROUNDING (BHMG ONLY) (OUTPATIENT)
Dept: PULMONOLOGY | Facility: CLINIC | Age: 59
End: 2024-11-13
Payer: MEDICARE

## 2024-11-13 NOTE — PROGRESS NOTES
November 13, 2024    Hello, may I speak with Ziyad Wilson?    My name is Petra Emanuel    I am  with [facility]  [address]  [facility] [address] [phone].    Before we get started may I verify your date of birth? 1965    I am calling to officially welcome you to our practice and ask about your recent visit. Is this a good time to talk? yes    Tell me about your visit with us. What things went well?  Visit was good.       We're always looking for ways to make our patients' experiences even better. Do you have recommendations on ways we may improve?  no, not at this time.    Overall were you satisfied with your first visit to our practice? yes       I appreciate you taking the time to speak with me today. Is there anything else I can do for you? no      Thank you, and have a great day.

## 2024-11-15 ENCOUNTER — HOSPITAL ENCOUNTER (OUTPATIENT)
Dept: CT IMAGING | Facility: HOSPITAL | Age: 59
Discharge: HOME | End: 2024-11-15
Payer: MEDICARE

## 2024-11-15 DIAGNOSIS — R91.8 PULMONARY NODULES: ICD-10-CM

## 2024-11-15 PROCEDURE — 71250 CT THORAX DX C-: CPT

## 2024-11-18 ENCOUNTER — HOSPITAL ENCOUNTER (OUTPATIENT)
Dept: CARDIOLOGY | Facility: HOSPITAL | Age: 59
Discharge: HOME | End: 2024-11-18
Admitting: NURSE PRACTITIONER
Payer: MEDICARE

## 2024-11-18 VITALS
OXYGEN SATURATION: 96 % | BODY MASS INDEX: 30.98 KG/M2 | RESPIRATION RATE: 16 BRPM | SYSTOLIC BLOOD PRESSURE: 101 MMHG | HEART RATE: 64 BPM | DIASTOLIC BLOOD PRESSURE: 65 MMHG | WEIGHT: 197.8 LBS

## 2024-11-18 DIAGNOSIS — I50.22 CHRONIC HFREF (HEART FAILURE WITH REDUCED EJECTION FRACTION): Primary | ICD-10-CM

## 2024-11-18 LAB
ABSOLUTE LUNG FLUID CONTENT: 29 % (ref 20–35)
ANION GAP SERPL CALCULATED.3IONS-SCNC: 9 MMOL/L (ref 5–15)
BUN SERPL-MCNC: 25 MG/DL (ref 8–23)
BUN/CREAT SERPL: 13.2 (ref 7–25)
CALCIUM SPEC-SCNC: 8.8 MG/DL (ref 8.6–10.5)
CHLORIDE SERPL-SCNC: 105 MMOL/L (ref 98–107)
CO2 SERPL-SCNC: 24 MMOL/L (ref 22–29)
CREAT SERPL-MCNC: 1.9 MG/DL (ref 0.76–1.27)
EGFRCR SERPLBLD CKD-EPI 2021: 39.9 ML/MIN/1.73
GLUCOSE SERPL-MCNC: 93 MG/DL (ref 65–99)
POTASSIUM SERPL-SCNC: 4.5 MMOL/L (ref 3.5–5.2)
SODIUM SERPL-SCNC: 138 MMOL/L (ref 136–145)

## 2024-11-18 PROCEDURE — G2211 COMPLEX E/M VISIT ADD ON: HCPCS | Performed by: NURSE PRACTITIONER

## 2024-11-18 PROCEDURE — 80048 BASIC METABOLIC PNL TOTAL CA: CPT | Performed by: NURSE PRACTITIONER

## 2024-11-18 PROCEDURE — 94726 PLETHYSMOGRAPHY LUNG VOLUMES: CPT | Performed by: NURSE PRACTITIONER

## 2024-11-18 PROCEDURE — 99214 OFFICE O/P EST MOD 30 MIN: CPT | Performed by: NURSE PRACTITIONER

## 2024-11-18 NOTE — PATIENT INSTRUCTIONS
Medication Management  Continue Jardiance 10mg daily  Continue Spironolactone 25mg daily  Continue Toprol-XL 200mg daily  Stop Carvedilol  Continue Exforge (amlodipine/valsartan) 5-160mg tablet daily    We will see how your HR and BP manage without the carvedilol prior to making any other changes.  Ideally, I will work toward getting you off the Exforge and on to the Entresto again in the future.       Weight Monitoring  - Please weigh yourself every morning after you use the restroom while wearing the same amount of clothing.  - Please write down your weight readings in a log and bring that log with you to your next heart failure clinic appointment.  - If your weight increases by 2 lbs in 1 day or 5 lbs in 1 week, you should call the office. If your weight does not come down, please call the heart failure clinic.    Blood Pressure Monitoring  - Please check your blood pressure at least once daily ~2-4 hours after you have taken your morning blood pressure medications.  - Please write down your blood pressure readings in a log and bring that log with you to your next heart failure clinic appointment.    Diet  - It is very important that you monitor your fluid and sodium (salt) intake.  - Please try to limit sodium intake to less than 2,000 mg each day.  - Please try to limit fluid intake to ~2 liters (64 ounces) each day.

## 2024-11-18 NOTE — PROGRESS NOTES
Heart Failure Clinic    Date:  11/18/24     Vitals:    11/18/24 1405   BP: 101/65   Pulse: 64   Resp: 16   SpO2: 96%        Indication:  Heart Failure     Procedure:  ReDS device sensor unit applied to right side of chest and right side of back.  Appropriate positioning confirmed based off of the unit's calculation.  Chest measurement obtained with the chest size ruler.  Measurement session performed over 45 seconds.      Method of arrival:  Ambulatory    Weighing self daily:  Yes    Taking medications as prescribed:  No has been out of Entresto for at least 2 weeks. Patient started taking Coreg that they still had a home. They are not sure of mg. But they are taking it 2 times daily.    Edema:  No    Shortness of Air:  No    Number of pillows used at night:  <2    Results:  ReDS Value=          29                Interpretation:  25-35% - normal/ideal lung fluid content    Francie Desouza RN 11/18/24 14:10 CST

## 2024-11-18 NOTE — PROGRESS NOTES
"    Heart Failure Clinic Progress Note  Reason For Visit:  CHF    Subjective        Ziyad Wilson is a 59 y.o. male with the below pertinent PMH who presents for follow-up of CHF.    Ziyad Wilson was most recently seen in the heart failure clinic on 8/19/2024.  However, he is all VIRGILIO Armstrong in the cardiology office on 10/14/2024.  At his last visit with Wily he was doing well from a heart failure standpoint.  He would denied any exertional shortness of breath and been tolerating his presentation regimen well.  His weight had been stable and he had actually been losing weight.  He denied any lower extremity edema.  No changes were made to his current treatments and his GDMT at that time was stated to be Toprol- mg daily, Jardiance 10 mg daily, spironolactone 25 mg daily, and Entresto 49-51 mg twice daily.  He is instructions did mention that he was to stop the amlodipine/valsartan combination pill.    He notes that he feels well today.  He was sent to the office by his PCP due to some confusion with his medications.  He notes that he was having difficulties with affording his Entresto and ran out about 2 weeks ago.  He had been taking Exforge in combination with the Entresto.  However, when he ran out of Entresto he also started taking some Coreg that he had left over from a previous prescription.  He started taking 50mg BID.  He denies any shortness of breath, chest pain, lightheadedness,, or dizziness.  He reports 1 episode where he started to feel flushed and his knees \"buckled\".  Otherwise he denies any new symptoms.  His weight is overall stayed remotely stable.  His ReDs reading is normal at 29% today.    Review of Systems   Constitutional:  Negative for fatigue.   Respiratory:  Negative for shortness of breath.    Cardiovascular:  Negative for chest pain and leg swelling.   Neurological:  Positive for syncope (Near syncope noted). Negative for dizziness and light-headedness.        Pertinent " PMH  HFrEF due to NICM  Mild to moderate MR  Persistent atrial fibrillation s/p cardioversion 7/29/24  Hypertension  Hyperlipidemia  Coronary Artery Calcification on CT  [facility]    Pertinent past medical, surgical, family, and social history were reviewed.      Current Outpatient Medications:     albuterol sulfate  (90 Base) MCG/ACT inhaler, Inhale 2 puffs Every 4 (Four) Hours As Needed for Wheezing or Shortness of Air., Disp: 18 g, Rfl: 11    amiodarone (Pacerone) 200 MG tablet, Take 1 tablet by mouth Daily., Disp: 90 tablet, Rfl: 0    amitriptyline (ELAVIL) 75 MG tablet, Take 1 tablet by mouth Every Night., Disp: , Rfl:     amLODIPine-valsartan (EXFORGE) 5-160 MG per tablet, Take 1 tablet by mouth Daily., Disp: , Rfl:     apixaban (ELIQUIS) 5 MG tablet tablet, Take 1 tablet by mouth Every 12 (Twelve) Hours. Indications: Atrial Fibrillation, Disp: 180 tablet, Rfl: 0    atorvastatin (LIPITOR) 40 MG tablet, Take 1 tablet by mouth Daily., Disp: 90 tablet, Rfl: 3    diclofenac (VOLTAREN) 1 % gel gel, Apply 4 g topically to the appropriate area as directed 3 (Three) Times a Day., Disp: 100 g, Rfl: 3    empagliflozin (Jardiance) 10 MG tablet tablet, Take 1 tablet by mouth Daily., Disp: 30 tablet, Rfl: 11    fluticasone (Flonase) 50 MCG/ACT nasal spray, 2 sprays into the nostril(s) as directed by provider Daily. Administer 2 sprays in each nostril for each dose., Disp: 1 bottle, Rfl: 11    fluticasone-salmeterol (ADVAIR) 100-50 MCG/DOSE DISKUS, Inhale 1 puff 2 (Two) Times a Day., Disp: 60 each, Rfl: 3    levothyroxine (SYNTHROID, LEVOTHROID) 112 MCG tablet, Take 75 mcg by mouth Daily., Disp: , Rfl:     meloxicam (MOBIC) 15 MG tablet, Take 1 tablet by mouth Daily., Disp: , Rfl:     metoprolol succinate XL (TOPROL-XL) 200 MG 24 hr tablet, Take 1 tablet by mouth Daily., Disp: 90 tablet, Rfl: 0    Multiple Vitamins-Minerals (MULTIVITAMIN WITH MINERALS) tablet tablet, Take 1 tablet by mouth Daily., Disp: , Rfl:      "omeprazole (priLOSEC) 20 MG capsule, Take 1 capsule by mouth Daily., Disp: 90 capsule, Rfl: 3    PARoxetine (PAXIL) 20 MG tablet, Take 1 tablet by mouth Daily., Disp: 90 tablet, Rfl: 0    potassium chloride (MICRO-K) 10 MEQ CR capsule, Take 1 capsule by mouth Daily., Disp: 90 capsule, Rfl: 0    QUEtiapine (SEROquel) 100 MG tablet, Take 1 tablet by mouth every night at bedtime., Disp: 90 tablet, Rfl: 1    spironolactone (ALDACTONE) 25 MG tablet, Take 1 tablet by mouth Daily., Disp: 90 tablet, Rfl: 0    traZODone (DESYREL) 50 MG tablet, Take 1 tablet by mouth Every Night., Disp: , Rfl:     nitroglycerin (NITROSTAT) 0.4 MG SL tablet, Place 1 tablet under the tongue Every 5 (Five) Minutes As Needed for Chest Pain (Only if SBP Greater Than 100). Take no more than 3 doses in 15 minutes., Disp: 12 tablet, Rfl: 12     Objective   Vital Signs:  /65 (BP Location: Left arm)   Pulse 64   Resp 16   Wt 89.7 kg (197 lb 12.8 oz)   SpO2 96%   BMI 30.98 kg/m²   Estimated body mass index is 30.98 kg/m² as calculated from the following:    Height as of 11/4/24: 170.2 cm (67\").    Weight as of this encounter: 89.7 kg (197 lb 12.8 oz).    Neck:      Vascular: No JVD.   Pulmonary:      Effort: Pulmonary effort is normal.      Breath sounds: Normal breath sounds.   Cardiovascular:      Normal rate. Regular rhythm. Normal S1. Normal S2.       Murmurs: There is no murmur.      No gallop.  No click. No rub.   Pulses:     Intact distal pulses.   Edema:     Peripheral edema absent.   Abdominal:      Palpations: Abdomen is soft.      Tenderness: There is no abdominal tenderness.   Skin:     General: Skin is warm and dry.   Neurological:      General: No focal deficit present.      Mental Status: Alert and oriented to person, place and time.   Psychiatric:         Behavior: Behavior is cooperative.        Result Review :  The following data was reviewed by: VIRGILIO Simmons on 11/18/2024:  JOSE R   BMP          8/19/2024    10:47 " 9/30/2024    08:04 11/18/2024    13:54   BMP   BUN 15  18  25    Creatinine 1.42  1.64  1.90    Sodium 138  139  138    Potassium 4.2  4.4  4.5    Chloride 102  105  105    CO2 26.0  26.0  24.0    Calcium 9.2  9.0  8.8      ReDS   Lab Results   Component Value Date    ABSOLUTELUNG 29 11/18/2024    ABSOLUTELUNG 39 (A) 08/19/2024    ABSOLUTELUNG 32 08/08/2024     Results for orders placed during the hospital encounter of 10/01/24    Adult Transesophageal Echo (LAUREN) W/ Cont if Necessary Per Protocol    Interpretation Summary    Left ventricular systolic function is moderately decreased.    The left ventricular cavity is mild to moderately dilated.    Left ventricular diastolic dysfunction is noted.    The left atrial cavity is dilated.    Mild to moderate mitral valve regurgitation is present.          Assessment and Plan   Diagnoses and all orders for this visit:    1. Chronic HFrEF (heart failure with reduced ejection fraction) (Primary)  At this point it appears his heart failure remains stable.  However, he has been taking some medications in addition to what was being prescribed and there have been some confusion around his overall medication load.  - Most concerning is the fact that he is on 2 beta-blockers.  He is currently on max doses of Toprol-XL and carvedilol.  We will have him stop his carvedilol.  He Likely that his near syncopal episode was associated to a sudden drop in his heart rate.  I did advise that he not take any carvedilol in the future.  - For now we will have him continue the Exforge is adequately controlling his blood pressure.  He is unable to afford the Entresto.  Ideally, I would like to have him on Entresto but I would like to see what his blood pressure and heart rate are going to do after stopping his carvedilol prior to changing him on any other medications.  At his next visit we will likely reinitiate Entresto and stop the Exforge.-Jardiance 10 mg daily  - Toprol- mg daily  -  Spironolactone 25 mg daily  - Potassium 10 mEq daily       Follow Up   Return in about 2 weeks (around 12/2/2024) for recheck.    Patient was given instructions and counseling regarding his condition or for health maintenance advice. Please see specific information pulled into the AVS if appropriate.       Willem Vasquez, APRN  11/18/24  16:13 CST

## 2024-11-20 ENCOUNTER — TRANSCRIBE ORDERS (OUTPATIENT)
Dept: ADMINISTRATIVE | Facility: HOSPITAL | Age: 59
End: 2024-11-20
Payer: MEDICARE

## 2024-11-20 ENCOUNTER — HOSPITAL ENCOUNTER (OUTPATIENT)
Dept: PULMONOLOGY | Facility: HOSPITAL | Age: 59
Discharge: HOME | End: 2024-11-20
Payer: MEDICARE

## 2024-11-20 DIAGNOSIS — R55 SYNCOPE AND COLLAPSE: Primary | ICD-10-CM

## 2024-11-20 DIAGNOSIS — J45.20 MILD INTERMITTENT ASTHMA, UNSPECIFIED WHETHER COMPLICATED: ICD-10-CM

## 2024-11-20 PROCEDURE — 94060 EVALUATION OF WHEEZING: CPT

## 2024-11-20 PROCEDURE — 94726 PLETHYSMOGRAPHY LUNG VOLUMES: CPT

## 2024-11-20 PROCEDURE — 94729 DIFFUSING CAPACITY: CPT

## 2024-11-20 RX ORDER — ALBUTEROL SULFATE 0.83 MG/ML
2.5 SOLUTION RESPIRATORY (INHALATION) ONCE
Status: COMPLETED | OUTPATIENT
Start: 2024-11-20 | End: 2024-11-20

## 2024-11-20 RX ADMIN — ALBUTEROL SULFATE 2.5 MG: 2.5 SOLUTION RESPIRATORY (INHALATION) at 10:32

## 2024-12-03 ENCOUNTER — HOSPITAL ENCOUNTER (OUTPATIENT)
Dept: CARDIOLOGY | Facility: HOSPITAL | Age: 59
Discharge: HOME | End: 2024-12-03
Payer: MEDICARE

## 2024-12-03 ENCOUNTER — HOSPITAL ENCOUNTER (OUTPATIENT)
Dept: ULTRASOUND IMAGING | Facility: HOSPITAL | Age: 59
Discharge: HOME | End: 2024-12-03
Payer: MEDICARE

## 2024-12-03 VITALS
WEIGHT: 194 LBS | OXYGEN SATURATION: 94 % | SYSTOLIC BLOOD PRESSURE: 101 MMHG | HEART RATE: 78 BPM | BODY MASS INDEX: 30.38 KG/M2 | DIASTOLIC BLOOD PRESSURE: 60 MMHG

## 2024-12-03 DIAGNOSIS — R55 NEAR SYNCOPE: ICD-10-CM

## 2024-12-03 DIAGNOSIS — R55 SYNCOPE AND COLLAPSE: ICD-10-CM

## 2024-12-03 DIAGNOSIS — R42 DIZZINESS AND GIDDINESS: ICD-10-CM

## 2024-12-03 DIAGNOSIS — I50.22 CHRONIC HFREF (HEART FAILURE WITH REDUCED EJECTION FRACTION): Primary | ICD-10-CM

## 2024-12-03 LAB
ANION GAP SERPL CALCULATED.3IONS-SCNC: 11 MMOL/L (ref 5–15)
BUN SERPL-MCNC: 18 MG/DL (ref 8–23)
BUN/CREAT SERPL: 10.5 (ref 7–25)
CALCIUM SPEC-SCNC: 8.6 MG/DL (ref 8.6–10.5)
CHLORIDE SERPL-SCNC: 105 MMOL/L (ref 98–107)
CO2 SERPL-SCNC: 22 MMOL/L (ref 22–29)
CREAT SERPL-MCNC: 1.71 MG/DL (ref 0.76–1.27)
DEPRECATED RDW RBC AUTO: 53.6 FL (ref 37–54)
EGFRCR SERPLBLD CKD-EPI 2021: 45.3 ML/MIN/1.73
ERYTHROCYTE [DISTWIDTH] IN BLOOD BY AUTOMATED COUNT: 17.6 % (ref 12.3–15.4)
GLUCOSE SERPL-MCNC: 113 MG/DL (ref 65–99)
HCT VFR BLD AUTO: 37.1 % (ref 37.5–51)
HGB BLD-MCNC: 12.4 G/DL (ref 13–17.7)
MCH RBC QN AUTO: 27.7 PG (ref 26.6–33)
MCHC RBC AUTO-ENTMCNC: 33.4 G/DL (ref 31.5–35.7)
MCV RBC AUTO: 83 FL (ref 79–97)
PLATELET # BLD AUTO: 151 10*3/MM3 (ref 140–450)
PMV BLD AUTO: 10.1 FL (ref 6–12)
POTASSIUM SERPL-SCNC: 4.1 MMOL/L (ref 3.5–5.2)
RBC # BLD AUTO: 4.47 10*6/MM3 (ref 4.14–5.8)
SODIUM SERPL-SCNC: 138 MMOL/L (ref 136–145)
WBC NRBC COR # BLD AUTO: 6.1 10*3/MM3 (ref 3.4–10.8)

## 2024-12-03 PROCEDURE — 93246 EXT ECG>7D<15D RECORDING: CPT

## 2024-12-03 PROCEDURE — 80048 BASIC METABOLIC PNL TOTAL CA: CPT | Performed by: NURSE PRACTITIONER

## 2024-12-03 PROCEDURE — 85027 COMPLETE CBC AUTOMATED: CPT | Performed by: NURSE PRACTITIONER

## 2024-12-03 PROCEDURE — 93880 EXTRACRANIAL BILAT STUDY: CPT

## 2024-12-03 PROCEDURE — G0463 HOSPITAL OUTPT CLINIC VISIT: HCPCS | Performed by: NURSE PRACTITIONER

## 2024-12-03 RX ORDER — BUPROPION HYDROCHLORIDE 300 MG/1
1 TABLET ORAL DAILY
COMMUNITY
Start: 2024-11-04

## 2024-12-03 NOTE — PROGRESS NOTES
"    Heart Failure Clinic Progress Note  Reason For Visit:  CHF    Subjective        Ziyad Wilson is a 59 y.o. male with the below pertinent PMH who presents for follow-up of CHF.    Ziyad Wilson was most recently seen in the heart failure clinic on 11/18/2024.  At that time he noted that he had been feeling well but had was sent to our office by his PCP using confusion over his medications.  He had been having difficulties affording his Entresto and had run out.  He had also been taking Exforge in combination with his Entresto.  However whenever his Entresto had ran out he started taking some extra Coreg that he had been having.  He had denied any issues with lightheadedness dizziness.  However, there was 1 episode that he reported some buckling of his knees and feeling flushed.  He appeared to be euvolemic on that exam.  In addition to Coreg he was taking Toprol-XL.  We stopped his carvedilol as I felt that that was causative to his syncopal episode.  I left his other GDMT the same with additional plans to try and transition him back to Entresto at some point.    He reports that he has been doing OK. He does report that he continues to shortness of breath when he ambulates long distances.  He denies any chest pain or peripheral edema.  He does have some continued episodes of lightheadedness and \"buckling\" of his knees.  He did stop the Carvedilol since his last visit.  His weight is overall stable and down 3 pounds since last being seen.  His renal function has improved some since his last visit.    Review of Systems   Constitutional:  Negative for fatigue.   Respiratory:  Negative for shortness of breath.    Cardiovascular:  Negative for chest pain and leg swelling.   Neurological:  Positive for syncope (Near syncope noted). Negative for dizziness and light-headedness.        Pertinent PMH  HFrEF due to NICM  Mild to moderate MR  Persistent atrial fibrillation s/p cardioversion " 7/29/24  Hypertension  Hyperlipidemia  Coronary Artery Calcification on CT  KOLTON    Pertinent past medical, surgical, family, and social history were reviewed.      Current Outpatient Medications:     albuterol sulfate  (90 Base) MCG/ACT inhaler, Inhale 2 puffs Every 4 (Four) Hours As Needed for Wheezing or Shortness of Air., Disp: 18 g, Rfl: 11    amiodarone (Pacerone) 200 MG tablet, Take 1 tablet by mouth Daily., Disp: 90 tablet, Rfl: 0    amitriptyline (ELAVIL) 75 MG tablet, Take 1 tablet by mouth Every Night., Disp: , Rfl:     apixaban (ELIQUIS) 5 MG tablet tablet, Take 1 tablet by mouth Every 12 (Twelve) Hours. Indications: Atrial Fibrillation, Disp: 180 tablet, Rfl: 0    atorvastatin (LIPITOR) 40 MG tablet, Take 1 tablet by mouth Daily., Disp: 90 tablet, Rfl: 3    buPROPion XL (WELLBUTRIN XL) 300 MG 24 hr tablet, Take 1 tablet by mouth Daily., Disp: , Rfl:     diclofenac (VOLTAREN) 1 % gel gel, Apply 4 g topically to the appropriate area as directed 3 (Three) Times a Day., Disp: 100 g, Rfl: 3    empagliflozin (Jardiance) 10 MG tablet tablet, Take 1 tablet by mouth Daily., Disp: 30 tablet, Rfl: 11    fluticasone (Flonase) 50 MCG/ACT nasal spray, 2 sprays into the nostril(s) as directed by provider Daily. Administer 2 sprays in each nostril for each dose., Disp: 1 bottle, Rfl: 11    fluticasone-salmeterol (ADVAIR) 100-50 MCG/DOSE DISKUS, Inhale 1 puff 2 (Two) Times a Day., Disp: 60 each, Rfl: 3    levothyroxine (SYNTHROID, LEVOTHROID) 112 MCG tablet, Take 75 mcg by mouth Daily., Disp: , Rfl:     meloxicam (MOBIC) 15 MG tablet, Take 1 tablet by mouth Daily., Disp: , Rfl:     metoprolol succinate XL (TOPROL-XL) 200 MG 24 hr tablet, Take 1 tablet by mouth Daily., Disp: 90 tablet, Rfl: 0    Multiple Vitamins-Minerals (MULTIVITAMIN WITH MINERALS) tablet tablet, Take 1 tablet by mouth Daily., Disp: , Rfl:     nitroglycerin (NITROSTAT) 0.4 MG SL tablet, Place 1 tablet under the tongue Every 5 (Five) Minutes As  "Needed for Chest Pain (Only if SBP Greater Than 100). Take no more than 3 doses in 15 minutes., Disp: 12 tablet, Rfl: 12    omeprazole (priLOSEC) 20 MG capsule, Take 1 capsule by mouth Daily., Disp: 90 capsule, Rfl: 3    PARoxetine (PAXIL) 20 MG tablet, Take 1 tablet by mouth Daily., Disp: 90 tablet, Rfl: 0    potassium chloride (MICRO-K) 10 MEQ CR capsule, Take 1 capsule by mouth Daily., Disp: 90 capsule, Rfl: 0    QUEtiapine (SEROquel) 100 MG tablet, Take 1 tablet by mouth every night at bedtime., Disp: 90 tablet, Rfl: 1    spironolactone (ALDACTONE) 25 MG tablet, Take 1 tablet by mouth Daily., Disp: 90 tablet, Rfl: 0    traZODone (DESYREL) 50 MG tablet, Take 1 tablet by mouth Every Night., Disp: , Rfl:     amLODIPine-valsartan (EXFORGE) 5-160 MG per tablet, Take 1 tablet by mouth Daily. (Patient not taking: Reported on 12/3/2024), Disp: , Rfl:      Objective   Vital Signs:  /60 (BP Location: Left arm, Patient Position: Sitting)   Pulse 78   Wt 88 kg (194 lb)   SpO2 94%   BMI 30.38 kg/m²   Estimated body mass index is 30.38 kg/m² as calculated from the following:    Height as of 11/4/24: 170.2 cm (67\").    Weight as of this encounter: 88 kg (194 lb).    Neck:      Vascular: No JVD.   Pulmonary:      Effort: Pulmonary effort is normal.      Breath sounds: Normal breath sounds.   Cardiovascular:      Normal rate. Regular rhythm. Normal S1. Normal S2.       Murmurs: There is no murmur.      No gallop.  No click. No rub.   Pulses:     Intact distal pulses.   Edema:     Peripheral edema absent.   Abdominal:      Palpations: Abdomen is soft.      Tenderness: There is no abdominal tenderness.   Skin:     General: Skin is warm and dry.   Neurological:      General: No focal deficit present.      Mental Status: Alert and oriented to person, place and time.   Psychiatric:         Behavior: Behavior is cooperative.        Result Review :  The following data was reviewed by: VIRGILIO Simmons on 11/18/2024:  BMP "   BMP          9/30/2024    08:04 11/18/2024    13:54 12/3/2024    14:16   BMP   BUN 18  25  18    Creatinine 1.64  1.90  1.71    Sodium 139  138  138    Potassium 4.4  4.5  4.1    Chloride 105  105  105    CO2 26.0  24.0  22.0    Calcium 9.0  8.8  8.6      ReDS   Lab Results   Component Value Date    ABSOLUTELUNG 29 11/18/2024    ABSOLUTELUNG 39 (A) 08/19/2024    ABSOLUTELUNG 32 08/08/2024     Results for orders placed during the hospital encounter of 10/01/24    Adult Transesophageal Echo (LAUREN) W/ Cont if Necessary Per Protocol    Interpretation Summary    Left ventricular systolic function is moderately decreased.    The left ventricular cavity is mild to moderately dilated.    Left ventricular diastolic dysfunction is noted.    The left atrial cavity is dilated.    Mild to moderate mitral valve regurgitation is present.          Assessment and Plan   Diagnoses and all orders for this visit:    1. Chronic HFrEF (heart failure with reduced ejection fraction) (Primary)  2.  Dizziness and giddiness   3.  Near syncope  He continues to be compensated on examination and is euvolemic.  However, he does remain with difficulties regarding his medications.  He continues to not exactly know which medications he is on.  However, he does report he has stopped the carvedilol and as result has had a decreased incidence of near syncopal episodes.  - Continue Exforge 5-160 mg daily  - Jardiance 10 mg daily  - Toprol- mg daily  - Spironolactone 25 mg daily  - Holter monitor for evaluation of near syncopal episodes.  I suspect we may need to further decrease his Toprol-XL.    Will have him bring his medications in from home at his next visit so that we can get an accurate idea of what medications he is taking.  Potassium 10 mEq daily       Follow Up   Return in about 3 weeks (around 12/24/2024) for recheck.    Patient was given instructions and counseling regarding his condition or for health maintenance advice. Please see  specific information pulled into the AVS if appropriate.       Willem Vasquez, VIRGILIO  12/03/24  16:06 CST

## 2024-12-03 NOTE — PATIENT INSTRUCTIONS
Medication Management  Continue Jardiance 10mg daily  Continue Spironolactone 25mg daily  Continue Toprol-XL 100mg daily  Continue Exforge 5-160mg daily    Weight Monitoring  - Please weigh yourself every morning after you use the restroom while wearing the same amount of clothing.  - Please write down your weight readings in a log and bring that log with you to your next heart failure clinic appointment.  - If your weight increases by 2 lbs in 1 day or 5 lbs in 1 week, you should call the office. If your weight does not come down, please call the heart failure clinic.    Blood Pressure Monitoring  - Please check your blood pressure at least once daily ~2-4 hours after you have taken your morning blood pressure medications.  - Please write down your blood pressure readings in a log and bring that log with you to your next heart failure clinic appointment.    Diet  - It is very important that you monitor your fluid and sodium (salt) intake.  - Please try to limit sodium intake to less than 2,000 mg each day.  - Please try to limit fluid intake to ~2 liters (64 ounces) each day.

## 2024-12-12 ENCOUNTER — PREP FOR SURGERY (OUTPATIENT)
Dept: OTHER | Facility: HOSPITAL | Age: 59
End: 2024-12-12
Payer: MEDICARE

## 2024-12-12 ENCOUNTER — OFFICE VISIT (OUTPATIENT)
Dept: CARDIOLOGY | Facility: CLINIC | Age: 59
End: 2024-12-12
Payer: MEDICARE

## 2024-12-12 VITALS
BODY MASS INDEX: 30.38 KG/M2 | HEART RATE: 71 BPM | DIASTOLIC BLOOD PRESSURE: 86 MMHG | OXYGEN SATURATION: 98 % | HEIGHT: 67 IN | SYSTOLIC BLOOD PRESSURE: 102 MMHG

## 2024-12-12 DIAGNOSIS — T50.905A BRADYCARDIA, DRUG INDUCED: ICD-10-CM

## 2024-12-12 DIAGNOSIS — R00.1 BRADYCARDIA, DRUG INDUCED: ICD-10-CM

## 2024-12-12 DIAGNOSIS — I42.8 NICM (NONISCHEMIC CARDIOMYOPATHY): Chronic | ICD-10-CM

## 2024-12-12 DIAGNOSIS — I50.22 CHRONIC HFREF (HEART FAILURE WITH REDUCED EJECTION FRACTION): ICD-10-CM

## 2024-12-12 DIAGNOSIS — I48.0 PAF (PAROXYSMAL ATRIAL FIBRILLATION): Primary | ICD-10-CM

## 2024-12-12 RX ORDER — SODIUM CHLORIDE 0.9 % (FLUSH) 0.9 %
10 SYRINGE (ML) INJECTION AS NEEDED
OUTPATIENT
Start: 2024-12-12

## 2024-12-12 RX ORDER — SODIUM CHLORIDE 9 MG/ML
40 INJECTION, SOLUTION INTRAVENOUS AS NEEDED
OUTPATIENT
Start: 2024-12-12

## 2024-12-12 RX ORDER — SODIUM CHLORIDE 0.9 % (FLUSH) 0.9 %
10 SYRINGE (ML) INJECTION EVERY 12 HOURS SCHEDULED
OUTPATIENT
Start: 2024-12-12

## 2024-12-12 NOTE — PROGRESS NOTES
[facility] -  CLINIC FOLLOW UP     Patient Care Team:  Randi Patterson APRN as PCP - General (Emergency Medicine)  Douglas Ruano MD as Consulting Physician (Pain Medicine)  Uri Lagos MD (Inactive) as Cardiologist (Cardiology)  Fernando Caballero DO as Consulting Physician (Pulmonary Disease)    Chief Complaint:     Subjective   EP Problems:  1.  Persistent atrial fibrillation  -7/29/24: DCCV  2.  Severe biatrial enlargement     Cardiology Problems:  1.  Severe MR  -10/1/24: LAUREN Mild-mod MR  2.  Nonischemic cardiomyopathy  -10/1/24: nonobstructive CAD   3.  Chronic systolic heart failure  4.  Hypertension  5.  Hyperlipidemia     Medical Problems:  1.  Obesity  2.  Obstructive sleep apnea  3.  Chronic normocytic anemia    HPI: Today I had the pleasure of seeing Ziyad Wilson in the cardiology clinic for follow up. He is a 59 year old male with a history of NICM, KOLTON, and HTN, persistent afib and severe MR. He presented to the hospital in July with SOB and edema and found to be in afib with RVR and admitted for further work up. Echo showed severe MR with EF 36-40%. He underwent cardioversion successfully and was on Amiodarone to maintain sinus rhythm. He was doing very well in September with NYHA Class I symptoms pretty much and had been scheduled for his MAZE and mitral valve surgery, but he was feeling very well.     Repeat echo then showed an improvement in his MR to mild to moderate, but EF was still the same. Dr. Mathew performed LAUREN and LHC in October. The LAUREN showed his MR was improved officially and LHC did not show any obstructive disease, only 40-50% stenosis of the LAD.     When he followed up with HF clinic Dec 3rd, he was found to have several discrepciances with his medication list and was found to have been taking 2 beta blockers. Willem POOLE placed a holter that day since the patient had noted a syncopal episode. He states that has been better since  "he corrected that. He just bought a stationary bike for home and he can go 15 minutes and has increased that to 30 minutes.     Objective     Visit Vitals  /86   Pulse 71   Ht 170.2 cm (67.01\")   SpO2 98%   BMI 30.38 kg/m²           Vitals reviewed.   Constitutional:       Appearance: Not in distress.   Eyes:      Extraocular Movements: Extraocular movements intact.      Conjunctiva/sclera: Conjunctivae normal.      Pupils: Pupils are equal, round, and reactive to light.   HENT:      Head: Normocephalic and atraumatic.      Nose: Nose normal.    Mouth/Throat:      Lips: Pink.      Mouth: Mucous membranes are moist.      Pharynx: Oropharynx is clear.   Neck:      Vascular: No carotid bruit or JVD. JVD normal.   Pulmonary:      Effort: Pulmonary effort is normal.      Breath sounds: Normal breath sounds.   Chest:      Chest wall: Not tender to palpatation.   Cardiovascular:      PMI at left midclavicular line. Normal rate. Regular rhythm. Normal S1. Normal S2.       Murmurs: There is no murmur.      No gallop.  No rub.   Pulses:     Radial: 2+ bilaterally.  Edema:     Peripheral edema absent.   Musculoskeletal: Normal range of motion.      Extremities: No clubbing present.     Cervical back: Normal range of motion. Skin:     General: Skin is warm and dry.   Neurological:      General: No focal deficit present.      Mental Status: Alert and oriented to person, place, and time.   Psychiatric:         Attention and Perception: Attention normal.         Mood and Affect: Affect normal.         Speech: Speech normal.         Behavior: Behavior normal.         Cognition and Memory: Cognition normal.             The following portions of the patient's history were reviewed and updated as appropriate: allergies, current medications, past medical history, past social history, past and problem list.     Review of Systems   Constitutional: Negative.    HENT: Negative.     Eyes: Negative.    Respiratory: Negative.   "   Cardiovascular: Negative.    Gastrointestinal: Negative.    Endocrine: Negative.    Genitourinary: Negative.    Musculoskeletal: Negative.    Skin: Negative.    Allergic/Immunologic: Negative.    Neurological: Negative.    Hematological: Negative.    Psychiatric/Behavioral: Negative.                ECG 12 Lead    Date/Time: 12/12/2024 10:44 AM  Performed by: Alexandra Liao PA    Authorized by: Alexandra Liao PA  Comparison: compared with previous ECG from 9/6/2024  Rhythm: sinus rhythm  Rate: normal  BPM: 63  Conduction: 1st degree AV block  QRS axis: right            Medication Review: yes    Current Outpatient Medications:     albuterol sulfate  (90 Base) MCG/ACT inhaler, Inhale 2 puffs Every 4 (Four) Hours As Needed for Wheezing or Shortness of Air., Disp: 18 g, Rfl: 11    amiodarone (Pacerone) 200 MG tablet, Take 1 tablet by mouth Daily., Disp: 90 tablet, Rfl: 0    amitriptyline (ELAVIL) 75 MG tablet, Take 1 tablet by mouth Every Night., Disp: , Rfl:     amLODIPine-valsartan (EXFORGE) 5-160 MG per tablet, Take 1 tablet by mouth Daily., Disp: , Rfl:     apixaban (ELIQUIS) 5 MG tablet tablet, Take 1 tablet by mouth Every 12 (Twelve) Hours. Indications: Atrial Fibrillation, Disp: 180 tablet, Rfl: 3    atorvastatin (LIPITOR) 40 MG tablet, Take 1 tablet by mouth Daily., Disp: 90 tablet, Rfl: 3    buPROPion XL (WELLBUTRIN XL) 300 MG 24 hr tablet, Take 1 tablet by mouth Daily., Disp: , Rfl:     diclofenac (VOLTAREN) 1 % gel gel, Apply 4 g topically to the appropriate area as directed 3 (Three) Times a Day., Disp: 100 g, Rfl: 3    empagliflozin (Jardiance) 10 MG tablet tablet, Take 1 tablet by mouth Daily., Disp: 30 tablet, Rfl: 11    fluticasone (Flonase) 50 MCG/ACT nasal spray, 2 sprays into the nostril(s) as directed by provider Daily. Administer 2 sprays in each nostril for each dose., Disp: 1 bottle, Rfl: 11    fluticasone-salmeterol (ADVAIR) 100-50 MCG/DOSE DISKUS, Inhale 1 puff 2 (Two) Times a  Day., Disp: 60 each, Rfl: 3    levothyroxine (SYNTHROID, LEVOTHROID) 112 MCG tablet, Take 75 mcg by mouth Daily., Disp: , Rfl:     meloxicam (MOBIC) 15 MG tablet, Take 1 tablet by mouth Daily., Disp: , Rfl:     metoprolol succinate XL (TOPROL-XL) 200 MG 24 hr tablet, Take 1 tablet by mouth Daily., Disp: 90 tablet, Rfl: 0    Multiple Vitamins-Minerals (MULTIVITAMIN WITH MINERALS) tablet tablet, Take 1 tablet by mouth Daily., Disp: , Rfl:     nitroglycerin (NITROSTAT) 0.4 MG SL tablet, Place 1 tablet under the tongue Every 5 (Five) Minutes As Needed for Chest Pain (Only if SBP Greater Than 100). Take no more than 3 doses in 15 minutes., Disp: 12 tablet, Rfl: 12    omeprazole (priLOSEC) 20 MG capsule, Take 1 capsule by mouth Daily., Disp: 90 capsule, Rfl: 3    PARoxetine (PAXIL) 20 MG tablet, Take 1 tablet by mouth Daily., Disp: 90 tablet, Rfl: 0    potassium chloride (MICRO-K) 10 MEQ CR capsule, Take 1 capsule by mouth Daily., Disp: 90 capsule, Rfl: 0    QUEtiapine (SEROquel) 100 MG tablet, Take 1 tablet by mouth every night at bedtime., Disp: 90 tablet, Rfl: 1    spironolactone (ALDACTONE) 25 MG tablet, Take 1 tablet by mouth Daily., Disp: 90 tablet, Rfl: 0    traZODone (DESYREL) 50 MG tablet, Take 1 tablet by mouth Every Night., Disp: , Rfl:    No Known Allergies    I have reviewed       CBC:  Lab Results - Last 18 Months   Lab Units 12/03/24  1416 07/23/24  1334 07/23/24  0347   WBC 10*3/mm3 6.10   < >  --    HEMOGLOBIN g/dL 12.4*   < >  --    HEMATOCRIT % 37.1*   < >  --    PLATELETS 10*3/mm3 151   < >  --    IRON mcg/dL  --   --  32*    < > = values in this interval not displayed.      BMP/CMP:  Lab Results - Last 18 Months   Lab Units 12/03/24  1416   SODIUM mmol/L 138   POTASSIUM mmol/L 4.1   CHLORIDE mmol/L 105   CO2 mmol/L 22.0   GLUCOSE mg/dL 113*   BUN mg/dL 18   CREATININE mg/dL 1.71*   CALCIUM mg/dL 8.6     BNP:   Lab Results - Last 18 Months   Lab Units 07/22/24  1632   PROBNP pg/mL 602.9       THYROID:  Lab Results - Last 18 Months   Lab Units 07/22/24  1632   TSH uIU/mL 7.340*   FREE T4 ng/dL 1.01       Results for orders placed during the hospital encounter of 10/01/24    Adult Transesophageal Echo (LAUREN) W/ Cont if Necessary Per Protocol    Interpretation Summary    Left ventricular systolic function is moderately decreased.    The left ventricular cavity is mild to moderately dilated.    Left ventricular diastolic dysfunction is noted.    The left atrial cavity is dilated.    Mild to moderate mitral valve regurgitation is present.     Assessment:   Diagnoses and all orders for this visit:    1. PAF (paroxysmal atrial fibrillation) (Primary)  -     apixaban (ELIQUIS) 5 MG tablet tablet; Take 1 tablet by mouth Every 12 (Twelve) Hours. Indications: Atrial Fibrillation  Dispense: 180 tablet; Refill: 3    2. NICM (nonischemic cardiomyopathy), viral etiology    3. Chronic HFrEF (heart failure with reduced ejection fraction)    Other orders  -     ECG 12 Lead    Bradycardia/Syncope: HF clinic adjusted medications and educated about correct beta blockers. Will complete Holter and await results. No further syncopal or near syncopal episodes.     HFrEF: Appears overall compensated and euvolemic today on exam. MR is now at mild to moderate and no need for surgery.     PAF: Refilled eliquis. Since he does not need MAZE/CABG/MVR, treatment of afib may be performed now with ablation. Discussed procedure and process, which he is interested in. Advised him that the procedure would not be able to be performed likely before the spring. In the meantime, he may follow up in MD clinic to discuss further.     I spent 30 minutes caring for Ziyad on this date of service. This time includes time spent by me in the following activities:preparing for the visit, reviewing tests, obtaining and/or reviewing a separately obtained history, performing a medically appropriate examination and/or evaluation , counseling and educating  the patient/family/caregiver, ordering medications, tests, or procedures, referring and communicating with other health care professionals , documenting information in the medical record, and independently interpreting results and communicating that information with the patient/family/caregiver        Electronically signed by SOLOMON Urbina

## 2024-12-14 PROBLEM — R00.1 BRADYCARDIA, DRUG INDUCED: Status: ACTIVE | Noted: 2024-12-14

## 2024-12-14 PROBLEM — T50.905A BRADYCARDIA, DRUG INDUCED: Status: ACTIVE | Noted: 2024-12-14

## 2024-12-16 ENCOUNTER — OFFICE VISIT (OUTPATIENT)
Dept: PULMONOLOGY | Facility: CLINIC | Age: 59
End: 2024-12-16
Payer: MEDICARE

## 2024-12-16 VITALS
SYSTOLIC BLOOD PRESSURE: 128 MMHG | BODY MASS INDEX: 30.13 KG/M2 | DIASTOLIC BLOOD PRESSURE: 76 MMHG | HEIGHT: 67 IN | OXYGEN SATURATION: 97 % | HEART RATE: 73 BPM | WEIGHT: 192 LBS

## 2024-12-16 DIAGNOSIS — K21.9 GASTROESOPHAGEAL REFLUX DISEASE, UNSPECIFIED WHETHER ESOPHAGITIS PRESENT: ICD-10-CM

## 2024-12-16 DIAGNOSIS — J90 PLEURAL EFFUSION: ICD-10-CM

## 2024-12-16 DIAGNOSIS — J45.20 MILD INTERMITTENT ASTHMA, UNSPECIFIED WHETHER COMPLICATED: ICD-10-CM

## 2024-12-16 DIAGNOSIS — R91.1 PULMONARY NODULE: Primary | ICD-10-CM

## 2024-12-16 PROCEDURE — 99213 OFFICE O/P EST LOW 20 MIN: CPT | Performed by: INTERNAL MEDICINE

## 2024-12-16 PROCEDURE — 3078F DIAST BP <80 MM HG: CPT | Performed by: INTERNAL MEDICINE

## 2024-12-16 PROCEDURE — 3074F SYST BP LT 130 MM HG: CPT | Performed by: INTERNAL MEDICINE

## 2024-12-16 NOTE — LETTER
2024     VIRGILIO Lacy  [address]  [place] [address]    Patient: Ziyad Wilson   YOB: 1965   Date of Visit: 2024     Dear VIRGILIO Yee:    Thank you for referring Ziyad Wilson to me for evaluation. Below are the relevant portions of my assessment and plan of care.    If you have questions, please do not hesitate to call me. I look forward to following Ziyad along with you.         Sincerely,        Wandantly Epi Caballero,         CC: No Recipients      Progress Notes:    Clinic Note - Follow Up            NAME: Ziyad Wilson  MRN: [id number]  AGE: 59 y.o.            : 1965  PRIMARY CARE PROVIDER: Randi Patterson APRN               Reason for Visit:  Ziyad Wilson presents to clinic today for follow up for the evaluation of pulmonary nodules.    History of Present Illness:  Mr. Wilson is a 59-year-old male who presents to clinic today for follow-up. Past medical history includes asthma diagnosed in childhood, pulmonary nodules, daily marijuana use, CHF, paroxysmal A-fib, hypertension, hyperlipidemia.    Patient was last seen in clinic on 24 as a new patient for abnormal chest CT.  The patient was admitted to the hospital at the end of July of this year.  He had a CT of the chest at that time which showed possibly enlarging nodules.  In reviewing the CT with the patient in clinic in November his nodules appeared more consistent with edema.  Since his last appointment he has undergone a follow-up CT which showed resolution of the abnormalities noted in July.  His CT on 11/15/2024 showed a stable left-sided pulmonary nodules since .  The right sided pulmonary nodule has gotten smaller.  Today he denies any difficulties with shortness of breath, wheezing, coughing.    Review of Systems:  A full 12-point review of systems was performed and was negative except as documented in the HPI.            Physical Exam:  General: No acute distress,  cooperative.  Head: Atraumatic, normocephalic, normal inspection.  Eyes: EOMI, normal inspection.  ENT: Mucous membranes moist, normal inspection. No thrush.  Heart: RRR, no murmur  Lungs: Clear to auscultation bilaterally, no wheezing  MSK: No edema or clubbing  GI/abdominal: Soft, nontender.  Neurologic: Alert, oriented.  Cranial nerves II through XII grossly intact.      Imaging Review:  I personally reviewed the CT chest done 11/15/2024:  Chest CT showed resolution of the previous patchy opacities and right-sided pleural effusion.  He has 2 stable left-sided pulmonary nodules for the past 8 years, the right-sided nodule has gotten smaller.      Pulmonary Functions Testing Results:              Problem List:  Problem List Items Addressed This Visit          Gastrointestinal Abdominal     GERD (gastroesophageal reflux disease)       Pulmonary and Pneumonias    Asthma    Pulmonary nodule - Primary    Pleural effusion         Pulmonary Assessment:  Patient is a 59-year-old male who presents to clinic today for follow-up.  Since his last visit he underwent a follow-up CT which showed resolution of the right sided opacities.  CT also showed 2 left-sided pulmonary nodules that have been stable since 2016.  We discussed since he is a lifetime noncigarette smoker he would be considered low risk and no longer requires routine CT scans.  His asthma is mild and well-controlled with as needed albuterol which he only uses rarely.  He should continue follow-up with his PCP for his mild asthma and cardiology for his CAD.  Pulmonary follow-up as needed.      Plan:   -No longer requires follow-up CTs as he has had stable nodules for 8 years and is a lifetime noncigarette smoker  -Continue as needed albuterol for mild asthma, well-controlled  -Cardiology follow-up  -Pulmonary clinic follow-up as needed           Follow Up:  As needed         Thank you Randi Patterson APRN for allowing me to participate in this patient's care.            Past Medical History:   Past Medical History:   Diagnosis Date   • Asthma    • Cardiomyopathy     non-ischemic   • CHF (congestive heart failure)    • Coronary artery disease    • Foot pain, bilateral    • GERD (gastroesophageal reflux disease)    • Headache    • Hyperlipemia, mixed    • Hypertension     benign   • Hypothyroidism    • Obesity    • Sleep apnea    • SOB (shortness of breath)          Past Surgical History:   Past Surgical History:   Procedure Laterality Date   • APPENDECTOMY     • BLADDER REPAIR     • CARDIAC CATHETERIZATION  09/2003   • CARDIAC CATHETERIZATION N/A 10/1/2024    Procedure: Left Heart Cath;  Surgeon: Remi Mathew MD;  Location:  [facility];  Service: Cardiology;  Laterality: N/A;   • COLONOSCOPY N/A 1/9/2018    Procedure: COLONOSCOPY WITH ANESTHESIA;  Surgeon: Dick Espinosa DO;  Location:  [facility];  Service:    • COLOSTOMY      with colostomy reversal   • KNEE SURGERY Right          Family History:   Family History   Problem Relation Age of Onset   • Hypertension Mother    • Stroke Mother    • Heart disease Mother    • Hypertension Father    • Heart disease Father    • Hypertension Sister    • Hypertension Brother          Medications:   Prior to Admission medications    Medication Sig Start Date End Date Taking? Authorizing Provider   albuterol sulfate  (90 Base) MCG/ACT inhaler Inhale 2 puffs Every 4 (Four) Hours As Needed for Wheezing or Shortness of Air. 9/16/20  Yes Erika Dill APRN   amiodarone (Pacerone) 200 MG tablet Take 1 tablet by mouth Daily. 8/8/24  Yes Adriano Rapp MD   amitriptyline (ELAVIL) 75 MG tablet Take 1 tablet by mouth Every Night.   Yes ProviderYareli MD   amLODIPine-valsartan (EXFORGE) 5-160 MG per tablet Take 1 tablet by mouth Daily. 10/16/24  Yes ProviderYareli MD   apixaban (ELIQUIS) 5 MG tablet tablet Take 1 tablet by mouth Every 12 (Twelve) Hours. Indications: Atrial Fibrillation 12/12/24   Yes Alexandra Liao PA   atorvastatin (LIPITOR) 40 MG tablet Take 1 tablet by mouth Daily. 2/17/22  Yes Erika Dill APRN   buPROPion XL (WELLBUTRIN XL) 300 MG 24 hr tablet Take 1 tablet by mouth Daily. 11/4/24  Yes Yareli Patterson MD   diclofenac (VOLTAREN) 1 % gel gel Apply 4 g topically to the appropriate area as directed 3 (Three) Times a Day. 9/16/20  Yes Erika Dill APRN   empagliflozin (Jardiance) 10 MG tablet tablet Take 1 tablet by mouth Daily. 8/8/24  Yes Shaw Wright MD   fluticasone (Flonase) 50 MCG/ACT nasal spray 2 sprays into the nostril(s) as directed by provider Daily. Administer 2 sprays in each nostril for each dose. 11/25/20  Yes Erika Dill APRN   levothyroxine (SYNTHROID, LEVOTHROID) 112 MCG tablet Take 75 mcg by mouth Daily.   Yes Yareli Patterson MD   meloxicam (MOBIC) 15 MG tablet Take 1 tablet by mouth Daily. 10/14/24  Yes Yareli Patterson MD   metoprolol succinate XL (TOPROL-XL) 200 MG 24 hr tablet Take 1 tablet by mouth Daily. 8/3/24  Yes Adriano Rapp MD   Multiple Vitamins-Minerals (MULTIVITAMIN WITH MINERALS) tablet tablet Take 1 tablet by mouth Daily.   Yes Yareli Patterson MD   nitroglycerin (NITROSTAT) 0.4 MG SL tablet Place 1 tablet under the tongue Every 5 (Five) Minutes As Needed for Chest Pain (Only if SBP Greater Than 100). Take no more than 3 doses in 15 minutes. 8/2/24  Yes Adriano Rapp MD   omeprazole (priLOSEC) 20 MG capsule Take 1 capsule by mouth Daily. 2/11/22  Yes Erika Dill APRN   PARoxetine (PAXIL) 20 MG tablet Take 1 tablet by mouth Daily. 8/3/24  Yes Adriano Rapp MD   potassium chloride (MICRO-K) 10 MEQ CR capsule Take 1 capsule by mouth Daily. 8/3/24  Yes Adriano Rapp MD   QUEtiapine (SEROquel) 100 MG tablet Take 1 tablet by mouth every night at bedtime. 2/17/22  Yes Erika Dill APRN   spironolactone (ALDACTONE) 25 MG tablet Take 1 tablet by mouth Daily. 8/3/24  Yes Adriano Rapp MD   traZODone (DESYREL) 50  "MG tablet Take 1 tablet by mouth Every Night.   Yes Provider, MD Yareli   fluticasone-salmeterol (ADVAIR) 100-50 MCG/DOSE DISKUS Inhale 1 puff 2 (Two) Times a Day.  Patient not taking: Reported on 12/16/2024 12/1/20   Ashish Thurman DO         Allergies:   Patient has no known allergies.      Results Review:             Visit Vitals  /76   Pulse 73   Ht 170.2 cm (67\")   Wt 87.1 kg (192 lb)   SpO2 97% Comment: RA   BMI 30.07 kg/m²           Social History:     Social History     Socioeconomic History   • Marital status:    Tobacco Use   • Smoking status: Never     Passive exposure: Past (Childhood)   • Smokeless tobacco: Never   Vaping Use   • Vaping status: Never Used   Substance and Sexual Activity   • Alcohol use: Yes     Comment: occasional   • Drug use: Yes     Types: Marijuana   • Sexual activity: Defer                Fernando Caballero DO  Pulmonary/Critical Care  12/16/2024  13:48 CST  "

## 2024-12-16 NOTE — PROGRESS NOTES
Clinic Note - Follow Up            NAME: Ziyad Wilson  MRN: [id number]  AGE: 59 y.o.            : 1965  PRIMARY CARE PROVIDER: Randi Pattesron APRN               Reason for Visit:  Ziyad Wilson presents to clinic today for follow up for the evaluation of pulmonary nodules.    History of Present Illness:  Mr. Wilson is a 59-year-old male who presents to clinic today for follow-up. Past medical history includes asthma diagnosed in childhood, pulmonary nodules, daily marijuana use, CHF, paroxysmal A-fib, hypertension, hyperlipidemia.    Patient was last seen in clinic on 24 as a new patient for abnormal chest CT.  The patient was admitted to the hospital at the end of July of this year.  He had a CT of the chest at that time which showed possibly enlarging nodules.  In reviewing the CT with the patient in clinic in November his nodules appeared more consistent with edema.  Since his last appointment he has undergone a follow-up CT which showed resolution of the abnormalities noted in July.  His CT on 11/15/2024 showed a stable left-sided pulmonary nodules since .  The right sided pulmonary nodule has gotten smaller.  Today he denies any difficulties with shortness of breath, wheezing, coughing.    Review of Systems:  A full 12-point review of systems was performed and was negative except as documented in the HPI.            Physical Exam:  General: No acute distress, cooperative.  Head: Atraumatic, normocephalic, normal inspection.  Eyes: EOMI, normal inspection.  ENT: Mucous membranes moist, normal inspection. No thrush.  Heart: RRR, no murmur  Lungs: Clear to auscultation bilaterally, no wheezing  MSK: No edema or clubbing  GI/abdominal: Soft, nontender.  Neurologic: Alert, oriented.  Cranial nerves II through XII grossly intact.      Imaging Review:  I personally reviewed the CT chest done 11/15/2024:  Chest CT showed resolution of the previous patchy opacities and right-sided pleural effusion.   He has 2 stable left-sided pulmonary nodules for the past 8 years, the right-sided nodule has gotten smaller.      Pulmonary Functions Testing Results:              Problem List:  Problem List Items Addressed This Visit          Gastrointestinal Abdominal     GERD (gastroesophageal reflux disease)       Pulmonary and Pneumonias    Asthma    Pulmonary nodule - Primary    Pleural effusion         Pulmonary Assessment:  Patient is a 59-year-old male who presents to clinic today for follow-up.  Since his last visit he underwent a follow-up CT which showed resolution of the right sided opacities.  CT also showed 2 left-sided pulmonary nodules that have been stable since 2016.  We discussed since he is a lifetime noncigarette smoker he would be considered low risk and no longer requires routine CT scans.  His asthma is mild and well-controlled with as needed albuterol which he only uses rarely.  He should continue follow-up with his PCP for his mild asthma and cardiology for his CAD.  Pulmonary follow-up as needed.      Plan:   -No longer requires follow-up CTs as he has had stable nodules for 8 years and is a lifetime noncigarette smoker  -Continue as needed albuterol for mild asthma, well-controlled  -Cardiology follow-up  -Pulmonary clinic follow-up as needed           Follow Up:  As needed         Thank you Randi Patterson APRN for allowing me to participate in this patient's care.           Past Medical History:   Past Medical History:   Diagnosis Date    Asthma     Cardiomyopathy     non-ischemic    CHF (congestive heart failure)     Coronary artery disease     Foot pain, bilateral     GERD (gastroesophageal reflux disease)     Headache     Hyperlipemia, mixed     Hypertension     benign    Hypothyroidism     Obesity     Sleep apnea     SOB (shortness of breath)          Past Surgical History:   Past Surgical History:   Procedure Laterality Date    APPENDECTOMY      BLADDER REPAIR      CARDIAC CATHETERIZATION   09/2003    CARDIAC CATHETERIZATION N/A 10/1/2024    Procedure: Left Heart Cath;  Surgeon: Remi Mathew MD;  Location:  [facility];  Service: Cardiology;  Laterality: N/A;    COLONOSCOPY N/A 1/9/2018    Procedure: COLONOSCOPY WITH ANESTHESIA;  Surgeon: Dick Espinosa DO;  Location:  [facility];  Service:     COLOSTOMY      with colostomy reversal    KNEE SURGERY Right          Family History:   Family History   Problem Relation Age of Onset    Hypertension Mother     Stroke Mother     Heart disease Mother     Hypertension Father     Heart disease Father     Hypertension Sister     Hypertension Brother          Medications:   Prior to Admission medications    Medication Sig Start Date End Date Taking? Authorizing Provider   albuterol sulfate  (90 Base) MCG/ACT inhaler Inhale 2 puffs Every 4 (Four) Hours As Needed for Wheezing or Shortness of Air. 9/16/20  Yes Erika Dill APRN   amiodarone (Pacerone) 200 MG tablet Take 1 tablet by mouth Daily. 8/8/24  Yes Adriano Rapp MD   amitriptyline (ELAVIL) 75 MG tablet Take 1 tablet by mouth Every Night.   Yes Provider, MD Yareli   amLODIPine-valsartan (EXFORGE) 5-160 MG per tablet Take 1 tablet by mouth Daily. 10/16/24  Yes Yareli Patterson MD   apixaban (ELIQUIS) 5 MG tablet tablet Take 1 tablet by mouth Every 12 (Twelve) Hours. Indications: Atrial Fibrillation 12/12/24  Yes Alexandra Liao PA   atorvastatin (LIPITOR) 40 MG tablet Take 1 tablet by mouth Daily. 2/17/22  Yes Erika Dill APRN   buPROPion XL (WELLBUTRIN XL) 300 MG 24 hr tablet Take 1 tablet by mouth Daily. 11/4/24  Yes ProviderYareli MD   diclofenac (VOLTAREN) 1 % gel gel Apply 4 g topically to the appropriate area as directed 3 (Three) Times a Day. 9/16/20  Yes Erika Dill APRN   empagliflozin (Jardiance) 10 MG tablet tablet Take 1 tablet by mouth Daily. 8/8/24  Yes Shaw Wright MD   fluticasone (Flonase) 50 MCG/ACT nasal spray 2 sprays into  "the nostril(s) as directed by provider Daily. Administer 2 sprays in each nostril for each dose. 11/25/20  Yes Erika Dill APRN   levothyroxine (SYNTHROID, LEVOTHROID) 112 MCG tablet Take 75 mcg by mouth Daily.   Yes Yareli Patterson MD   meloxicam (MOBIC) 15 MG tablet Take 1 tablet by mouth Daily. 10/14/24  Yes Yareli Patterson MD   metoprolol succinate XL (TOPROL-XL) 200 MG 24 hr tablet Take 1 tablet by mouth Daily. 8/3/24  Yes Adriano Rapp MD   Multiple Vitamins-Minerals (MULTIVITAMIN WITH MINERALS) tablet tablet Take 1 tablet by mouth Daily.   Yes ProviderYareli MD   nitroglycerin (NITROSTAT) 0.4 MG SL tablet Place 1 tablet under the tongue Every 5 (Five) Minutes As Needed for Chest Pain (Only if SBP Greater Than 100). Take no more than 3 doses in 15 minutes. 8/2/24  Yes Adriano Rapp MD   omeprazole (priLOSEC) 20 MG capsule Take 1 capsule by mouth Daily. 2/11/22  Yes Erika Dill APRN   PARoxetine (PAXIL) 20 MG tablet Take 1 tablet by mouth Daily. 8/3/24  Yes Adriano Rapp MD   potassium chloride (MICRO-K) 10 MEQ CR capsule Take 1 capsule by mouth Daily. 8/3/24  Yes Adriano Rapp MD   QUEtiapine (SEROquel) 100 MG tablet Take 1 tablet by mouth every night at bedtime. 2/17/22  Yes Erika Dill APRN   spironolactone (ALDACTONE) 25 MG tablet Take 1 tablet by mouth Daily. 8/3/24  Yes Adriano Rapp MD   traZODone (DESYREL) 50 MG tablet Take 1 tablet by mouth Every Night.   Yes ProviderYareli MD   fluticasone-salmeterol (ADVAIR) 100-50 MCG/DOSE DISKUS Inhale 1 puff 2 (Two) Times a Day.  Patient not taking: Reported on 12/16/2024 12/1/20   Ashish Thurman,          Allergies:   Patient has no known allergies.      Results Review:             Visit Vitals  /76   Pulse 73   Ht 170.2 cm (67\")   Wt 87.1 kg (192 lb)   SpO2 97% Comment: RA   BMI 30.07 kg/m²           Social History:     Social History     Socioeconomic History    Marital status:    Tobacco Use "    Smoking status: Never     Passive exposure: Past (Childhood)    Smokeless tobacco: Never   Vaping Use    Vaping status: Never Used   Substance and Sexual Activity    Alcohol use: Yes     Comment: occasional    Drug use: Yes     Types: Marijuana    Sexual activity: Defer                Fernando Caballero DO  Pulmonary/Critical Care  12/16/2024  13:48 CST

## 2024-12-18 ENCOUNTER — TELEPHONE (OUTPATIENT)
Dept: CARDIOLOGY | Facility: HOSPITAL | Age: 59
End: 2024-12-18

## 2024-12-18 LAB
CV ZIO BASELINE AVG BPM: 70 BPM
CV ZIO BASELINE BPM HIGH: 118 BPM
CV ZIO BASELINE BPM LOW: 49 BPM
CV ZIO DEVICE ANALYSIS TIME: NORMAL
CV ZIO ECT SVE COUNT: 48 EPISODES
CV ZIO ECT SVE CPLT COUNT: 17 EPISODES
CV ZIO ECT SVE CPLT FREQ: NORMAL
CV ZIO ECT SVE FREQ: NORMAL
CV ZIO ECT SVE TPLT COUNT: 0 EPISODES
CV ZIO ECT SVE TPLT FREQ: 0
CV ZIO ECT VE COUNT: 329 EPISODES
CV ZIO ECT VE CPLT COUNT: 33 EPISODES
CV ZIO ECT VE CPLT FREQ: NORMAL
CV ZIO ECT VE FREQ: NORMAL
CV ZIO ECT VE TPLT COUNT: 0 EPISODES
CV ZIO ECT VE TPLT FREQ: 0
CV ZIO ECTOPIC SVE COUPLET RAW PERCENT: 0 %
CV ZIO ECTOPIC SVE ISOLATED PERCENT: 0.01 %
CV ZIO ECTOPIC SVE TRIPLET RAW PERCENT: 0 %
CV ZIO ECTOPIC VE COUPLET RAW PERCENT: 0.01 %
CV ZIO ECTOPIC VE ISOLATED PERCENT: 0.04 %
CV ZIO ECTOPIC VE TRIPLET RAW PERCENT: 0 %
CV ZIO ENROLLMENT END: NORMAL
CV ZIO ENROLLMENT START: NORMAL
CV ZIO PATIENT EVENTS DIARIES: 0
CV ZIO PATIENT EVENTS TRIGGERS: 0
CV ZIO PAUSE COUNT: 0
CV ZIO PRESCRIPTION STATUS: NORMAL
CV ZIO SVT COUNT: 0
CV ZIO TOTAL  ENROLLMENT PERIOD: NORMAL
CV ZIO VT COUNT: 0

## 2024-12-18 NOTE — TELEPHONE ENCOUNTER
----- Message from Willem Vasquez sent at 12/18/2024  8:09 AM CST -----  Holter is normal.  No specific findings to suggest that he is having syncope related to low HR's at this time.  No other arrhythmias noted.  We will discuss more at his visit.

## 2024-12-30 ENCOUNTER — HOSPITAL ENCOUNTER (OUTPATIENT)
Dept: CARDIOLOGY | Facility: HOSPITAL | Age: 59
Discharge: HOME | End: 2024-12-30
Admitting: NURSE PRACTITIONER
Payer: MEDICARE

## 2024-12-30 VITALS
SYSTOLIC BLOOD PRESSURE: 99 MMHG | WEIGHT: 191.6 LBS | RESPIRATION RATE: 16 BRPM | BODY MASS INDEX: 30.01 KG/M2 | HEART RATE: 81 BPM | DIASTOLIC BLOOD PRESSURE: 60 MMHG

## 2024-12-30 DIAGNOSIS — I50.22 CHRONIC HFREF (HEART FAILURE WITH REDUCED EJECTION FRACTION): Primary | ICD-10-CM

## 2024-12-30 LAB
ANION GAP SERPL CALCULATED.3IONS-SCNC: 13 MMOL/L (ref 5–15)
BUN SERPL-MCNC: 20 MG/DL (ref 8–23)
BUN/CREAT SERPL: 11.2 (ref 7–25)
CALCIUM SPEC-SCNC: 9.2 MG/DL (ref 8.6–10.5)
CHLORIDE SERPL-SCNC: 99 MMOL/L (ref 98–107)
CO2 SERPL-SCNC: 24 MMOL/L (ref 22–29)
CREAT SERPL-MCNC: 1.78 MG/DL (ref 0.76–1.27)
DEPRECATED RDW RBC AUTO: 51.4 FL (ref 37–54)
EGFRCR SERPLBLD CKD-EPI 2021: 43.1 ML/MIN/1.73
ERYTHROCYTE [DISTWIDTH] IN BLOOD BY AUTOMATED COUNT: 15.5 % (ref 12.3–15.4)
GLUCOSE SERPL-MCNC: 144 MG/DL (ref 65–99)
HCT VFR BLD AUTO: 38.1 % (ref 37.5–51)
HGB BLD-MCNC: 12.4 G/DL (ref 13–17.7)
MCH RBC QN AUTO: 29.2 PG (ref 26.6–33)
MCHC RBC AUTO-ENTMCNC: 32.5 G/DL (ref 31.5–35.7)
MCV RBC AUTO: 89.9 FL (ref 79–97)
PLATELET # BLD AUTO: 170 10*3/MM3 (ref 140–450)
PMV BLD AUTO: 9.4 FL (ref 6–12)
POTASSIUM SERPL-SCNC: 4.1 MMOL/L (ref 3.5–5.2)
RBC # BLD AUTO: 4.24 10*6/MM3 (ref 4.14–5.8)
SODIUM SERPL-SCNC: 136 MMOL/L (ref 136–145)
WBC NRBC COR # BLD AUTO: 6.54 10*3/MM3 (ref 3.4–10.8)

## 2024-12-30 PROCEDURE — 80048 BASIC METABOLIC PNL TOTAL CA: CPT | Performed by: NURSE PRACTITIONER

## 2024-12-30 PROCEDURE — 85027 COMPLETE CBC AUTOMATED: CPT | Performed by: NURSE PRACTITIONER

## 2024-12-30 PROCEDURE — G0463 HOSPITAL OUTPT CLINIC VISIT: HCPCS | Performed by: NURSE PRACTITIONER

## 2024-12-30 NOTE — PROGRESS NOTES
Heart Failure Clinic Progress Note  Reason For Visit:  CHF    Subjective        Ziyad Wilson is a 59 y.o. male with the below pertinent PMH who presents for follow-up of CHF.    Ziyad Wilson was most recently seen in the heart failure clinic on 12/3/2024.  At that time he had been doing better.  He continued to have some shortness of breath with long distances but otherwise was stable.  He denied any chest pain or peripheral edema.  After our previous discussion at his visit before he had stopped his carvedilol and reported that his syncopal episodes had been improving but had not ceased.  Continued his GDMT at that time and order Holter monitor secondary to his syncopal episodes to get more information on his rhythm.  Discussed his case with EP.    He reports that he is feeling much better today.  He has not had any syncopal episodes.  We have gotten an accurate medication reconciliation today and it appears that he is now on more appropriate medications for his HFrEF.  He is not taking 2 different beta-blockers at the same time any longer.  He denies any shortness of breath, lightheadedness, dizziness, syncopal episodes, palpitations, chest pain, or lower extremity edema.  Is down 3 pounds since last being seen and his ReDs reading is 29%.  He reports he is leaving soon for a trip to [place] for at least 1 month.    Review of Systems   Constitutional:  Negative for fatigue.   Respiratory:  Negative for shortness of breath.    Cardiovascular:  Negative for chest pain and leg swelling.   Neurological:  Negative for dizziness, syncope and light-headedness.      Pertinent PMH  HFrEF due to NICM  Mild to moderate MR  Persistent atrial fibrillation s/p cardioversion 7/29/24  Hypertension  Hyperlipidemia  Coronary Artery Calcification on CT  [facility]    Pertinent past medical, surgical, family, and social history were reviewed.      Current Outpatient Medications:     albuterol sulfate  (90 Base) MCG/ACT inhaler,  Inhale 2 puffs Every 4 (Four) Hours As Needed for Wheezing or Shortness of Air., Disp: 18 g, Rfl: 11    amiodarone (Pacerone) 200 MG tablet, Take 1 tablet by mouth Daily., Disp: 90 tablet, Rfl: 0    amitriptyline (ELAVIL) 75 MG tablet, Take 1 tablet by mouth Every Night., Disp: , Rfl:     amLODIPine-valsartan (EXFORGE) 5-160 MG per tablet, Take 1 tablet by mouth Daily., Disp: , Rfl:     apixaban (ELIQUIS) 5 MG tablet tablet, Take 1 tablet by mouth Every 12 (Twelve) Hours. Indications: Atrial Fibrillation, Disp: 180 tablet, Rfl: 3    atorvastatin (LIPITOR) 40 MG tablet, Take 1 tablet by mouth Daily., Disp: 90 tablet, Rfl: 3    buPROPion XL (WELLBUTRIN XL) 300 MG 24 hr tablet, Take 1 tablet by mouth Daily., Disp: , Rfl:     diclofenac (VOLTAREN) 1 % gel gel, Apply 4 g topically to the appropriate area as directed 3 (Three) Times a Day., Disp: 100 g, Rfl: 3    empagliflozin (Jardiance) 10 MG tablet tablet, Take 1 tablet by mouth Daily., Disp: 30 tablet, Rfl: 11    fluticasone (Flonase) 50 MCG/ACT nasal spray, 2 sprays into the nostril(s) as directed by provider Daily. Administer 2 sprays in each nostril for each dose., Disp: 1 bottle, Rfl: 11    levothyroxine (SYNTHROID, LEVOTHROID) 112 MCG tablet, Take 75 mcg by mouth Daily., Disp: , Rfl:     meloxicam (MOBIC) 15 MG tablet, Take 1 tablet by mouth Daily., Disp: , Rfl:     metoprolol succinate XL (TOPROL-XL) 200 MG 24 hr tablet, Take 1 tablet by mouth Daily., Disp: 90 tablet, Rfl: 0    Multiple Vitamins-Minerals (MULTIVITAMIN WITH MINERALS) tablet tablet, Take 1 tablet by mouth Daily., Disp: , Rfl:     nitroglycerin (NITROSTAT) 0.4 MG SL tablet, Place 1 tablet under the tongue Every 5 (Five) Minutes As Needed for Chest Pain (Only if SBP Greater Than 100). Take no more than 3 doses in 15 minutes., Disp: 12 tablet, Rfl: 12    omeprazole (priLOSEC) 20 MG capsule, Take 1 capsule by mouth Daily., Disp: 90 capsule, Rfl: 3    PARoxetine (PAXIL) 20 MG tablet, Take 1 tablet by  "mouth Daily., Disp: 90 tablet, Rfl: 0    QUEtiapine (SEROquel) 100 MG tablet, Take 1 tablet by mouth every night at bedtime., Disp: 90 tablet, Rfl: 1    spironolactone (ALDACTONE) 25 MG tablet, Take 1 tablet by mouth Daily., Disp: 90 tablet, Rfl: 0    traZODone (DESYREL) 50 MG tablet, Take 1 tablet by mouth Every Night., Disp: , Rfl:     potassium chloride (MICRO-K) 10 MEQ CR capsule, Take 1 capsule by mouth Daily. (Patient not taking: Reported on 12/30/2024), Disp: 90 capsule, Rfl: 0     Objective   Vital Signs:  BP 99/60 (BP Location: Left arm)   Pulse 81   Resp 16   Wt 86.9 kg (191 lb 9.6 oz)   BMI 30.01 kg/m²   Estimated body mass index is 30.01 kg/m² as calculated from the following:    Height as of 12/16/24: 170.2 cm (67\").    Weight as of this encounter: 86.9 kg (191 lb 9.6 oz).    Neck:      Vascular: No JVD.   Pulmonary:      Effort: Pulmonary effort is normal.      Breath sounds: Normal breath sounds.   Cardiovascular:      Normal rate. Regular rhythm. Normal S1. Normal S2.       Murmurs: There is no murmur.      No gallop.  No click. No rub.   Pulses:     Intact distal pulses.   Edema:     Peripheral edema absent.   Abdominal:      Palpations: Abdomen is soft.      Tenderness: There is no abdominal tenderness.   Skin:     General: Skin is warm and dry.   Neurological:      General: No focal deficit present.      Mental Status: Alert and oriented to person, place and time.   Psychiatric:         Behavior: Behavior is cooperative.        Result Review :  The following data was reviewed by: VIRGILIO Simmons on 11/18/2024:  BMP   BMP          11/18/2024    13:54 12/3/2024    14:16 12/30/2024    14:20   BMP   BUN 25  18  20    Creatinine 1.90  1.71  1.78    Sodium 138  138  136    Potassium 4.5  4.1  4.1    Chloride 105  105  99    CO2 24.0  22.0  24.0    Calcium 8.8  8.6  9.2      ReDS   Lab Results   Component Value Date    ABSOLUTELUNG 29 11/18/2024    ABSOLUTELUNG 39 (A) 08/19/2024    ABSOLUTELUNG " 32 08/08/2024     Results for orders placed during the hospital encounter of 10/01/24    Adult Transesophageal Echo ([facility]) W/ Cont if Necessary Per Protocol    Interpretation Summary    Left ventricular systolic function is moderately decreased.    The left ventricular cavity is mild to moderately dilated.    Left ventricular diastolic dysfunction is noted.    The left atrial cavity is dilated.    Mild to moderate mitral valve regurgitation is present.          Assessment and Plan   Diagnoses and all orders for this visit:    1. Chronic HFrEF (heart failure with reduced ejection fraction) (Primary)  2.  Dizziness and giddiness   3.  Near syncope  He appears to be better on examination today without any further syncopal episodes.  I do suspect that his dual beta-blockers at max doses were causing this.  I continue to recommend that he not take any other medications without discussing with us first.  He is understanding.  He is euvolemic on examination.  - Continue Exforge 5-160 mg daily.  We have been able to confirm that this is what he is taking.  I would like to eventually transition back to Entresto but given the fact that he is about to leave for a trip for an entire month we will continue him on this at this time.  At his next visit we will consider transition back to Entresto.  He is understanding and agreeable with this.  - She reports that he has been out of potassium supplements.  However, although he has been out of this for at least 1 week his potassium remains stable.  Will continue without supplementation at this time.  - Jardiance 10 mg daily  - Toprol- mg daily  - Spironolactone 25 mg daily       Follow Up   Return in about 6 weeks (around 2/10/2025) for recheck.    Patient was given instructions and counseling regarding his condition or for health maintenance advice. Please see specific information pulled into the AVS if appropriate.       Willem Vasquez, APRN  12/30/24  17:42 CST

## 2024-12-30 NOTE — PATIENT INSTRUCTIONS
Medication Management  Continue Jardiance 10mg daily  Continue Exforge 5-160mg daily  Continue Spironolactone 25mg daily  Continue Toprol-XL 200mg daily    Weight Monitoring  - Please weigh yourself every morning after you use the restroom while wearing the same amount of clothing.  - Please write down your weight readings in a log and bring that log with you to your next heart failure clinic appointment.  - If your weight increases by 2 lbs in 1 day or 5 lbs in 1 week, you should call the office. If your weight does not come down, please call the heart failure clinic.    Blood Pressure Monitoring  - Please check your blood pressure at least once daily ~2-4 hours after you have taken your morning blood pressure medications.  - Please write down your blood pressure readings in a log and bring that log with you to your next heart failure clinic appointment.    Diet  - It is very important that you monitor your fluid and sodium (salt) intake.  - Please try to limit sodium intake to less than 2,000 mg each day.  - Please try to limit fluid intake to ~2 liters (64 ounces) each day.

## 2025-02-01 ENCOUNTER — APPOINTMENT (OUTPATIENT)
Dept: GENERAL RADIOLOGY | Facility: HOSPITAL | Age: 60
End: 2025-02-01
Payer: MEDICARE

## 2025-02-01 ENCOUNTER — HOSPITAL ENCOUNTER (EMERGENCY)
Facility: HOSPITAL | Age: 60
Discharge: HOME | End: 2025-02-01
Attending: FAMILY MEDICINE
Payer: MEDICARE

## 2025-02-01 VITALS
DIASTOLIC BLOOD PRESSURE: 84 MMHG | OXYGEN SATURATION: 92 % | TEMPERATURE: 98.9 F | SYSTOLIC BLOOD PRESSURE: 105 MMHG | HEART RATE: 90 BPM | HEIGHT: 67 IN | RESPIRATION RATE: 18 BRPM | WEIGHT: 194 LBS | BODY MASS INDEX: 30.45 KG/M2

## 2025-02-01 DIAGNOSIS — R05.1 ACUTE COUGH: Primary | ICD-10-CM

## 2025-02-01 LAB
B PARAPERT DNA SPEC QL NAA+PROBE: NOT DETECTED
B PERT DNA SPEC QL NAA+PROBE: NOT DETECTED
C PNEUM DNA NPH QL NAA+NON-PROBE: NOT DETECTED
FLUAV SUBTYP SPEC NAA+PROBE: NOT DETECTED
FLUBV RNA ISLT QL NAA+PROBE: NOT DETECTED
HADV DNA SPEC NAA+PROBE: NOT DETECTED
HCOV 229E RNA SPEC QL NAA+PROBE: NOT DETECTED
HCOV HKU1 RNA SPEC QL NAA+PROBE: NOT DETECTED
HCOV NL63 RNA SPEC QL NAA+PROBE: NOT DETECTED
HCOV OC43 RNA SPEC QL NAA+PROBE: NOT DETECTED
HMPV RNA NPH QL NAA+NON-PROBE: NOT DETECTED
HOLD SPECIMEN: NORMAL
HPIV1 RNA ISLT QL NAA+PROBE: NOT DETECTED
HPIV2 RNA SPEC QL NAA+PROBE: NOT DETECTED
HPIV3 RNA NPH QL NAA+PROBE: NOT DETECTED
HPIV4 P GENE NPH QL NAA+PROBE: NOT DETECTED
M PNEUMO IGG SER IA-ACNC: NOT DETECTED
RHINOVIRUS RNA SPEC NAA+PROBE: NOT DETECTED
RSV RNA NPH QL NAA+NON-PROBE: NOT DETECTED
SARS-COV-2 RNA RESP QL NAA+PROBE: NOT DETECTED
WHOLE BLOOD HOLD COAG: NORMAL
WHOLE BLOOD HOLD SPECIMEN: NORMAL

## 2025-02-01 PROCEDURE — 0202U NFCT DS 22 TRGT SARS-COV-2: CPT | Performed by: FAMILY MEDICINE

## 2025-02-01 PROCEDURE — 71046 X-RAY EXAM CHEST 2 VIEWS: CPT

## 2025-02-01 PROCEDURE — 93005 ELECTROCARDIOGRAM TRACING: CPT

## 2025-02-01 PROCEDURE — 99284 EMERGENCY DEPT VISIT MOD MDM: CPT

## 2025-02-01 PROCEDURE — 36415 COLL VENOUS BLD VENIPUNCTURE: CPT

## 2025-02-01 PROCEDURE — 93005 ELECTROCARDIOGRAM TRACING: CPT | Performed by: FAMILY MEDICINE

## 2025-02-01 NOTE — ED PROVIDER NOTES
Subjective   History of Present Illness  59-year-old male presents a cough that began today at about 5 AM.  No shortness of breath.  No fevers.  He was at his doctor's office today.  His blood pressure was high.  They advised him to come to the emergency room for evaluation.  Patient denies any other symptoms at this time.      Review of Systems   Respiratory:  Positive for cough.    All other systems reviewed and are negative.      Past Medical History:   Diagnosis Date    Asthma     Cardiomyopathy     non-ischemic    CHF (congestive heart failure)     Coronary artery disease     Foot pain, bilateral     GERD (gastroesophageal reflux disease)     Headache     Hyperlipemia, mixed     Hypertension     benign    Hypothyroidism     Obesity     Sleep apnea     SOB (shortness of breath)        No Known Allergies    Past Surgical History:   Procedure Laterality Date    APPENDECTOMY      BLADDER REPAIR      CARDIAC CATHETERIZATION  09/2003    CARDIAC CATHETERIZATION N/A 10/1/2024    Procedure: Left Heart Cath;  Surgeon: Remi Mathew MD;  Location: [facility] CATH INVASIVE LOCATION;  Service: Cardiology;  Laterality: N/A;    COLONOSCOPY N/A 1/9/2018    Procedure: COLONOSCOPY WITH ANESTHESIA;  Surgeon: Dick Espinosa DO;  Location: [facility] ENDOSCOPY;  Service:     COLOSTOMY      with colostomy reversal    KNEE SURGERY Right        Family History   Problem Relation Age of Onset    Hypertension Mother     Stroke Mother     Heart disease Mother     Hypertension Father     Heart disease Father     Hypertension Sister     Hypertension Brother        Social History     Socioeconomic History    Marital status:    Tobacco Use    Smoking status: Never     Passive exposure: Past (Childhood)    Smokeless tobacco: Never   Vaping Use    Vaping status: Never Used   Substance and Sexual Activity    Alcohol use: Yes     Comment: occasional    Drug use: Yes     Types: Marijuana    Sexual activity: Defer           Objective    Physical Exam  Vitals and nursing note reviewed.   Constitutional:       Appearance: Normal appearance.   HENT:      Head: Normocephalic and atraumatic.      Mouth/Throat:      Mouth: Mucous membranes are moist.   Eyes:      Extraocular Movements: Extraocular movements intact.      Pupils: Pupils are equal, round, and reactive to light.   Cardiovascular:      Rate and Rhythm: Normal rate and regular rhythm.      Heart sounds: Normal heart sounds.   Pulmonary:      Effort: Pulmonary effort is normal.      Breath sounds: Normal breath sounds.   Abdominal:      General: Bowel sounds are normal.      Palpations: Abdomen is soft.      Tenderness: There is no abdominal tenderness.   Skin:     General: Skin is warm and dry.   Neurological:      General: No focal deficit present.      Mental Status: He is alert and oriented to person, place, and time.   Psychiatric:         Mood and Affect: Mood normal.         Behavior: Behavior normal.         Procedures           ED Course  ED Course as of 02/01/25 1600   Sat Feb 01, 2025   1426 EKG rate 95  Normal sinus rhythm  No STEMI [RP]      ED Course User Index  [RP] Kian Herrera MD                                                     Lab Results (last 24 hours)       Procedure Component Value Units Date/Time    Respiratory Panel PCR w/COVID-19(SARS-CoV-2) [facility] In-House, NP Swab in UTM/VTM, 2 HR TAT - Swab, Nasopharynx [[id number]]  (Normal) Collected: 02/01/25 1420    Specimen: Swab from Nasopharynx Updated: 02/01/25 1528     ADENOVIRUS, PCR Not Detected     Coronavirus 229E Not Detected     Coronavirus HKU1 Not Detected     Coronavirus NL63 Not Detected     Coronavirus OC43 Not Detected     COVID19 Not Detected     Human Metapneumovirus Not Detected     Human Rhinovirus/Enterovirus Not Detected     Influenza A PCR Not Detected     Influenza B PCR Not Detected     Parainfluenza Virus 1 Not Detected     Parainfluenza Virus 2 Not Detected     Parainfluenza Virus  3 Not Detected     Parainfluenza Virus 4 Not Detected     RSV, PCR Not Detected     Bordetella pertussis pcr Not Detected     Bordetella parapertussis PCR Not Detected     Chlamydophila pneumoniae PCR Not Detected     Mycoplasma pneumo by PCR Not Detected    Narrative:      In the setting of a positive respiratory panel with a viral infection PLUS a negative procalcitonin without other underlying concern for bacterial infection, consider observing off antibiotics or discontinuation of antibiotics and continue supportive care. If the respiratory panel is positive for atypical bacterial infection (Bordetella pertussis, Chlamydophila pneumoniae, or Mycoplasma pneumoniae), consider antibiotic de-escalation to target atypical bacterial infection.          XR Chest 2 View   Final Result       No acute cardiopulmonary abnormality.           This report was signed and finalized on 2/1/2025 2:40 PM by Hermes Hurst.            Medications - No data to display    Medical Decision Making  I had a discussion with the patient/family regarding diagnosis, diagnostic results, treatment plan, and medications.  The patient/family indicated understanding of these instructions.  I spent adequate time at the bedside prior to discharge necessary to discuss the aftercare instructions, giving patient education, providing explanations of the results of our evaluations/findings, and my decision making to assure that the patient/family understand the plan of care.  Time was allotted to answer questions at that time and throughout the ED course.  Emphasis was placed on timely follow-up after discharge.  I also discussed the potential for the development of an acute emergent condition requiring further evaluation, return to the ER, admission, or even surgical intervention. I discussed that we found nothing during the visit today indicating the need for further ER workup at this time, admission to the hospital, or the presence of an acute  unstable medical condition.  I encouraged the patient to return to the emergency department immediately for ANY concerns, worsening, new complaints, or if symptoms persist and unable to seek follow-up in a timely fashion.  The patient/family expressed understanding and agreement with this plan.      Problems Addressed:  Acute cough: complicated acute illness or injury    Amount and/or Complexity of Data Reviewed  Radiology: ordered. Decision-making details documented in ED Course.  ECG/medicine tests: ordered. Decision-making details documented in ED Course.        Final diagnoses:   Acute cough       ED Disposition  ED Disposition       ED Disposition   Discharge    Condition   Stable    Comment   --               Randi Patterson, APRN  [address] [facility] [id number] [phone]    Schedule an appointment as soon as possible for a visit       [facility] EMERGENCY DEPARTMENT  [address] [place] [address] [phone]    As needed, If symptoms worsen         Medication List      No changes were made to your prescriptions during this visit.            Kian Herrera MD  02/01/25 7440

## 2025-02-02 LAB
QT INTERVAL: 538 MS
QTC INTERVAL: 676 MS

## 2025-02-10 ENCOUNTER — HOSPITAL ENCOUNTER (OUTPATIENT)
Dept: CARDIOLOGY | Facility: HOSPITAL | Age: 60
Discharge: HOME | End: 2025-02-10
Admitting: NURSE PRACTITIONER
Payer: MEDICARE

## 2025-02-10 VITALS
OXYGEN SATURATION: 94 % | WEIGHT: 191.2 LBS | DIASTOLIC BLOOD PRESSURE: 50 MMHG | SYSTOLIC BLOOD PRESSURE: 86 MMHG | HEART RATE: 102 BPM | BODY MASS INDEX: 29.95 KG/M2

## 2025-02-10 DIAGNOSIS — I10 ESSENTIAL HYPERTENSION: ICD-10-CM

## 2025-02-10 DIAGNOSIS — I50.22 CHRONIC HFREF (HEART FAILURE WITH REDUCED EJECTION FRACTION): Primary | ICD-10-CM

## 2025-02-10 DIAGNOSIS — I42.8 NICM (NONISCHEMIC CARDIOMYOPATHY): ICD-10-CM

## 2025-02-10 DIAGNOSIS — I25.10 CORONARY ARTERY DISEASE INVOLVING NATIVE CORONARY ARTERY OF NATIVE HEART WITHOUT ANGINA PECTORIS: ICD-10-CM

## 2025-02-10 DIAGNOSIS — R42 DIZZINESS AND GIDDINESS: ICD-10-CM

## 2025-02-10 DIAGNOSIS — E78.2 MIXED HYPERLIPIDEMIA: ICD-10-CM

## 2025-02-10 LAB
ABSOLUTE LUNG FLUID CONTENT: 31 % (ref 20–35)
ANION GAP SERPL CALCULATED.3IONS-SCNC: 9 MMOL/L (ref 5–15)
BUN SERPL-MCNC: 24 MG/DL (ref 8–23)
BUN/CREAT SERPL: 11.1 (ref 7–25)
CALCIUM SPEC-SCNC: 8.2 MG/DL (ref 8.6–10.5)
CHLORIDE SERPL-SCNC: 104 MMOL/L (ref 98–107)
CO2 SERPL-SCNC: 26 MMOL/L (ref 22–29)
CREAT SERPL-MCNC: 2.16 MG/DL (ref 0.76–1.27)
DEPRECATED RDW RBC AUTO: 52.9 FL (ref 37–54)
EGFRCR SERPLBLD CKD-EPI 2021: 34.2 ML/MIN/1.73
ERYTHROCYTE [DISTWIDTH] IN BLOOD BY AUTOMATED COUNT: 14.7 % (ref 12.3–15.4)
FERRITIN SERPL-MCNC: 356.7 NG/ML (ref 30–400)
GLUCOSE SERPL-MCNC: 101 MG/DL (ref 65–99)
HCT VFR BLD AUTO: 36.8 % (ref 37.5–51)
HGB BLD-MCNC: 11.6 G/DL (ref 13–17.7)
IRON 24H UR-MRATE: 57 MCG/DL (ref 59–158)
IRON SATN MFR SERPL: 19 % (ref 20–50)
MCH RBC QN AUTO: 31.2 PG (ref 26.6–33)
MCHC RBC AUTO-ENTMCNC: 31.5 G/DL (ref 31.5–35.7)
MCV RBC AUTO: 98.9 FL (ref 79–97)
PLATELET # BLD AUTO: 209 10*3/MM3 (ref 140–450)
PMV BLD AUTO: 9.5 FL (ref 6–12)
POTASSIUM SERPL-SCNC: 4.4 MMOL/L (ref 3.5–5.2)
RBC # BLD AUTO: 3.72 10*6/MM3 (ref 4.14–5.8)
SODIUM SERPL-SCNC: 139 MMOL/L (ref 136–145)
TIBC SERPL-MCNC: 297 MCG/DL (ref 298–536)
TRANSFERRIN SERPL-MCNC: 199 MG/DL (ref 200–360)
WBC NRBC COR # BLD AUTO: 6.65 10*3/MM3 (ref 3.4–10.8)

## 2025-02-10 PROCEDURE — 80048 BASIC METABOLIC PNL TOTAL CA: CPT | Performed by: NURSE PRACTITIONER

## 2025-02-10 PROCEDURE — 93005 ELECTROCARDIOGRAM TRACING: CPT | Performed by: NURSE PRACTITIONER

## 2025-02-10 PROCEDURE — 94726 PLETHYSMOGRAPHY LUNG VOLUMES: CPT | Performed by: NURSE PRACTITIONER

## 2025-02-10 PROCEDURE — 84466 ASSAY OF TRANSFERRIN: CPT | Performed by: NURSE PRACTITIONER

## 2025-02-10 PROCEDURE — 83540 ASSAY OF IRON: CPT | Performed by: NURSE PRACTITIONER

## 2025-02-10 PROCEDURE — 85027 COMPLETE CBC AUTOMATED: CPT | Performed by: NURSE PRACTITIONER

## 2025-02-10 PROCEDURE — 82728 ASSAY OF FERRITIN: CPT | Performed by: NURSE PRACTITIONER

## 2025-02-10 RX ORDER — LOSARTAN POTASSIUM 50 MG/1
50 TABLET ORAL DAILY
Qty: 30 TABLET | Refills: 11 | Status: SHIPPED | OUTPATIENT
Start: 2025-02-10

## 2025-02-10 NOTE — PROGRESS NOTES
Heart Failure Clinic Progress Note  Reason For Visit:  CHF    Subjective    Ziyad Wilson is a 59 y.o. male with the below pertinent PMH who presents for follow-up of CHF.    Ziyad Wilson was most recently seen in the heart failure clinic on 12/30/2024.  At that time he was feeling better.  He had denied any syncopal episodes.  He had stopped taking double beta-blockers doses.  He denied any heart failure symptoms at that time.  No changes were made to his medications at that time as [place] was about to go out of the [place] when I mentioned that we may consider Entresto at his next visit.    He notes that he feels ok today.  He reports intermittent dizziness.  He indicates that his blood pressures at home have been in the 120s systolic but his initial blood pressure today is 76/54.  He denies any current symptoms minus the intermittent dizziness.  Recheck of blood pressure revealed 100/60 in the left arm and 86/50 in the right arm.  He reports that he is taking all of his medications appropriately and denies having doubled up on any medications.  He denies any shortness of breath, lower extremity edema, or shortness of breath.  His weight is stable without any changes and his ReDs reading today is 31%.    Review of Systems   Constitutional: Negative for malaise/fatigue.   Cardiovascular:  Negative for chest pain, dyspnea on exertion and leg swelling.   Neurological:  Positive for dizziness. Negative for light-headedness.     Pertinent PMH  HFrEF due to NICM  Mild to moderate MR  Persistent atrial fibrillation s/p cardioversion 7/29/24  Hypertension  Hyperlipidemia  Coronary Artery Calcification on CT  [facility]    Pertinent past medical, surgical, family, and social history were reviewed.    Current Outpatient Medications   Medication Instructions    albuterol sulfate  (90 Base) MCG/ACT inhaler 2 puffs, Inhalation, Every 4 Hours PRN    amiodarone (PACERONE) 200 mg, Oral, Daily    amitriptyline  "(ELAVIL) 75 mg, Nightly    apixaban (ELIQUIS) 5 mg, Oral, Every 12 Hours Scheduled    atorvastatin (LIPITOR) 40 mg, Oral, Daily    buPROPion XL (WELLBUTRIN XL) 300 MG 24 hr tablet 1 tablet, Daily    diclofenac (VOLTAREN) 4 g, Topical, 3 Times Daily    empagliflozin (JARDIANCE) 10 mg, Oral, Daily    fluticasone (Flonase) 50 MCG/ACT nasal spray 2 sprays, Nasal, Daily, Administer 2 sprays in each nostril for each dose.     levothyroxine (SYNTHROID, LEVOTHROID) 75 mcg, Daily    losartan (COZAAR) 50 mg, Oral, Daily    meloxicam (MOBIC) 15 MG tablet 1 tablet, Daily    metoprolol succinate XL (TOPROL-XL) 200 mg, Oral, Every 24 Hours Scheduled    Multiple Vitamins-Minerals (MULTIVITAMIN WITH MINERALS) tablet tablet 1 tablet, Daily    nitroglycerin (NITROSTAT) 0.4 mg, Sublingual, Every 5 Minutes PRN, Take no more than 3 doses in 15 minutes.    omeprazole (PRILOSEC) 20 mg, Oral, Daily    PARoxetine (PAXIL) 20 mg, Oral, Daily    potassium chloride (MICRO-K) 10 MEQ CR capsule 10 mEq, Oral, Daily    QUEtiapine (SEROQUEL) 100 mg, Oral, Every Night at Bedtime    spironolactone (ALDACTONE) 25 mg, Oral, Daily    traZODone (DESYREL) 50 mg, Nightly        Objective   Vital Signs:  BP (!) 86/50 (BP Location: Right arm, Patient Position: Sitting)   Pulse 102   Wt 86.7 kg (191 lb 3.2 oz)   SpO2 94%   BMI 29.95 kg/m²   Estimated body mass index is 29.95 kg/m² as calculated from the following:    Height as of 2/1/25: 170.2 cm (67\").    Weight as of this encounter: 86.7 kg (191 lb 3.2 oz).    Neck:      Vascular: JVD normal.   Pulmonary:      Effort: Pulmonary effort is normal.      Breath sounds: Normal breath sounds.   Cardiovascular:      Normal rate. Regular rhythm. Normal S1. Normal S2.       Murmurs: There is no murmur.      No gallop.  No click. No rub.   Pulses:     Intact distal pulses.   Edema:     Peripheral edema absent.   Abdominal:      Palpations: Abdomen is soft.      Tenderness: There is no abdominal tenderness. "   Skin:     General: Skin is warm and dry.   Neurological:      Mental Status: Alert and oriented to person, place and time.        The following data was reviewed by myself, VIRGILIO Escobar  BMP   BMP          12/3/2024    14:16 12/30/2024    14:20 2/10/2025    12:59   BMP   BUN 18  20  24    Creatinine 1.71  1.78  2.16    Sodium 138  136  139    Potassium 4.1  4.1  4.4    Chloride 105  99  104    CO2 22.0  24.0  26.0    Calcium 8.6  9.2  8.2      CBC   CBC          12/3/2024    14:16 12/30/2024    14:20 2/10/2025    14:04   CBC   WBC 6.10  6.54  6.65    RBC 4.47  4.24  3.72    Hemoglobin 12.4  12.4  11.6    Hematocrit 37.1  38.1  36.8    MCV 83.0  89.9  98.9    MCH 27.7  29.2  31.2    MCHC 33.4  32.5  31.5    RDW 17.6  15.5  14.7    Platelets 151  170  209      Ferritin   Ferritin   Date/Time Value Ref Range Status   02/10/2025 1404 356.70 30.00 - 400.00 ng/mL Final     Iron Profile   Iron   Date/Time Value Ref Range Status   02/10/2025 1404 57 (L) 59 - 158 mcg/dL Final     Iron Saturation (TSAT)   Date/Time Value Ref Range Status   02/10/2025 1404 19 (L) 20 - 50 % Final     Transferrin   Date/Time Value Ref Range Status   02/10/2025 1404 199 (L) 200 - 360 mg/dL Final     TIBC   Date/Time Value Ref Range Status   02/10/2025 1404 297 (L) 298 - 536 mcg/dL Final     ReDS   Lab Results   Component Value Date    ABSOLUTELUNG 31 02/10/2025    ABSOLUTELUNG 29 11/18/2024    ABSOLUTELUNG 39 (A) 08/19/2024       Results for orders placed during the hospital encounter of 10/01/24    Adult Transesophageal Echo ([facility]) W/ Cont if Necessary Per Protocol    Interpretation Summary    Left ventricular systolic function is moderately decreased.    The left ventricular cavity is mild to moderately dilated.    Left ventricular diastolic dysfunction is noted.    The left atrial cavity is dilated.    Mild to moderate mitral valve regurgitation is present.     Assessment   Assessment and Plan  Diagnoses and all orders for this  visit:    1. Chronic HFrEF (heart failure with reduced ejection fraction) (Primary)  2. NICM (nonischemic cardiomyopathy), viral etiology  3. Dizziness and giddiness  4. Coronary artery disease involving native coronary artery of native heart without angina pectoris  5. Essential hypertension  6. Mixed hyperlipidemia  From a heart failure side of things he does appear that he is stable without any signs of volume overload.  However, he is having some hypotension at this time. He additionally looks pale with poor color.  I am unsure as to exactly the cause of his hypotension.  Suspect he may have doubled up on his medications accidentally as he has had issues with his medications in the past. However, I would like to make adjustments to his antihypertensives today to help with this.   - I will have him go ahead and stop the Exforge today.  - Start losartan 50 mg daily  - Jardiance 10 mg daily  - Toprol  mg daily  - Spironolactone 25 mg daily    When discussing potential further causes to his hypotension and poor color, he indicated that he needed to leave. I advised that he double check on exactly what medications he is taking and take the list that is provided today. I also advised that if he continues to feel poor or his BP never comes up that he should proceed to the ED for further evaluation.  He is understanding and will do so.        Follow Up:  Return in about 1 week (around 2/17/2025) for recheck.    Patient was given instructions and counseling regarding his condition or for health maintenance advice.      Willem Vasquez, APRN  02/10/25  15:06 CST    I spent 45 minutes caring for Ziyad on this date of service. This time includes time spent by me in the following activities:preparing for the visit, reviewing tests, obtaining and/or reviewing a separately obtained history, performing a medically appropriate examination and/or evaluation , counseling and educating the patient/family/caregiver, ordering  medications, tests, or procedures, documenting information in the medical record, and independently interpreting results and communicating that information with the patient/family/caregiver  Time spent on the separately reported service of ReDS interpretation/documentation is not included in the time used to support the E/M service also reported today.

## 2025-02-10 NOTE — PATIENT INSTRUCTIONS
STOP: amlodipine-Valsartan (Exforge)  START: Losartan 50mg daily  NO NOT take losartan and exforge together    Continue all other medications as prescribed.

## 2025-02-10 NOTE — PROGRESS NOTES
Heart Failure Clinic    Date:  02/10/25     Vitals:    02/10/25 1301   BP: (!) 76/54   Pulse:    SpO2:         Indication:  Heart Failure     Procedure:  ReDS device sensor unit applied to right side of chest and right side of back.  Appropriate positioning confirmed based off of the unit's calculation.  Chest measurement obtained with the chest size ruler.  Measurement session performed over 45 seconds.      Method of arrival:      Weighing self daily:      Taking medications as prescribed:      Edema:      Shortness of Air:      Number of pillows used at night:      Results:  ReDS Value=           31               Interpretation:  25-35% - normal/ideal lung fluid content    Francie Desouza RN 02/10/25 13:13 CST

## 2025-02-12 LAB
QT INTERVAL: 354 MS
QTC INTERVAL: 449 MS

## 2025-02-25 ENCOUNTER — OFFICE VISIT (OUTPATIENT)
Dept: CARDIOLOGY | Facility: CLINIC | Age: 60
End: 2025-02-25
Payer: MEDICARE

## 2025-02-25 VITALS
HEART RATE: 63 BPM | HEIGHT: 67 IN | SYSTOLIC BLOOD PRESSURE: 100 MMHG | BODY MASS INDEX: 30.13 KG/M2 | WEIGHT: 192 LBS | DIASTOLIC BLOOD PRESSURE: 62 MMHG

## 2025-02-25 DIAGNOSIS — I48.0 PAROXYSMAL ATRIAL FIBRILLATION: Primary | ICD-10-CM

## 2025-02-25 PROCEDURE — 93000 ELECTROCARDIOGRAM COMPLETE: CPT | Performed by: STUDENT IN AN ORGANIZED HEALTH CARE EDUCATION/TRAINING PROGRAM

## 2025-02-25 PROCEDURE — 1159F MED LIST DOCD IN RCRD: CPT | Performed by: STUDENT IN AN ORGANIZED HEALTH CARE EDUCATION/TRAINING PROGRAM

## 2025-02-25 PROCEDURE — 3074F SYST BP LT 130 MM HG: CPT | Performed by: STUDENT IN AN ORGANIZED HEALTH CARE EDUCATION/TRAINING PROGRAM

## 2025-02-25 PROCEDURE — 99213 OFFICE O/P EST LOW 20 MIN: CPT | Performed by: STUDENT IN AN ORGANIZED HEALTH CARE EDUCATION/TRAINING PROGRAM

## 2025-02-25 PROCEDURE — 3078F DIAST BP <80 MM HG: CPT | Performed by: STUDENT IN AN ORGANIZED HEALTH CARE EDUCATION/TRAINING PROGRAM

## 2025-02-25 PROCEDURE — 1160F RVW MEDS BY RX/DR IN RCRD: CPT | Performed by: STUDENT IN AN ORGANIZED HEALTH CARE EDUCATION/TRAINING PROGRAM

## 2025-02-25 RX ORDER — SACUBITRIL AND VALSARTAN 24; 26 MG/1; MG/1
1 TABLET, FILM COATED ORAL 2 TIMES DAILY
COMMUNITY
End: 2025-02-26

## 2025-02-26 ENCOUNTER — HOSPITAL ENCOUNTER (OUTPATIENT)
Dept: CARDIOLOGY | Facility: HOSPITAL | Age: 60
Discharge: HOME | End: 2025-02-26
Admitting: NURSE PRACTITIONER
Payer: MEDICARE

## 2025-02-26 VITALS
WEIGHT: 191.2 LBS | BODY MASS INDEX: 29.95 KG/M2 | SYSTOLIC BLOOD PRESSURE: 117 MMHG | HEART RATE: 67 BPM | DIASTOLIC BLOOD PRESSURE: 71 MMHG | OXYGEN SATURATION: 96 %

## 2025-02-26 DIAGNOSIS — I10 ESSENTIAL HYPERTENSION: ICD-10-CM

## 2025-02-26 DIAGNOSIS — I42.8 NICM (NONISCHEMIC CARDIOMYOPATHY): ICD-10-CM

## 2025-02-26 DIAGNOSIS — I50.22 CHRONIC HFREF (HEART FAILURE WITH REDUCED EJECTION FRACTION): Primary | ICD-10-CM

## 2025-02-26 DIAGNOSIS — I48.0 PAROXYSMAL ATRIAL FIBRILLATION: ICD-10-CM

## 2025-02-26 DIAGNOSIS — I25.10 CORONARY ARTERY DISEASE INVOLVING NATIVE CORONARY ARTERY OF NATIVE HEART WITHOUT ANGINA PECTORIS: ICD-10-CM

## 2025-02-26 DIAGNOSIS — E78.2 MIXED HYPERLIPIDEMIA: ICD-10-CM

## 2025-02-26 LAB
ABSOLUTE LUNG FLUID CONTENT: 30 % (ref 20–35)
ANION GAP SERPL CALCULATED.3IONS-SCNC: 10 MMOL/L (ref 5–15)
BUN SERPL-MCNC: 22 MG/DL (ref 8–23)
BUN/CREAT SERPL: 11.6 (ref 7–25)
CALCIUM SPEC-SCNC: 8.6 MG/DL (ref 8.6–10.5)
CHLORIDE SERPL-SCNC: 104 MMOL/L (ref 98–107)
CO2 SERPL-SCNC: 25 MMOL/L (ref 22–29)
CREAT SERPL-MCNC: 1.89 MG/DL (ref 0.76–1.27)
EGFRCR SERPLBLD CKD-EPI 2021: 40.1 ML/MIN/1.73
GLUCOSE SERPL-MCNC: 121 MG/DL (ref 65–99)
POTASSIUM SERPL-SCNC: 4.4 MMOL/L (ref 3.5–5.2)
SODIUM SERPL-SCNC: 139 MMOL/L (ref 136–145)

## 2025-02-26 PROCEDURE — 94726 PLETHYSMOGRAPHY LUNG VOLUMES: CPT | Performed by: NURSE PRACTITIONER

## 2025-02-26 PROCEDURE — 80048 BASIC METABOLIC PNL TOTAL CA: CPT | Performed by: NURSE PRACTITIONER

## 2025-02-26 RX ORDER — SPIRONOLACTONE 25 MG/1
12.5 TABLET ORAL DAILY
Qty: 90 TABLET | Refills: 0 | Status: SHIPPED | OUTPATIENT
Start: 2025-02-26

## 2025-02-26 NOTE — PROGRESS NOTES
Heart Failure Clinic Progress Note  Reason For Visit:  CHF    Subjective    Ziyad Wilson is a 59 y.o. male with the below pertinent PMH who presents for follow-up of CHF.    Ziyad Wilson was most recently seen in the heart failure clinic on 2/10/2025.  At that time he stated that he felt okay.  However, his blood pressure was low at 76/54.  He denies any associated symptoms.  His weight was stable and his ReDs reading was 31%.  He did appear to be at that time with poor color.  I did stop his Exforge and  his losartan [place] from that.  Otherwise, I advised that he seek further evaluation of his hypotension but he wished to leave at that time.  He did see Dr. Kel MD yesterday and it appears that he had reported a severe dizziness and near for presyncopal episode and his losartan was stopped.    Overall, he reports he is doing well today.  He denies any significant shortness of breath, lightheadedness, dizziness, or any peripheral edema.  He denies any chest pain.  He notes that since stopping Exforge and starting losartan only that his dizziness has essentially completely resolved.  However, he is not taking losartan any longer secondary to it being stopped at his EP visit yesterday.  His weight is stable today at 191 and his ReDs reading is normal at 30%.  His blood pressure has improved at 117/71 today. His creatinine remains elevated today.     Review of Systems   Constitutional: Negative for malaise/fatigue.   Cardiovascular:  Negative for chest pain, dyspnea on exertion and leg swelling.   Neurological:  Negative for dizziness and light-headedness.     Pertinent PMH  HFrEF due to NICM  Mild to moderate MR  Persistent atrial fibrillation s/p cardioversion 7/29/24  Hypertension  Hyperlipidemia  Coronary Artery Calcification on CT  [facility]    Pertinent past medical, surgical, family, and social history were reviewed.    Current Outpatient Medications   Medication Instructions    albuterol sulfate HFA  "108 (90 Base) MCG/ACT inhaler 2 puffs, Inhalation, Every 4 Hours PRN    amiodarone (PACERONE) 200 mg, Oral, Daily    amitriptyline (ELAVIL) 75 mg, Nightly    apixaban (ELIQUIS) 5 mg, Oral, Every 12 Hours Scheduled    atorvastatin (LIPITOR) 40 mg, Oral, Daily    buPROPion XL (WELLBUTRIN XL) 300 MG 24 hr tablet 1 tablet, Daily    diclofenac (VOLTAREN) 4 g, Topical, 3 Times Daily    empagliflozin (JARDIANCE) 10 mg, Oral, Daily    fluticasone (Flonase) 50 MCG/ACT nasal spray 2 sprays, Nasal, Daily, Administer 2 sprays in each nostril for each dose.     levothyroxine (SYNTHROID, LEVOTHROID) 75 mcg, Daily    meloxicam (MOBIC) 15 MG tablet 1 tablet, Daily    metoprolol succinate XL (TOPROL-XL) 200 mg, Oral, Every 24 Hours Scheduled    Multiple Vitamins-Minerals (MULTIVITAMIN WITH MINERALS) tablet tablet 1 tablet, Daily    nitroglycerin (NITROSTAT) 0.4 mg, Sublingual, Every 5 Minutes PRN, Take no more than 3 doses in 15 minutes.    omeprazole (PRILOSEC) 20 mg, Oral, Daily    PARoxetine (PAXIL) 20 mg, Oral, Daily    potassium chloride (MICRO-K) 10 MEQ CR capsule 10 mEq, Oral, Daily    QUEtiapine (SEROQUEL) 100 mg, Oral, Every Night at Bedtime    spironolactone (ALDACTONE) 12.5 mg, Oral, Daily    traZODone (DESYREL) 50 mg, Nightly        Objective   Vital Signs:  /71 (BP Location: Left arm, Patient Position: Sitting)   Pulse 67   Wt 86.7 kg (191 lb 3.2 oz)   SpO2 96%   BMI 29.95 kg/m²   Estimated body mass index is 29.95 kg/m² as calculated from the following:    Height as of 2/25/25: 170.2 cm (67\").    Weight as of this encounter: 86.7 kg (191 lb 3.2 oz).    Neck:      Vascular: JVD normal.   Pulmonary:      Effort: Pulmonary effort is normal.      Breath sounds: Normal breath sounds.   Cardiovascular:      Normal rate. Regular rhythm. Normal S1. Normal S2.       Murmurs: There is no murmur.      No gallop.  No click. No rub.   Pulses:     Intact distal pulses.   Edema:     Peripheral edema absent.   Abdominal:     "  Palpations: Abdomen is soft.      Tenderness: There is no abdominal tenderness.   Skin:     General: Skin is warm and dry.   Neurological:      Mental Status: Alert and oriented to person, place and time.        The following data was reviewed by myself, VIRGILIO Escobar  BMP   BMP          12/30/2024    14:20 2/10/2025    12:59 2/26/2025    13:01   BMP   BUN 20  24  22    Creatinine 1.78  2.16  1.89    Sodium 136  139  139    Potassium 4.1  4.4  4.4    Chloride 99  104  104    CO2 24.0  26.0  25.0    Calcium 9.2  8.2  8.6      ReDS   Lab Results   Component Value Date    ABSOLUTELUNG 30 02/26/2025    ABSOLUTELUNG 31 02/10/2025    ABSOLUTELUNG 29 11/18/2024       Results for orders placed during the hospital encounter of 10/01/24    Adult Transesophageal Echo ([facility]) W/ Cont if Necessary Per Protocol    Interpretation Summary    Left ventricular systolic function is moderately decreased.    The left ventricular cavity is mild to moderately dilated.    Left ventricular diastolic dysfunction is noted.    The left atrial cavity is dilated.    Mild to moderate mitral valve regurgitation is present.     Assessment   Assessment and Plan  Diagnoses and all orders for this visit:    1. Chronic HFrEF (heart failure with reduced ejection fraction) (Primary)  2. Paroxysmal atrial fibrillation  3. NICM (nonischemic cardiomyopathy), viral etiology  4. Coronary artery disease involving native coronary artery of native heart without angina pectoris  5. Mixed hyperlipidemia  6. Essential hypertension  From a cardiac standpoint he appears to be doing better than his last visit.  His blood pressure is improved and he is no longer have any episodes of dizziness or lightheadedness.  However, his renal function still remains low at this point with an increased creatinine from his last visit.  He appears to be euvolemic on exam.  -Given his renal dysfunction we will go ahead and remain off of losartan for today.  I would like to  ideally restart Entresto instead of losartan potentially at his next visit.  - Jardiance 10 mg daily  - Toprol  mg daily  - Decrease spironolactone to 12.5 mg daily due to his renal dysfunction    We provided him with a new medication list today in the office as he continues to have significant difficulties with remembering his medications.  Will have him follow-up with Kyle at his next visit.       Follow Up:  Return in about 2 weeks (around 3/12/2025) for recheck.    Patient was given instructions and counseling regarding his condition or for health maintenance advice.      Willem Vasquez, VIRGILIO  02/26/25  14:10 CST

## 2025-02-26 NOTE — H&P (VIEW-ONLY)
"Chief Complaint  Atrial Fibrillation (SEVERE DIZZINESS, PRE SYNCOPAL EPISODE - NEVER HAS FULLY PASSED OUT. NORMAL HR AND BP TIMES HE HAS CHECKED )    Subjective        History of Present Illness    EP Problems:  1.  Persistent atrial fibrillation  2.  Severe biatrial enlargement     Cardiology Problems:  1.  Severe MR  2.  Nonischemic cardiomyopathy  3.  Chronic systolic heart failure  4.  Hypertension  5.  Hyperlipidemia     Medical Problems:  1.  Obesity  2.  Obstructive sleep apnea  3.  Chronic normocytic anemia    History of Present Illness  The patient presents for atrial fibrillation follow-up.    He was last seen in the hospital, where he underwent cardioversion to restore normal rhythm due to atrial fibrillation. Initially, mitral valve surgery was considered, but it was later deemed unnecessary. He is currently on amiodarone therapy, which is intended for short-term use only. He has expressed concerns about the potential risks associated with long-term use of this medication. He is scheduled for an endovascular ablation procedure on 03/24/2025.        Objective   Vital Signs:  /62   Pulse 63   Ht 170.2 cm (67\")   Wt 87.1 kg (192 lb)   BMI 30.07 kg/m²   Estimated body mass index is 30.07 kg/m² as calculated from the following:    Height as of this encounter: 170.2 cm (67\").    Weight as of this encounter: 87.1 kg (192 lb).      Physical Exam  Vitals reviewed.   Constitutional:       Appearance: Normal appearance.   Cardiovascular:      Rate and Rhythm: Normal rate and regular rhythm.      Pulses: Normal pulses.      Heart sounds: Normal heart sounds.   Pulmonary:      Effort: Pulmonary effort is normal.      Breath sounds: Normal breath sounds.   Musculoskeletal:         General: No swelling.   Neurological:      Mental Status: He is alert.   Psychiatric:         Mood and Affect: Mood normal.         Judgment: Judgment normal.          Physical Exam      Result Review :  The following data was " reviewed by: Jem Begum MD on today's date:      ECG 12 Lead    Date/Time: 2/25/2025 6:11 PM  Performed by: Jem Begum MD    Authorized by: Jem Begum MD  Comparison: compared with previous ECG from 2/10/2025  Similar to previous ECG  Rhythm: sinus rhythm  Rate: normal  Conduction: 1st degree AV block  QRS axis: normal  Other findings: non-specific ST-T wave changes and low voltage    Clinical impression: abnormal EKG              Assessment and Plan   Diagnoses and all orders for this visit:    1. Paroxysmal atrial fibrillation (Primary)    Other orders  -     ECG 12 Lead        Assessment & Plan  1. Atrial fibrillation, paroxysmal  -Currently scheduled for ablation next month  -Continue on amiodarone for rhythm control for now  -Continue anticoagulation with apixaban  -Continue metoprolol for additional rate control  -Proceed with ablation as planned      I spent 25 minutes caring for Ziyad Wilson on this date of service (excluding time spent on ECG interpretation and documentation). This time includes time spent by me in the following activities:  preparing for the visit, reviewing tests, obtaining and/or reviewing a separately obtained history, performing a medically appropriate examination and/or evaluation, counseling and educating the patient/family/caregiver, and documenting information in the medical record.          Follow Up   Return in about 3 months (around 5/25/2025).  Patient was given instructions and counseling regarding his condition or for health maintenance advice. Please see specific information pulled into the AVS if appropriate.     Part of this note may be an electronic transcription/translation of spoken language to printed text using the Dragon Dictation System.    Patient or patient representative verbalized consent for the use of Ambient Listening during the visit with  Jem Begum MD for chart documentation. 2/25/2025  18:13 CST

## 2025-03-10 ENCOUNTER — TELEPHONE (OUTPATIENT)
Dept: VASCULAR SURGERY | Facility: CLINIC | Age: 60
End: 2025-03-10
Payer: MEDICARE

## 2025-03-11 ENCOUNTER — OFFICE VISIT (OUTPATIENT)
Dept: VASCULAR SURGERY | Facility: CLINIC | Age: 60
End: 2025-03-11
Payer: MEDICARE

## 2025-03-11 VITALS
BODY MASS INDEX: 29.51 KG/M2 | DIASTOLIC BLOOD PRESSURE: 84 MMHG | HEIGHT: 67 IN | HEART RATE: 102 BPM | SYSTOLIC BLOOD PRESSURE: 154 MMHG | WEIGHT: 188 LBS | OXYGEN SATURATION: 97 %

## 2025-03-11 DIAGNOSIS — I65.23 BILATERAL CAROTID ARTERY STENOSIS: Primary | ICD-10-CM

## 2025-03-11 DIAGNOSIS — I10 ESSENTIAL HYPERTENSION: ICD-10-CM

## 2025-03-11 DIAGNOSIS — E78.2 MIXED HYPERLIPIDEMIA: ICD-10-CM

## 2025-03-11 NOTE — LETTER
March 12, 2025     VIRGILIO Torres  [address] [facility] [address]  [address]  [place] [address]    Patient: Ziyad Wilson   YOB: 1965   Date of Visit: 3/11/2025     Dear VIRGILIO Torres:       Thank you for referring Ziyad Wilson to me for evaluation. Below are the relevant portions of my assessment and plan of care.    If you have questions, please do not hesitate to call me. I look forward to following Ziyad along with you.         Sincerely,        Oscar Espinosa DO        CC: No Recipients    Oscar Espinosa DO  03/12/25 1503  Sign when Signing Visit  03/11/2025      Desiree Martin APRN  [address] [facility] [address]  [address] [place] [address]    Ziyad Wilson  1965    Chief Complaint   Patient presents with   • NEW PATIENT     Referred from [facility] for carotid stenosis. Testing done 7/24/24. Patient complains of falling for past 3 weeks, started using walker at home. Never been a smoker.        Dear VIRGILIO Torres    HPI  I had the pleasure of seeing your patient Ziyad Wilson in the office today.  Thank you kindly for this consultation.  As you recall, Ziyad Wilson is a 59 y.o.  male who you are currently following for routine health maintenance.  The patient was recently seen in the emergency room for cough and shortness of breath.  His blood pressure was also elevated.  Over the last few weeks, the patient has been unsteady with his gait and has fallen multiple times.  In December 2024, he had a carotid duplex in which I personally reviewed that showed 50 to 69% stenosis of the right internal carotid artery.  Currently, he denies any classic strokelike symptoms.      Past Medical History:   Diagnosis Date   • Asthma    • Cardiomyopathy     non-ischemic   • CHF (congestive heart failure)    • Coronary artery disease    • Foot pain, bilateral    • GERD (gastroesophageal reflux disease)    • Headache    • Hyperlipemia, mixed    • Hypertension      benign   • Hypothyroidism    • Obesity    • Sleep apnea    • SOB (shortness of breath)        Past Surgical History:   Procedure Laterality Date   • APPENDECTOMY     • BLADDER REPAIR     • CARDIAC CATHETERIZATION  09/2003   • CARDIAC CATHETERIZATION N/A 10/1/2024    Procedure: Left Heart Cath;  Surgeon: Remi Mathew MD;  Location:  [facility] CATH INVASIVE LOCATION;  Service: Cardiology;  Laterality: N/A;   • COLONOSCOPY N/A 1/9/2018    Procedure: COLONOSCOPY WITH ANESTHESIA;  Surgeon: Dick Espinosa DO;  Location:  [facility] ENDOSCOPY;  Service:    • COLOSTOMY      with colostomy reversal   • KNEE SURGERY Right        Family History   Problem Relation Age of Onset   • Hypertension Mother    • Stroke Mother    • Heart disease Mother    • Hypertension Father    • Heart disease Father    • Hypertension Sister    • Hypertension Brother        Social History     Socioeconomic History   • Marital status:    Tobacco Use   • Smoking status: Never     Passive exposure: Past (Childhood)   • Smokeless tobacco: Never   Vaping Use   • Vaping status: Never Used   Substance and Sexual Activity   • Alcohol use: Yes     Comment: occasional   • Drug use: Yes     Types: Marijuana   • Sexual activity: Defer       No Known Allergies      Current Outpatient Medications:   •  albuterol sulfate  (90 Base) MCG/ACT inhaler, Inhale 2 puffs Every 4 (Four) Hours As Needed for Wheezing or Shortness of Air., Disp: 18 g, Rfl: 11  •  amiodarone (Pacerone) 200 MG tablet, Take 1 tablet by mouth Daily., Disp: 90 tablet, Rfl: 0  •  amitriptyline (ELAVIL) 75 MG tablet, Take 1 tablet by mouth Every Night., Disp: , Rfl:   •  apixaban (ELIQUIS) 5 MG tablet tablet, Take 1 tablet by mouth Every 12 (Twelve) Hours. Indications: Atrial Fibrillation, Disp: 180 tablet, Rfl: 3  •  atorvastatin (LIPITOR) 40 MG tablet, Take 1 tablet by mouth Daily., Disp: 90 tablet, Rfl: 3  •  buPROPion XL (WELLBUTRIN XL) 300 MG 24 hr tablet, Take 1 tablet by mouth  Daily., Disp: , Rfl:   •  diclofenac (VOLTAREN) 1 % gel gel, Apply 4 g topically to the appropriate area as directed 3 (Three) Times a Day., Disp: 100 g, Rfl: 3  •  empagliflozin (Jardiance) 10 MG tablet tablet, Take 1 tablet by mouth Daily., Disp: 30 tablet, Rfl: 11  •  fluticasone (Flonase) 50 MCG/ACT nasal spray, 2 sprays into the nostril(s) as directed by provider Daily. Administer 2 sprays in each nostril for each dose., Disp: 1 bottle, Rfl: 11  •  levothyroxine (SYNTHROID, LEVOTHROID) 112 MCG tablet, Take 75 mcg by mouth Daily., Disp: , Rfl:   •  meloxicam (MOBIC) 15 MG tablet, Take 1 tablet by mouth Daily., Disp: , Rfl:   •  metoprolol succinate XL (TOPROL-XL) 200 MG 24 hr tablet, Take 1 tablet by mouth Daily., Disp: 90 tablet, Rfl: 0  •  Multiple Vitamins-Minerals (MULTIVITAMIN WITH MINERALS) tablet tablet, Take 1 tablet by mouth Daily., Disp: , Rfl:   •  nitroglycerin (NITROSTAT) 0.4 MG SL tablet, Place 1 tablet under the tongue Every 5 (Five) Minutes As Needed for Chest Pain (Only if SBP Greater Than 100). Take no more than 3 doses in 15 minutes., Disp: 12 tablet, Rfl: 12  •  omeprazole (priLOSEC) 20 MG capsule, Take 1 capsule by mouth Daily., Disp: 90 capsule, Rfl: 3  •  PARoxetine (PAXIL) 20 MG tablet, Take 1 tablet by mouth Daily., Disp: 90 tablet, Rfl: 0  •  potassium chloride (MICRO-K) 10 MEQ CR capsule, Take 1 capsule by mouth Daily., Disp: 90 capsule, Rfl: 0  •  QUEtiapine (SEROquel) 100 MG tablet, Take 1 tablet by mouth every night at bedtime., Disp: 90 tablet, Rfl: 1  •  spironolactone (ALDACTONE) 25 MG tablet, Take 0.5 tablets by mouth Daily., Disp: 90 tablet, Rfl: 0  •  traZODone (DESYREL) 50 MG tablet, Take 1 tablet by mouth Every Night., Disp: , Rfl:     Review of Systems   Constitutional: Negative.    HENT: Negative.     Eyes: Negative.    Respiratory: Negative.     Cardiovascular: Negative.    Gastrointestinal: Negative.    Endocrine: Negative.    Genitourinary: Negative.    Musculoskeletal:  " Positive for gait problem.   Skin: Negative.    Allergic/Immunologic: Negative.    Hematological: Negative.    Psychiatric/Behavioral: Negative.     All other systems reviewed and are negative.    /84   Pulse 102   Ht 170.2 cm (67\")   Wt 85.3 kg (188 lb)   SpO2 97%   BMI 29.44 kg/m²     Physical Exam  Vitals and nursing note reviewed.   Constitutional:       Appearance: He is well-developed.   HENT:      Head: Normocephalic and atraumatic.   Eyes:      General: No scleral icterus.     Pupils: Pupils are equal, round, and reactive to light.   Neck:      Thyroid: No thyromegaly.      Vascular: No carotid bruit or JVD.   Cardiovascular:      Rate and Rhythm: Normal rate and regular rhythm.      Pulses:           Carotid pulses are 2+ on the right side and 2+ on the left side.       Femoral pulses are 2+ on the right side and 2+ on the left side.       Popliteal pulses are 2+ on the right side and 2+ on the left side.        Dorsalis pedis pulses are 2+ on the right side and 2+ on the left side.        Posterior tibial pulses are 2+ on the right side and 2+ on the left side.      Heart sounds: Normal heart sounds.   Pulmonary:      Effort: Pulmonary effort is normal.      Breath sounds: Normal breath sounds.   Abdominal:      General: Bowel sounds are normal. There is no distension or abdominal bruit.      Palpations: Abdomen is soft. There is no mass.      Tenderness: There is no abdominal tenderness.   Musculoskeletal:         General: Normal range of motion.      Cervical back: Neck supple.   Lymphadenopathy:      Cervical: No cervical adenopathy.   Skin:     General: Skin is warm and dry.   Neurological:      Mental Status: He is alert and oriented to person, place, and time.      Cranial Nerves: No cranial nerve deficit.      Sensory: No sensory deficit.     Test results:  Narrative & Impression   History: Carotid occlusive disease     IMPRESSION:  Impression:  1. There is 50 to 69% stenosis of the " right internal carotid artery.  2. There is less than 50% stenosis of the left internal carotid artery.  3. Antegrade flow is demonstrated in bilateral vertebral arteries.     Comments: Bilateral carotid vertebral arterial duplex scan was  performed.     Grayscale imaging shows intimal thickening and calcified elements at the  carotid bifurcation. The right internal carotid artery peak systolic  velocity is 174.2 cm/sec. The end-diastolic velocity is 37.1 cm/sec. The  right ICA/CCA ratio is approximately 1.9. These findings correlate with  50 to 69% stenosis of the right internal carotid artery.     Grayscale imaging shows intimal thickening and calcified elements at the  carotid bifurcation. The left internal carotid artery peak systolic  velocity is 88.7 cm/sec. The end-diastolic velocity is 30.2 cm/sec. The  left ICA/CCA ratio is approximately 1.3. These findings correlate with  less than 50% stenosis of the left internal carotid artery.     Antegrade flow is demonstrated in bilateral vertebral arteries.        This report was signed and finalized on 12/3/2024 3:23 PM by Dr. Oscar Espinosa MD.       Patient Active Problem List   Diagnosis   • Essential hypertension   • Coronary artery disease involving native coronary artery of native heart without angina pectoris   • Acquired hypothyroidism   • Chronic daily headache   • Class 1 obesity due to excess calories with serious comorbidity and body mass index (BMI) of 30.0 to 30.9 in adult   • Mild cognitive impairment   • Asthma   • GERD (gastroesophageal reflux disease)   • Sleep apnea   • Mixed hyperlipidemia   • Moderate episode of recurrent major depressive disorder   • Primary insomnia   • Cervical facet joint syndrome   • Impaired glucose tolerance   • CKD (chronic kidney disease) stage 2, GFR 60-89 ml/min   • Arthritis of right shoulder region   • Chest pain, atypical   • Paroxysmal atrial fibrillation   • Iron deficiency anemia   • NICM (nonischemic  cardiomyopathy), viral etiology   • Tetrahydrocannabinol (THC) dependence   • Acute pulmonary edema   • Severe mitral regurgitation   • Chronic HFrEF (heart failure with reduced ejection fraction)   • SOB (shortness of breath)   • Pulmonary nodule   • Pleural effusion   • PAF (paroxysmal atrial fibrillation)   • Bradycardia, drug induced        Diagnosis Plan   1. Bilateral carotid artery stenosis  MRI Angiogram Neck Without Contrast      2. Essential hypertension        3. Mixed hyperlipidemia            Plan: After thoroughly evaluating Ziyad Wilson, I believe the best course of action is to proceed with an MRA of the neck to better evaluate his right-sided carotid occlusive disease.  He cannot have a CTA of the neck due to his elevated BUN and creatinine.  I will see him back in the next 2 to 3 weeks time with this testing to see if any further treatment is necessary for stroke risk reduction.I did discuss vascular risk factors as they pertain to the progression of vascular disease including controlling hypertension and hyperlipidemia.  These risk factors are currently controlled and stable. The patient is to continue taking their medications as previously discussed.   This was all discussed in full with complete understanding.  Thank you for allowing me to participate in the care of your patient.  Please do not hesitate to call with any questions or concerns.  We will keep you aware of any further encounters with Ziyad Wilson.        Sincerely yours,         DO Leonardo Davila Hanna Jean, APRN

## 2025-03-12 ENCOUNTER — PATIENT ROUNDING (BHMG ONLY) (OUTPATIENT)
Dept: VASCULAR SURGERY | Facility: CLINIC | Age: 60
End: 2025-03-12
Payer: MEDICARE

## 2025-03-12 NOTE — PROGRESS NOTES
03/11/2025      Desiree Martin APRN  [address] [facility] [address]  [address]  [place] [address]    Ziyad Wilson  1965    Chief Complaint   Patient presents with    NEW PATIENT     [facility] carotid stenosis. Testing done 7/24/24. Patient complains of falling for past 3 weeks, started using walker at home. Never been a smoker.        Dear VIRGILIO Torres    HPI  I had the pleasure of seeing your patient Ziyad Wilson in the office today.  Thank you kindly for this consultation.  As you recall, Ziyad Wilson is a 59 y.o.  male who you are currently following for routine health maintenance.  The patient was recently seen in the emergency room for cough and shortness of breath.  His blood pressure was also elevated.  Over the last few weeks, the patient has been unsteady with his gait and has fallen multiple times.  In December 2024, he had a carotid duplex in which I personally reviewed that showed 50 to 69% stenosis of the right internal carotid artery.  Currently, he denies any classic strokelike symptoms.      Past Medical History:   Diagnosis Date    Asthma     Cardiomyopathy     non-ischemic    CHF (congestive heart failure)     Coronary artery disease     Foot pain, bilateral     GERD (gastroesophageal reflux disease)     Headache     Hyperlipemia, mixed     Hypertension     benign    Hypothyroidism     Obesity     Sleep apnea     SOB (shortness of breath)        Past Surgical History:   Procedure Laterality Date    APPENDECTOMY      BLADDER REPAIR      CARDIAC CATHETERIZATION  09/2003    CARDIAC CATHETERIZATION N/A 10/1/2024    Procedure: Left Heart Cath;  Surgeon: Remi Mathew MD;  Location: [facility] CATH INVASIVE LOCATION;  Service: Cardiology;  Laterality: N/A;    COLONOSCOPY N/A 1/9/2018    Procedure: COLONOSCOPY WITH ANESTHESIA;  Surgeon: Dick Espinosa DO;  Location: [facility] ENDOSCOPY;  Service:     COLOSTOMY      with colostomy reversal    KNEE SURGERY Right         Family History   Problem Relation Age of Onset    Hypertension Mother     Stroke Mother     Heart disease Mother     Hypertension Father     Heart disease Father     Hypertension Sister     Hypertension Brother        Social History     Socioeconomic History    Marital status:    Tobacco Use    Smoking status: Never     Passive exposure: Past (Childhood)    Smokeless tobacco: Never   Vaping Use    Vaping status: Never Used   Substance and Sexual Activity    Alcohol use: Yes     Comment: occasional    Drug use: Yes     Types: Marijuana    Sexual activity: Defer       No Known Allergies      Current Outpatient Medications:     albuterol sulfate  (90 Base) MCG/ACT inhaler, Inhale 2 puffs Every 4 (Four) Hours As Needed for Wheezing or Shortness of Air., Disp: 18 g, Rfl: 11    amiodarone (Pacerone) 200 MG tablet, Take 1 tablet by mouth Daily., Disp: 90 tablet, Rfl: 0    amitriptyline (ELAVIL) 75 MG tablet, Take 1 tablet by mouth Every Night., Disp: , Rfl:     apixaban (ELIQUIS) 5 MG tablet tablet, Take 1 tablet by mouth Every 12 (Twelve) Hours. Indications: Atrial Fibrillation, Disp: 180 tablet, Rfl: 3    atorvastatin (LIPITOR) 40 MG tablet, Take 1 tablet by mouth Daily., Disp: 90 tablet, Rfl: 3    buPROPion XL (WELLBUTRIN XL) 300 MG 24 hr tablet, Take 1 tablet by mouth Daily., Disp: , Rfl:     diclofenac (VOLTAREN) 1 % gel gel, Apply 4 g topically to the appropriate area as directed 3 (Three) Times a Day., Disp: 100 g, Rfl: 3    empagliflozin (Jardiance) 10 MG tablet tablet, Take 1 tablet by mouth Daily., Disp: 30 tablet, Rfl: 11    fluticasone (Flonase) 50 MCG/ACT nasal spray, 2 sprays into the nostril(s) as directed by provider Daily. Administer 2 sprays in each nostril for each dose., Disp: 1 bottle, Rfl: 11    levothyroxine (SYNTHROID, LEVOTHROID) 112 MCG tablet, Take 75 mcg by mouth Daily., Disp: , Rfl:     meloxicam (MOBIC) 15 MG tablet, Take 1 tablet by mouth Daily., Disp: , Rfl:      "metoprolol succinate XL (TOPROL-XL) 200 MG 24 hr tablet, Take 1 tablet by mouth Daily., Disp: 90 tablet, Rfl: 0    Multiple Vitamins-Minerals (MULTIVITAMIN WITH MINERALS) tablet tablet, Take 1 tablet by mouth Daily., Disp: , Rfl:     nitroglycerin (NITROSTAT) 0.4 MG SL tablet, Place 1 tablet under the tongue Every 5 (Five) Minutes As Needed for Chest Pain (Only if SBP Greater Than 100). Take no more than 3 doses in 15 minutes., Disp: 12 tablet, Rfl: 12    omeprazole (priLOSEC) 20 MG capsule, Take 1 capsule by mouth Daily., Disp: 90 capsule, Rfl: 3    PARoxetine (PAXIL) 20 MG tablet, Take 1 tablet by mouth Daily., Disp: 90 tablet, Rfl: 0    potassium chloride (MICRO-K) 10 MEQ CR capsule, Take 1 capsule by mouth Daily., Disp: 90 capsule, Rfl: 0    QUEtiapine (SEROquel) 100 MG tablet, Take 1 tablet by mouth every night at bedtime., Disp: 90 tablet, Rfl: 1    spironolactone (ALDACTONE) 25 MG tablet, Take 0.5 tablets by mouth Daily., Disp: 90 tablet, Rfl: 0    traZODone (DESYREL) 50 MG tablet, Take 1 tablet by mouth Every Night., Disp: , Rfl:     Review of Systems   Constitutional: Negative.    HENT: Negative.     Eyes: Negative.    Respiratory: Negative.     Cardiovascular: Negative.    Gastrointestinal: Negative.    Endocrine: Negative.    Genitourinary: Negative.    Musculoskeletal:  Positive for gait problem.   Skin: Negative.    Allergic/Immunologic: Negative.    Hematological: Negative.    Psychiatric/Behavioral: Negative.     All other systems reviewed and are negative.    /84   Pulse 102   Ht 170.2 cm (67\")   Wt 85.3 kg (188 lb)   SpO2 97%   BMI 29.44 kg/m²     Physical Exam  Vitals and nursing note reviewed.   Constitutional:       Appearance: He is well-developed.   HENT:      Head: Normocephalic and atraumatic.   Eyes:      General: No scleral icterus.     Pupils: Pupils are equal, round, and reactive to light.   Neck:      Thyroid: No thyromegaly.      Vascular: No carotid bruit or JVD. "   Cardiovascular:      Rate and Rhythm: Normal rate and regular rhythm.      Pulses:           Carotid pulses are 2+ on the right side and 2+ on the left side.       Femoral pulses are 2+ on the right side and 2+ on the left side.       Popliteal pulses are 2+ on the right side and 2+ on the left side.        Dorsalis pedis pulses are 2+ on the right side and 2+ on the left side.        Posterior tibial pulses are 2+ on the right side and 2+ on the left side.      Heart sounds: Normal heart sounds.   Pulmonary:      Effort: Pulmonary effort is normal.      Breath sounds: Normal breath sounds.   Abdominal:      General: Bowel sounds are normal. There is no distension or abdominal bruit.      Palpations: Abdomen is soft. There is no mass.      Tenderness: There is no abdominal tenderness.   Musculoskeletal:         General: Normal range of motion.      Cervical back: Neck supple.   Lymphadenopathy:      Cervical: No cervical adenopathy.   Skin:     General: Skin is warm and dry.   Neurological:      Mental Status: He is alert and oriented to person, place, and time.      Cranial Nerves: No cranial nerve deficit.      Sensory: No sensory deficit.     Test results:  Narrative & Impression   History: Carotid occlusive disease     IMPRESSION:  Impression:  1. There is 50 to 69% stenosis of the right internal carotid artery.  2. There is less than 50% stenosis of the left internal carotid artery.  3. Antegrade flow is demonstrated in bilateral vertebral arteries.     Comments: Bilateral carotid vertebral arterial duplex scan was  performed.     Grayscale imaging shows intimal thickening and calcified elements at the  carotid bifurcation. The right internal carotid artery peak systolic  velocity is 174.2 cm/sec. The end-diastolic velocity is 37.1 cm/sec. The  right ICA/CCA ratio is approximately 1.9. These findings correlate with  50 to 69% stenosis of the right internal carotid artery.     Grayscale imaging shows intimal  thickening and calcified elements at the  carotid bifurcation. The left internal carotid artery peak systolic  velocity is 88.7 cm/sec. The end-diastolic velocity is 30.2 cm/sec. The  left ICA/CCA ratio is approximately 1.3. These findings correlate with  less than 50% stenosis of the left internal carotid artery.     Antegrade flow is demonstrated in bilateral vertebral arteries.        This report was signed and finalized on 12/3/2024 3:23 PM by Dr. Oscar Espinosa MD.       Patient Active Problem List   Diagnosis    Essential hypertension    Coronary artery disease involving native coronary artery of native heart without angina pectoris    Acquired hypothyroidism    Chronic daily headache    Class 1 obesity due to excess calories with serious comorbidity and body mass index (BMI) of 30.0 to 30.9 in adult    Mild cognitive impairment    Asthma    GERD (gastroesophageal reflux disease)    Sleep apnea    Mixed hyperlipidemia    Moderate episode of recurrent major depressive disorder    Primary insomnia    Cervical facet joint syndrome    Impaired glucose tolerance    CKD (chronic kidney disease) stage 2, GFR 60-89 ml/min    Arthritis of right shoulder region    Chest pain, atypical    Paroxysmal atrial fibrillation    Iron deficiency anemia    NICM (nonischemic cardiomyopathy), viral etiology    Tetrahydrocannabinol (THC) dependence    Acute pulmonary edema    Severe mitral regurgitation    Chronic HFrEF (heart failure with reduced ejection fraction)    SOB (shortness of breath)    Pulmonary nodule    Pleural effusion    PAF (paroxysmal atrial fibrillation)    Bradycardia, drug induced        Diagnosis Plan   1. Bilateral carotid artery stenosis  MRI Angiogram Neck Without Contrast      2. Essential hypertension        3. Mixed hyperlipidemia            Plan: After thoroughly evaluating Ziyad HERNANDEZ Steve, I believe the best course of action is to proceed with an MRA of the neck to better evaluate his right-sided  carotid occlusive disease.  He cannot have a CTA of the neck due to his elevated BUN and creatinine.  I will see him back in the next 2 to 3 weeks time with this testing to see if any further treatment is necessary for stroke risk reduction.I did discuss vascular risk factors as they pertain to the progression of vascular disease including controlling hypertension and hyperlipidemia.  These risk factors are currently controlled and stable. The patient is to continue taking their medications as previously discussed.   This was all discussed in full with complete understanding.  Thank you for allowing me to participate in the care of your patient.  Please do not hesitate to call with any questions or concerns.  We will keep you aware of any further encounters with Ziyad Wilson.        Sincerely yours,         DO Leonardo Davila Hanna Jean, APRN

## 2025-03-12 NOTE — PROGRESS NOTES
March 12, 2025    Hello, may I speak with Ziyad DAVID Wilson?    My name is ALEXIS      I am  with [facility] V[facility]ULAR SUR[facility]  [address], [address]  [facility] [address] [phone].    Before we get started may I verify your date of birth? 1965    I am calling to officially welcome you to our practice and ask about your recent visit. Is this a good time to talk? yes    Tell me about your visit with us. What things went well?  IT WAS GOOD       We're always looking for ways to make our patients' experiences even better. Do you have recommendations on ways we may improve?  no, I WAS SATISFIED    Overall were you satisfied with your first visit to our practice? yes       I appreciate you taking the time to speak with me today. Is there anything else I can do for you? no      Thank you, and have a great day.

## 2025-03-21 ENCOUNTER — TELEPHONE (OUTPATIENT)
Dept: CARDIOLOGY | Facility: CLINIC | Age: 60
End: 2025-03-21
Payer: MEDICARE

## 2025-03-21 NOTE — DISCHARGE INSTRUCTIONS
Post-Atrial Fibrillation Ablation Instructions    ACTIVITY    Avoid driving, operating machinery, or alcohol consumption for 24 hours after receiving sedation. In addition, avoid signing legal documents or participating in legal proceedings.    You may resume normal activities after 24 hours except for the following:    Avoid heavy lifting (no more than 10 pounds) and vigorous activities for a minimum of 7 days. This includes activities such as jogging, exercise classes, sports activities, etc.    You may resume walking and other normal activities.    Avoid sitting more than 2 consecutive hours during waking hours for the first 3 days. If you are traveling, stop for brief (5 minutes) walks every two hours.    MEDICATIONS  Continue Eliquis at your usual dose this evening. Do not stop this medication without consulting with your cardiologist.    2.   Antacid: You are already on an antacid medication.  Please do not stop your antacid medication for 30 days.    MEDICAL FOLLOW UP   EP clinic follow up with Lul Mason on 4/21/2025..    PLEASE NOTIFY US IF YOU DEVELOP    Fever of 101 or greater during your first few days at home    The occurrence of a new, persistent cough or unexplained shortness of breath.    A groin bruise may be expected. Initial soreness and discomfort should improve over the first few days. If you continue to have discomfort or if bruising is excessive, please call us.    Patients often experience palpitations and even atrial fibrillation during the healing period.    Please call if you have an episode of atrial fibrillation accompanied by fast heart rate greater than 120 beats per minute for extended period or Atrial Fibrillation that last longer than 1-2 days.    Mild chest soreness is common and may be treated with Tylenol, Advil, or Motrin.  This soreness typically worsens when taking deep breaths.  If you notice these symptoms, start one of these medications according to the package  directions and continue for 2-3 days. If your symptoms are not relieved or become severe, please call us.      REASONS TO GO TO THE EMERGENCY ROOM FOR EVALUATION:   Severe chest pain  Significant shortness of breath at rest  Near passing out or passing out episodes soon after your ablation  Symptoms of chest pain or shortness of breath associated with low blood pressure (reading less than 90 for the top number / systolic blood pressure)  Brisk bleeding or significant swelling from the groin where IVs were placed  Any concerns that an emergent or life threatening complication is occurring    If you have a clinical questions between the hours of 8am and 4pm, Monday - Friday please call the office and let us know your concern. Please leave a message if your call is not an emergency.    For emergencies, please go for evaluation at your nearest emergency department.    If you have a [employer] account, this an excellent way to communicate.  [employer] messages are checked Monday through Friday. Please allow 24-36 hours for your message to be answered.

## 2025-03-21 NOTE — TELEPHONE ENCOUNTER
I spoke with Mr. Wilson about his upcoming ablation.  He was well informed about the procedure from prior discussion with Dr. Begum.  We discussed the procedure at length including risks, anesthesia, intra-op procedures, recovery, bedrest, sheath removal, discharge criteria, and normal post-procedure expectations.  Confirmed with Mr. Wilson that he is taking Eliquis, as prescribed, without any missed doses. Instructed to arrive at 1000, NPO after midnight. I answered a few remaining questions. Mr. Wilson verbalized understanding and he is ready to proceed.      Chanda Mayen RN

## 2025-03-24 ENCOUNTER — ANESTHESIA EVENT (OUTPATIENT)
Dept: CARDIOLOGY | Facility: HOSPITAL | Age: 60
End: 2025-03-24
Payer: MEDICARE

## 2025-03-24 ENCOUNTER — ANESTHESIA (OUTPATIENT)
Dept: CARDIOLOGY | Facility: HOSPITAL | Age: 60
End: 2025-03-24
Payer: MEDICARE

## 2025-03-24 ENCOUNTER — HOSPITAL ENCOUNTER (OUTPATIENT)
Facility: HOSPITAL | Age: 60
Setting detail: HOSPITAL OUTPATIENT SURGERY
Discharge: HOME | End: 2025-03-24
Attending: STUDENT IN AN ORGANIZED HEALTH CARE EDUCATION/TRAINING PROGRAM | Admitting: PHYSICIAN ASSISTANT
Payer: MEDICARE

## 2025-03-24 VITALS
HEART RATE: 75 BPM | SYSTOLIC BLOOD PRESSURE: 127 MMHG | WEIGHT: 195 LBS | TEMPERATURE: 98.3 F | DIASTOLIC BLOOD PRESSURE: 75 MMHG | RESPIRATION RATE: 18 BRPM | BODY MASS INDEX: 36.82 KG/M2 | OXYGEN SATURATION: 99 % | HEIGHT: 61 IN

## 2025-03-24 DIAGNOSIS — I48.0 PAF (PAROXYSMAL ATRIAL FIBRILLATION): ICD-10-CM

## 2025-03-24 LAB
ACT BLD: 487 SECONDS (ref 82–152)
ACT BLD: 516 SECONDS (ref 82–152)
ANION GAP SERPL CALCULATED.3IONS-SCNC: 12 MMOL/L (ref 5–15)
BASOPHILS # BLD AUTO: 0.05 10*3/MM3 (ref 0–0.2)
BASOPHILS NFR BLD AUTO: 1 % (ref 0–1.5)
BUN SERPL-MCNC: 21 MG/DL (ref 8–23)
BUN/CREAT SERPL: 13.6 (ref 7–25)
CALCIUM SPEC-SCNC: 9.1 MG/DL (ref 8.6–10.5)
CHLORIDE SERPL-SCNC: 103 MMOL/L (ref 98–107)
CO2 SERPL-SCNC: 26 MMOL/L (ref 22–29)
CREAT SERPL-MCNC: 1.54 MG/DL (ref 0.76–1.27)
DEPRECATED RDW RBC AUTO: 43.6 FL (ref 37–54)
EGFRCR SERPLBLD CKD-EPI 2021: 51.3 ML/MIN/1.73
EOSINOPHIL # BLD AUTO: 0.22 10*3/MM3 (ref 0–0.4)
EOSINOPHIL NFR BLD AUTO: 4.3 % (ref 0.3–6.2)
ERYTHROCYTE [DISTWIDTH] IN BLOOD BY AUTOMATED COUNT: 12.4 % (ref 12.3–15.4)
GLUCOSE SERPL-MCNC: 107 MG/DL (ref 65–99)
HCT VFR BLD AUTO: 41.9 % (ref 37.5–51)
HGB BLD-MCNC: 13.2 G/DL (ref 13–17.7)
IMM GRANULOCYTES # BLD AUTO: 0.03 10*3/MM3 (ref 0–0.05)
IMM GRANULOCYTES NFR BLD AUTO: 0.6 % (ref 0–0.5)
INR PPP: 0.96 (ref 0.91–1.09)
LYMPHOCYTES # BLD AUTO: 1.05 10*3/MM3 (ref 0.7–3.1)
LYMPHOCYTES NFR BLD AUTO: 20.4 % (ref 19.6–45.3)
MCH RBC QN AUTO: 29.8 PG (ref 26.6–33)
MCHC RBC AUTO-ENTMCNC: 31.5 G/DL (ref 31.5–35.7)
MCV RBC AUTO: 94.6 FL (ref 79–97)
MONOCYTES # BLD AUTO: 0.56 10*3/MM3 (ref 0.1–0.9)
MONOCYTES NFR BLD AUTO: 10.9 % (ref 5–12)
NEUTROPHILS NFR BLD AUTO: 3.23 10*3/MM3 (ref 1.7–7)
NEUTROPHILS NFR BLD AUTO: 62.8 % (ref 42.7–76)
NRBC BLD AUTO-RTO: 0 /100 WBC (ref 0–0.2)
PLATELET # BLD AUTO: 158 10*3/MM3 (ref 140–450)
PMV BLD AUTO: 9.7 FL (ref 6–12)
POTASSIUM SERPL-SCNC: 4.2 MMOL/L (ref 3.5–5.2)
PROTHROMBIN TIME: 13.2 SECONDS (ref 11.8–14.8)
RBC # BLD AUTO: 4.43 10*6/MM3 (ref 4.14–5.8)
SODIUM SERPL-SCNC: 141 MMOL/L (ref 136–145)
WBC NRBC COR # BLD AUTO: 5.14 10*3/MM3 (ref 3.4–10.8)

## 2025-03-24 PROCEDURE — 85610 PROTHROMBIN TIME: CPT | Performed by: PHYSICIAN ASSISTANT

## 2025-03-24 PROCEDURE — 25010000002 FENTANYL CITRATE (PF) 100 MCG/2ML SOLUTION

## 2025-03-24 PROCEDURE — 93656 COMPRE EP EVAL ABLTJ ATR FIB: CPT | Performed by: STUDENT IN AN ORGANIZED HEALTH CARE EDUCATION/TRAINING PROGRAM

## 2025-03-24 PROCEDURE — 93010 ELECTROCARDIOGRAM REPORT: CPT | Performed by: HOSPITALIST

## 2025-03-24 PROCEDURE — 25010000002 LIDOCAINE 2% SOLUTION: Performed by: STUDENT IN AN ORGANIZED HEALTH CARE EDUCATION/TRAINING PROGRAM

## 2025-03-24 PROCEDURE — 25010000002 HEPARIN (PORCINE) 2000-0.9 UNIT/L-% SOLUTION: Performed by: STUDENT IN AN ORGANIZED HEALTH CARE EDUCATION/TRAINING PROGRAM

## 2025-03-24 PROCEDURE — 25010000002 SUGAMMADEX 200 MG/2ML SOLUTION

## 2025-03-24 PROCEDURE — 85025 COMPLETE CBC W/AUTO DIFF WBC: CPT | Performed by: PHYSICIAN ASSISTANT

## 2025-03-24 PROCEDURE — 25810000003 LACTATED RINGERS PER 1000 ML

## 2025-03-24 PROCEDURE — C1730 CATH, EP, 19 OR FEW ELECT: HCPCS | Performed by: STUDENT IN AN ORGANIZED HEALTH CARE EDUCATION/TRAINING PROGRAM

## 2025-03-24 PROCEDURE — C1733 CATH, EP, OTHR THAN COOL-TIP: HCPCS | Performed by: STUDENT IN AN ORGANIZED HEALTH CARE EDUCATION/TRAINING PROGRAM

## 2025-03-24 PROCEDURE — 25010000002 PROTAMINE SULFATE PER 10 MG

## 2025-03-24 PROCEDURE — 93005 ELECTROCARDIOGRAM TRACING: CPT | Performed by: PHYSICIAN ASSISTANT

## 2025-03-24 PROCEDURE — 25010000002 ONDANSETRON PER 1 MG

## 2025-03-24 PROCEDURE — C1766 INTRO/SHEATH,STRBLE,NON-PEEL: HCPCS | Performed by: STUDENT IN AN ORGANIZED HEALTH CARE EDUCATION/TRAINING PROGRAM

## 2025-03-24 PROCEDURE — 93622 COMP EP EVAL L VENTR PAC&REC: CPT | Performed by: STUDENT IN AN ORGANIZED HEALTH CARE EDUCATION/TRAINING PROGRAM

## 2025-03-24 PROCEDURE — 80048 BASIC METABOLIC PNL TOTAL CA: CPT | Performed by: PHYSICIAN ASSISTANT

## 2025-03-24 PROCEDURE — 93005 ELECTROCARDIOGRAM TRACING: CPT | Performed by: STUDENT IN AN ORGANIZED HEALTH CARE EDUCATION/TRAINING PROGRAM

## 2025-03-24 PROCEDURE — 25010000002 VASOPRESSIN 20 UNIT/ML SOLUTION

## 2025-03-24 PROCEDURE — C1760 CLOSURE DEV, VASC: HCPCS | Performed by: STUDENT IN AN ORGANIZED HEALTH CARE EDUCATION/TRAINING PROGRAM

## 2025-03-24 PROCEDURE — 25010000002 HEPARIN (PORCINE) 1000-0.9 UT/500ML-% SOLUTION: Performed by: STUDENT IN AN ORGANIZED HEALTH CARE EDUCATION/TRAINING PROGRAM

## 2025-03-24 PROCEDURE — C1759 CATH, INTRA ECHOCARDIOGRAPHY: HCPCS | Performed by: STUDENT IN AN ORGANIZED HEALTH CARE EDUCATION/TRAINING PROGRAM

## 2025-03-24 PROCEDURE — 25010000002 DEXAMETHASONE PER 1 MG

## 2025-03-24 PROCEDURE — 25010000002 PROPOFOL 10 MG/ML EMULSION

## 2025-03-24 PROCEDURE — C1732 CATH, EP, DIAG/ABL, 3D/VECT: HCPCS | Performed by: STUDENT IN AN ORGANIZED HEALTH CARE EDUCATION/TRAINING PROGRAM

## 2025-03-24 PROCEDURE — C1894 INTRO/SHEATH, NON-LASER: HCPCS | Performed by: STUDENT IN AN ORGANIZED HEALTH CARE EDUCATION/TRAINING PROGRAM

## 2025-03-24 PROCEDURE — 25010000002 HEPARIN (PORCINE) PER 1000 UNITS

## 2025-03-24 PROCEDURE — 85347 COAGULATION TIME ACTIVATED: CPT

## 2025-03-24 RX ORDER — LIDOCAINE HYDROCHLORIDE 20 MG/ML
INJECTION, SOLUTION INFILTRATION; PERINEURAL
Status: DISCONTINUED | OUTPATIENT
Start: 2025-03-24 | End: 2025-03-24 | Stop reason: HOSPADM

## 2025-03-24 RX ORDER — ONDANSETRON 2 MG/ML
INJECTION INTRAMUSCULAR; INTRAVENOUS AS NEEDED
Status: DISCONTINUED | OUTPATIENT
Start: 2025-03-24 | End: 2025-03-24 | Stop reason: SURG

## 2025-03-24 RX ORDER — DEXAMETHASONE SODIUM PHOSPHATE 4 MG/ML
INJECTION, SOLUTION INTRA-ARTICULAR; INTRALESIONAL; INTRAMUSCULAR; INTRAVENOUS; SOFT TISSUE AS NEEDED
Status: DISCONTINUED | OUTPATIENT
Start: 2025-03-24 | End: 2025-03-24 | Stop reason: SURG

## 2025-03-24 RX ORDER — SODIUM CHLORIDE 0.9 % (FLUSH) 0.9 %
10 SYRINGE (ML) INJECTION EVERY 12 HOURS SCHEDULED
Status: DISCONTINUED | OUTPATIENT
Start: 2025-03-24 | End: 2025-03-24 | Stop reason: HOSPADM

## 2025-03-24 RX ORDER — FENTANYL CITRATE 50 UG/ML
INJECTION, SOLUTION INTRAMUSCULAR; INTRAVENOUS AS NEEDED
Status: DISCONTINUED | OUTPATIENT
Start: 2025-03-24 | End: 2025-03-24 | Stop reason: SURG

## 2025-03-24 RX ORDER — HEPARIN SODIUM 200 [USP'U]/100ML
INJECTION, SOLUTION INTRAVENOUS
Status: DISCONTINUED | OUTPATIENT
Start: 2025-03-24 | End: 2025-03-24 | Stop reason: HOSPADM

## 2025-03-24 RX ORDER — HEPARIN SODIUM 1000 [USP'U]/ML
INJECTION, SOLUTION INTRAVENOUS; SUBCUTANEOUS AS NEEDED
Status: DISCONTINUED | OUTPATIENT
Start: 2025-03-24 | End: 2025-03-24 | Stop reason: SURG

## 2025-03-24 RX ORDER — ROCURONIUM BROMIDE 10 MG/ML
INJECTION, SOLUTION INTRAVENOUS AS NEEDED
Status: DISCONTINUED | OUTPATIENT
Start: 2025-03-24 | End: 2025-03-24 | Stop reason: SURG

## 2025-03-24 RX ORDER — PROTAMINE SULFATE 10 MG/ML
INJECTION, SOLUTION INTRAVENOUS AS NEEDED
Status: DISCONTINUED | OUTPATIENT
Start: 2025-03-24 | End: 2025-03-24 | Stop reason: SURG

## 2025-03-24 RX ORDER — BUPIVACAINE HCL/0.9 % NACL/PF 0.125 %
PLASTIC BAG, INJECTION (ML) EPIDURAL AS NEEDED
Status: DISCONTINUED | OUTPATIENT
Start: 2025-03-24 | End: 2025-03-24 | Stop reason: SURG

## 2025-03-24 RX ORDER — PROPOFOL 10 MG/ML
VIAL (ML) INTRAVENOUS AS NEEDED
Status: DISCONTINUED | OUTPATIENT
Start: 2025-03-24 | End: 2025-03-24 | Stop reason: SURG

## 2025-03-24 RX ORDER — SODIUM CHLORIDE, SODIUM LACTATE, POTASSIUM CHLORIDE, CALCIUM CHLORIDE 600; 310; 30; 20 MG/100ML; MG/100ML; MG/100ML; MG/100ML
INJECTION, SOLUTION INTRAVENOUS CONTINUOUS PRN
Status: DISCONTINUED | OUTPATIENT
Start: 2025-03-24 | End: 2025-03-24 | Stop reason: SURG

## 2025-03-24 RX ORDER — SODIUM CHLORIDE 0.9 % (FLUSH) 0.9 %
10 SYRINGE (ML) INJECTION AS NEEDED
Status: DISCONTINUED | OUTPATIENT
Start: 2025-03-24 | End: 2025-03-24 | Stop reason: HOSPADM

## 2025-03-24 RX ADMIN — DEXAMETHASONE SODIUM PHOSPHATE 4 MG: 4 INJECTION, SOLUTION INTRAMUSCULAR; INTRAVENOUS at 14:41

## 2025-03-24 RX ADMIN — Medication 100 MCG: at 14:27

## 2025-03-24 RX ADMIN — PROPOFOL INJECTABLE EMULSION 130 MCG/KG/MIN: 10 INJECTION, EMULSION INTRAVENOUS at 14:25

## 2025-03-24 RX ADMIN — PROPOFOL INJECTABLE EMULSION 150 MCG/KG/MIN: 10 INJECTION, EMULSION INTRAVENOUS at 13:21

## 2025-03-24 RX ADMIN — PROPOFOL INJECTABLE EMULSION 200 MG: 10 INJECTION, EMULSION INTRAVENOUS at 13:18

## 2025-03-24 RX ADMIN — FENTANYL CITRATE 100 MCG: 50 INJECTION, SOLUTION INTRAMUSCULAR; INTRAVENOUS at 13:15

## 2025-03-24 RX ADMIN — Medication 100 MCG: at 14:45

## 2025-03-24 RX ADMIN — HEPARIN SODIUM 20000 UNITS: 1000 INJECTION, SOLUTION INTRAVENOUS; SUBCUTANEOUS at 13:46

## 2025-03-24 RX ADMIN — ROCURONIUM 50 MG: 50 INJECTION, SOLUTION INTRAVENOUS at 13:18

## 2025-03-24 RX ADMIN — PROTAMINE SULFATE 50 MG: 10 INJECTION, SOLUTION INTRAVENOUS at 14:41

## 2025-03-24 RX ADMIN — ONDANSETRON 4 MG: 2 INJECTION INTRAMUSCULAR; INTRAVENOUS at 14:41

## 2025-03-24 RX ADMIN — SUGAMMADEX 200 MG: 100 INJECTION, SOLUTION INTRAVENOUS at 14:41

## 2025-03-24 RX ADMIN — SODIUM CHLORIDE, POTASSIUM CHLORIDE, SODIUM LACTATE AND CALCIUM CHLORIDE: 600; 310; 30; 20 INJECTION, SOLUTION INTRAVENOUS at 13:14

## 2025-03-24 RX ADMIN — ROCURONIUM 30 MG: 50 INJECTION, SOLUTION INTRAVENOUS at 13:50

## 2025-03-24 NOTE — ANESTHESIA PROCEDURE NOTES
Airway  Reason: elective    Date/Time: 3/24/2025 1:19 PM  Airway not difficult    General Information and Staff    Patient location during procedure: OR    Indications and Patient Condition  Indications for airway management: airway protection    Preoxygenated: yes  MILS maintained throughout    Mask difficulty assessment: 0 - not attempted    Final Airway Details    Final airway type: endotracheal airway      Successful airway: ETT  Cuffed: yes   Successful intubation technique: video laryngoscopy  Adjuncts used in placement: intubating stylet  Endotracheal tube insertion site: oral  Blade: Collins  Blade size: 4  ETT size (mm): 7.5  Cormack-Lehane Classification: grade I - full view of glottis  Placement verified by: chest auscultation and capnometry   Measured from: gums  ETT/EBT to gums (cm): 22  Number of attempts at approach: 1  Assessment: lips, teeth, and gum same as pre-op and atraumatic intubation

## 2025-03-24 NOTE — ANESTHESIA PREPROCEDURE EVALUATION
Anesthesia Evaluation     Patient summary reviewed   no history of anesthetic complications:   NPO Solid Status: > 8 hours             Airway   Mallampati: II  Dental      Pulmonary    (+) ,sleep apnea  (-) COPD, asthma, not a smoker  Cardiovascular     (+) hypertension, CAD (nonobstructive), dysrhythmias Atrial Fib, CHF Systolic <55%, hyperlipidemia  (-) pacemaker, past MI, angina, cardiac stents      Neuro/Psych  (-) seizures, TIA, CVA  GI/Hepatic/Renal/Endo    (+) obesity, GERD, renal disease- CRI, thyroid problem hypothyroidism  (-) liver disease, diabetes    Musculoskeletal     Abdominal    Substance History      OB/GYN          Other                      Anesthesia Plan    ASA 4     general     intravenous induction     Anesthetic plan, risks, benefits, and alternatives have been provided, discussed and informed consent has been obtained with: patient.    CODE STATUS:

## 2025-03-24 NOTE — Clinical Note
Hemostasis started on the right femoral vein. Vascade MVP and figure 8 suturing was used in achieving hemostasis. Closure device deployed in the vessel.

## 2025-03-24 NOTE — INTERVAL H&P NOTE
H&P reviewed. The patient was examined and there are no changes to the H&P.      Since the time of the last clinic visit, denies significant change in symptoms.  Denies missing any doses of his medications.  No new ER visits or hospitalizations.    Exam:  Unchanged from last clinic visit.    Labs:  Reviewed.    Assessment/plan:  Ziyad Wilson is a 59 y.o. male who presents to the hospital for outpatient EP study and ablation for persistent AF.  There are no apparent contraindications to proceeding and he is medically appropriate for outpatient management with this procedure.      I have previously discussed and again recapitulated risks, benefits, and alternatives of an electrophysiology study and ablation for atrial fibrillation with the patient.  Alternatives discussed include continued observation and medical management.  An electrophysiology study with ablation is an invasive procedure with possible complications that include but are not limited to vascular access complications, internal bleeding, tamponade requiring pericardiocentesis or cardiac surgery, stroke, MI, embolism, myocardial injury, injury to the normal conduction system requiring a pacemaker, pulmonary vein stenosis, atrio-esophageal fistula, and ultimately death.  We also discussed that given the nature of the procedure, therapeutic efficacy can be variable.  Possibilities of recurrent arrhythmias and the possible need for additional procedures and/or medical therapy was discussed. Questions asked were appropriately answered.  No guarantees were made or implied.   Despite this, they would still like to proceed.    Plan:  - Proceed with EP study and ablation

## 2025-03-24 NOTE — Clinical Note
Vascade MVP and figure 8 suturing was used in achieving hemostasis. Closure device deployed in the vessel. Hemostasis achieved successfully.

## 2025-03-24 NOTE — ANESTHESIA POSTPROCEDURE EVALUATION
"Patient: Ziyad Wilson    Procedure Summary       Date: 03/24/25 Room / Location: [facility] CATH [facility] 4 /  [facility] CATH INVASIVE LOCATION    Anesthesia Start: 1314 Anesthesia Stop: 1500    Procedure: Ablation atrial fibrillation - PFA Diagnosis:       PAF (paroxysmal atrial fibrillation)      (Atrial fibrillation 02989)    Providers: Jem Begum MD Provider: Romi Velasquez CRNA    Anesthesia Type: general ASA Status: 4            Anesthesia Type: general    Vitals  No vitals data found for the desired time range.          Post Anesthesia Care and Evaluation    Patient location during evaluation: PHASE II  Patient participation: complete - patient participated  Level of consciousness: awake and alert  Pain management: adequate    Airway patency: patent  Anesthetic complications: No anesthetic complications    Cardiovascular status: acceptable  Respiratory status: acceptable  Hydration status: acceptable    Comments: Blood pressure 133/93, pulse 95, temperature 98.3 °F (36.8 °C), resp. rate 18, height 154.9 cm (61\"), weight 88.5 kg (195 lb), SpO2 95%.    Pt discharged from PACU based on santo score >8    "

## 2025-03-25 ENCOUNTER — CALL CENTER PROGRAMS (OUTPATIENT)
Dept: CALL CENTER | Facility: HOSPITAL | Age: 60
End: 2025-03-25
Payer: MEDICARE

## 2025-03-25 NOTE — OUTREACH NOTE
PCI/Device Survey      Flowsheet Row Responses   [facility]   [facility]cedure date 03/24/25   Procedure (if device, specify in description) Ablation   Performing MD Other (annotate)  [Dr Begum]   Attempt successful? Yes   Call start time 1136   Call end time 1145   Person spoke with today (if not patient) and relationship patient   Has the patient had any of the following symptoms since discharge? --  [No complaints]   Is the patient taking prescribed medications: Apixaban   Nursing intervention Reminded to continue to take prescribed medications   Medication comments Stop amiodarone   Does the patient have any of the following symptoms related to the cath/surgical site? --  [Dressing remains clean and intact to groin]   Does the patient have an appointment scheduled with the cardiologist? Yes   Appointment comments Follow Up  Thursday Mar 27, 2025 2:30 PM    [facility], Hospital Follow Up with Lul Mason  Monday Apr 21, 2025 2:00 PM   If the patient is a current smoker, are they able to teach back resources for cessation? Not a smoker   Did the patient feel prepared to go home on the same day as the procedure? Yes   Is the patient satisfied with the same day discharge process? Yes   PCI/Device call completed Yes   Wrap up additional comments Patient reports resting well. No further questions.            SUKHJINDER HERNANDEZ - Registered Nurse

## 2025-03-27 ENCOUNTER — HOSPITAL ENCOUNTER (OUTPATIENT)
Dept: CARDIOLOGY | Facility: HOSPITAL | Age: 60
Discharge: HOME | End: 2025-03-27
Admitting: HOSPITALIST
Payer: MEDICARE

## 2025-03-27 VITALS
WEIGHT: 194 LBS | BODY MASS INDEX: 36.66 KG/M2 | SYSTOLIC BLOOD PRESSURE: 137 MMHG | DIASTOLIC BLOOD PRESSURE: 83 MMHG | OXYGEN SATURATION: 94 % | HEART RATE: 93 BPM

## 2025-03-27 DIAGNOSIS — I42.8 NICM (NONISCHEMIC CARDIOMYOPATHY): ICD-10-CM

## 2025-03-27 DIAGNOSIS — I10 ESSENTIAL HYPERTENSION: ICD-10-CM

## 2025-03-27 DIAGNOSIS — I50.22 CHRONIC HFREF (HEART FAILURE WITH REDUCED EJECTION FRACTION): Primary | ICD-10-CM

## 2025-03-27 LAB
ABSOLUTE LUNG FLUID CONTENT: 37 % (ref 20–35)
ANION GAP SERPL CALCULATED.3IONS-SCNC: 9 MMOL/L (ref 5–15)
BUN SERPL-MCNC: 19 MG/DL (ref 8–23)
BUN/CREAT SERPL: 13.7 (ref 7–25)
CALCIUM SPEC-SCNC: 9.3 MG/DL (ref 8.6–10.5)
CHLORIDE SERPL-SCNC: 101 MMOL/L (ref 98–107)
CO2 SERPL-SCNC: 28 MMOL/L (ref 22–29)
CREAT SERPL-MCNC: 1.39 MG/DL (ref 0.76–1.27)
EGFRCR SERPLBLD CKD-EPI 2021: 58 ML/MIN/1.73
GLUCOSE SERPL-MCNC: 115 MG/DL (ref 65–99)
POTASSIUM SERPL-SCNC: 4 MMOL/L (ref 3.5–5.2)
QT INTERVAL: 356 MS
QT INTERVAL: 372 MS
QT INTERVAL: 440 MS
QTC INTERVAL: 449 MS
QTC INTERVAL: 479 MS
QTC INTERVAL: 501 MS
SODIUM SERPL-SCNC: 138 MMOL/L (ref 136–145)

## 2025-03-27 PROCEDURE — 94726 PLETHYSMOGRAPHY LUNG VOLUMES: CPT | Performed by: HOSPITALIST

## 2025-03-27 PROCEDURE — 93005 ELECTROCARDIOGRAM TRACING: CPT | Performed by: HOSPITALIST

## 2025-03-27 PROCEDURE — 80048 BASIC METABOLIC PNL TOTAL CA: CPT | Performed by: HOSPITALIST

## 2025-03-27 NOTE — PROGRESS NOTES
Reason For Visit:  CHF    Subjective        Ziyad Wilson is a 59 y.o. male with the below pertinent PMH who presents for follow-up of CHF.    Ziyad Wilson was most recently seen in the CHF clinic 2/26/2025 at which time he reported doing well with resolved dizziness since stopping Exforge and starting losartan.  Note indicates that losartan had been discontinued the day prior by EP although I do not see any mention of this in the EP note.  Spironolactone also was decreased to 12.5 mg daily due to concerns about worsened renal function.    Since his last visit, the patient has had significant improvement in his renal function.  That being said, he has a lot of confusion about what medications he is currently taking.  He does not believe that he is cutting any tablets in half so presumably is not taking spironolactone 12.5 mg daily; he is not sure if he completely stopped taking it or if he is actually still on 25 mg daily.  He also believes that he is still taking Exforge.  He did recently  a prescription for Entresto and just darted taking it as well.  He does believe that he stopped taking amiodarone after his recent ablation.    Regarding symptoms, he states that he feels well.  He reports stable breathing without any increased/abnormal shortness of breath.  He also denies palpitations, lightheadedness, chest pain, orthopnea, and peripheral edema.    ROS: Pertinent findings are included above.    Cardiac Studies  Echo 12/3/2012: low normal LV systolic function with EF 50-55%, normal RV size and function, mild to moderate LA dilation, mild MR, diastolic function suggesting increased LA pressure  Holter monitor 4/24/2007: frequent PVCs, rare PACs, no significant pauses, symptoms of shortness of breath associated with sinus tachycardia  Echo 5/7/2014: EF 40 to 45%, diastolic function consistent with elevated mean LA pressure, mild LVH, global LV hypokinesis, mild LA enlargement, normal RV size and function,  no significant valve dysfunction  Stress Echo 2/25/20: Low risk for ischemia. LVEF 51-55%.  Echo 7/23/24: LVEF 36-40%, LV mildly dilated, LA severely dilated, normal RV size/function, severe MR, mild AI, mild TR, RVSP 54  LAUREN 7/25/24: LV systolic function moderately decreased, LV mild-mod dilated, LA severely dilated, RA dilated, severe MR with pulmonary vein flow reversal present.  Echo 9/16/2024: LVEF 36/40%, G1 DD, mild LA dilation, normal RV size/function, mild to moderate MR  LAUREN 10/1/2024: LV function moderately decreased, LV mildly to moderately dilated, diastolic dysfunction noted, LA dilated, mild to moderate MR  [facility] 10/1/2024: Mild nonobstructive CAD only up to 40-50% in the LAD, normal left heart filling pressures, NICM.  Cardiac monitor 12/18/2024: Normal study with average HR 70.  No reported symptoms.  No significant arrhythmias, frequent ectopy, or AV block noted.    Pertinent PMH  HFrEF due to NICM  Mild to moderate MR  Persistent atrial fibrillation s/p cardioversion 7/29/24 and PVI ablation 3/24/2025  Hypertension  Hyperlipidemia  Coronary Artery Calcification on CT  [place]    Pertinent past medical, surgical, family, and social history were reviewed.      Current Outpatient Medications:     albuterol sulfate  (90 Base) MCG/ACT inhaler, Inhale 2 puffs Every 4 (Four) Hours As Needed for Wheezing or Shortness of Air., Disp: 18 g, Rfl: 11    amitriptyline (ELAVIL) 75 MG tablet, Take 1 tablet by mouth Every Night., Disp: , Rfl:     apixaban (ELIQUIS) 5 MG tablet tablet, Take 1 tablet by mouth Every 12 (Twelve) Hours. Indications: Atrial Fibrillation, Disp: 180 tablet, Rfl: 3    atorvastatin (LIPITOR) 40 MG tablet, Take 1 tablet by mouth Daily., Disp: 90 tablet, Rfl: 3    buPROPion XL (WELLBUTRIN XL) 300 MG 24 hr tablet, Take 1 tablet by mouth Daily., Disp: , Rfl:     diclofenac (VOLTAREN) 1 % gel gel, Apply 4 g topically to the appropriate area as directed 3 (Three) Times a Day., Disp: 100 g, Rfl:  "3    empagliflozin (Jardiance) 10 MG tablet tablet, Take 1 tablet by mouth Daily., Disp: 30 tablet, Rfl: 11    fluticasone (Flonase) 50 MCG/ACT nasal spray, 2 sprays into the nostril(s) as directed by provider Daily. Administer 2 sprays in each nostril for each dose., Disp: 1 bottle, Rfl: 11    levothyroxine (SYNTHROID, LEVOTHROID) 112 MCG tablet, Take 75 mcg by mouth Daily., Disp: , Rfl:     meloxicam (MOBIC) 15 MG tablet, Take 1 tablet by mouth Daily., Disp: , Rfl:     metoprolol succinate XL (TOPROL-XL) 200 MG 24 hr tablet, Take 1 tablet by mouth Daily., Disp: 90 tablet, Rfl: 0    Multiple Vitamins-Minerals (MULTIVITAMIN WITH MINERALS) tablet tablet, Take 1 tablet by mouth Daily., Disp: , Rfl:     nitroglycerin (NITROSTAT) 0.4 MG SL tablet, Place 1 tablet under the tongue Every 5 (Five) Minutes As Needed for Chest Pain (Only if SBP Greater Than 100). Take no more than 3 doses in 15 minutes., Disp: 12 tablet, Rfl: 12    omeprazole (priLOSEC) 20 MG capsule, Take 1 capsule by mouth Daily., Disp: 90 capsule, Rfl: 3    PARoxetine (PAXIL) 20 MG tablet, Take 1 tablet by mouth Daily., Disp: 90 tablet, Rfl: 0    potassium chloride (MICRO-K) 10 MEQ CR capsule, Take 1 capsule by mouth Daily., Disp: 90 capsule, Rfl: 0    QUEtiapine (SEROquel) 100 MG tablet, Take 1 tablet by mouth every night at bedtime., Disp: 90 tablet, Rfl: 1    spironolactone (ALDACTONE) 25 MG tablet, Take 0.5 tablets by mouth Daily., Disp: 90 tablet, Rfl: 0    traZODone (DESYREL) 50 MG tablet, Take 1 tablet by mouth Every Night., Disp: , Rfl:      Objective   Vital Signs:  /83 (BP Location: Left arm, Patient Position: Sitting)   Pulse 93   Wt 88 kg (194 lb)   SpO2 94%   BMI 36.66 kg/m²   Estimated body mass index is 36.66 kg/m² as calculated from the following:    Height as of 3/24/25: 154.9 cm (61\").    Weight as of this encounter: 88 kg (194 lb).      Constitutional:       Appearance: Healthy appearance. Not in distress.   Neck:      " Vascular: JVD normal.   Pulmonary:      Effort: Pulmonary effort is normal.      Breath sounds: Normal breath sounds.   Cardiovascular:      Normal rate. Regular rhythm.      Murmurs: There is a grade 1/6 systolic murmur at the apex.      No gallop.  No click. No rub.   Edema:     Peripheral edema absent.   Abdominal:      General: There is no distension.      Palpations: Abdomen is soft.      Tenderness: There is no abdominal tenderness.   Skin:     General: Skin is warm and dry.   Neurological:      Mental Status: Alert and oriented to person, place and time.        Result Review :  The following data was reviewed by: Shaw Wright MD on 03/27/2025:  BMP   BMP          2/26/2025    13:01 3/24/2025    10:04 3/27/2025    14:29   BMP   BUN 22  21  19    Creatinine 1.89  1.54  1.39    Sodium 139  141  138    Potassium 4.4  4.2  4.0    Chloride 104  103  101    CO2 25.0  26.0  28.0    Calcium 8.6  9.1  9.3        ReDS   Lab Results   Component Value Date    ABSOLUTELUNG 37 (A) 03/27/2025    ABSOLUTELUNG 30 02/26/2025    ABSOLUTELUNG 31 02/10/2025            Assessment and Plan   Diagnoses and all orders for this visit:    1. Chronic HFrEF (heart failure with reduced ejection fraction) (Primary)  2. NICM (nonischemic cardiomyopathy), viral etiology  3. Essential hypertension  -Currently it is unfortunately a complicated picture due to it being unclear what medications the patient is actually taking.  He does appear to be doing well with high normal BP and continued improvement in labs.  ReDS reading is mildly elevated suggesting some increased lung fluid content, but he does appear relatively euvolemic on exam.  He does report having recently started Entresto, but it is unclear whether or not he is still taking Exforge or if he is on spironolactone.  For now, I will recommend that he continue his current regimen and come back very soon for a follow-up with our pharmacist to bring his medications so that we can sort out  his current regimen and help make adjustments.  - As he has started Entresto, I will plan to try to optimize Entresto including increasing the dose if he is currently taking it with Exforge and stopping the Exforge to allow more BP room for Entresto optimization.  - If he is currently still taking spironolactone, I would plan to continue it.  If he is not taking spironolactone, I would hold off on restarting it for now until we do more work to optimize Entresto and sort of his other medications.  - Continue Toprol- mg daily and Jardiance 10 mg daily, which she reports that he has been taking lately.      Follow Up   Return in about 3 weeks (around 4/17/2025).  Patient was given instructions and counseling regarding his condition or for health maintenance advice. Please see specific information pulled into the AVS if appropriate.       EMR Dragon/Transcription disclaimer: Much of this encounter note is an electronic transcription/translation of spoken language to printed text. The electronic translation of spoken language may permit erroneous, or at times, nonsensical words or phrases to be inadvertently transcribed; although I have reviewed the note for such errors, some may still exist.

## 2025-03-31 ENCOUNTER — TELEPHONE (OUTPATIENT)
Dept: VASCULAR SURGERY | Facility: CLINIC | Age: 60
End: 2025-03-31
Payer: MEDICARE

## 2025-04-01 ENCOUNTER — HOSPITAL ENCOUNTER (OUTPATIENT)
Dept: MRI IMAGING | Facility: HOSPITAL | Age: 60
Discharge: HOME | End: 2025-04-01
Admitting: SURGERY
Payer: MEDICARE

## 2025-04-01 ENCOUNTER — OFFICE VISIT (OUTPATIENT)
Dept: VASCULAR SURGERY | Facility: CLINIC | Age: 60
End: 2025-04-01
Payer: MEDICARE

## 2025-04-01 VITALS
DIASTOLIC BLOOD PRESSURE: 78 MMHG | OXYGEN SATURATION: 98 % | HEIGHT: 61 IN | BODY MASS INDEX: 35.92 KG/M2 | HEART RATE: 110 BPM | SYSTOLIC BLOOD PRESSURE: 110 MMHG

## 2025-04-01 DIAGNOSIS — I65.23 BILATERAL CAROTID ARTERY STENOSIS: Primary | ICD-10-CM

## 2025-04-01 DIAGNOSIS — E66.09 CLASS 1 OBESITY DUE TO EXCESS CALORIES WITH SERIOUS COMORBIDITY AND BODY MASS INDEX (BMI) OF 30.0 TO 30.9 IN ADULT: ICD-10-CM

## 2025-04-01 DIAGNOSIS — I48.0 PAF (PAROXYSMAL ATRIAL FIBRILLATION): ICD-10-CM

## 2025-04-01 DIAGNOSIS — E78.2 MIXED HYPERLIPIDEMIA: ICD-10-CM

## 2025-04-01 DIAGNOSIS — E66.811 CLASS 1 OBESITY DUE TO EXCESS CALORIES WITH SERIOUS COMORBIDITY AND BODY MASS INDEX (BMI) OF 30.0 TO 30.9 IN ADULT: ICD-10-CM

## 2025-04-01 DIAGNOSIS — I65.23 BILATERAL CAROTID ARTERY STENOSIS: ICD-10-CM

## 2025-04-01 DIAGNOSIS — I10 ESSENTIAL HYPERTENSION: ICD-10-CM

## 2025-04-01 PROCEDURE — 3074F SYST BP LT 130 MM HG: CPT | Performed by: SURGERY

## 2025-04-01 PROCEDURE — 99214 OFFICE O/P EST MOD 30 MIN: CPT | Performed by: SURGERY

## 2025-04-01 PROCEDURE — 1159F MED LIST DOCD IN RCRD: CPT | Performed by: SURGERY

## 2025-04-01 PROCEDURE — 1160F RVW MEDS BY RX/DR IN RCRD: CPT | Performed by: SURGERY

## 2025-04-01 PROCEDURE — 3078F DIAST BP <80 MM HG: CPT | Performed by: SURGERY

## 2025-04-01 PROCEDURE — 70547 MR ANGIOGRAPHY NECK W/O DYE: CPT

## 2025-04-01 RX ORDER — SACUBITRIL AND VALSARTAN 24; 26 MG/1; MG/1
1 TABLET, FILM COATED ORAL EVERY 12 HOURS SCHEDULED
COMMUNITY
Start: 2025-03-15

## 2025-04-01 NOTE — LETTER
April 1, 2025     VIRGILIO Lacy  [address]  [place] [address]    Patient: Ziyad Wilson   YOB: 1965   Date of Visit: 4/1/2025     Dear VIRGILIO Lacy:       Thank you for referring Ziyad Wilson to me for evaluation. Below are the relevant portions of my assessment and plan of care.    If you have questions, please do not hesitate to call me. I look forward to following Ziyad along with you.         Sincerely,        Oscar Espinosa DO        CC: No Recipients    Oscar Espinosa DO  04/01/25 1403  Sign when Signing Visit  4/1/2025       Randi Patterson APRN  [address]  [facility] [address]      Ziyad Wilson  1965    Chief Complaint   Patient presents with   • bilateral carotid artery stenosis     2 week fu/ with testing- done today   • Fall     Falling some still, even after heart procedure       Dear Randi Patterson APRN     HPI  I had the pleasure of seeing your patient Ziyad Wilson in the office today.   As you recall, Ziyad Wilson is a 59 y.o.  male who you are currently following for routine health maintenance.  The patient was recently seen in the emergency room for cough and shortness of breath.  His blood pressure was also elevated.  Over the last few weeks, the patient has been unsteady with his gait and has fallen multiple times.  Currently, he denies any classic strokelike symptoms.  He did have noninvasive testing performed which I did review in office.       Review of Systems   Constitutional: Negative.    HENT: Negative.     Eyes: Negative.    Respiratory: Negative.     Cardiovascular: Negative.    Gastrointestinal: Negative.    Endocrine: Negative.    Genitourinary: Negative.    Musculoskeletal:  Positive for gait problem.   Skin: Negative.    Allergic/Immunologic: Negative.    Hematological: Negative.    Psychiatric/Behavioral: Negative.     All other systems reviewed and are negative.    /78 (BP Location: Left arm, Patient Position:  "Sitting, Cuff Size: Adult)   Pulse 110   Ht 154.9 cm (60.98\")   SpO2 98%   BMI 35.92 kg/m²     Physical Exam  Vitals and nursing note reviewed.   Constitutional:       Appearance: He is well-developed.   HENT:      Head: Normocephalic and atraumatic.   Eyes:      General: No scleral icterus.     Pupils: Pupils are equal, round, and reactive to light.   Neck:      Thyroid: No thyromegaly.      Vascular: No carotid bruit or JVD.   Cardiovascular:      Rate and Rhythm: Normal rate and regular rhythm.      Pulses:           Carotid pulses are 2+ on the right side and 2+ on the left side.       Femoral pulses are 2+ on the right side and 2+ on the left side.       Popliteal pulses are 2+ on the right side and 2+ on the left side.        Dorsalis pedis pulses are 2+ on the right side and 2+ on the left side.        Posterior tibial pulses are 2+ on the right side and 2+ on the left side.      Heart sounds: Normal heart sounds.   Pulmonary:      Effort: Pulmonary effort is normal.      Breath sounds: Normal breath sounds.   Abdominal:      General: Bowel sounds are normal. There is no distension or abdominal bruit.      Palpations: Abdomen is soft. There is no mass.      Tenderness: There is no abdominal tenderness.   Musculoskeletal:         General: Normal range of motion.      Cervical back: Neck supple.   Lymphadenopathy:      Cervical: No cervical adenopathy.   Skin:     General: Skin is warm and dry.   Neurological:      Mental Status: He is alert and oriented to person, place, and time.      Cranial Nerves: No cranial nerve deficit.      Sensory: No sensory deficit.       Diagnostic data:  MRI Angiogram Neck Without Contrast  Result Date: 4/1/2025  Narrative: MRI ANGIOGRAM NECK WO CONTRAST- 4/1/2025 10:15 AM  HISTORY: Carotid artery stenosis  TECHNIQUE: Noncontrast 3-D time-of-flight imaging was performed through the neck. 3-D and maximum intensity projection (MIP) images were created from the axial source data. "  COMPARISON: Carotid ultrasound dated 12/3/2024  FINDINGS:  Typical three-vessel branching pattern of the aortic arch. Common carotid arteries are normal in caliber. Less than 50% atheromatous narrowing at the level of the carotid bulbs and ICA origins. The right ICA is notably tortuous. Distal ICAs are patent. Bilateral vertebral arteries are patent, normal in caliber and codominant. Intracranial vertebral arteries and basilar artery are patent.      Impression: 1. No flow-limiting stenosis along either carotid system in the neck. 2. Right ICA is notably tortuous. 3. Vertebral arteries are patent, normal in caliber and codominant.   This report was signed and finalized on 4/1/2025 1:07 PM by Dr Dominick Joiner.      EP/CRM Study  Result Date: 3/24/2025  Narrative: Contrast: 0 ml Fluoroscopy: 2.8 min Indication: Symptomatic persistent atrial fibrillation Initial rhythm: Sinus rhythm Final rhythm: Sinus rhythm Pre-Procedure Diagnosis: Symptomatic persistent atrial fibrillation Post-Procedure Diagnosis: Symptomatic persistent atrial fibrillation Procedures 1. Atrial fibrillation ablation - PVI - 21660 2. Left ventricular pacing and recording - +[id number] Procedure The risks, benefits, alternatives and anticipated results of EP study, sedation, ablation, and cardioversion were explained to the patient and family. Written informed consent was obtained. The patient was sedated with IV medication. See nursing records for full details. The area overlying the right and left groin was prepped and draped in the usual sterile fashion. 8F, 8F and 10F hemostasis sheath were placed in the right femoral vein by the modified Seldinger technique. Through the 10F venous sheath a ICE catheter was passed to the RA. Baseline exam revealed evidence of no significant pericardial effusion. The ICE catheter was then advanced up the RVOT and into the left pulmonary artery. Ultrasound imaging revealed no evidence of LA or CAMERON thrombus by exam.   Through the 8F hemostasis sheath, a Biosense Harrison Pentaray catheter was advanced into the RA. A 3D shell of the IVC, RA, and proximal RV was created using FAM. The mapping catheter was then removed from the body. Through the 8F sheath, a Biosense Harrison FJ catheter was placed into the coronary sinus. A baseline EP study was performed as follows: RAP: Incremental atrial pacing was notable for a decremental response with no evidence of MI>RR. AVWB occurred at a paced cycle length of 350ms. An HV interval of 73ms was noted. The patient was administered a bolus dose of IV heparin. Transseptal access was gained using the FeraDrive sheath in combination with an RF wire.  The ICE catheter was advanced into the LA for LA imaging and visualization. Subsequent boluses of IV heparin were then administered to achieve a target ACT of 400sec throughout the duration of the left sided procedure. The Pentaray catheter was then passed to the LA and a 3-D map of the LA was created. This documented atypical venous return of the pulmonary veins due to the presence of a common ostium of the left sided pulmonary veins with an otherwise moderately enlarged left atrium and voltage map suggestive of minimal evidence of left atrial myopathy. The catheter was then advanced into the LV. Pacing from the LV catheter revealed evidence of  no concealed left lateral accessory pathway with otherwise decremental retrograde conduction with a concentric CS activation sequence.  A VA Wenckebach cycle length was noted at 460ms.. The mapping catheter was exchanged for a FeraWave PFA catheter.  PFA was delivered to the four pulmonary veins in a sequential fashion in the standard basket and flower configuration.  Post-ablation mapping with the mapping catheter revealed conduction present at the posterior edge of the RIPV.  Additional PFA applications were delivered to the associated area with subsequent good isolation of the pulmonary veins. All  catheters were then removed from the body.  The bidirectional sheath was exchanged back for a femoral hemostasis sheath. The patient was then administered 50 mg of IV protamine. The vascular access sites were closed using a combination of vascade and a figure of 8 suture..  There was good hemostasis. Findings 1. Sinus rhythm on presentation 2. 4/4 veins isolated 3. Moderate left atrial enlargement without evidence of significant left atrial myopathy 4. No apparent early complications of the procedure.          Patient Active Problem List   Diagnosis   • Essential hypertension   • Coronary artery disease involving native coronary artery of native heart without angina pectoris   • Acquired hypothyroidism   • Chronic daily headache   • Class 1 obesity due to excess calories with serious comorbidity and body mass index (BMI) of 30.0 to 30.9 in adult   • Mild cognitive impairment   • Asthma   • GERD (gastroesophageal reflux disease)   • Sleep apnea   • Mixed hyperlipidemia   • Moderate episode of recurrent major depressive disorder   • Primary insomnia   • Cervical facet joint syndrome   • Impaired glucose tolerance   • CKD (chronic kidney disease) stage 2, GFR 60-89 ml/min   • Arthritis of right shoulder region   • Chest pain, atypical   • Paroxysmal atrial fibrillation   • Iron deficiency anemia   • NICM (nonischemic cardiomyopathy), viral etiology   • Tetrahydrocannabinol (THC) dependence   • Acute pulmonary edema   • Severe mitral regurgitation   • Chronic HFrEF (heart failure with reduced ejection fraction)   • SOB (shortness of breath)   • Pulmonary nodule   • Pleural effusion   • PAF (paroxysmal atrial fibrillation)   • Bradycardia, drug induced        Diagnosis Plan   1. Bilateral carotid artery stenosis        2. Essential hypertension        3. Mixed hyperlipidemia        4. PAF (paroxysmal atrial fibrillation)        5. Class 1 obesity due to excess calories with serious comorbidity and body mass index (BMI)  of 30.0 to 30.9 in adult              Plan: After thoroughly evaluating Ziyad Wilson, I believe the best course of action is to remain conservative from a vascular surgery standpoint.  I did review his testing which shows no significant carotid stenosis.  There is a little plaque on the right but overall patent.  Vessel is tortuous.  I will see him back in 1 year with repeat noninvasive testing including a carotid duplex for continued surveillance.  I did discuss vascular risk factors as they pertain to the progression of vascular disease including controlling hypertension and hyperlipidemia.  These risk factors are currently controlled and stable. The patient is to continue taking their medications as previously discussed.   This was all discussed in full with complete understanding.  Thank you for allowing me to participate in the care of your patient.  Please do not hesitate to call with any questions or concerns.  We will keep you aware of any further encounters with Ziyad Wilson.        Sincerely yours,         DO Leonardo Davila Hanna Jean, APRN

## 2025-04-01 NOTE — PROGRESS NOTES
"4/1/2025       Randi Patterson APRN  [address] [facility] [place] [address]      Ziyad Wilson  1965    Chief Complaint   Patient presents with    bilateral carotid artery stenosis     2 week fu/ with testing- done today    Fall     Falling some still, even after heart procedure       Dear Randi Patterson APRN     HPI  I had the pleasure of seeing your patient Ziyad Wilson in the office today.   As you recall, Ziyad Wilson is a 59 y.o.  male who you are currently following for routine health maintenance.  The patient was recently seen in the emergency room for cough and shortness of breath.  His blood pressure was also elevated.  Over the last few weeks, the patient has been unsteady with his gait and has fallen multiple times.  Currently, he denies any classic strokelike symptoms.  He did have noninvasive testing performed which I did review in office.       Review of Systems   Constitutional: Negative.    HENT: Negative.     Eyes: Negative.    Respiratory: Negative.     Cardiovascular: Negative.    Gastrointestinal: Negative.    Endocrine: Negative.    Genitourinary: Negative.    Musculoskeletal:  Positive for gait problem.   Skin: Negative.    Allergic/Immunologic: Negative.    Hematological: Negative.    Psychiatric/Behavioral: Negative.     All other systems reviewed and are negative.    /78 (BP Location: Left arm, Patient Position: Sitting, Cuff Size: Adult)   Pulse 110   Ht 154.9 cm (60.98\")   SpO2 98%   BMI 35.92 kg/m²     Physical Exam  Vitals and nursing note reviewed.   Constitutional:       Appearance: He is well-developed.   HENT:      Head: Normocephalic and atraumatic.   Eyes:      General: No scleral icterus.     Pupils: Pupils are equal, round, and reactive to light.   Neck:      Thyroid: No thyromegaly.      Vascular: No carotid bruit or JVD.   Cardiovascular:      Rate and Rhythm: Normal rate and regular rhythm.      Pulses:           Carotid pulses are 2+ on the right side and " 2+ on the left side.       Femoral pulses are 2+ on the right side and 2+ on the left side.       Popliteal pulses are 2+ on the right side and 2+ on the left side.        Dorsalis pedis pulses are 2+ on the right side and 2+ on the left side.        Posterior tibial pulses are 2+ on the right side and 2+ on the left side.      Heart sounds: Normal heart sounds.   Pulmonary:      Effort: Pulmonary effort is normal.      Breath sounds: Normal breath sounds.   Abdominal:      General: Bowel sounds are normal. There is no distension or abdominal bruit.      Palpations: Abdomen is soft. There is no mass.      Tenderness: There is no abdominal tenderness.   Musculoskeletal:         General: Normal range of motion.      Cervical back: Neck supple.   Lymphadenopathy:      Cervical: No cervical adenopathy.   Skin:     General: Skin is warm and dry.   Neurological:      Mental Status: He is alert and oriented to person, place, and time.      Cranial Nerves: No cranial nerve deficit.      Sensory: No sensory deficit.       Diagnostic data:  MRI Angiogram Neck Without Contrast  Result Date: 4/1/2025  Narrative: MRI ANGIOGRAM NECK WO CONTRAST- 4/1/2025 10:15 AM  HISTORY: Carotid artery stenosis  TECHNIQUE: Noncontrast 3-D time-of-flight imaging was performed through the neck. 3-D and maximum intensity projection (MIP) images were created from the axial source data.  COMPARISON: Carotid ultrasound dated 12/3/2024  FINDINGS:  Typical three-vessel branching pattern of the aortic arch. Common carotid arteries are normal in caliber. Less than 50% atheromatous narrowing at the level of the carotid bulbs and ICA origins. The right ICA is notably tortuous. Distal ICAs are patent. Bilateral vertebral arteries are patent, normal in caliber and codominant. Intracranial vertebral arteries and basilar artery are patent.      Impression: 1. No flow-limiting stenosis along either carotid system in the neck. 2. Right ICA is notably tortuous.  3. Vertebral arteries are patent, normal in caliber and codominant.   This report was signed and finalized on 4/1/2025 1:07 PM by Dr Dominick Joiner.      EP/CRM Study  Result Date: 3/24/2025  Narrative: Contrast: 0 ml Fluoroscopy: 2.8 min Indication: Symptomatic persistent atrial fibrillation Initial rhythm: Sinus rhythm Final rhythm: Sinus rhythm Pre-Procedure Diagnosis: Symptomatic persistent atrial fibrillation Post-Procedure Diagnosis: Symptomatic persistent atrial fibrillation Procedures 1. Atrial fibrillation ablation - PVI - 45376 2. Left ventricular pacing and recording - +[id number] Procedure The risks, benefits, alternatives and anticipated results of EP study, sedation, ablation, and cardioversion were explained to the patient and family. Written informed consent was obtained. The patient was sedated with IV medication. See nursing records for full details. The area overlying the right and left groin was prepped and draped in the usual sterile fashion. 8F, 8F and 10F hemostasis sheath were placed in the right femoral vein by the modified Seldinger technique. Through the 10F venous sheath a ICE catheter was passed to the RA. Baseline exam revealed evidence of no significant pericardial effusion. The ICE catheter was then advanced up the RVOT and into the left pulmonary artery. Ultrasound imaging revealed no evidence of LA or CAMERON thrombus by exam.  Through the 8F hemostasis sheath, a Biosense Harrison Pentaray catheter was advanced into the RA. A 3D shell of the IVC, RA, and proximal RV was created using FAM. The mapping catheter was then removed from the body. Through the 8F sheath, a Biosense Harrison FJ catheter was placed into the coronary sinus. A baseline EP study was performed as follows: RAP: Incremental atrial pacing was notable for a decremental response with no evidence of MN>RR. AVWB occurred at a paced cycle length of 350ms. An HV interval of 73ms was noted. The patient was administered a bolus dose  of IV heparin. Transseptal access was gained using the FeraDrive sheath in combination with an RF wire.  The ICE catheter was advanced into the LA for LA imaging and visualization. Subsequent boluses of IV heparin were then administered to achieve a target ACT of 400sec throughout the duration of the left sided procedure. The Pentaray catheter was then passed to the LA and a 3-D map of the LA was created. This documented atypical venous return of the pulmonary veins due to the presence of a common ostium of the left sided pulmonary veins with an otherwise moderately enlarged left atrium and voltage map suggestive of minimal evidence of left atrial myopathy. The catheter was then advanced into the LV. Pacing from the LV catheter revealed evidence of  no concealed left lateral accessory pathway with otherwise decremental retrograde conduction with a concentric CS activation sequence.  A VA Wenckebach cycle length was noted at 460ms.. The mapping catheter was exchanged for a FeraWave PFA catheter.  PFA was delivered to the four pulmonary veins in a sequential fashion in the standard basket and flower configuration.  Post-ablation mapping with the mapping catheter revealed conduction present at the posterior edge of the RIPV.  Additional PFA applications were delivered to the associated area with subsequent good isolation of the pulmonary veins. All catheters were then removed from the body.  The bidirectional sheath was exchanged back for a femoral hemostasis sheath. The patient was then administered 50 mg of IV protamine. The vascular access sites were closed using a combination of vascade and a figure of 8 suture..  There was good hemostasis. Findings 1. Sinus rhythm on presentation 2. 4/4 veins isolated 3. Moderate left atrial enlargement without evidence of significant left atrial myopathy 4. No apparent early complications of the procedure.          Patient Active Problem List   Diagnosis    Essential hypertension     Coronary artery disease involving native coronary artery of native heart without angina pectoris    Acquired hypothyroidism    Chronic daily headache    Class 1 obesity due to excess calories with serious comorbidity and body mass index (BMI) of 30.0 to 30.9 in adult    Mild cognitive impairment    Asthma    GERD (gastroesophageal reflux disease)    Sleep apnea    Mixed hyperlipidemia    Moderate episode of recurrent major depressive disorder    Primary insomnia    Cervical facet joint syndrome    Impaired glucose tolerance    CKD (chronic kidney disease) stage 2, GFR 60-89 ml/min    Arthritis of right shoulder region    Chest pain, atypical    Paroxysmal atrial fibrillation    Iron deficiency anemia    NICM (nonischemic cardiomyopathy), viral etiology    Tetrahydrocannabinol (THC) dependence    Acute pulmonary edema    Severe mitral regurgitation    Chronic HFrEF (heart failure with reduced ejection fraction)    SOB (shortness of breath)    Pulmonary nodule    Pleural effusion    PAF (paroxysmal atrial fibrillation)    Bradycardia, drug induced        Diagnosis Plan   1. Bilateral carotid artery stenosis        2. Essential hypertension        3. Mixed hyperlipidemia        4. PAF (paroxysmal atrial fibrillation)        5. Class 1 obesity due to excess calories with serious comorbidity and body mass index (BMI) of 30.0 to 30.9 in adult              Plan: After thoroughly evaluating Ziyad Wilson, I believe the best course of action is to remain conservative from a vascular surgery standpoint.  I did review his testing which shows no significant carotid stenosis.  There is a little plaque on the right but overall patent.  Vessel is tortuous.  I will see him back in 1 year with repeat noninvasive testing including a carotid duplex for continued surveillance.  I did discuss vascular risk factors as they pertain to the progression of vascular disease including controlling hypertension and hyperlipidemia.  These risk  factors are currently controlled and stable. The patient is to continue taking their medications as previously discussed.   This was all discussed in full with complete understanding.  Thank you for allowing me to participate in the care of your patient.  Please do not hesitate to call with any questions or concerns.  We will keep you aware of any further encounters with Ziyad Wilson.        Sincerely yours,         DO Leonardo Davila Hanna Jean, APRN

## 2025-04-02 ENCOUNTER — HOSPITAL ENCOUNTER (OUTPATIENT)
Dept: CARDIOLOGY | Facility: HOSPITAL | Age: 60
Discharge: HOME | End: 2025-04-02
Admitting: NURSE PRACTITIONER
Payer: MEDICARE

## 2025-04-02 VITALS
HEART RATE: 113 BPM | OXYGEN SATURATION: 94 % | BODY MASS INDEX: 37.13 KG/M2 | SYSTOLIC BLOOD PRESSURE: 104 MMHG | WEIGHT: 196.4 LBS | DIASTOLIC BLOOD PRESSURE: 67 MMHG

## 2025-04-02 DIAGNOSIS — I50.22 CHRONIC HFREF (HEART FAILURE WITH REDUCED EJECTION FRACTION): Primary | ICD-10-CM

## 2025-04-02 LAB
ABSOLUTE LUNG FLUID CONTENT: 26 % (ref 20–35)
ANION GAP SERPL CALCULATED.3IONS-SCNC: 11 MMOL/L (ref 5–15)
BUN SERPL-MCNC: 16 MG/DL (ref 8–23)
BUN/CREAT SERPL: 11.4 (ref 7–25)
CALCIUM SPEC-SCNC: 8.9 MG/DL (ref 8.6–10.5)
CHLORIDE SERPL-SCNC: 104 MMOL/L (ref 98–107)
CO2 SERPL-SCNC: 23 MMOL/L (ref 22–29)
CREAT SERPL-MCNC: 1.4 MG/DL (ref 0.76–1.27)
EGFRCR SERPLBLD CKD-EPI 2021: 57.5 ML/MIN/1.73
GLUCOSE SERPL-MCNC: 104 MG/DL (ref 65–99)
POTASSIUM SERPL-SCNC: 4 MMOL/L (ref 3.5–5.2)
SODIUM SERPL-SCNC: 138 MMOL/L (ref 136–145)

## 2025-04-02 PROCEDURE — 80048 BASIC METABOLIC PNL TOTAL CA: CPT | Performed by: NURSE PRACTITIONER

## 2025-04-02 PROCEDURE — 94726 PLETHYSMOGRAPHY LUNG VOLUMES: CPT | Performed by: NURSE PRACTITIONER

## 2025-04-02 RX ORDER — METOPROLOL SUCCINATE 25 MG/1
25 TABLET, EXTENDED RELEASE ORAL DAILY
Qty: 30 TABLET | Refills: 0 | Status: SHIPPED | OUTPATIENT
Start: 2025-04-02

## 2025-04-02 RX ORDER — SODIUM PHOSPHATE,MONO-DIBASIC 19G-7G/118
2 ENEMA (ML) RECTAL DAILY
COMMUNITY

## 2025-04-02 RX ORDER — LEVOTHYROXINE SODIUM 75 UG/1
75 TABLET ORAL
COMMUNITY

## 2025-04-02 NOTE — PROGRESS NOTES
[facility]    Date:  04/02/25     Vitals:    04/02/25 1302   BP: 104/67   Pulse: 113   SpO2: 94%        Indication:  Heart Failure     Procedure:  ReDS device sensor unit applied to right side of chest and right side of back.  Appropriate positioning confirmed based off of the unit's calculation.  Chest measurement obtained with the chest size ruler.  Measurement session performed over 45 seconds.      Method of arrival:  Ambulatory    Weighing self daily:  No,  2-3 days a week    Taking medications as prescribed:  Yes    Edema:  No    Shortness of Air:  Yes with any activity    Number of pillows used at night:  3    Results:  ReDS Value=     26                     Interpretation:  25-35% - normal/ideal lung fluid content    Mallory L Haase, RN 04/02/25 13:07 CDT

## 2025-04-02 NOTE — PROGRESS NOTES
[facility] Failure [facility]    CURRENT REGIMEN:  Heart Failure    Jardiance 10mg QD  Entresto 24/26mg BID      Other CV Meds    Eliquis 5mg BID  Atorvastatin 40mg QD      ASSESSMENT/PLAN OF CARE:  Changes to medications: Yes - see extensive note below for details  STOP Exforge 5/160mg   START metoprolol succinate 25mg QD   Continue Entresto 24/26mg BID  Hold spironolactone for now        Ziyad is a 59 y.o. male, who is followed by the Heart Failure Clinic.        Chief Complaint: No chief complaint on file.      HPI:  Congestive Heart Failure  Patient presents for follow-up visit for congestive heart failure.He states he is compliant most of the time with his medications.            OBJECTIVE:  /67 (BP Location: Left arm, Patient Position: Sitting)   Pulse 113   Wt 89.1 kg (196 lb 6.4 oz)   SpO2 94%   BMI 37.13 kg/m²     Body mass index is 37.13 kg/m².  Wt Readings from Last 1 Encounters:   04/02/25 89.1 kg (196 lb 6.4 oz)     Patient does not check home weight and vitals. A log sheet and instructions were provided to him today.   Home BP Readings:   Home Pulse Readings:   Home Daily Weight Log:       Lab Review:  Renal Function: Estimated Creatinine Clearance: 53.2 mL/min (A) (by C-G formula based on SCr of 1.4 mg/dL (H)).    Lab Results   Component Value Date    GLUCOSE 104 (H) 04/02/2025    CALCIUM 8.9 04/02/2025     04/02/2025    K 4.0 04/02/2025    CO2 23.0 04/02/2025     04/02/2025    BUN 16 04/02/2025    CREATININE 1.40 (H) 04/02/2025    EGFR 57.5 (L) 04/02/2025    BCR 11.4 04/02/2025    ANIONGAP 11.0 04/02/2025     Lab Results   Component Value Date    MG 1.7 08/08/2024     Lab Results   Component Value Date    PROBNP 602.9 07/22/2024       Results for orders placed during the hospital encounter of 10/01/24    Adult Transesophageal Echo (LAUREN) W/ Cont if Necessary Per Protocol    Interpretation Summary    Left ventricular systolic function is moderately decreased.    The  left ventricular cavity is mild to moderately dilated.    Left ventricular diastolic dysfunction is noted.    The left atrial cavity is dilated.    Mild to moderate mitral valve regurgitation is present.      Current Outpatient Medications:     albuterol sulfate  (90 Base) MCG/ACT inhaler, Inhale 2 puffs Every 4 (Four) Hours As Needed for Wheezing or Shortness of Air., Disp: 18 g, Rfl: 11    amitriptyline (ELAVIL) 75 MG tablet, Take 1 tablet by mouth Every Night., Disp: , Rfl:     apixaban (ELIQUIS) 5 MG tablet tablet, Take 1 tablet by mouth Every 12 (Twelve) Hours. Indications: Atrial Fibrillation, Disp: 180 tablet, Rfl: 3    atorvastatin (LIPITOR) 40 MG tablet, Take 1 tablet by mouth Daily., Disp: 90 tablet, Rfl: 3    buPROPion XL (WELLBUTRIN XL) 300 MG 24 hr tablet, Take 1 tablet by mouth Daily., Disp: , Rfl:     diclofenac (VOLTAREN) 1 % gel gel, Apply 4 g topically to the appropriate area as directed 3 (Three) Times a Day., Disp: 100 g, Rfl: 3    empagliflozin (Jardiance) 10 MG tablet tablet, Take 1 tablet by mouth Daily., Disp: 30 tablet, Rfl: 11    Entresto 24-26 MG tablet, Take 1 tablet by mouth Every 12 (Twelve) Hours., Disp: , Rfl:     fluticasone (Flonase) 50 MCG/ACT nasal spray, 2 sprays into the nostril(s) as directed by provider Daily. Administer 2 sprays in each nostril for each dose., Disp: 1 bottle, Rfl: 11    glucosamine-chondroitin 500-400 MG capsule capsule, Take 2 capsules by mouth Daily., Disp: , Rfl:     levothyroxine (SYNTHROID, LEVOTHROID) 75 MCG tablet, Take 1 tablet by mouth Every Morning., Disp: , Rfl:     Multiple Vitamins-Minerals (MULTIVITAMIN WITH MINERALS) tablet tablet, Take 1 tablet by mouth Daily., Disp: , Rfl:     nitroglycerin (NITROSTAT) 0.4 MG SL tablet, Place 1 tablet under the tongue Every 5 (Five) Minutes As Needed for Chest Pain (Only if SBP Greater Than 100). Take no more than 3 doses in 15 minutes., Disp: 12 tablet, Rfl: 12    omeprazole (priLOSEC) 20 MG capsule,  Take 1 capsule by mouth Daily., Disp: 90 capsule, Rfl: 3    PARoxetine (PAXIL) 20 MG tablet, Take 1 tablet by mouth Daily., Disp: 90 tablet, Rfl: 0    potassium chloride (MICRO-K) 10 MEQ CR capsule, Take 1 capsule by mouth Daily., Disp: 90 capsule, Rfl: 0    QUEtiapine (SEROquel) 100 MG tablet, Take 1 tablet by mouth every night at bedtime., Disp: 90 tablet, Rfl: 1    traZODone (DESYREL) 50 MG tablet, Take 1 tablet by mouth Every Night., Disp: , Rfl:     meloxicam (MOBIC) 15 MG tablet, Take 1 tablet by mouth Daily., Disp: , Rfl:     metoprolol succinate XL (TOPROL-XL) 25 MG 24 hr tablet, Take 1 tablet by mouth Daily., Disp: 30 tablet, Rfl: 0      ASSESSMENT/PLAN OF CARE:  Changes to medications: yes    Patient presents in clinic today for pharmacist follow up, thorough medication reconciliation, and potential GDMT titration.     Patient followed instructions from last visit and brought in all of his medications from home as well as his pill planner which he fills weekly. Prior to this visit I called both of his pharmacies and obtained most recent fill dates for all medications filled within the past 6 months. While his labs were processing, I went over each of his medication bottles versus his med list and verified them in his pill planner. A number of discrepancies were found:     -The dates on most bottles did not match the last fill dates reported by the pharmacies. He admitted that he does combine bottles and it appears that he has been putting new pills into old bottles.     -He was still taking Exforge 5/160mg QD AND Entresto 24/26 BID which likely explains his blood pressure and otherwise unexplained decline in kidney function    -He had a bottle of spironolactone 25mg that was filled on 3/2/25 for #90 that contained over 100 tablets. Based on this quantity and the fill dates I am pretty confident that he has not been taking it. It was also not in his pill planner. He also admits that he is not currently and  "has not ever broken any tablets in half, and he was supposed to only be taking a half tablet. This again reinforces the suspicion that he is not taking spironolactone.     -He did not have a bottle of metoprolol succinate of any strength in his medication bag, nor was there any other beta blocker present in bottles or in his pill planner. According to pharmacy provided fill history the metoprolol succinate 200mg had not been filled since 8/2/24. No other beta blockers had been filed since this time, either. I do not believe that he has been taking any beta blocker. This likely explains his heart rate of 113 today.     -His levothyroxine bottle contained 2 different strenghts: 112mcg and 75mcg. This was a likely result of his combining of bottles as he has been on both strengths before. His most recently filled dose was 75mcg. I  the two strengths and disposed of the 112mcg tablets.    -There was no meloxicam bottle to be found although he says he is taking it. We discussed the risks of taking NSAIDs with heart failure and kidney dysfunction. I asked him that if he found it at home to please use it very sparingly and to use Tylenol instead. He verbalized understanding.     He admitted during this visit that he suffers from dyslexia and this makes reading bottles a challenge sometimes. He stated that he specifically has trouble confusing the letters B and D and that numbers are often very confusing to him in written form. I told him that I appreciated him sharing this information. This does provide some insight as to why he may struggle to keep up with his medications. I asked him if it was easier for him to read short statements such as \"ONCE DAILY\" or \"TWICE DAILY\" rather than a long phrase like \"Take one tablet by mouth once daily.\" He stated that short statements are easier. We went through each bottle and I used a sharpie to temo \"once daily\" or \"twice daily\" in blue ink on his bottles so that it is " easier to see. He appreciated this.     Other findings from today's visit included discussion of his financial struggles to pay for his medications, specifically Entresto and Eliquis. We discussed his income and I do believe that he may qualify for Medicaid. He was given instructions on going to the [facility] to apply for this. He was aware of where it is. I spoke with him about patient assistance programs but informed him that they would not allow him to participate if they see that he qualifies for Medicaid so we needed to pursue that first. He verbalized understanding.     In light of today's findings, his vitals, and his labs, the following changes were made to his regimen:     -I will dispose of the Exforge 5/160mg tablets with his permission  -I will dispose of incorrect strength of levothyroxine with his permission   -He will continue to take Entresto 24/26mg BID  -He will not take spironolactone at this time. This bottle was segregated along with his Bumex into a ziploc bag with a post-it note stating not to take either of these unless instructed by the [facility]. I did not want to dispose of anything that he may have to pay for again.   -He will start metoprolol succinate 25mg QD. A new Rx was sent to our pharmacy for convenience. I instructed him that if he found any metoprolol or carvedilol of other strengths at home he was not to take those. He verbalized understanding.   -He will continue Jardiance 10mg QD    I feel like we made good progress at this visit. A thorough med rec like this is probably wise to conduct with him at least every few months given his history of noncompliance. He seemed to really appreciate the help.       Follow-up: With VIRGILIO Escobar on 4/17/25.     Thank you for allowing me to participate in the care of your patient. Patient was instructed to contact our Heart Failure Clinic if they have any concerns before their next clinical visit.      Romy Currie, PharmD  [facility]  [facility]  04/02/25  15:40 CDT

## 2025-04-02 NOTE — PATIENT INSTRUCTIONS
metoprolol succinate 25mg at [facility].   Continue to take your medications as they are listed on this handout med list.   Return to clinic in 2 weeks for appointment with Willem Vasquez.   Please call the clinic if questions/concerns arise before your next appointment.

## 2025-04-17 ENCOUNTER — HOSPITAL ENCOUNTER (OUTPATIENT)
Dept: CARDIOLOGY | Facility: HOSPITAL | Age: 60
Discharge: HOME | End: 2025-04-17
Admitting: NURSE PRACTITIONER
Payer: MEDICARE

## 2025-04-17 VITALS
SYSTOLIC BLOOD PRESSURE: 141 MMHG | BODY MASS INDEX: 37.96 KG/M2 | OXYGEN SATURATION: 94 % | DIASTOLIC BLOOD PRESSURE: 96 MMHG | WEIGHT: 200.8 LBS | HEART RATE: 103 BPM

## 2025-04-17 DIAGNOSIS — I50.22 CHRONIC HFREF (HEART FAILURE WITH REDUCED EJECTION FRACTION): Primary | ICD-10-CM

## 2025-04-17 LAB
ANION GAP SERPL CALCULATED.3IONS-SCNC: 9 MMOL/L (ref 5–15)
BUN SERPL-MCNC: 12 MG/DL (ref 8–23)
BUN/CREAT SERPL: 8.4 (ref 7–25)
CALCIUM SPEC-SCNC: 8.5 MG/DL (ref 8.6–10.5)
CHLORIDE SERPL-SCNC: 103 MMOL/L (ref 98–107)
CO2 SERPL-SCNC: 24 MMOL/L (ref 22–29)
CREAT SERPL-MCNC: 1.43 MG/DL (ref 0.76–1.27)
EGFRCR SERPLBLD CKD-EPI 2021: 56.1 ML/MIN/1.73
GLUCOSE SERPL-MCNC: 103 MG/DL (ref 65–99)
POTASSIUM SERPL-SCNC: 4.1 MMOL/L (ref 3.5–5.2)
SODIUM SERPL-SCNC: 136 MMOL/L (ref 136–145)

## 2025-04-17 PROCEDURE — 94726 PLETHYSMOGRAPHY LUNG VOLUMES: CPT | Performed by: NURSE PRACTITIONER

## 2025-04-17 PROCEDURE — 80048 BASIC METABOLIC PNL TOTAL CA: CPT | Performed by: NURSE PRACTITIONER

## 2025-04-17 RX ORDER — DAPAGLIFLOZIN 10 MG/1
10 TABLET, FILM COATED ORAL DAILY
Qty: 30 TABLET | Refills: 11 | Status: SHIPPED | OUTPATIENT
Start: 2025-04-17

## 2025-04-17 RX ORDER — SACUBITRIL AND VALSARTAN 49; 51 MG/1; MG/1
1 TABLET, FILM COATED ORAL 2 TIMES DAILY
Qty: 60 TABLET | Refills: 11 | Status: SHIPPED | OUTPATIENT
Start: 2025-04-17

## 2025-04-17 NOTE — PROGRESS NOTES
Heart Failure Clinic    Date:  04/17/25     Vitals:    04/17/25 1317   BP: 141/96   Pulse: 103   SpO2: 94%        Indication:  Heart Failure    Procedure:  ReDS device sensor unit applied to right side of chest and right side of back.  Appropriate positioning confirmed based off of the unit's calculation.  Chest measurement obtained with the chest size ruler.  Measurement session performed over 45 seconds.      Results:  ReDS Value=25    Interpretation:  25-35% - normal/ideal lung fluid content    Francie Desouza RN 04/17/25 13:28 CDT

## 2025-04-17 NOTE — PROGRESS NOTES
Heart Failure Clinic Progress Note  Reason For Visit:  CHF    Subjective    Ziyad Wilson is a 59 y.o. male with the below pertinent PMH who presents for follow-up of CHF.    Ziyad Wilson was most recently seen in the heart failure clinic on 825.  At that time he was seeing pharmacy.  He was still extremely confused about his medications and many of them did not match the field.  He was still taking Exforge and Entresto at the same time.  At that time he is also not on metoprolol.  We went ahead and started that at 25 mg daily.    Today, he reports that he is doing well.  He feels much better about his medications.  He denies any lightheadedness, dizziness, chest pain, peripheral edema, or shortness of breath.  He indicates that he is using pill planners and is doing well with this.  His renal function is overall stable.  His weight is up by 4 pounds and his ReDs reading is normal at 26%.      Review of Systems   Constitutional: Negative for malaise/fatigue.   Cardiovascular:  Negative for chest pain, dyspnea on exertion and leg swelling.   Neurological:  Negative for dizziness and light-headedness.     Pertinent PMH  HFrEF due to NICM  Mild to moderate MR  Persistent atrial fibrillation s/p cardioversion 7/29/24  Hypertension  Hyperlipidemia  Coronary Artery Calcification on CT  [place]    Pertinent past medical, surgical, family, and social history were reviewed.    Current Outpatient Medications   Medication Instructions    albuterol sulfate  (90 Base) MCG/ACT inhaler 2 puffs, Inhalation, Every 4 Hours PRN    amitriptyline (ELAVIL) 75 mg, Nightly    apixaban (ELIQUIS) 5 mg, Oral, Every 12 Hours Scheduled    atorvastatin (LIPITOR) 40 mg, Oral, Daily    buPROPion XL (WELLBUTRIN XL) 300 MG 24 hr tablet 1 tablet, Daily    dapagliflozin Propanediol (FARXIGA) 10 mg, Oral, Daily    diclofenac (VOLTAREN) 4 g, Topical, 3 Times Daily    fluticasone (Flonase) 50 MCG/ACT nasal spray 2 sprays, Nasal, Daily, Administer  "2 sprays in each nostril for each dose.     glucosamine-chondroitin 500-400 MG capsule capsule 2 capsules, Daily    levothyroxine (SYNTHROID, LEVOTHROID) 75 mcg, Every Early Morning    meloxicam (MOBIC) 15 MG tablet 1 tablet, Daily    metoprolol succinate XL (TOPROL-XL) 25 mg, Oral, Daily    Multiple Vitamins-Minerals (MULTIVITAMIN WITH MINERALS) tablet tablet 1 tablet, Daily    nitroglycerin (NITROSTAT) 0.4 mg, Sublingual, Every 5 Minutes PRN, Take no more than 3 doses in 15 minutes.    omeprazole (PRILOSEC) 20 mg, Oral, Daily    PARoxetine (PAXIL) 20 mg, Oral, Daily    potassium chloride (MICRO-K) 10 MEQ CR capsule 10 mEq, Oral, Daily    QUEtiapine (SEROQUEL) 100 mg, Oral, Every Night at Bedtime    sacubitril-valsartan (Entresto) 49-51 MG tablet 1 tablet, Oral, 2 Times Daily    traZODone (DESYREL) 50 mg, Nightly        Objective   Vital Signs:  /96 (BP Location: Left arm, Patient Position: Sitting)   Pulse 103   Wt 91.1 kg (200 lb 12.8 oz)   SpO2 94%   BMI 37.96 kg/m²   Estimated body mass index is 37.96 kg/m² as calculated from the following:    Height as of 4/1/25: 154.9 cm (60.98\").    Weight as of this encounter: 91.1 kg (200 lb 12.8 oz).    Neck:      Vascular: JVD normal.   Pulmonary:      Effort: Pulmonary effort is normal.      Breath sounds: Normal breath sounds.   Cardiovascular:      Normal rate. Regular rhythm. Normal S1. Normal S2.       Murmurs: There is no murmur.      No gallop.  No click. No rub.   Pulses:     Intact distal pulses.   Edema:     Peripheral edema absent.   Abdominal:      Palpations: Abdomen is soft.      Tenderness: There is no abdominal tenderness.   Skin:     General: Skin is warm and dry.   Neurological:      Mental Status: Alert and oriented to person, place and time.        The following data was reviewed by myself, VIRGILIO Escobar  BMP   BMP          3/27/2025    14:29 4/2/2025    13:06 4/17/2025    13:23   BMP   BUN 19  16  12    Creatinine 1.39  1.40  1.43  "   Sodium 138  138  136    Potassium 4.0  4.0  4.1    Chloride 101  104  103    CO2 28.0  23.0  24.0    Calcium 9.3  8.9  8.5      ReDS   Lab Results   Component Value Date    ABSOLUTELUNG 26 04/02/2025    ABSOLUTELUNG 37 (A) 03/27/2025    ABSOLUTELUNG 30 02/26/2025       Results for orders placed during the hospital encounter of 10/01/24    Adult Transesophageal Echo (LAUREN) W/ Cont if Necessary Per Protocol    Interpretation Summary    Left ventricular systolic function is moderately decreased.    The left ventricular cavity is mild to moderately dilated.    Left ventricular diastolic dysfunction is noted.    The left atrial cavity is dilated.    Mild to moderate mitral valve regurgitation is present.     Assessment   Assessment and Plan  Diagnoses and all orders for this visit:    1. Chronic HFrEF (heart failure with reduced ejection fraction) (Primary)  2. Paroxysmal atrial fibrillation  3. NICM (nonischemic cardiomyopathy), viral etiology  4. Coronary artery disease involving native coronary artery of native heart without angina pectoris  5. Mixed hyperlipidemia  6. Essential hypertension  Overall he is doing well.  I do not see any overt symptoms of decompensation or hypervolemia on examination today.  His weight is up a little bit but his ReDs reading also remains normal.  I do feel that we have continued room for GDMT titration today.  - Increase Entresto to 49-51 mg twice daily.  I do believe this will assist with further fluid management  - We are going to change his Jardiance to Farxiga 10 mg daily for financial reasons.  - Toprol  mg daily  - Eliquis 5 mg twice daily  - Potassium 10 mEq daily  - Low-sodium diet, daily weights, blood pressure checks, fluid restriction    We provided him with a new medication list today in the office as he continues to have significant difficulties with remembering his medications.  Will have him follow-up with Kyle at his next visit.       Follow Up:  Return in about  2 weeks (around 5/1/2025) for recheck.    Patient was given instructions and counseling regarding his condition or for health maintenance advice.      Willem Vasquez, VIRGILIO  04/17/25  15:18 CDT

## 2025-04-17 NOTE — PATIENT INSTRUCTIONS
Increase Entresto to 49-51mg twice daily  Stop Jardiance  Start Farxiga 10mg daily  Call the office with any other concerns.

## 2025-04-21 ENCOUNTER — OFFICE VISIT (OUTPATIENT)
Dept: CARDIOLOGY | Facility: CLINIC | Age: 60
End: 2025-04-21
Payer: MEDICARE

## 2025-04-21 VITALS
BODY MASS INDEX: 37.96 KG/M2 | DIASTOLIC BLOOD PRESSURE: 72 MMHG | OXYGEN SATURATION: 98 % | SYSTOLIC BLOOD PRESSURE: 128 MMHG | HEIGHT: 61 IN | HEART RATE: 100 BPM

## 2025-04-21 DIAGNOSIS — I48.0 PAF (PAROXYSMAL ATRIAL FIBRILLATION): Primary | ICD-10-CM

## 2025-04-21 PROCEDURE — 99214 OFFICE O/P EST MOD 30 MIN: CPT

## 2025-04-21 PROCEDURE — 3078F DIAST BP <80 MM HG: CPT

## 2025-04-21 PROCEDURE — 93000 ELECTROCARDIOGRAM COMPLETE: CPT

## 2025-04-21 PROCEDURE — 3074F SYST BP LT 130 MM HG: CPT

## 2025-04-21 RX ORDER — METOPROLOL SUCCINATE 25 MG/1
25 TABLET, EXTENDED RELEASE ORAL DAILY
Qty: 90 TABLET | Refills: 3 | Status: SHIPPED | OUTPATIENT
Start: 2025-04-21

## 2025-04-21 NOTE — PROGRESS NOTES
"EP FOLLOW UP PATIENT VISIT    Chief Complaint  PAROXYSMAL ATRIAL FIBRILLATION  (1 month Ablation F/U (Ablation 03.24.25))    Subjective        History of Present Illness    EP Problems:  1.  Persistent atrial fibrillation  -3/24/2025: PVI ablation  2.  Severe biatrial enlargement     Cardiology Problems:  1.  Severe MR  2.  Nonischemic cardiomyopathy  3.  Chronic systolic heart failure  4.  Hypertension  5.  Hyperlipidemia     Medical Problems:  1.  Obesity  2.  Obstructive sleep apnea  3.  Chronic normocytic anemia      Patient is a 59-year-old male who presents to the clinic following up for persistent atrial fibrillation. He was newly diagnosed with atrial fibrillation in July of last year. He underwent a successful PVI ablation 3/24/2025.    He states that he is doing well today. He says that prior to his ablation he he was unaware of any symptoms when he was in atrial fibrillation. He says to his knowledge she has not had any more recurrences of atrial fibrillation. He is anticoagulated with Eliquis and denies any bleeding issues at this time. He is tolerating his metoprolol but but his heart rate today is elevated at 102 bpm. He is concerned because he has been having bilateral tremors that are starting to affect his quality of life. He says that up until a month ago he was having spells where he would fall on a daily basis but was unsure what was causing this. He denies symptoms prior to these falls but says that he could just tell when it was about to happen. Says that he has had several medication changes about a month ago and has not fallen since then.    Objective   Vital Signs:  /72 (BP Location: Left arm, Patient Position: Sitting, Cuff Size: Adult)   Pulse 100   Ht 154.9 cm (60.98\")   SpO2 98%   BMI 37.96 kg/m²   Estimated body mass index is 37.96 kg/m² as calculated from the following:    Height as of this encounter: 154.9 cm (60.98\").    Weight as of 4/17/25: 91.1 kg (200 lb 12.8 " oz).      Physical Exam  Vitals reviewed.   Constitutional:       Appearance: Normal appearance. He is obese.   Cardiovascular:      Rate and Rhythm: Regular rhythm. Tachycardia present.      Pulses: Normal pulses.           Radial pulses are 2+ on the right side and 2+ on the left side.        Posterior tibial pulses are 2+ on the right side and 2+ on the left side.      Heart sounds: Normal heart sounds.   Pulmonary:      Effort: Pulmonary effort is normal.      Breath sounds: Normal breath sounds.   Musculoskeletal:      Right lower leg: No edema.      Left lower leg: No edema.   Skin:     General: Skin is warm and dry.   Neurological:      Mental Status: He is alert.      Motor: Tremor present.      Comments: Tremors of bilateral upper extremities              Result Review :  The following data was reviewed by: VIRGILIO Huerta on 04/21/2025:  CMP          3/27/2025    14:29 4/2/2025    13:06 4/17/2025    13:23   CMP   Glucose 115  104  103    BUN 19  16  12    Creatinine 1.39  1.40  1.43    EGFR 58.0  57.5  56.1    Sodium 138  138  136    Potassium 4.0  4.0  4.1    Chloride 101  104  103    Calcium 9.3  8.9  8.5    BUN/Creatinine Ratio 13.7  11.4  8.4    Anion Gap 9.0  11.0  9.0      CBC          12/30/2024    14:20 2/10/2025    14:04 3/24/2025    10:04   CBC   WBC 6.54  6.65  5.14    RBC 4.24  3.72  4.43    Hemoglobin 12.4  11.6  13.2    Hematocrit 38.1  36.8  41.9    MCV 89.9  98.9  94.6    MCH 29.2  31.2  29.8    MCHC 32.5  31.5  31.5    RDW 15.5  14.7  12.4    Platelets 170  209  158      TSH          7/22/2024    16:32   TSH   TSH 7.340      [id number] SCORE   [id number] Score: 3 (4/21/2025  1:51 PM)      ECG 12 Lead    Date/Time: 4/21/2025 2:46 PM  Performed by: Lul Mason APRN    Authorized by: Lul Mason APRN  Comparison: compared with previous ECG from 3/27/2025  Similar to previous ECG  Rhythm: sinus tachycardia  Rate: tachycardic  BPM: 102  Conduction: non-specific intraventricular  conduction delay  QRS axis: left    Clinical impression: abnormal EKG              Assessment and Plan   Diagnoses and all orders for this visit:    1. PAF (paroxysmal atrial fibrillation) (Primary)  -     metoprolol succinate XL (TOPROL-XL) 25 MG 24 hr tablet; Take 1 tablet by mouth Daily.  Dispense: 90 tablet; Refill: 3  -     ECG 12 Lead            Plan:  - Heart rates still elevated at rest. Will increase dose of metoprolol to 50 mg daily for rate control.  - Continue Eliquis for anticoagulation post ablation and given his [id number] score of 3.  - Recommend diet consisting of calorie counting.  - Recommend at least 30 minutes of exercise daily for weight loss.  - His following lines up with the timing of his ablation as well as certain medication changes. If he continues having falls he may need to be worked up for Watchman.  - A Holter monitor may also be beneficial if falling continues.      Follow Up   Return in about 3 months (around 7/21/2025).  Patient was given instructions and counseling regarding his condition or for health maintenance advice. Please see specific information pulled into the AVS if appropriate.     Part of this note may be an electronic transcription/translation of spoken language to printed text using the Dragon Dictation System.

## 2025-04-24 ENCOUNTER — TELEPHONE (OUTPATIENT)
Dept: CARDIOLOGY | Facility: CLINIC | Age: 60
End: 2025-04-24
Payer: MEDICARE

## 2025-04-30 ENCOUNTER — HOSPITAL ENCOUNTER (OUTPATIENT)
Dept: CARDIOLOGY | Facility: HOSPITAL | Age: 60
Discharge: HOME | End: 2025-04-30
Admitting: NURSE PRACTITIONER
Payer: MEDICARE

## 2025-04-30 VITALS
OXYGEN SATURATION: 96 % | HEART RATE: 91 BPM | BODY MASS INDEX: 37.62 KG/M2 | SYSTOLIC BLOOD PRESSURE: 130 MMHG | DIASTOLIC BLOOD PRESSURE: 88 MMHG | WEIGHT: 199 LBS

## 2025-04-30 DIAGNOSIS — I50.22 CHRONIC HFREF (HEART FAILURE WITH REDUCED EJECTION FRACTION): Primary | ICD-10-CM

## 2025-04-30 LAB
ABSOLUTE LUNG FLUID CONTENT: 43 % (ref 20–35)
ANION GAP SERPL CALCULATED.3IONS-SCNC: 10 MMOL/L (ref 5–15)
BUN SERPL-MCNC: 13 MG/DL (ref 8–23)
BUN/CREAT SERPL: 9 (ref 7–25)
CALCIUM SPEC-SCNC: 8.3 MG/DL (ref 8.6–10.5)
CHLORIDE SERPL-SCNC: 104 MMOL/L (ref 98–107)
CO2 SERPL-SCNC: 24 MMOL/L (ref 22–29)
CREAT SERPL-MCNC: 1.45 MG/DL (ref 0.76–1.27)
EGFRCR SERPLBLD CKD-EPI 2021: 55.2 ML/MIN/1.73
GLUCOSE SERPL-MCNC: 94 MG/DL (ref 65–99)
POTASSIUM SERPL-SCNC: 4.1 MMOL/L (ref 3.5–5.2)
SODIUM SERPL-SCNC: 138 MMOL/L (ref 136–145)

## 2025-04-30 PROCEDURE — 80048 BASIC METABOLIC PNL TOTAL CA: CPT | Performed by: NURSE PRACTITIONER

## 2025-04-30 PROCEDURE — 94726 PLETHYSMOGRAPHY LUNG VOLUMES: CPT | Performed by: NURSE PRACTITIONER

## 2025-04-30 RX ORDER — SACUBITRIL AND VALSARTAN 97; 103 MG/1; MG/1
1 TABLET, FILM COATED ORAL 2 TIMES DAILY
Qty: 60 TABLET | Refills: 11 | Status: SHIPPED | OUTPATIENT
Start: 2025-04-30

## 2025-04-30 NOTE — PROGRESS NOTES
Heart Failure Clinic    Date:  04/30/25     Vitals:    04/30/25 1306   BP: 130/88   Pulse: 91   SpO2: 96%        Indication:  Heart Failure    Procedure:  ReDS device sensor unit applied to right side of chest and right side of back.  Appropriate positioning confirmed based off of the unit's calculation.  Chest measurement obtained with the chest size ruler.  Measurement session performed over 45 seconds.      Results:  ReDS Value=   43    Interpretation:  39-46% - elevated lung fluid content    Mallory L Haase, RN 04/30/25 13:07 CDT

## 2025-04-30 NOTE — PROGRESS NOTES
Heart Failure Clinic Progress Note  Reason For Visit:  CHF    Subjective    Ziyad Wilson is a 59 y.o. male with the below pertinent PMH who presents for follow-up of CHF.    Ziyad Wilson was most recently seen in the heart failure clinic on 4/17/2025.  At that time he was doing well and felt much better about his medications.  He was using pill planners and felt he had a better handle on this.  His weight was up by 4 pounds and his ReDs reading was 26%.  No symptoms of hypervolemia were on examination.  I increased his Entresto to 49-51 mg twice daily.  We did additionally change his Jardiance to Farxiga for financial assistance.    Overall he notes he is doing well.  He denies any shortness of breath, lightheadedness, dizziness, chest pain, or peripheral edema.  He is increase his dose of Entresto to 49-51 mg twice daily and notes that he is tolerating it very well.  He has no new concerns today.  His weight today is down 1 pound and his ReDs reading is 43%.    Review of Systems   Constitutional: Negative for malaise/fatigue.   Cardiovascular:  Negative for chest pain, dyspnea on exertion and leg swelling.   Neurological:  Negative for dizziness and light-headedness.     Pertinent PMH  HFrEF due to NICM  Mild to moderate MR  Persistent atrial fibrillation s/p cardioversion 7/29/24  Hypertension  Hyperlipidemia  Coronary Artery Calcification on CT  [facility]    Pertinent past medical, surgical, family, and social history were reviewed.    Current Outpatient Medications   Medication Instructions    albuterol sulfate  (90 Base) MCG/ACT inhaler 2 puffs, Inhalation, Every 4 Hours PRN    amitriptyline (ELAVIL) 75 mg, Nightly    apixaban (ELIQUIS) 5 mg, Oral, Every 12 Hours Scheduled    atorvastatin (LIPITOR) 40 mg, Oral, Daily    buPROPion XL (WELLBUTRIN XL) 300 MG 24 hr tablet 1 tablet, Daily    dapagliflozin Propanediol (FARXIGA) 10 mg, Oral, Daily    diclofenac (VOLTAREN) 4 g, Topical, 3 Times Daily     "fluticasone (Flonase) 50 MCG/ACT nasal spray 2 sprays, Nasal, Daily, Administer 2 sprays in each nostril for each dose.     glucosamine-chondroitin 500-400 MG capsule capsule 2 capsules, Daily    levothyroxine (SYNTHROID, LEVOTHROID) 75 mcg, Every Early Morning    meloxicam (MOBIC) 15 MG tablet 1 tablet, Daily    metoprolol succinate XL (TOPROL-XL) 25 mg, Oral, Daily    Multiple Vitamins-Minerals (MULTIVITAMIN WITH MINERALS) tablet tablet 1 tablet, Daily    nitroglycerin (NITROSTAT) 0.4 mg, Sublingual, Every 5 Minutes PRN, Take no more than 3 doses in 15 minutes.    omeprazole (PRILOSEC) 20 mg, Oral, Daily    PARoxetine (PAXIL) 20 mg, Oral, Daily    potassium chloride (MICRO-K) 10 MEQ CR capsule 10 mEq, Oral, Daily    QUEtiapine (SEROQUEL) 100 mg, Oral, Every Night at Bedtime    sacubitril-valsartan (Entresto)  MG tablet 1 tablet, Oral, 2 Times Daily    traZODone (DESYREL) 50 mg, Nightly        Objective   Vital Signs:  /88 (BP Location: Left arm, Patient Position: Sitting)   Pulse 91   Wt 90.3 kg (199 lb)   SpO2 96%   BMI 37.62 kg/m²   Estimated body mass index is 37.62 kg/m² as calculated from the following:    Height as of 4/21/25: 154.9 cm (60.98\").    Weight as of this encounter: 90.3 kg (199 lb).    Neck:      Vascular: JVD normal.   Pulmonary:      Effort: Pulmonary effort is normal.      Breath sounds: Normal breath sounds.   Cardiovascular:      Normal rate. Regular rhythm. Normal S1. Normal S2.       Murmurs: There is no murmur.      No gallop.  No click. No rub.   Pulses:     Intact distal pulses.   Edema:     Peripheral edema absent.   Abdominal:      Palpations: Abdomen is soft.      Tenderness: There is no abdominal tenderness.   Skin:     General: Skin is warm and dry.   Neurological:      Mental Status: Alert and oriented to person, place and time.        The following data was reviewed by myself, VIRGILIO Escobar  BMP   BMP          4/2/2025    13:06 4/17/2025    13:23 4/30/2025 "    13:19   BMP   BUN 16  12  13    Creatinine 1.40  1.43  1.45    Sodium 138  136  138    Potassium 4.0  4.1  4.1    Chloride 104  103  104    CO2 23.0  24.0  24.0    Calcium 8.9  8.5  8.3      ReDS   Lab Results   Component Value Date    ABSOLUTELUNG 43 (A) 04/30/2025    ABSOLUTELUNG 26 04/02/2025    ABSOLUTELUNG 37 (A) 03/27/2025       Results for orders placed during the hospital encounter of 10/01/24    Adult Transesophageal Echo ([facility]) W/ Cont if Necessary Per Protocol    Interpretation Summary    Left ventricular systolic function is moderately decreased.    The left ventricular cavity is mild to moderately dilated.    Left ventricular diastolic dysfunction is noted.    The left atrial cavity is dilated.    Mild to moderate mitral valve regurgitation is present.     Assessment   Assessment and Plan  Diagnoses and all orders for this visit:    1. Chronic HFrEF (heart failure with reduced ejection fraction) (Primary)  2. Paroxysmal atrial fibrillation  3. NICM (nonischemic cardiomyopathy), viral etiology  4. Coronary artery disease involving native coronary artery of native heart without angina pectoris  5. Mixed hyperlipidemia  6. Essential hypertension  Overall he appears to be compensated without any signs of hypervolemia.  He has tolerated GDMT titration of Entresto.  His renal function remains stable at this time without any specific concerns.  Overall he is doing very well and we will continue titration of his GDMT.  -Increase Entresto to  mg twice daily  - Farxiga 10 mg daily  - Toprol  mg daily  - Not on spironolactone secondary to renal function.  - Eliquis 5 mg twice daily  - Potassium 10 mEq daily  - Low-sodium diet, daily weights, blood pressure checks, fluid restriction       Follow Up:  Return in about 3 weeks (around 5/21/2025) for recheck.    Patient was given instructions and counseling regarding his condition or for health maintenance advice.      Willem Vasquez,  VIRGILIO  04/30/25  14:09 CDT

## 2025-04-30 NOTE — PATIENT INSTRUCTIONS
Increase Entresto to 97-103mg twice daily  Continue all other medications as prescribed.  Call the office with any concerns.

## 2025-05-01 ENCOUNTER — TELEPHONE (OUTPATIENT)
Dept: CARDIOLOGY | Facility: CLINIC | Age: 60
End: 2025-05-01
Payer: MEDICARE

## 2025-05-01 NOTE — TELEPHONE ENCOUNTER
Received fax from [employer] needing housing size of patient.. I called the patient and gathered needed information. Refax documents to sender. Bianca Solis MA

## 2025-07-18 ENCOUNTER — TRANSCRIBE ORDERS (OUTPATIENT)
Dept: ADMINISTRATIVE | Facility: HOSPITAL | Age: 60
End: 2025-07-18
Payer: MEDICARE

## 2025-07-18 DIAGNOSIS — M17.12 UNILATERAL PRIMARY OSTEOARTHRITIS, LEFT KNEE: ICD-10-CM

## 2025-07-18 DIAGNOSIS — M17.11 UNILATERAL PRIMARY OSTEOARTHRITIS, RIGHT KNEE: ICD-10-CM

## 2025-07-18 DIAGNOSIS — M17.0 BILATERAL PRIMARY OSTEOARTHRITIS OF KNEE: Primary | ICD-10-CM

## 2025-07-21 ENCOUNTER — OFFICE VISIT (OUTPATIENT)
Dept: CARDIOLOGY | Facility: CLINIC | Age: 60
End: 2025-07-21
Payer: MEDICARE

## 2025-07-21 VITALS
HEIGHT: 61 IN | WEIGHT: 191.4 LBS | BODY MASS INDEX: 36.14 KG/M2 | HEART RATE: 91 BPM | SYSTOLIC BLOOD PRESSURE: 118 MMHG | DIASTOLIC BLOOD PRESSURE: 84 MMHG | OXYGEN SATURATION: 97 %

## 2025-07-21 DIAGNOSIS — I50.22 CHRONIC HFREF (HEART FAILURE WITH REDUCED EJECTION FRACTION): ICD-10-CM

## 2025-07-21 DIAGNOSIS — I48.0 PAF (PAROXYSMAL ATRIAL FIBRILLATION): Primary | ICD-10-CM

## 2025-07-21 PROCEDURE — 3074F SYST BP LT 130 MM HG: CPT

## 2025-07-21 PROCEDURE — 99214 OFFICE O/P EST MOD 30 MIN: CPT

## 2025-07-21 PROCEDURE — 1159F MED LIST DOCD IN RCRD: CPT

## 2025-07-21 PROCEDURE — 1160F RVW MEDS BY RX/DR IN RCRD: CPT

## 2025-07-21 PROCEDURE — 3079F DIAST BP 80-89 MM HG: CPT

## 2025-07-21 PROCEDURE — 93000 ELECTROCARDIOGRAM COMPLETE: CPT

## 2025-07-21 RX ORDER — METOPROLOL SUCCINATE 50 MG/1
50 TABLET, EXTENDED RELEASE ORAL DAILY
Qty: 30 TABLET | Refills: 11 | Status: SHIPPED | OUTPATIENT
Start: 2025-07-21

## 2025-07-21 NOTE — PROGRESS NOTES
"EP FOLLOW UP PATIENT VISIT    Chief Complaint  <9>Paroxysmal atrial fibrillation (Persistent AFIB /Paroxysmal AFIB /3.24.2025 PVI Ablation /3 Month Follow Up )    Subjective        History of Present Illness    EP Problems:  1.  Persistent atrial fibrillation  -3/24/2025: PVI ablation  2.  Severe biatrial enlargement     Cardiology Problems:  1.  Severe MR  2.  Nonischemic cardiomyopathy  3.  Chronic systolic heart failure  4.  Hypertension  5.  Hyperlipidemia     Medical Problems:  1.  Obesity  2.  Obstructive sleep apnea  3.  Chronic normocytic anemia          Patient is a 60-year-old male who presents to the clinic following up for persistent atrial fibrillation. He was newly diagnosed with atrial fibrillation in July of last year. He underwent an EP study was noted to have severe biatrial enlargement. He had a successful PVI ablation 3/24/2025 for treatment of his atrial fibrillation. At his last visit his metoprolol was increased to 50 mg daily for better rate control.    He says he has been doing well since his last visit. He denies any recurrences of atrial fibrillation that he is aware of. He was started on 50 mg of metoprolol at his last visit but he said he is only taking 25 mg. He is in sinus rhythm on his EKG today with a ventricular rate of 91 bpm. He says he does not monitor his heart rate at home. He said that his blood pressures have been well-controlled lately. He continues to take Eliquis for anticoagulation. He said he recently got a new dog that has been scratching up his arms but other than that he is not having any bleeding problems.    Objective   Vital Signs:  /84 (BP Location: Right arm, Patient Position: Sitting, Cuff Size: Adult)   Pulse 91   Ht 154.9 cm (60.98\")   Wt 86.8 kg (191 lb 6.4 oz)   SpO2 97%   BMI 36.18 kg/m²   Estimated body mass index is 36.18 kg/m² as calculated from the following:    Height as of this encounter: 154.9 cm (60.98\").    Weight as of this encounter: " 86.8 kg (191 lb 6.4 oz).      Physical Exam  Vitals reviewed.   Constitutional:       Appearance: Normal appearance. He is obese.   Cardiovascular:      Rate and Rhythm: Normal rate and regular rhythm.      Pulses: Normal pulses.           Radial pulses are 2+ on the right side and 2+ on the left side.        Posterior tibial pulses are 2+ on the right side and 2+ on the left side.      Heart sounds: Normal heart sounds.   Pulmonary:      Effort: Pulmonary effort is normal.      Breath sounds: Normal breath sounds.   Musculoskeletal:      Right lower leg: No edema.      Left lower leg: No edema.   Skin:     General: Skin is warm and dry.   Neurological:      Mental Status: He is alert.                 Result Review :  The following data was reviewed by: VIRGILIO Huerta on 07/21/2025:  CMP          4/2/2025    13:06 4/17/2025    13:23 4/30/2025    13:19   CMP   Glucose 104  103  94    BUN 16  12  13    Creatinine 1.40  1.43  1.45    EGFR 57.5  56.1  55.2    Sodium 138  136  138    Potassium 4.0  4.1  4.1    Chloride 104  103  104    Calcium 8.9  8.5  8.3    BUN/Creatinine Ratio 11.4  8.4  9.0    Anion Gap 11.0  9.0  10.0      CBC          12/30/2024    14:20 2/10/2025    14:04 3/24/2025    10:04   CBC   WBC 6.54  6.65  5.14    RBC 4.24  3.72  4.43    Hemoglobin 12.4  11.6  13.2    Hematocrit 38.1  36.8  41.9    MCV 89.9  98.9  94.6    MCH 29.2  31.2  29.8    MCHC 32.5  31.5  31.5    RDW 15.5  14.7  12.4    Platelets 170  209  158            ECG 12 Lead    Date/Time: 7/21/2025 2:20 PM  Performed by: Lul Mason APRN    Authorized by: Lul Mason APRN  Comparison: compared with previous ECG from 4/21/2025  Rhythm: sinus rhythm  Rate: normal  Conduction: 1st degree AV block  QRS axis: left  Other findings: low voltage    Clinical impression: abnormal EKG         [id number] SCORE   [id number] Score: 3 (7/21/2025 12:36 PM)       Assessment and Plan   Diagnoses and all orders for this visit:    1. PAF  (paroxysmal atrial fibrillation) (Primary)  -     metoprolol succinate XL (TOPROL-XL) 50 MG 24 hr tablet; Take 1 tablet by mouth Daily.  Dispense: 30 tablet; Refill: 11  -     ECG 12 Lead    2. Chronic HFrEF (heart failure with reduced ejection fraction)  -     metoprolol succinate XL (TOPROL-XL) 50 MG 24 hr tablet; Take 1 tablet by mouth Daily.  Dispense: 30 tablet; Refill: 11            Plan:  - Continue Eliquis 5 mg twice a day for anticoagulation given his elevated [id number] score of 3.  - Will send in a new prescription for 50 mg of metoprolol succinate daily for better rate control.  - Continue GDMT per the heart failure clinic for management of his systolic heart failure.  - He missed his last visit with the heart failure clinic. Will arrange for him to have a follow-up in the next couple weeks with a provider there.                   Follow Up   Return in about 6 months (around 1/21/2026).  Patient was given instructions and counseling regarding his condition or for health maintenance advice. Please see specific information pulled into the AVS if appropriate.     Part of this note may be an electronic transcription/translation of spoken language to printed text using the Dragon Dictation System.

## 2025-07-22 ENCOUNTER — HOSPITAL ENCOUNTER (OUTPATIENT)
Dept: GENERAL RADIOLOGY | Facility: HOSPITAL | Age: 60
Discharge: HOME | End: 2025-07-22
Admitting: NURSE PRACTITIONER
Payer: MEDICARE

## 2025-07-22 ENCOUNTER — TRANSCRIBE ORDERS (OUTPATIENT)
Dept: ADMINISTRATIVE | Facility: HOSPITAL | Age: 60
End: 2025-07-22
Payer: MEDICARE

## 2025-07-22 DIAGNOSIS — M25.562 LEFT KNEE PAIN, UNSPECIFIED CHRONICITY: Primary | ICD-10-CM

## 2025-07-22 DIAGNOSIS — M25.562 LEFT KNEE PAIN, UNSPECIFIED CHRONICITY: ICD-10-CM

## 2025-07-22 PROCEDURE — 73564 X-RAY EXAM KNEE 4 OR MORE: CPT

## 2025-07-24 ENCOUNTER — EXTERNAL PBMM DATA (OUTPATIENT)
Dept: PHARMACY | Facility: OTHER | Age: 60
End: 2025-07-24
Payer: MEDICARE

## (undated) DEVICE — PAD, DEFIB, ADULT, RADIOTRANS, PHYSIO: Brand: MEDLINE

## (undated) DEVICE — PULSED FIELD ABLATION CATHETER: Brand: FARAWAVE™

## (undated) DEVICE — DRSNG SURESITE WNDW 4X4.5

## (undated) DEVICE — 1 X VERSACROSS CONNECT TRANSSEPTAL DILATOR;  1 X VERSACROSS RF WIRE (INCLUDING 1 X CONNECTOR CABLE (SINGLE USE)): Brand: VERSACROSS CONNECT ACCESS SOLUTION FOR FARADRIVE

## (undated) DEVICE — SUREFIT, DUAL DISPERSIVE ELECTRODE, CONTACT QUALITY MONITOR: Brand: SUREFIT

## (undated) DEVICE — DRAPE,ANGIO,BRACH,STERILE,38X44: Brand: MEDLINE

## (undated) DEVICE — CANN NASL ETCO2 LO/FLO A/

## (undated) DEVICE — RADIFOCUS OPTITORQUE ANGIOGRAPHIC CATHETER: Brand: OPTITORQUE

## (undated) DEVICE — Device: Brand: REFERENCE PATCH CARTO 3

## (undated) DEVICE — PK CATH CARD 30 CA/4

## (undated) DEVICE — KT NDL GUIDE STRL 18GA

## (undated) DEVICE — SI AVANTI+ 8F STD W/GW  NO OBT: Brand: AVANTI

## (undated) DEVICE — LIMB HOLDER, WRIST/ANKLE: Brand: DEROYAL

## (undated) DEVICE — MODEL BT2000 P/N 700287-012KIT CONTENTS: MANIFOLD WITH SALINE AND CONTRAST PORTS, SALINE TUBING WITH SPIKE AND HAND SYRINGE, TRANSDUCER: Brand: BT2000 AUTOMATED MANIFOLD KIT

## (undated) DEVICE — SUP ARMBRD ART/LINE BLU

## (undated) DEVICE — SOL IRR NACL 0.9PCT BO 1000ML

## (undated) DEVICE — GW STARTER FXD/CORE PTFE/COAT J/TP/3MM .035IN 10X150CM

## (undated) DEVICE — SENSR O2 OXIMAX FNGR A/ 18IN NONSTR

## (undated) DEVICE — SLV CBL IVL 5X96IN

## (undated) DEVICE — SYS COL WAST NAMIC IV SGL/LN FML/FIT W/VNT/SPK/HD 72IN

## (undated) DEVICE — MODEL AT P65, P/N 701554-001KIT CONTENTS: HAND CONTROLLER, 3-WAY HIGH-PRESSURE STOPCOCK WITH ROTATING END AND PREMIUM HIGH-PRESSURE TUBING: Brand: ANGIOTOUCH® KIT

## (undated) DEVICE — STPCK 3/WY HP M/RA W/OFF/HNDL 1050PSI STRL

## (undated) DEVICE — TBG PRESS/MONITR FIX M/F LL A/ 48IN STRL

## (undated) DEVICE — MSK O2 MD CONCENTR A/ LF 7FT 1P/U

## (undated) DEVICE — PRESSURE MONITORING SET: Brand: TRUWAVE

## (undated) DEVICE — Device: Brand: PENTARAY NAV

## (undated) DEVICE — SOL NS 500ML

## (undated) DEVICE — TBG SMPL FLTR LINE NASL 02/C02 A/ BX/100

## (undated) DEVICE — SOLIDIFIER LIQ LIQUILOC/PLUS W/TREAT 2000CC

## (undated) DEVICE — Device: Brand: SOUNDSTAR

## (undated) DEVICE — Device: Brand: DEFENDO AIR/WATER/SUCTION AND BIOPSY VALVE

## (undated) DEVICE — CATHETER CONNECTION CABLE: Brand: FARASTAR™

## (undated) DEVICE — SYS CLS VASC/VENI VASCADE MVP 6TO12F

## (undated) DEVICE — FRCP BX RADJAW4 NDL 2.8 240 STD OG

## (undated) DEVICE — TR BAND RADIAL ARTERY COMPRESSION DEVICE: Brand: TR BAND

## (undated) DEVICE — A2000 MULTI-USE SYRINGE KIT, P/N 701277-003KIT CONTENTS: 100ML CONTRAST RESERVOIR AND TUBING WITH CONTRAST SPIKE AND CLAMP: Brand: A2000 MULTI-USE SYRINGE KIT

## (undated) DEVICE — Device: Brand: WEBSTER CS

## (undated) DEVICE — SOL IRR NACL 0.9PCT BT 1000ML

## (undated) DEVICE — SI AVANTI+ 10F STD W/GW: Brand: AVANTI

## (undated) DEVICE — THE CHANNEL CLEANING BRUSH IS A NYLON FLEXI BRUSH ATTACHED TO A FLEXIBLE PLASTIC SHEATH DESIGNED TO SAFELY REMOVE DEBRIS FROM FLEXIBLE ENDOSCOPES.

## (undated) DEVICE — CABL BIPOL W/ALLGTR CLIP/SM 12FT

## (undated) DEVICE — GLIDESHEATH SLENDER STAINLESS STEEL KIT: Brand: GLIDESHEATH SLENDER

## (undated) DEVICE — ENDOGATOR AUXILIARY WATER JET CONNECTOR: Brand: ENDOGATOR

## (undated) DEVICE — STEERABLE SHEATH CLEAR: Brand: FARADRIVE™